# Patient Record
Sex: FEMALE | Race: WHITE | NOT HISPANIC OR LATINO | Employment: UNEMPLOYED | URBAN - METROPOLITAN AREA
[De-identification: names, ages, dates, MRNs, and addresses within clinical notes are randomized per-mention and may not be internally consistent; named-entity substitution may affect disease eponyms.]

---

## 2019-07-03 ENCOUNTER — HOSPITAL ENCOUNTER (EMERGENCY)
Facility: HOSPITAL | Age: 59
Discharge: HOME | End: 2019-07-03
Attending: EMERGENCY MEDICINE | Admitting: EMERGENCY MEDICINE
Payer: COMMERCIAL

## 2019-07-03 ENCOUNTER — APPOINTMENT (EMERGENCY)
Dept: RADIOLOGY | Facility: HOSPITAL | Age: 59
End: 2019-07-03
Payer: COMMERCIAL

## 2019-07-03 ENCOUNTER — OFFICE VISIT (OUTPATIENT)
Dept: URGENT CARE | Age: 59
End: 2019-07-03
Payer: COMMERCIAL

## 2019-07-03 VITALS
TEMPERATURE: 97.5 F | DIASTOLIC BLOOD PRESSURE: 94 MMHG | RESPIRATION RATE: 20 BRPM | HEART RATE: 76 BPM | OXYGEN SATURATION: 96 % | WEIGHT: 293 LBS | SYSTOLIC BLOOD PRESSURE: 148 MMHG | HEIGHT: 65 IN | BODY MASS INDEX: 48.82 KG/M2

## 2019-07-03 VITALS
WEIGHT: 293 LBS | TEMPERATURE: 98.4 F | SYSTOLIC BLOOD PRESSURE: 137 MMHG | BODY MASS INDEX: 48.82 KG/M2 | OXYGEN SATURATION: 93 % | HEART RATE: 72 BPM | DIASTOLIC BLOOD PRESSURE: 67 MMHG | HEIGHT: 65 IN | RESPIRATION RATE: 16 BRPM

## 2019-07-03 DIAGNOSIS — R63.5 ABNORMAL WEIGHT GAIN: Primary | ICD-10-CM

## 2019-07-03 DIAGNOSIS — R06.02 SHORTNESS OF BREATH: ICD-10-CM

## 2019-07-03 DIAGNOSIS — R06.02 SOB (SHORTNESS OF BREATH): Primary | ICD-10-CM

## 2019-07-03 LAB
ALBUMIN SERPL BCP-MCNC: 3.7 G/DL (ref 3.5–5)
ALP SERPL-CCNC: 83 U/L (ref 46–116)
ALT SERPL W P-5'-P-CCNC: 24 U/L (ref 12–78)
ANION GAP SERPL CALCULATED.3IONS-SCNC: 3 MMOL/L (ref 4–13)
AST SERPL W P-5'-P-CCNC: 12 U/L (ref 5–45)
BASOPHILS # BLD AUTO: 0.02 THOUSANDS/ΜL (ref 0–0.1)
BASOPHILS NFR BLD AUTO: 0 % (ref 0–1)
BILIRUB SERPL-MCNC: 0.4 MG/DL (ref 0.2–1)
BUN SERPL-MCNC: 16 MG/DL (ref 5–25)
CALCIUM SERPL-MCNC: 9.9 MG/DL (ref 8.3–10.1)
CHLORIDE SERPL-SCNC: 105 MMOL/L (ref 100–108)
CO2 SERPL-SCNC: 28 MMOL/L (ref 21–32)
CREAT SERPL-MCNC: 0.91 MG/DL (ref 0.6–1.3)
EOSINOPHIL # BLD AUTO: 0.15 THOUSAND/ΜL (ref 0–0.61)
EOSINOPHIL NFR BLD AUTO: 2 % (ref 0–6)
ERYTHROCYTE [DISTWIDTH] IN BLOOD BY AUTOMATED COUNT: 13.4 % (ref 11.6–15.1)
GFR SERPL CREATININE-BSD FRML MDRD: 70 ML/MIN/1.73SQ M
GLUCOSE SERPL-MCNC: 86 MG/DL (ref 65–140)
HCT VFR BLD AUTO: 41 % (ref 34.8–46.1)
HGB BLD-MCNC: 13.3 G/DL (ref 11.5–15.4)
IMM GRANULOCYTES # BLD AUTO: 0.02 THOUSAND/UL (ref 0–0.2)
IMM GRANULOCYTES NFR BLD AUTO: 0 % (ref 0–2)
LYMPHOCYTES # BLD AUTO: 1.63 THOUSANDS/ΜL (ref 0.6–4.47)
LYMPHOCYTES NFR BLD AUTO: 22 % (ref 14–44)
MCH RBC QN AUTO: 30.3 PG (ref 26.8–34.3)
MCHC RBC AUTO-ENTMCNC: 32.4 G/DL (ref 31.4–37.4)
MCV RBC AUTO: 93 FL (ref 82–98)
MONOCYTES # BLD AUTO: 0.88 THOUSAND/ΜL (ref 0.17–1.22)
MONOCYTES NFR BLD AUTO: 12 % (ref 4–12)
NEUTROPHILS # BLD AUTO: 4.66 THOUSANDS/ΜL (ref 1.85–7.62)
NEUTS SEG NFR BLD AUTO: 64 % (ref 43–75)
NRBC BLD AUTO-RTO: 0 /100 WBCS
NT-PROBNP SERPL-MCNC: 65 PG/ML
PLATELET # BLD AUTO: 196 THOUSANDS/UL (ref 149–390)
PMV BLD AUTO: 11.1 FL (ref 8.9–12.7)
POTASSIUM SERPL-SCNC: 3.9 MMOL/L (ref 3.5–5.3)
PROT SERPL-MCNC: 7.7 G/DL (ref 6.4–8.2)
RBC # BLD AUTO: 4.39 MILLION/UL (ref 3.81–5.12)
SODIUM SERPL-SCNC: 136 MMOL/L (ref 136–145)
TROPONIN I SERPL-MCNC: <0.02 NG/ML
WBC # BLD AUTO: 7.36 THOUSAND/UL (ref 4.31–10.16)

## 2019-07-03 PROCEDURE — 85025 COMPLETE CBC W/AUTO DIFF WBC: CPT | Performed by: EMERGENCY MEDICINE

## 2019-07-03 PROCEDURE — 93005 ELECTROCARDIOGRAM TRACING: CPT

## 2019-07-03 PROCEDURE — 99213 OFFICE O/P EST LOW 20 MIN: CPT | Performed by: NURSE PRACTITIONER

## 2019-07-03 PROCEDURE — 80053 COMPREHEN METABOLIC PANEL: CPT | Performed by: EMERGENCY MEDICINE

## 2019-07-03 PROCEDURE — 71046 X-RAY EXAM CHEST 2 VIEWS: CPT

## 2019-07-03 PROCEDURE — 84484 ASSAY OF TROPONIN QUANT: CPT | Performed by: EMERGENCY MEDICINE

## 2019-07-03 PROCEDURE — 73110 X-RAY EXAM OF WRIST: CPT

## 2019-07-03 PROCEDURE — 83880 ASSAY OF NATRIURETIC PEPTIDE: CPT | Performed by: EMERGENCY MEDICINE

## 2019-07-03 PROCEDURE — 99284 EMERGENCY DEPT VISIT MOD MDM: CPT | Performed by: EMERGENCY MEDICINE

## 2019-07-03 PROCEDURE — 36415 COLL VENOUS BLD VENIPUNCTURE: CPT

## 2019-07-03 PROCEDURE — 99285 EMERGENCY DEPT VISIT HI MDM: CPT

## 2019-07-03 RX ORDER — LISINOPRIL AND HYDROCHLOROTHIAZIDE 12.5; 1 MG/1; MG/1
1 TABLET ORAL DAILY
Refills: 1 | COMMUNITY
Start: 2019-05-08 | End: 2019-07-10

## 2019-07-03 RX ORDER — ACETAMINOPHEN 500 MG
500 TABLET ORAL EVERY 6 HOURS PRN
COMMUNITY

## 2019-07-03 NOTE — DISCHARGE INSTRUCTIONS
You were seen in the emergency department today for shortness of breath  Please proceed with your plan to establish with a new primary care provider as soon as possible to discuss outpatient management of your leg swelling and increasing your level of activity  Continue taking your current medications as prescribed in the meantime

## 2019-07-03 NOTE — ED PROVIDER NOTES
History  Chief Complaint   Patient presents with    Shortness of Breath     pt reports increased swelling in legs, SOB and weight gain for the past 6 weeks  (25 lbs in 6 weeks)      Ms Brody High presents with several months of increasing shortness of breath and leg swelling  She went to an urgent care earlier today due to non-traumatic wrist pain, but was told to go to the ED when she mentioned her shortness of breath and leg swelling  She reports that she has gained 25 pounds over the last 6 weeks  She smoked 1 ppd from age 17 until this past December but denies baseline shortness of breath  Her shortness of breath is worse on exertion but she denies any chest pain, pain in arm(s), indigestion, lightheadedness, syncope, or palpitations at rest or on exertion  She is unable to walk up a full flight of stairs due to SOB  She does not have a history of heart problems herself but her father  from heart problems in his late 52's, his brothers and sisters at about the same age, and her mother had two heart attacks before passing away from a AAA  She denies any back or abdominal pain, numbness or tingling, HA, vision changes, cough, wheezing, recent illnesses  She occassionally has lightheadedness when she stands up quickly from laying down  She has had L wrist pain for several weeks, not made worse by anything in particular but relieved by holding it in neutral position and wrapping it in an ACE bandage  She has mild numbness in her palm but not in her fingers         History provided by:  Patient   used: No    Shortness of Breath   Severity:  Moderate  Onset quality:  Gradual  Duration:  6 months  Progression:  Worsening  Chronicity:  Chronic  Context: activity    Relieved by:  Rest  Worsened by:  Exertion  Associated symptoms: no abdominal pain, no chest pain, no claudication, no cough, no diaphoresis, no fever, no headaches, no neck pain, no rash, no sore throat, no syncope and no vomiting    Risk factors: obesity and tobacco use    Risk factors: no hx of PE/DVT    Wrist Pain   Location:  Volar L wrist  Quality:  Dull  Severity:  Mild  Onset quality:  Gradual  Duration:  6 weeks  Chronicity:  New  Associated symptoms: shortness of breath    Associated symptoms: no abdominal pain, no chest pain, no cough, no diarrhea, no fatigue, no fever, no headaches, no myalgias, no nausea, no rash, no rhinorrhea, no sore throat and no vomiting        Prior to Admission Medications   Prescriptions Last Dose Informant Patient Reported? Taking?   acetaminophen (TYLENOL) 500 mg tablet 7/3/2019 at Unknown time Self Yes Yes   Sig: Take 500 mg by mouth every 6 (six) hours as needed for mild pain   lisinopril-hydrochlorothiazide (PRINZIDE,ZESTORETIC) 10-12 5 MG per tablet Past Week at Unknown time  Yes Yes   Sig: Take 1 tablet by mouth daily      Facility-Administered Medications: None       Past Medical History:   Diagnosis Date    COPD (chronic obstructive pulmonary disease) ([facility] [address] )     Hypertension        Past Surgical History:   Procedure Laterality Date    HYSTERECTOMY      REPLACEMENT TOTAL KNEE         Family History   Problem Relation Age of Onset    Other Mother     Heart failure Father     Diabetes Father      I have reviewed and agree with the history as documented  Social History     Tobacco Use    Smoking status: Former Smoker    Smokeless tobacco: Never Used   Substance Use Topics    Alcohol use: Never     Frequency: Never    Drug use: Never        Review of Systems   Constitutional: Positive for activity change (less)  Negative for chills, diaphoresis, fatigue and fever  HENT: Negative for rhinorrhea and sore throat  Eyes: Negative for visual disturbance  Respiratory: Positive for shortness of breath  Negative for cough  Cardiovascular: Positive for leg swelling  Negative for chest pain, palpitations, claudication and syncope     Gastrointestinal: Negative for abdominal distention, abdominal pain, blood in stool, diarrhea, nausea and vomiting  Genitourinary: Negative for decreased urine volume, difficulty urinating, flank pain, hematuria and pelvic pain  Musculoskeletal: Negative for arthralgias, back pain, myalgias and neck pain  Skin: Negative for rash and wound  Neurological: Negative for dizziness, syncope, weakness, light-headedness, numbness and headaches  Hematological: Does not bruise/bleed easily  Psychiatric/Behavioral: Negative for confusion  Physical Exam  ED Triage Vitals [07/03/19 1143]   Temperature Pulse Respirations Blood Pressure SpO2   98 4 °F (36 9 °C) 79 20 (!) 176/91 94 %      Temp Source Heart Rate Source Patient Position - Orthostatic VS BP Location FiO2 (%)   Tympanic Monitor Lying Right arm --      Pain Score       5             Orthostatic Vital Signs  Vitals:    07/03/19 1143 07/03/19 1204 07/03/19 1400 07/03/19 1415   BP: (!) 176/91 157/83 137/67 137/67   Pulse: 79 73 74 72   Patient Position - Orthostatic VS: Lying Lying Lying Lying       Physical Exam   Constitutional: She is oriented to person, place, and time  She appears well-developed and well-nourished  No distress  HENT:   Head: Normocephalic and atraumatic  Mouth/Throat: Oropharynx is clear and moist  No oropharyngeal exudate  Eyes: Conjunctivae are normal  No scleral icterus  Neck: Neck supple  No thyromegaly present  Cardiovascular: Normal rate and regular rhythm  1+ b/l posterior tibial pulses, 2+ bl radial pulses   Pulmonary/Chest: Effort normal and breath sounds normal  No stridor  No respiratory distress  Abdominal: Soft  She exhibits no distension and no mass  There is no tenderness  There is no guarding  Musculoskeletal: She exhibits no deformity  Right lower leg: She exhibits edema (3+)  Left lower leg: She exhibits edema (3+)  Lymphadenopathy:     She has no cervical adenopathy     Neurological: She is alert and oriented to person, place, and time  Skin: Skin is warm and dry  Capillary refill takes less than 2 seconds  No rash noted  She is not diaphoretic  Psychiatric: She has a normal mood and affect  Her behavior is normal    Vitals reviewed        ED Medications  Medications - No data to display    Diagnostic Studies  Results Reviewed     Procedure Component Value Units Date/Time    Troponin I [[id number]]  (Normal) Collected:  07/03/19 1211    Lab Status:  Final result Specimen:  Blood from Arm, Left Updated:  07/03/19 1246     Troponin I <0 02 ng/mL     Comprehensive metabolic panel [[id number]]  (Abnormal) Collected:  07/03/19 1211    Lab Status:  Final result Specimen:  Blood from Arm, Left Updated:  07/03/19 1242     Sodium 136 mmol/L      Potassium 3 9 mmol/L      Chloride 105 mmol/L      CO2 28 mmol/L      ANION GAP 3 mmol/L      BUN 16 mg/dL      Creatinine 0 91 mg/dL      Glucose 86 mg/dL      Calcium 9 9 mg/dL      AST 12 U/L      ALT 24 U/L      Alkaline Phosphatase 83 U/L      Total Protein 7 7 g/dL      Albumin 3 7 g/dL      Total Bilirubin 0 40 mg/dL      eGFR 70 ml/min/1 73sq m     Narrative:       Lucero guidelines for Chronic Kidney Disease (CKD):     Stage 1 with normal or high GFR (GFR > 90 mL/min/1 73 square meters)    Stage 2 Mild CKD (GFR = 60-89 mL/min/1 73 square meters)    Stage 3A Moderate CKD (GFR = 45-59 mL/min/1 73 square meters)    Stage 3B Moderate CKD (GFR = 30-44 mL/min/1 73 square meters)    Stage 4 Severe CKD (GFR = 15-29 mL/min/1 73 square meters)    Stage 5 End Stage CKD (GFR <15 mL/min/1 73 square meters)  Note: GFR calculation is accurate only with a steady state creatinine    NT-BNP PRO (BNP for [place], [place], [place], [place], [place], [place], [place], [place] campuses) [[id number]]  (Normal) Collected:  07/03/19 1211    Lab Status:  Final result Specimen:  Blood from Arm, Left Updated:  07/03/19 1242     NT-proBNP 65 pg/mL     CBC and differential [[id number]] Collected:  07/03/19 1211    Lab Status: Final result Specimen:  Blood from Arm, Left Updated:  07/03/19 1220     WBC 7 36 Thousand/uL      RBC 4 39 Million/uL      Hemoglobin 13 3 g/dL      Hematocrit 41 0 %      MCV 93 fL      MCH 30 3 pg      MCHC 32 4 g/dL      RDW 13 4 %      MPV 11 1 fL      Platelets 488 Thousands/uL      nRBC 0 /100 WBCs      Neutrophils Relative 64 %      Immat GRANS % 0 %      Lymphocytes Relative 22 %      Monocytes Relative 12 %      Eosinophils Relative 2 %      Basophils Relative 0 %      Neutrophils Absolute 4 66 Thousands/µL      Immature Grans Absolute 0 02 Thousand/uL      Lymphocytes Absolute 1 63 Thousands/µL      Monocytes Absolute 0 88 Thousand/µL      Eosinophils Absolute 0 15 Thousand/µL      Basophils Absolute 0 02 Thousands/µL                  XR wrist 3+ views LEFT   Final Result by Tarun Jimenez MD (07/03 2372)      No acute osseous abnormality  Workstation performed: [id number]         XR chest 2 views   Final Result by Aamir Woodward MD (07/03 0385)      No acute cardiopulmonary disease  Workstation performed: [id number]               Procedures  Procedures        ED Course                               MDM  Number of Diagnoses or Management Options  SOB (shortness of breath): new and requires workup  Diagnosis management comments: Patient does not have tachycardia, low O2 saturation, unilateral leg swelling or tenderness  Patient has a normal pulmonary, cardiac, and abdominal exam and she has bilaterally symmetric pulses in her extremities  She has a negative troponin and labs including pro-BNP within normal limits  She has an established plan to get a new PCP         Amount and/or Complexity of Data Reviewed  Clinical lab tests: ordered and reviewed  Tests in the radiology section of CPT®: ordered and reviewed  Tests in the medicine section of CPT®: ordered and reviewed  Discussion of test results with the performing providers: yes  Review and summarize past medical records: yes  Discuss the patient with other providers: yes  Independent visualization of images, tracings, or specimens: yes    Risk of Complications, Morbidity, and/or Mortality  Presenting problems: low  Diagnostic procedures: minimal  Management options: minimal    Patient Progress  Patient progress: stable      Disposition  Final diagnoses:   SOB (shortness of breath)     Time reflects when diagnosis was documented in both MDM as applicable and the Disposition within this note     Time User Action Codes Description Comment    7/3/2019  2:45 PM Codey Giron Add [R06 02] SOB (shortness of breath)       ED Disposition     ED Disposition Condition Date/Time Comment    Discharge Good Wed Jul 3, 2019  3:15 PM Shimon Nicolas discharge to home/self care  Her labs are reassuring and she reports feeling well enough to go home  She is planning to get a new PCP ASAP  Encouraged to return to ED if she experiences chest pain, worsening shortness of michael th  Follow-up Information    None         Discharge Medication List as of 7/3/2019  2:48 PM      CONTINUE these medications which have NOT CHANGED    Details   acetaminophen (TYLENOL) 500 mg tablet Take 500 mg by mouth every 6 (six) hours as needed for mild pain, Historical Med      lisinopril-hydrochlorothiazide (PRINZIDE,ZESTORETIC) 10-12 5 MG per tablet Take 1 tablet by mouth daily, Starting Wed 5/8/2019, Historical Med           No discharge procedures on file  ED Provider  Attending physically available and evaluated Shimon Nicolas  I managed the patient along with the ED Attending      Electronically Signed by         Chelsea Sharif, DO  07/03/19 1700 [facility], DO  07/04/19 1849

## 2019-07-03 NOTE — PATIENT INSTRUCTIONS
Shortness of Breath   WHAT YOU NEED TO KNOW:   Shortness of breath is a feeling that you cannot get enough air when you breathe in  You may have this feeling only during activity, or all the time  Your symptoms can range from mild to severe  Shortness of breath may be a sign of a serious health condition that needs immediate care  DISCHARGE INSTRUCTIONS:   Return to the emergency department if:   · Your signs and symptoms are the same or worse within 24 hours of treatment  · The skin over your ribs or on your neck sinks in when you breathe  · You feel confused or dizzy  Contact your healthcare provider if:   · You have new or worsening symptoms  · You have questions or concerns about your condition or care  Medicines:   · Medicines  may be used to treat the cause of your symptoms  You may need medicine to treat a bacterial infection or reduce anxiety  Other medicines may be used to open your airway, reduce swelling, or remove extra fluid  If you have a heart condition, you may need medicine to help your heart beat more strongly or regularly  · Take your medicine as directed  Contact your healthcare provider if you think your medicine is not helping or if you have side effects  Tell him or her if you are allergic to any medicine  Keep a list of the medicines, vitamins, and herbs you take  Include the amounts, and when and why you take them  Bring the list or the pill bottles to follow-up visits  Carry your medicine list with you in case of an emergency  Manage shortness of breath:   · Create an action plan  You and your healthcare provider can work together to create a plan for how to handle shortness of breath  The plan can include daily activities, treatment changes, and what to do if you have severe breathing problems  · Lean forward on your elbows when you sit  This helps your lungs expand and may make it easier to breathe  · Use pursed-lip breathing any time you feel short of breath  Breathe in through your nose and then slowly breathe out through your mouth with your lips slightly puckered  It should take you twice as long to breathe out as it did to breathe in  · Do not smoke  Nicotine and other chemicals in cigarettes and cigars can cause lung damage and make shortness of breath worse  Ask your healthcare provider for information if you currently smoke and need help to quit  E-cigarettes or smokeless tobacco still contain nicotine  Talk to your healthcare provider before you use these products  · Reach or maintain a healthy weight  Your healthcare provider can help you create a safe weight loss plan if you are overweight  · Exercise as directed  Exercise can help your lungs work more easily  Exercise can also help you lose weight if needed  Try to get at least 30 minutes of exercise most days of the week  Follow up with your healthcare provider or specialist as directed:  Write down your questions so you remember to ask them during your visits  © 2017 [employer] Information is for End User's use only and may not be sold, redistributed or otherwise used for commercial purposes  All illustrations and images included in CareNotes® are the copyrighted property of [employer]  or [employer]  The above information is an  only  It is not intended as medical advice for individual conditions or treatments  Talk to your doctor, nurse or pharmacist before following any medical regimen to see if it is safe and effective for you

## 2019-07-03 NOTE — PROGRESS NOTES
[facility]        NAME: Dianne Mehta is a 64 y o  female  : 1960    MRN: [id number]  DATE: July 3, 2019  TIME: 11:16 AM    Assessment and Plan   Abnormal weight gain [R63 5]  1  Abnormal weight gain  Transfer to other facility   2  Shortness of breath  Transfer to other facility         Patient Instructions     Transferred to the ED in [place]  R/o CHF  Follow up with PCP in 3-5 days  Proceed to  ER if symptoms worsen  Chief Complaint     Chief Complaint   Patient presents with    Hand Pain     Pt complaining of L hand pain x2 days  Reports inability to move hand, does not recall an injury  Arrived with an ace wrap in place   Foot Swelling     Pt complaining of B/L foot swelling since   She reports a weight gain of 25 pounds in 1 5 months and is having SOB with exertion  Denies history of CHF  History of Present Illness       HPI   Reports SOB with recent weight gain of about 25 Ibs, in the last 6 weeks or less  Has a strong family Hx of CHF  Has not been diagnosed with CHF but is severely obese  Review of Systems   Review of Systems   Constitutional: Positive for fatigue  Respiratory: Positive for shortness of breath (at rest and with activity)  Negative for cough, chest tightness and wheezing  Cardiovascular: Negative for chest pain  Neurological: Negative for dizziness and headaches           Current Medications       Current Outpatient Medications:     lisinopril-hydrochlorothiazide (PRINZIDE,ZESTORETIC) 10-12 5 MG per tablet, Take 1 tablet by mouth daily, Disp: , Rfl: 1    Current Allergies     Allergies as of 2019    (No Known Allergies)            The following portions of the patient's history were reviewed and updated as appropriate: allergies, current medications, past family history, past medical history, past social history, past surgical history and problem list      Past Medical History:   Diagnosis Date    COPD [facility]chronic obstructive pulmonary disease) ([facility] [address] )     Hypertension        Past Surgical History:   Procedure Laterality Date    HYSTERECTOMY      REPLACEMENT TOTAL KNEE         Family History   Problem Relation Age of Onset    Other Mother     Heart failure Father     Diabetes Father          Medications have been verified  Objective   /94 (BP Location: Right arm, Patient Position: Sitting)   Pulse 76   Temp 97 5 °F (36 4 °C) (Temporal)   Resp 20   Ht 5' 5" (1 651 m)   Wt (!) 156 kg (345 lb)   SpO2 96%   BMI 57 41 kg/m²        Physical Exam     Physical Exam   Cardiovascular: Normal rate and regular rhythm     Swelling of the feet   Pulmonary/Chest: Effort normal    Mild congestion in the right lower lung

## 2019-07-04 LAB
ATRIAL RATE: 170 BPM
QRS AXIS: 54 DEGREES
QRSD INTERVAL: 92 MS
QT INTERVAL: 380 MS
QTC INTERVAL: 430 MS
T WAVE AXIS: 69 DEGREES
VENTRICULAR RATE: 77 BPM

## 2019-07-04 PROCEDURE — 93010 ELECTROCARDIOGRAM REPORT: CPT | Performed by: INTERNAL MEDICINE

## 2019-07-04 NOTE — ED ATTENDING ATTESTATION
Joce Vazquez DO, saw and evaluated the patient  I have discussed the patient with the resident/non-physician practitioner and agree with the resident's/non-physician practitioner's findings, Plan of Care, and MDM as documented in the resident's/non-physician practitioner's note, except where noted  All available labs and Radiology studies were reviewed  I was present for key portions of any procedure(s) performed by the resident/non-physician practitioner and I was immediately available to provide assistance  At this point I agree with the current assessment done in the Emergency Department  I have conducted an independent evaluation of this patient a history and physical is as follows:    61-year-old female complain of increased shortness of breath and bilateral leg swelling  Patient also complains of atraumatic left wrist pain  Patient states he has had approximately 25 lb weight gain over last 6 weeks  She states her shortness of breath is limiting her to significant immobility has become symptomatic with shortness breath while walking up stairs  Denies chest pain, fevers, chills  No change in medications  Denies any trauma to the left wrist   CXR, labs reviewed and within normal range  Left wrist xray unremarkable  Recommend f/u PCP      Critical Care Time  Procedures

## 2019-07-09 PROBLEM — Z76.89 ENCOUNTER TO ESTABLISH CARE: Status: ACTIVE | Noted: 2019-07-09

## 2019-07-09 NOTE — PROGRESS NOTES
Assessment/Plan:      Hyperlipidemia  - last lipid panel done on May 1st, 2019 showed a total cholesterol of 228, triglyceride of 144, HDL of 48 and LDL of 151  -calculated 10 year ASCVD risk is 3 8% with the recommendation for diet and exercise  -patient has been counseled to diet and exercise    Elevated blood pressure  - patient declines a diagnosis of hypertension and states that she believes that her elevated blood pressures are likely secondary to white coat hypertension  -she has been counseled to check her blood pressures at home  - continue with lisinopril for now    Reactive depression  - will start patient on Zoloft 25 mg daily and she has been counseled that she may increase the dose to 50 mg daily in 1-2 weeks  - will check a TSH level  -follow-up in 2 weeks  - patient may benefit from psychotherapy    Paresthesia of left upper extremity  - will refer patient for an EMG    Mixed stress and urge incontinence  - status post a bladder sling operation  - will order urinalysis with microscopy and reflex to culture and sensitivity to rule out a UTI  -will refer patient to Urogynecology    Breast cancer screen  - will give patient a script for mammogram    Hepatitis-C screening  - patient was born in [facility] and has never had a hepatitis-C antibody checked  -will order hepatitis C antibody    Colon cancer screening  - patient has never had a colonoscopy done  -will refer patient to Gastroenterology for a colonoscopy    Cervical cancer screen  - patient had a total hysterectomy about 10 years ago and so does not qualify for further Pap smears    COPD  - patient is currently not using inhalers because she states that the albuterol inhaler causes her to have tachycardia and palpitations  - I counseled that she may benefit from Xopenex inhaler     Diagnoses and all orders for this visit:    Encounter to establish care    Screening for breast cancer  -     Mammo screening bilateral w cad;  Future    Screening for colon cancer  -     Ambulatory referral to Gastroenterology; Future    Cervical cancer screening    Need for hepatitis C screening test  -     Hepatitis C antibody    Elevated blood pressure, situational    Mixed stress and urge urinary incontinence  -     Ambulatory referral to Urogynecology; Future    Chronic obstructive pulmonary disease, unspecified COPD type ([facility] )    Paresthesia of left upper extremity  -     EMG 1 Limb; Future    Reactive depression  -     TSH, 3rd generation with Free T4 reflex; Future  -     sertraline (ZOLOFT) 25 mg tablet; Take 1 tablet (25 mg total) by mouth daily    Morbid obesity ([facility] )    Other orders  -     Cancel: Ambulatory referral to Obstetrics / Gynecology; Future  -     lisinopril (ZESTRIL) 5 mg tablet; Take 5 mg by mouth daily          Subjective:      Patient ID: Tucker Almanzar is a 64 y o  female  HPI  Pt presents to [facility] care  Her last primary care physician, whom she was with for a long time, does not accept her new insurance  She has a history of ? Hypertension  She states that her blood pressure tends to be elevated at her doctors office visits so she was started on lisinopril which she rarely takes  She has not taken it in almost a week  She states that she checks her blood pressures at home and they are always normal   She usually forgets to take her lisinopril and states that she only takes it about once a week  She has no family hx of HTN  She has a history of COPD  She states that she smoked a pack a day of cigarettes for about 40 years and quit about 6 months ago  She believes that she has a history of depression  Patient admits to feelings of depression, poor interest, fatigue, weight gain, excessive eating, and sleep disturbance but denies poor concentration, suicidal ideation or homicidal ideation  She also denies feelings of anxiety    She states that she has a severe history of urge and stress incontinence as well as nocturia which dates back to many years ago when she had a bladder sling surgery done  She states that she does not sleep well because she is up every hour to urinate and believes that this affects her mood  She also admits to urinary frequency during the day but denies hematuria, suprapubic pain, abdominal pain, nausea, vomiting, flank pain  Of note she denies fever, chills, night sweats, chest pain, shortness of breath, palpitations, nausea, vomiting, abdominal pain, diarrhea, constipation  Patient also complains of numbness of her 3rd and 4th left hand digits  She states that she woke up 1 morning, about 1 week ago with pain in her left hand which has since resolved but the numbness persists  Numbness usually only occurs every morning for about 1 hour after she wakes up from sleep and then resolves  She admits to a feeling of cold in left hand  She admits to a prior history of carpal tunnel syndrome but states that the symptoms she has now are completely different  She has a history of complete hysterectomy and her last Pap smear was about 10 years ago prior to the hysterectomy  Her last mammogram was 1 year ago  She has never had a colonoscopy done  She has also never been screened for hepatitis C infection  The following portions of the patient's history were reviewed and updated as appropriate:   She  has a past medical history of COPD (chronic obstructive pulmonary disease) ([address] ), Depression, High blood pressure, Hypertension, and Urinary incontinence    She   Patient Active Problem List    Diagnosis Date Noted    Elevated blood pressure, situational 07/10/2019    Mixed stress and urge urinary incontinence 07/10/2019    COPD (chronic obstructive pulmonary disease) ([address] ) 07/10/2019    Paresthesia of left upper extremity 07/10/2019    Mixed hyperlipidemia 07/10/2019    Morbid obesity ([address] ) 07/10/2019    Encounter to establish care 07/09/2019     She  has a past surgical history that includes Hysterectomy; Oophorectomy (Bilateral); Replacement total knee (Right, 2016); Total abdominal hysterectomy w/ bilateral salpingoophorectomy (2006); and Incontinence surgery  Her family history includes Aneurysm in her mother; Diabetes in her father; Heart failure in her father; Other in her mother  She  reports that she quit smoking about 6 months ago  Her smoking use included cigarettes  She has a 45 00 pack-year smoking history  She has never used smokeless tobacco  She reports that she has current or past drug history  Drug: Marijuana  She reports that she does not drink alcohol  Current Outpatient Medications   Medication Sig Dispense Refill    acetaminophen (TYLENOL) 500 mg tablet Take 500 mg by mouth every 6 (six) hours as needed for mild pain      lisinopril (ZESTRIL) 5 mg tablet Take 5 mg by mouth daily      sertraline (ZOLOFT) 25 mg tablet Take 1 tablet (25 mg total) by mouth daily 60 tablet 0     No current facility-administered medications for this visit  Current Outpatient Medications on File Prior to Visit   Medication Sig    acetaminophen (TYLENOL) 500 mg tablet Take 500 mg by mouth every 6 (six) hours as needed for mild pain    lisinopril (ZESTRIL) 5 mg tablet Take 5 mg by mouth daily    [DISCONTINUED] lisinopril-hydrochlorothiazide (PRINZIDE,ZESTORETIC) 10-12 5 MG per tablet Take 1 tablet by mouth daily     No current facility-administered medications on file prior to visit  She has No Known Allergies       Review of Systems   Constitutional: Positive for fatigue and unexpected weight change (gained 40 lbs in 6 months)  Negative for activity change, chills and fever  HENT: Negative for ear pain, postnasal drip, rhinorrhea, sinus pressure and sore throat  Eyes: Negative for pain  Respiratory: Positive for shortness of breath  Negative for cough, choking, chest tightness and wheezing  Cardiovascular: Negative for chest pain, palpitations and leg swelling     Gastrointestinal: Negative for abdominal pain, constipation, diarrhea, nausea and vomiting  Genitourinary: Positive for frequency and urgency  Negative for dysuria and hematuria  Incontinence   Musculoskeletal: Negative for arthralgias, back pain, gait problem, joint swelling, myalgias and neck stiffness  Skin: Negative for pallor and rash  Neurological: Negative for dizziness, tremors, seizures, syncope, light-headedness and headaches  Hematological: Negative for adenopathy  Psychiatric/Behavioral: Positive for dysphoric mood and sleep disturbance (secondary to nocturia)  Negative for behavioral problems and decreased concentration  The patient is not nervous/anxious           Poor ambition         Past Medical History:   Diagnosis Date    COPD [facility]chronic obstructive pulmonary disease) ([facility]     Depression     High blood pressure     Hypertension     Urinary incontinence          Current Outpatient Medications:     acetaminophen (TYLENOL) 500 mg tablet, Take 500 mg by mouth every 6 (six) hours as needed for mild pain, Disp: , Rfl:     lisinopril (ZESTRIL) 5 mg tablet, Take 5 mg by mouth daily, Disp: , Rfl:     sertraline (ZOLOFT) 25 mg tablet, Take 1 tablet (25 mg total) by mouth daily, Disp: 60 tablet, Rfl: 0    No Known Allergies    Social History   Past Surgical History:   Procedure Laterality Date    HYSTERECTOMY      INCONTINENCE SURGERY      OOPHORECTOMY Bilateral     REPLACEMENT TOTAL KNEE Right 2016    TOTAL ABDOMINAL HYSTERECTOMY W/ BILATERAL SALPINGOOPHORECTOMY  2006     Family History   Problem Relation Age of Onset    Other Mother     Aneurysm Mother         aortic    Heart failure Father     Diabetes Father        Objective:  /84 (BP Location: Left arm, Patient Position: Sitting, Cuff Size: Large)   Pulse 94   Temp 98 °F (36 7 °C) (Tympanic)   Ht 5' 5" (1 651 m)   Wt (!) 152 kg (335 lb 9 6 oz)   SpO2 95%   BMI 55 85 kg/m²        Physical Exam   Constitutional: She is oriented to person, place, and time  She appears well-developed and well-nourished  No distress  Morbidly obese   HENT:   Head: Normocephalic and atraumatic  Right Ear: External ear normal    Left Ear: External ear normal    Nose: Nose normal    Mouth/Throat: Oropharynx is clear and moist  No oropharyngeal exudate  Eyes: Pupils are equal, round, and reactive to light  Conjunctivae and EOM are normal  Right eye exhibits no discharge  Left eye exhibits no discharge  No scleral icterus  Neck: Normal range of motion  Neck supple  No JVD present  No tracheal deviation present  No thyromegaly present  Cardiovascular: Normal rate, regular rhythm, normal heart sounds and intact distal pulses  Exam reveals no gallop and no friction rub  No murmur heard  Pulmonary/Chest: Effort normal and breath sounds normal  No respiratory distress  She has no wheezes  She has no rales  She exhibits no tenderness  Abdominal: Soft  Bowel sounds are normal  She exhibits no distension and no mass  There is no tenderness  There is no rebound and no guarding  Obese abdomen   Musculoskeletal: Normal range of motion  She exhibits edema (Trace to 1+ pitting pedal edema in bilateral lower extremities)  She exhibits no tenderness or deformity  Lymphadenopathy:     She has no cervical adenopathy  Neurological: She is alert and oriented to person, place, and time  She has normal reflexes  No cranial nerve deficit  She exhibits normal muscle tone  Coordination normal    Skin: Skin is warm and dry  No rash noted  She is not diaphoretic  No erythema  No pallor  Psychiatric: Her behavior is normal    Patient's affect is depressed, with very easy tearing throughout the visit

## 2019-07-10 ENCOUNTER — OFFICE VISIT (OUTPATIENT)
Dept: INTERNAL MEDICINE CLINIC | Age: 59
End: 2019-07-10
Payer: COMMERCIAL

## 2019-07-10 VITALS
DIASTOLIC BLOOD PRESSURE: 84 MMHG | SYSTOLIC BLOOD PRESSURE: 122 MMHG | HEART RATE: 94 BPM | HEIGHT: 65 IN | OXYGEN SATURATION: 95 % | WEIGHT: 293 LBS | TEMPERATURE: 98 F | BODY MASS INDEX: 48.82 KG/M2

## 2019-07-10 DIAGNOSIS — E66.01 MORBID OBESITY (HCC): ICD-10-CM

## 2019-07-10 DIAGNOSIS — N39.46 MIXED STRESS AND URGE URINARY INCONTINENCE: ICD-10-CM

## 2019-07-10 DIAGNOSIS — Z12.11 SCREENING FOR COLON CANCER: ICD-10-CM

## 2019-07-10 DIAGNOSIS — R03.0 ELEVATED BLOOD PRESSURE, SITUATIONAL: ICD-10-CM

## 2019-07-10 DIAGNOSIS — F32.9 REACTIVE DEPRESSION: ICD-10-CM

## 2019-07-10 DIAGNOSIS — Z12.4 CERVICAL CANCER SCREENING: ICD-10-CM

## 2019-07-10 DIAGNOSIS — Z11.59 NEED FOR HEPATITIS C SCREENING TEST: ICD-10-CM

## 2019-07-10 DIAGNOSIS — Z12.39 SCREENING FOR BREAST CANCER: ICD-10-CM

## 2019-07-10 DIAGNOSIS — J44.9 CHRONIC OBSTRUCTIVE PULMONARY DISEASE, UNSPECIFIED COPD TYPE (HCC): ICD-10-CM

## 2019-07-10 DIAGNOSIS — R20.2 PARESTHESIA OF LEFT UPPER EXTREMITY: ICD-10-CM

## 2019-07-10 DIAGNOSIS — Z76.89 ENCOUNTER TO ESTABLISH CARE: Primary | ICD-10-CM

## 2019-07-10 PROBLEM — E78.2 MIXED HYPERLIPIDEMIA: Status: ACTIVE | Noted: 2019-07-10

## 2019-07-10 PROCEDURE — 1036F TOBACCO NON-USER: CPT | Performed by: INTERNAL MEDICINE

## 2019-07-10 PROCEDURE — 99204 OFFICE O/P NEW MOD 45 MIN: CPT | Performed by: INTERNAL MEDICINE

## 2019-07-10 PROCEDURE — 3008F BODY MASS INDEX DOCD: CPT | Performed by: INTERNAL MEDICINE

## 2019-07-10 RX ORDER — SERTRALINE HYDROCHLORIDE 25 MG/1
25 TABLET, FILM COATED ORAL DAILY
Qty: 60 TABLET | Refills: 0 | Status: SHIPPED | OUTPATIENT
Start: 2019-07-10 | End: 2020-03-06 | Stop reason: SDUPTHER

## 2019-07-10 RX ORDER — LISINOPRIL 5 MG/1
5 TABLET ORAL DAILY
COMMUNITY
End: 2020-03-06 | Stop reason: SDUPTHER

## 2019-07-10 NOTE — PATIENT INSTRUCTIONS
Basic Carbohydrate Counting   AMBULATORY CARE:   Carbohydrate counting  is a way to plan your meals by counting the amount of carbohydrate in foods  Carbohydrates are the sugars, starches, and fiber found in fruit, grains, vegetables, and milk products  Carbohydrates increase your blood sugar levels  Carbohydrate counting can help you eat the right amount of carbohydrate to keep your blood sugar levels under control  What you need to know about planning meals using carbohydrate counting:  · A dietitian or healthcare provider will help you develop a healthy meal plan that works best for you  You will be taught how much carbohydrate to eat or drink for each meal and snack  Your meal plan will be based on your age, weight, usual food intake, and physical activity level  If you have diabetes, it will also include your blood sugar levels and diabetes medicine  Once you know how much carbohydrate you should eat, you can decide what type of food you want to eat  · You will need to know what foods contain carbohydrate and how much they contain  Keep track of the amount of carbohydrate in meals and snacks in order to follow your meal plan  Do not avoid carbohydrates or skip meals  Your blood sugar may fall too low if you do not eat enough carbohydrate or you skip meals  Foods that contain carbohydrate:   · Breads:  Each serving of food listed below contains about 15 g of carbohydrate   ¨ 1 slice of bread (1 ounce) or 1 flour or corn tortilla (6 inch)    ¨ ½ of a hamburger bun or ¼ of a large bagel (about 1 ounce)    ¨ 1 pancake (about 4 inches across and ¼ inch thick)    · Cereals and grains:  Serving sizes of ready-to-eat cereals vary  Look at the serving size and the total carbohydrate amount listed on the food label  Each serving of food listed below contains about 15 g of carbohydrate       ¨ ¾ cup of dry, unsweetened, ready-to-eat cereal or ¼ cup of low-fat granola     ¨ ½ cup of oatmeal or other cooked cereal ¨ ? cup of cooked rice or pasta    · Starchy vegetables and beans:  Each serving of food listed below contains about 15 g of carbohydrate   ¨ ½ cup of corn, green peas, sweet potatoes, or mashed potatoes    ¨ ¼ of a large baked potato    ¨ ½ cup of beans, lentils, and peas (garbanzo, green, kidney, white, split, black-eyed)    · Crackers and snacks:  Each serving of food listed below contains about 15 g of carbohydrate   ¨ 3 noah cracker squares or 8 animal crackers     ¨ 6 saltine-type crackers    ¨ 3 cups of popcorn or ¾ ounce of pretzels, potato chips, or tortilla chips    · Fruit:  Each serving of food listed below contains about 15 g of carbohydrate   ¨ 1 small (4 ounce) piece of fresh fruit or ¾ to 1 cup of fresh fruit    ¨ ½ cup of canned or frozen fruit, packed in natural juice    ¨ ½ cup (4 ounces) of unsweetened fruit juice    ¨ 2 tablespoons of dried fruit    · Desserts or sugary foods:  Each serving of food listed below contains about 15 g of carbohydrate   ¨ 2-inch square unfrosted cake or brownie     ¨ 2 small cookies    ¨ ½ cup of ice cream, frozen yogurt, or nondairy frozen yogurt    ¨ ¼ cup of sherbet or sorbet    ¨ 1 tablespoon of regular syrup, jam, or jelly    ¨ 2 tablespoons of light syrup    · Milk and yogurt:  Foods from the milk group contain about 12 g of carbohydrate per serving  ¨ 1 cup of fat-free or low-fat milk    ¨ 1 cup of soy milk    ¨ ? cup of fat-free, yogurt sweetened with artificial sweetener    · Non-starchy vegetables:  Each serving contains about 5 g of carbohydrate   Three servings of non-starch vegetables count as 1 carbohydrate serving  ¨ ½ cup of cooked vegetables or 1 cup of raw vegetables  This includes beets, broccoli, cabbage, cauliflower, cucumber, mushrooms, tomatoes, and zucchini    ¨ ½ cup of vegetable juice  How to use carbohydrate counting to plan meals:   · Count carbohydrate amounts using serving sizes:      ¨ Pasta dinner example:   You plan to have pasta, tossed salad, and an 8-ounce glass of milk  Your healthcare provider tells you that you may have 4 carbohydrate servings for dinner  One carbohydrate serving of pasta is ? cup  One cup of pasta will equal 3 carbohydrate servings  An 8-ounce glass of milk will count as 1 carbohydrate serving  These amounts of food would equal 4 carbohydrate servings  One cup of tossed salad does not count toward your carbohydrate servings  · Count carbohydrate amounts using food labels:  Find the total amount of carbohydrate in a packaged food by reading the food label  Food labels tell you the serving size of the food and the total carbohydrate amount in each serving  Find the serving size on the food label and then decide how many servings you will eat  Multiply the number of servings you plan to eat by the carbohydrate amount per serving  ¨ Granola bar snack example: Your meal plan allows you to have 2 carbohydrate servings (30 grams) of carbohydrate for a snack  You plan to eat 1 package of granola bars, which contains 2 bars  According to the food label, the serving size of food in this package is 1 bar  Each serving (1 bar) contains 25 grams of carbohydrate  The total amount of carbohydrate in this package of granola bars would be 50 g  Based on your meal plan, you should eat only 1 bar  Follow up with your healthcare provider as directed:  Write down your questions so you remember to ask them during your visits  © 2017 [address] [employer] Information is for End User's use only and may not be sold, redistributed or otherwise used for commercial purposes  All illustrations and images included in CareNotes® are the copyrighted property of [employer]  or [employer]  The above information is an  only  It is not intended as medical advice for individual conditions or treatments   Talk to your doctor, nurse or pharmacist before following any medical regimen to see if it is safe and effective for you

## 2019-07-28 PROBLEM — F32.9 REACTIVE DEPRESSION: Status: ACTIVE | Noted: 2019-07-28

## 2019-11-04 ENCOUNTER — APPOINTMENT (OUTPATIENT)
Dept: LAB | Facility: CLINIC | Age: 59
End: 2019-11-04
Payer: COMMERCIAL

## 2019-11-04 ENCOUNTER — TRANSCRIBE ORDERS (OUTPATIENT)
Dept: LAB | Facility: CLINIC | Age: 59
End: 2019-11-04

## 2019-11-04 ENCOUNTER — HOSPITAL ENCOUNTER (OUTPATIENT)
Dept: RADIOLOGY | Facility: HOSPITAL | Age: 59
Discharge: HOME | End: 2019-11-04
Payer: COMMERCIAL

## 2019-11-04 ENCOUNTER — TELEPHONE (OUTPATIENT)
Dept: INTERNAL MEDICINE CLINIC | Facility: CLINIC | Age: 59
End: 2019-11-04

## 2019-11-04 ENCOUNTER — OFFICE VISIT (OUTPATIENT)
Dept: INTERNAL MEDICINE CLINIC | Age: 59
End: 2019-11-04
Payer: COMMERCIAL

## 2019-11-04 VITALS
BODY MASS INDEX: 48.82 KG/M2 | HEART RATE: 83 BPM | WEIGHT: 293 LBS | OXYGEN SATURATION: 95 % | TEMPERATURE: 98.9 F | SYSTOLIC BLOOD PRESSURE: 124 MMHG | HEIGHT: 65 IN | DIASTOLIC BLOOD PRESSURE: 66 MMHG

## 2019-11-04 DIAGNOSIS — R06.00 DYSPNEA ON EXERTION: ICD-10-CM

## 2019-11-04 DIAGNOSIS — I50.9 ACUTE CONGESTIVE HEART FAILURE, UNSPECIFIED HEART FAILURE TYPE (HCC): ICD-10-CM

## 2019-11-04 DIAGNOSIS — R03.0 ELEVATED BLOOD PRESSURE, SITUATIONAL: ICD-10-CM

## 2019-11-04 DIAGNOSIS — R60.0 PEDAL EDEMA: ICD-10-CM

## 2019-11-04 DIAGNOSIS — R06.02 SOB (SHORTNESS OF BREATH): ICD-10-CM

## 2019-11-04 DIAGNOSIS — J44.9 CHRONIC OBSTRUCTIVE PULMONARY DISEASE, UNSPECIFIED COPD TYPE (HCC): ICD-10-CM

## 2019-11-04 DIAGNOSIS — R06.02 SOB (SHORTNESS OF BREATH): Primary | ICD-10-CM

## 2019-11-04 PROBLEM — R06.09 DYSPNEA ON EXERTION: Status: ACTIVE | Noted: 2019-11-04

## 2019-11-04 LAB
ALBUMIN SERPL BCP-MCNC: 3.7 G/DL (ref 3.5–5)
ALP SERPL-CCNC: 94 U/L (ref 46–116)
ALT SERPL W P-5'-P-CCNC: 30 U/L (ref 12–78)
ANION GAP SERPL CALCULATED.3IONS-SCNC: 5 MMOL/L (ref 4–13)
AST SERPL W P-5'-P-CCNC: 17 U/L (ref 5–45)
BASOPHILS # BLD AUTO: 0.03 THOUSANDS/ΜL (ref 0–0.1)
BASOPHILS NFR BLD AUTO: 0 % (ref 0–1)
BILIRUB SERPL-MCNC: 0.3 MG/DL (ref 0.2–1)
BUN SERPL-MCNC: 15 MG/DL (ref 5–25)
CALCIUM SERPL-MCNC: 9.9 MG/DL (ref 8.3–10.1)
CHLORIDE SERPL-SCNC: 103 MMOL/L (ref 100–108)
CO2 SERPL-SCNC: 32 MMOL/L (ref 21–32)
CREAT SERPL-MCNC: 1.73 MG/DL (ref 0.6–1.3)
EOSINOPHIL # BLD AUTO: 0.16 THOUSAND/ΜL (ref 0–0.61)
EOSINOPHIL NFR BLD AUTO: 2 % (ref 0–6)
ERYTHROCYTE [DISTWIDTH] IN BLOOD BY AUTOMATED COUNT: 13.8 % (ref 11.6–15.1)
GFR SERPL CREATININE-BSD FRML MDRD: 32 ML/MIN/1.73SQ M
GLUCOSE SERPL-MCNC: 97 MG/DL (ref 65–140)
HCT VFR BLD AUTO: 43.9 % (ref 34.8–46.1)
HGB BLD-MCNC: 13.5 G/DL (ref 11.5–15.4)
IMM GRANULOCYTES # BLD AUTO: 0.02 THOUSAND/UL (ref 0–0.2)
IMM GRANULOCYTES NFR BLD AUTO: 0 % (ref 0–2)
LYMPHOCYTES # BLD AUTO: 1.85 THOUSANDS/ΜL (ref 0.6–4.47)
LYMPHOCYTES NFR BLD AUTO: 21 % (ref 14–44)
MCH RBC QN AUTO: 29.3 PG (ref 26.8–34.3)
MCHC RBC AUTO-ENTMCNC: 30.8 G/DL (ref 31.4–37.4)
MCV RBC AUTO: 95 FL (ref 82–98)
MONOCYTES # BLD AUTO: 0.69 THOUSAND/ΜL (ref 0.17–1.22)
MONOCYTES NFR BLD AUTO: 8 % (ref 4–12)
NEUTROPHILS # BLD AUTO: 6.01 THOUSANDS/ΜL (ref 1.85–7.62)
NEUTS SEG NFR BLD AUTO: 69 % (ref 43–75)
NRBC BLD AUTO-RTO: 0 /100 WBCS
NT-PROBNP SERPL-MCNC: 27 PG/ML
PLATELET # BLD AUTO: 214 THOUSANDS/UL (ref 149–390)
PMV BLD AUTO: 11.2 FL (ref 8.9–12.7)
POTASSIUM SERPL-SCNC: 3.9 MMOL/L (ref 3.5–5.3)
PROT SERPL-MCNC: 7.9 G/DL (ref 6.4–8.2)
RBC # BLD AUTO: 4.6 MILLION/UL (ref 3.81–5.12)
SODIUM SERPL-SCNC: 140 MMOL/L (ref 136–145)
WBC # BLD AUTO: 8.76 THOUSAND/UL (ref 4.31–10.16)

## 2019-11-04 PROCEDURE — 83880 ASSAY OF NATRIURETIC PEPTIDE: CPT

## 2019-11-04 PROCEDURE — 99214 OFFICE O/P EST MOD 30 MIN: CPT | Performed by: INTERNAL MEDICINE

## 2019-11-04 PROCEDURE — 3008F BODY MASS INDEX DOCD: CPT | Performed by: INTERNAL MEDICINE

## 2019-11-04 PROCEDURE — 85025 COMPLETE CBC W/AUTO DIFF WBC: CPT

## 2019-11-04 PROCEDURE — 80053 COMPREHEN METABOLIC PANEL: CPT

## 2019-11-04 PROCEDURE — 36415 COLL VENOUS BLD VENIPUNCTURE: CPT

## 2019-11-04 PROCEDURE — 71046 X-RAY EXAM CHEST 2 VIEWS: CPT

## 2019-11-04 PROCEDURE — 93000 ELECTROCARDIOGRAM COMPLETE: CPT | Performed by: INTERNAL MEDICINE

## 2019-11-04 NOTE — PROGRESS NOTES
Assessment/Plan:    Addendum at 2010 hours p m   - results of CBC, CMP, NT proBNP and stat chest x-ray reviewed  -patient does not have acute CHF exacerbation or any other acute cardiopulmonary process  - I called and spoke to patient's daughter and counseled her to tell her mother to get her echocardiogram done and to follow up at the office with us as soon as possible, within 1 week  Shortness of breath with weight gain and leg swelling  - patient's symptoms are concerning for possible CHF versus other cardio pulmonary process  - point of care EKG shows normal sinus rhythm, normal intervals and no acute ischemic changes  - will order a stat CBC, CMP, chest x-ray and NT proBNP  - will also order an echocardiogram      COPD  - patient is not currently wheezing and does not have rhonchi or rales on examination  -she states that she does not use her inhalers because she reacts adversely to them  - will switch presents to Xopenex inhaler if her insurance will cover    Elevated blood pressure  - blood pressure is controlled today at 124/66       Diagnoses and all orders for this visit:    SOB (shortness of breath)  -     POCT ECG  -     NT-BNP PRO; Future  -     XR chest pa & lateral; Future  -     CBC and differential; Future  -     Comprehensive metabolic panel; Future  -     Echo complete with contrast if indicated; Future    Elevated blood pressure, situational    Chronic obstructive pulmonary disease, unspecified COPD type ([facility])  -     XR chest pa & lateral; Future    Acute congestive heart failure, unspecified heart failure type ([address] )  -     NT-BNP PRO; Future  -     XR chest pa & lateral; Future  -     CBC and differential; Future  -     Comprehensive metabolic panel; Future  -     Echo complete with contrast if indicated; Future    Dyspnea on exertion  -     NT-BNP PRO; Future  -     XR chest pa & lateral; Future  -     CBC and differential; Future  -     Comprehensive metabolic panel;  Future  -     Echo complete with contrast if indicated; Future    Pedal edema  -     Comprehensive metabolic panel; Future  -     Echo complete with contrast if indicated; Future          Subjective:      Patient ID: Carl Acuna is a 64 y o  female  HPI  Patient presents with complaints of weight gain of about 32 lb in about 4 months, leg swelling, wheezing, shortness of breath both on exertion and at rest, fatigue and dizziness especially on standing up at night  She denies any change in her diet and states that the only thing that has changed is that she stopped working but also states that she has been taking care of her grandchildren  She states her shortness of breath such that she finds it difficult to climb the 3 steps front of her front door  She admits to occasional chest pain on the sides of her chest and palpitations when she is having shortness of breath but she denies fever, chills, night sweats, chest nausea, vomiting, abdominal pain, diarrhea, constipation but does admit to abdominal bloating  She denies hematuria or hematochezia  Of note, patient has a history of COPD and states that she does not use her inhalers because she reacts adversely to them  She states that both her Advair and her albuterol inhaler give her tachycardia and palpitations and so she does not use them  Of note, she quits smoking 1 year ago and smoked for about 40 years at a maximum of pack a day  The following portions of the patient's history were reviewed and updated as appropriate:   She  has a past medical history of COPD (chronic obstructive pulmonary disease) ([facility] [address] ), Depression, High blood pressure, Hypertension, and Urinary incontinence    She   Patient Active Problem List    Diagnosis Date Noted    Acute congestive heart failure ([facility] [address] ) 11/04/2019    SOB (shortness of breath) 11/04/2019    Pedal edema 11/04/2019    Reactive depression 07/28/2019    Elevated blood pressure, situational 07/10/2019    Mixed stress and urge urinary incontinence 07/10/2019    COPD (chronic obstructive pulmonary disease) ([facility] [address] ) 07/10/2019    Paresthesia of left upper extremity 07/10/2019    Mixed hyperlipidemia 07/10/2019    Morbid obesity ([facility] [address] ) 07/10/2019    Encounter to establish care 07/09/2019     She  has a past surgical history that includes Hysterectomy; Oophorectomy (Bilateral); Replacement total knee (Right, 2016); Total abdominal hysterectomy w/ bilateral salpingoophorectomy (2006); and Incontinence surgery  Her family history includes Aneurysm in her mother; Diabetes in her father; Heart failure in her father; Other in her mother  She  reports that she quit smoking about 10 months ago  Her smoking use included cigarettes  She has a 45 00 pack-year smoking history  She has never used smokeless tobacco  She reports that she has current or past drug history  Drug: Marijuana  She reports that she does not drink alcohol  Current Outpatient Medications   Medication Sig Dispense Refill    acetaminophen (TYLENOL) 500 mg tablet Take 500 mg by mouth every 6 (six) hours as needed for mild pain      sertraline (ZOLOFT) 25 mg tablet Take 1 tablet (25 mg total) by mouth daily 60 tablet 0    lisinopril (ZESTRIL) 5 mg tablet Take 5 mg by mouth daily       No current facility-administered medications for this visit  Current Outpatient Medications on File Prior to Visit   Medication Sig    acetaminophen (TYLENOL) 500 mg tablet Take 500 mg by mouth every 6 (six) hours as needed for mild pain    sertraline (ZOLOFT) 25 mg tablet Take 1 tablet (25 mg total) by mouth daily    lisinopril (ZESTRIL) 5 mg tablet Take 5 mg by mouth daily     No current facility-administered medications on file prior to visit  She has No Known Allergies       Review of Systems   Constitutional: Positive for fatigue and unexpected weight change (wt gain of about 32 lbs in 3-4 months)  Negative for activity change, chills and fever     HENT: Negative for ear pain, postnasal drip, rhinorrhea, sinus pressure and sore throat  Eyes: Negative for pain  Respiratory: Positive for shortness of breath (on exertion and also at rest  more with exhalation) and wheezing  Negative for cough, choking and chest tightness  Orthopnea and paroxysmal nocturnal dyspnea   Cardiovascular: Positive for chest pain (very rarely but usually on the sides of her chest, last time was 2 weeks ago), palpitations (sometimes hammad when she gets SOB) and leg swelling  Gastrointestinal: Negative for abdominal pain, constipation, diarrhea, nausea and vomiting  Genitourinary: Negative for dysuria and hematuria  Musculoskeletal: Positive for arthralgias (knee pain)  Negative for back pain, gait problem, joint swelling, myalgias and neck stiffness  Skin: Negative for pallor and rash  Neurological: Positive for dizziness (when she gets up too fast hammad at night)  Negative for tremors, seizures, syncope, light-headedness and headaches  Hematological: Negative for adenopathy  Psychiatric/Behavioral: Negative for behavioral problems  Objective:      /66 (BP Location: Left arm, Patient Position: Sitting)   Pulse 83   Temp 98 9 °F (37 2 °C) (Tympanic)   Ht 5' 4 57" (1 64 m) Comment: shoes off  Wt (!) 167 kg (367 lb 3 2 oz) Comment: shoes off  SpO2 95%   BMI 61 93 kg/m²          Physical Exam   Constitutional: She is oriented to person, place, and time  She appears well-developed and well-nourished  No distress  Morbidly obese   HENT:   Head: Normocephalic and atraumatic  Right Ear: External ear normal    Left Ear: External ear normal    Nose: Nose normal    Mouth/Throat: Oropharynx is clear and moist  No oropharyngeal exudate  Eyes: Pupils are equal, round, and reactive to light  Conjunctivae and EOM are normal  Right eye exhibits no discharge  Left eye exhibits no discharge  No scleral icterus  Neck: Normal range of motion  Neck supple  No JVD present   No tracheal deviation present  No thyromegaly present  Cardiovascular: Normal rate, regular rhythm and intact distal pulses  Exam reveals no gallop and no friction rub  Murmur (2/6 systolic murmur maximal in aortic valve region) heard  Pulmonary/Chest: Effort normal  No tachypnea (No tachypnea, patient's respiratory rate is 12 per minute)  No respiratory distress  She has decreased breath sounds in the right lower field and the left lower field  She has no wheezes  She has no rales  She exhibits no tenderness  Abdominal: Soft  Bowel sounds are normal  She exhibits no distension and no mass  There is no tenderness  There is no rebound and no guarding  Obese abdomen   Musculoskeletal: Normal range of motion  She exhibits edema ( 2+ pitting pedal edema in bilateral lower extremities)  She exhibits no tenderness or deformity  Lymphadenopathy:     She has no cervical adenopathy  Neurological: She is alert and oriented to person, place, and time  She has normal reflexes  No cranial nerve deficit  She exhibits normal muscle tone  Coordination normal    Skin: Skin is warm and dry  No rash noted  She is not diaphoretic  No erythema  No pallor  Psychiatric: She has a normal mood and affect   Her behavior is normal          Appointment on 11/04/2019   Component Date Value Ref Range Status    NT-proBNP 11/04/2019 27  <125 pg/mL Final    WBC 11/04/2019 8 76  4 31 - 10 16 Thousand/uL Final    RBC 11/04/2019 4 60  3 81 - 5 12 Million/uL Final    Hemoglobin 11/04/2019 13 5  11 5 - 15 4 g/dL Final    Hematocrit 11/04/2019 43 9  34 8 - 46 1 % Final    MCV 11/04/2019 95  82 - 98 fL Final    MCH 11/04/2019 29 3  26 8 - 34 3 pg Final    MCHC 11/04/2019 30 8* 31 4 - 37 4 g/dL Final    RDW 11/04/2019 13 8  11 6 - 15 1 % Final    MPV 11/04/2019 11 2  8 9 - 12 7 fL Final    Platelets 87/37/7587 214  149 - 390 Thousands/uL Final    nRBC 11/04/2019 0  /100 WBCs Final    Neutrophils Relative 11/04/2019 69  43 - 75 % Final    Immat GRANS % 11/04/2019 0  0 - 2 % Final    Lymphocytes Relative 11/04/2019 21  14 - 44 % Final    Monocytes Relative 11/04/2019 8  4 - 12 % Final    Eosinophils Relative 11/04/2019 2  0 - 6 % Final    Basophils Relative 11/04/2019 0  0 - 1 % Final    Neutrophils Absolute 11/04/2019 6 01  1 85 - 7 62 Thousands/µL Final    Immature Grans Absolute 11/04/2019 0 02  0 00 - 0 20 Thousand/uL Final    Lymphocytes Absolute 11/04/2019 1 85  0 60 - 4 47 Thousands/µL Final    Monocytes Absolute 11/04/2019 0 69  0 17 - 1 22 Thousand/µL Final    Eosinophils Absolute 11/04/2019 0 16  0 00 - 0 61 Thousand/µL Final    Basophils Absolute 11/04/2019 0 03  0 00 - 0 10 Thousands/µL Final    Sodium 11/04/2019 140  136 - 145 mmol/L Final    Potassium 11/04/2019 3 9  3 5 - 5 3 mmol/L Final    Chloride 11/04/2019 103  100 - 108 mmol/L Final    CO2 11/04/2019 32  21 - 32 mmol/L Final    ANION GAP 11/04/2019 5  4 - 13 mmol/L Final    BUN 11/04/2019 15  5 - 25 mg/dL Final    Creatinine 11/04/2019 1 73* 0 60 - 1 30 mg/dL Final    Standardized to IDMS reference method    Glucose 11/04/2019 97  65 - 140 mg/dL Final      If the patient is fasting, the ADA then defines impaired fasting glucose as > 100 mg/dL and diabetes as > or equal to 123 mg/dL  Specimen collection should occur prior to Sulfasalazine administration due to the potential for falsely depressed results  Specimen collection should occur prior to Sulfapyridine administration due to the potential for falsely elevated results   Calcium 11/04/2019 9 9  8 3 - 10 1 mg/dL Final    AST 11/04/2019 17  5 - 45 U/L Final      Specimen collection should occur prior to Sulfasalazine administration due to the potential for falsely depressed results   ALT 11/04/2019 30  12 - 78 U/L Final      Specimen collection should occur prior to Sulfasalazine administration due to the potential for falsely depressed results       Alkaline Phosphatase 11/04/2019 94  46 - 116 U/L Final    Total Protein 11/04/2019 7 9  6 4 - 8 2 g/dL Final    Albumin 11/04/2019 3 7  3 5 - 5 0 g/dL Final    Total Bilirubin 11/04/2019 0 30  0 20 - 1 00 mg/dL Final    eGFR 11/04/2019 32  ml/min/1 73sq m Final   Admission on 07/03/2019, Discharged on 07/03/2019   Component Date Value Ref Range Status    WBC 07/03/2019 7 36  4 31 - 10 16 Thousand/uL Final    RBC 07/03/2019 4 39  3 81 - 5 12 Million/uL Final    Hemoglobin 07/03/2019 13 3  11 5 - 15 4 g/dL Final    Hematocrit 07/03/2019 41 0  34 8 - 46 1 % Final    MCV 07/03/2019 93  82 - 98 fL Final    MCH 07/03/2019 30 3  26 8 - 34 3 pg Final    MCHC 07/03/2019 32 4  31 4 - 37 4 g/dL Final    RDW 07/03/2019 13 4  11 6 - 15 1 % Final    MPV 07/03/2019 11 1  8 9 - 12 7 fL Final    Platelets 86/20/5500 196  149 - 390 Thousands/uL Final    nRBC 07/03/2019 0  /100 WBCs Final    Neutrophils Relative 07/03/2019 64  43 - 75 % Final    Immat GRANS % 07/03/2019 0  0 - 2 % Final    Lymphocytes Relative 07/03/2019 22  14 - 44 % Final    Monocytes Relative 07/03/2019 12  4 - 12 % Final    Eosinophils Relative 07/03/2019 2  0 - 6 % Final    Basophils Relative 07/03/2019 0  0 - 1 % Final    Neutrophils Absolute 07/03/2019 4 66  1 85 - 7 62 Thousands/µL Final    Immature Grans Absolute 07/03/2019 0 02  0 00 - 0 20 Thousand/uL Final    Lymphocytes Absolute 07/03/2019 1 63  0 60 - 4 47 Thousands/µL Final    Monocytes Absolute 07/03/2019 0 88  0 17 - 1 22 Thousand/µL Final    Eosinophils Absolute 07/03/2019 0 15  0 00 - 0 61 Thousand/µL Final    Basophils Absolute 07/03/2019 0 02  0 00 - 0 10 Thousands/µL Final    Sodium 07/03/2019 136  136 - 145 mmol/L Final    Potassium 07/03/2019 3 9  3 5 - 5 3 mmol/L Final    Chloride 07/03/2019 105  100 - 108 mmol/L Final    CO2 07/03/2019 28  21 - 32 mmol/L Final    ANION GAP 07/03/2019 3* 4 - 13 mmol/L Final    BUN 07/03/2019 16  5 - 25 mg/dL Final    Creatinine 07/03/2019 0 91  0 60 - 1 30 mg/dL Final Standardized to IDMS reference method    Glucose 07/03/2019 86  65 - 140 mg/dL Final      If the patient is fasting, the ADA then defines impaired fasting glucose as > 100 mg/dL and diabetes as > or equal to 123 mg/dL  Specimen collection should occur prior to Sulfasalazine administration due to the potential for falsely depressed results  Specimen collection should occur prior to Sulfapyridine administration due to the potential for falsely elevated results   Calcium 07/03/2019 9 9  8 3 - 10 1 mg/dL Final    AST 07/03/2019 12  5 - 45 U/L Final      Specimen collection should occur prior to Sulfasalazine administration due to the potential for falsely depressed results   ALT 07/03/2019 24  12 - 78 U/L Final      Specimen collection should occur prior to Sulfasalazine and/or Sulfapyridine administration due to the potential for falsely depressed results   Alkaline Phosphatase 07/03/2019 83  46 - 116 U/L Final    Total Protein 07/03/2019 7 7  6 4 - 8 2 g/dL Final    Albumin 07/03/2019 3 7  3 5 - 5 0 g/dL Final    Total Bilirubin 07/03/2019 0 40  0 20 - 1 00 mg/dL Final    eGFR 07/03/2019 70  ml/min/1 73sq m Final    Troponin I 07/03/2019 <0 02  <=0 04 ng/mL Final      [employer] Chemistry analyzer 99% cutoff is > 0 04 ng/mL in network labs     o cTnI 99% cutoff is useful only when applied to patients in the clinical setting of myocardial ischemia   o cTnI 99% cutoff should be interpreted in the context of clinical history, ECG findings and possibly cardiac imaging to establish correct diagnosis  o cTnI 99% cutoff may be suggestive but clearly not indicative of a coronary event without the clinical setting of myocardial ischemia        NT-proBNP 07/03/2019 65  <125 pg/mL Final    Ventricular Rate 07/03/2019 77  BPM Final    Atrial Rate 07/03/2019 170  BPM Final    QRSD Interval 07/03/2019 92  ms Final    QT Interval 07/03/2019 380  ms Final    QTC Interval 07/03/2019 430  ms Final    QRS Axis 07/03/2019 54  degrees Final    T Wave Blossom 07/03/2019 69  degrees Final

## 2019-11-14 ENCOUNTER — TELEPHONE (OUTPATIENT)
Dept: INTERNAL MEDICINE CLINIC | Age: 59
End: 2019-11-14

## 2019-11-14 DIAGNOSIS — R79.89 ELEVATED SERUM CREATININE: ICD-10-CM

## 2019-11-14 DIAGNOSIS — E66.01 MORBID OBESITY (HCC): Primary | ICD-10-CM

## 2019-11-14 NOTE — TELEPHONE ENCOUNTER
I called and discussed patient's chest x-ray and lab results with her  Her creatinine is increased at 1 73, up from 0 91 on July 3rd, 2019  Her NT proBNP is not elevated at all  Her chest x-ray shows no acute cardiopulmonary process  Her echocardiogram is scheduled for November 19th, 2019  Will order a repeat BMP, and refer patient to nutrition and bariatric surgery  I will put in the referrals in the system and she will come in and pick them up

## 2020-02-19 ENCOUNTER — TELEPHONE (OUTPATIENT)
Dept: INTERNAL MEDICINE CLINIC | Age: 60
End: 2020-02-19

## 2020-02-19 DIAGNOSIS — Z12.11 ENCOUNTER FOR SCREENING FOR MALIGNANT NEOPLASM OF COLON: Primary | ICD-10-CM

## 2020-02-19 NOTE — TELEPHONE ENCOUNTER
Pt  Called stating she has been trying to lose weight and is interested in getting gastric bypass  She is not sure with what step to take to have this procedure done      Please advise    Thank you

## 2020-02-19 NOTE — PROGRESS NOTES
Spoke to pt  aggred to complete cologuard, will sign pt  Up for cologuard  Scheduled mammogram for pt  For 2/20/2020 at 6:10 PM pt  Preferred [facility]  Faxed oprder for mammogram to [facility]    Pt  Understands/

## 2020-02-20 NOTE — TELEPHONE ENCOUNTER
Pt can come and get a referral to bariatrics  During the visit we can screen her to see if she will likely be accepted

## 2020-03-05 PROBLEM — E66.01 MORBID OBESITY WITH BODY MASS INDEX (BMI) OF 60.0 TO 69.9 IN ADULT (HCC): Status: ACTIVE | Noted: 2020-03-05

## 2020-03-06 ENCOUNTER — OFFICE VISIT (OUTPATIENT)
Dept: INTERNAL MEDICINE CLINIC | Age: 60
End: 2020-03-06
Payer: COMMERCIAL

## 2020-03-06 VITALS
DIASTOLIC BLOOD PRESSURE: 86 MMHG | TEMPERATURE: 97.6 F | HEIGHT: 65 IN | SYSTOLIC BLOOD PRESSURE: 128 MMHG | OXYGEN SATURATION: 97 % | HEART RATE: 63 BPM | BODY MASS INDEX: 48.82 KG/M2 | WEIGHT: 293 LBS

## 2020-03-06 DIAGNOSIS — F32.9 REACTIVE DEPRESSION: ICD-10-CM

## 2020-03-06 DIAGNOSIS — R79.89 ELEVATED SERUM CREATININE: ICD-10-CM

## 2020-03-06 DIAGNOSIS — I10 ESSENTIAL HYPERTENSION: ICD-10-CM

## 2020-03-06 DIAGNOSIS — E66.01 MORBID OBESITY WITH BODY MASS INDEX (BMI) OF 60.0 TO 69.9 IN ADULT (HCC): Primary | ICD-10-CM

## 2020-03-06 DIAGNOSIS — F17.210 CIGARETTE NICOTINE DEPENDENCE WITHOUT COMPLICATION: ICD-10-CM

## 2020-03-06 PROCEDURE — 3008F BODY MASS INDEX DOCD: CPT | Performed by: INTERNAL MEDICINE

## 2020-03-06 PROCEDURE — 4004F PT TOBACCO SCREEN RCVD TLK: CPT | Performed by: INTERNAL MEDICINE

## 2020-03-06 PROCEDURE — 99215 OFFICE O/P EST HI 40 MIN: CPT | Performed by: INTERNAL MEDICINE

## 2020-03-06 PROCEDURE — 3079F DIAST BP 80-89 MM HG: CPT | Performed by: INTERNAL MEDICINE

## 2020-03-06 PROCEDURE — 3074F SYST BP LT 130 MM HG: CPT | Performed by: INTERNAL MEDICINE

## 2020-03-06 RX ORDER — SERTRALINE HYDROCHLORIDE 25 MG/1
25 TABLET, FILM COATED ORAL DAILY
Qty: 60 TABLET | Refills: 0 | Status: SHIPPED | OUTPATIENT
Start: 2020-03-06 | End: 2021-03-23 | Stop reason: SDUPTHER

## 2020-03-06 RX ORDER — LISINOPRIL 5 MG/1
10 TABLET ORAL DAILY
Qty: 60 TABLET | Refills: 1 | Status: SHIPPED | OUTPATIENT
Start: 2020-03-06 | End: 2020-07-31 | Stop reason: DRUGHIGH

## 2020-03-06 NOTE — PROGRESS NOTES
Assessment/Plan: Morbid obesity  - diet and exercise counseling given  -will refer patient to bariatric surgery    BMI Counseling: Body mass index is 62 56 kg/m²  The BMI is above normal  Nutrition recommendations include encouraging healthy choices of fruits and vegetables, consuming healthier snacks, limiting drinks that contain sugar and reducing intake of saturated and trans fat  Exercise recommendations include moderate physical activity 150 minutes/week  No pharmacotherapy was ordered  Patient referred to bariatric surgery and PCP due to patient being overweight  Tobacco Cessation Counseling: Tobacco cessation counseling was provided  The patient is sincerely urged to quit consumption of tobacco  She is ready to quit tobacco  Medication options and side effects of medication not discussed  Patient refused medication  Patient states that she barely smokes cigarettes  She states that she only smokes when she is with her brother-in-law(who smokes) on weekends and only smokes about 2-3 cigarettes a weekend and states that she will stop on her own  Essential hypertension  - will refill lisinopril and increase the dose from 5 mg to 10 mg daily  -patient has been counseled to buy a proper sphygmomanometer and to check her blood pressures twice a day and keep a blood pressure log and bring it into her next visit in 1 month   -patient was counseled to eat a low-salt diet and to cut down on caffeine and to stop smoking   -follow-up in 1 month    Reactive depression  - patient states that she has improved greatly  -will refill Zoloft  -I will not increase the dose of her Zoloft since patient has a habit of skipping doses  My goal is to avoid the adverse reactions of abrupt cessation of the SSRI  It is better for her to handle skipping doses if she is on small dose of Zoloft      Elevated serum creatinine  - her last CMP done on November 4th, 2019 showed an elevated creatinine of 1 74 with an EGFR of 32  -a repeat BMP was ordered and we will follow up with the results  - patient was counseled to hold off on taking the lisinopril till after having the BMP done  Nicotine dependence  - patient has been counseled to quit smoking  -I spent about 3 minutes on smoking cessation counseling     Diagnoses and all orders for this visit:    Morbid obesity with body mass index (BMI) of 60 0 to 69 9 in [facility])  -     Ambulatory referral to Weight Management; Future    Reactive depression  -     sertraline (ZOLOFT) 25 mg tablet; Take 1 tablet (25 mg total) by mouth daily    Essential hypertension  -     lisinopril (ZESTRIL) 5 mg tablet; Take 2 tablets (10 mg total) by mouth daily    Cigarette nicotine dependence without complication    Elevated serum creatinine          Subjective:      Patient ID: Jc Chan is a 64 y o  female  HPI  Pt presents for a follow up visit regarding her elevated blood pressure, reactive depression and obesity  She states that she checks her blood pressure at home with a wrist sphygmomanometer and gets blood pressures that are all over the place  She states that sometimes her blood pressures are up to 921 systolic and sometimes up to 566W diastolic  She has gained about 100 pounds In the past year  She states that she had a weird attachment to food and had also been very heavy and has a family hx of obesity and a family hx of early death  Sister  at age 54 and dad at age 61 years and uncle at age 61 years  She wants to have have wt loss surgery  She states that her sister had wt loss surgery and did well afterwards but gained some of the wt back before she   She  from complications of lung cancer  Pt states that she does not feel like she is still depressed  Of note , she is supposed to be on sertraline 25 mg daily   She does not take her medications all the time including the zoloft   Pt states that she quit smoking a year ago and had smoked a pack and a half to 2 packs a day for 45 years before quitting but since quitting she occasionally smokes 2-3 sticks a weekend when she is in the company of her brother in law who smokes  She has tried various wt loss programs like wt wat[employer] and [employer]ystem  She denies fever, chills, night sweats, chest pain, shortness breath or palpitations, nausea, vomiting abdominal pain, diarrhea, constipation  The following portions of the patient's history were reviewed and updated as appropriate:   She  has a past medical history of COPD (chronic obstructive pulmonary disease) ([facility] [place] [address] ), Depression, High blood pressure, Hypertension, and Urinary incontinence  She   Patient Active Problem List    Diagnosis Date Noted    Cigarette nicotine dependence without complication 16/04/0440    Morbid obesity with body mass index (BMI) of 60 0 to 69 9 in adult [facility]) 03/05/2020    Elevated serum creatinine 11/14/2019    Acute congestive heart failure ([facility] [place] [address] ) 11/04/2019    SOB (shortness of breath) 11/04/2019    Pedal edema 11/04/2019    Reactive depression 07/28/2019    Essential hypertension 07/10/2019    Mixed stress and urge urinary incontinence 07/10/2019    COPD (chronic obstructive pulmonary disease) ([facility] [place] [address] ) 07/10/2019    Paresthesia of left upper extremity 07/10/2019    Mixed hyperlipidemia 07/10/2019    [facility] 07/10/2019    Encounter to establish care 07/09/2019     She  has a past surgical history that includes Hysterectomy; Oophorectomy (Bilateral); Replacement total knee (Right, 2016); Total abdominal hysterectomy w/ bilateral salpingoophorectomy (2006); and Incontinence surgery  Her family history includes Aneurysm in her mother; Diabetes in her father; Heart failure in her father; Lung cancer in her sister; Other in her mother  She  reports that she has been smoking cigarettes  She has a 45 00 pack-year smoking history  She has never used smokeless tobacco  She reports that she has current or past drug history  Drug: Marijuana  She reports that she does not drink alcohol  Current Outpatient Medications   Medication Sig Dispense Refill    acetaminophen (TYLENOL) 500 mg tablet Take 500 mg by mouth every 6 (six) hours as needed for mild pain      lisinopril (ZESTRIL) 5 mg tablet Take 2 tablets (10 mg total) by mouth daily 60 tablet 1    sertraline (ZOLOFT) 25 mg tablet Take 1 tablet (25 mg total) by mouth daily 60 tablet 0     No current facility-administered medications for this visit  Current Outpatient Medications on File Prior to Visit   Medication Sig    acetaminophen (TYLENOL) 500 mg tablet Take 500 mg by mouth every 6 (six) hours as needed for mild pain    [DISCONTINUED] lisinopril (ZESTRIL) 5 mg tablet Take 5 mg by mouth daily    [DISCONTINUED] sertraline (ZOLOFT) 25 mg tablet Take 1 tablet (25 mg total) by mouth daily     No current facility-administered medications on file prior to visit  She has No Known Allergies       Review of Systems   Constitutional: Positive for appetite change (excessive eating) and fatigue  Negative for activity change, chills, fever and unexpected weight change  HENT: Negative for ear pain, postnasal drip, rhinorrhea, sinus pressure and sore throat  Eyes: Negative for pain  Respiratory: Positive for shortness of breath (both at rest and on exertion) and wheezing  Negative for cough, choking and chest tightness  Cardiovascular: Negative for chest pain, palpitations and leg swelling  Gastrointestinal: Negative for abdominal pain, constipation, diarrhea, nausea and vomiting  Genitourinary: Positive for frequency (with nocturia X 4 s/p bladder sling surgery for urinary  incontinence)  Negative for dysuria and hematuria  Musculoskeletal: Negative for arthralgias (knee pain), back pain, gait problem, joint swelling, myalgias and neck stiffness  Skin: Negative for pallor and rash     Neurological: Negative for dizziness, tremors, seizures, syncope, light-headedness and headaches  Hematological: Negative for adenopathy  Psychiatric/Behavioral: Positive for dysphoric mood (improved) and sleep disturbance  Negative for behavioral problems, decreased concentration and suicidal ideas  The patient is not nervous/anxious  Irritability           Objective:      /86 (BP Location: Left arm, Patient Position: Sitting, Cuff Size: Large)   Pulse 63   Temp 97 6 °F (36 4 °C) (Tympanic)   Ht 5' 5 04" (1 652 m)   Wt (!) 171 kg (376 lb 6 4 oz)   SpO2 97%   BMI 62 56 kg/m²          Physical Exam   Constitutional: She is oriented to person, place, and time  She appears well-developed and well-nourished  No distress  Morbidly obese   HENT:   Head: Normocephalic and atraumatic  Right Ear: External ear normal    Left Ear: External ear normal    Nose: Nose normal    Mouth/Throat: Oropharynx is clear and moist  Mucous membranes are dry (dry mucous membranes)  No oropharyngeal exudate  Eyes: Pupils are equal, round, and reactive to light  Conjunctivae and EOM are normal  Right eye exhibits no discharge  Left eye exhibits no discharge  No scleral icterus  Neck: Normal range of motion  Neck supple  No JVD present  No tracheal deviation present  No thyromegaly present  Cardiovascular: Normal rate, regular rhythm and intact distal pulses  Exam reveals no gallop and no friction rub  Murmur (1/6 systolic murmur maximal in aortic valve and radiating to the carotid) heard  Systolic murmur is present with a grade of 1/6  Pulmonary/Chest: Effort normal  No respiratory distress  She has decreased breath sounds  She has no wheezes  She has no rales  She exhibits no tenderness  Abdominal: Soft  Bowel sounds are normal  She exhibits distension ( obese abdomen)  She exhibits no mass  There is no tenderness  There is no rebound and no guarding  Musculoskeletal: Normal range of motion  She exhibits no tenderness or deformity  Right lower leg:  She exhibits edema  Left lower leg: She exhibits edema (Trace pitting pedal edema bilateral lower extremity With chronic venous stasis changes)  Lymphadenopathy:     She has no cervical adenopathy  Neurological: She is alert and oriented to person, place, and time  She has normal reflexes  No cranial nerve deficit  She exhibits normal muscle tone  Gait (Antalgic gait and patient walks with a walking cane) abnormal  Coordination normal    Skin: Skin is warm and dry  No rash noted  She is not diaphoretic  No erythema  No pallor  Psychiatric: She has a normal mood and affect   Her behavior is normal          Appointment on 11/04/2019   Component Date Value Ref Range Status    NT-proBNP 11/04/2019 27  <125 pg/mL Final    WBC 11/04/2019 8 76  4 31 - 10 16 Thousand/uL Final    RBC 11/04/2019 4 60  3 81 - 5 12 Million/uL Final    Hemoglobin 11/04/2019 13 5  11 5 - 15 4 g/dL Final    Hematocrit 11/04/2019 43 9  34 8 - 46 1 % Final    MCV 11/04/2019 95  82 - 98 fL Final    MCH 11/04/2019 29 3  26 8 - 34 3 pg Final    MCHC 11/04/2019 30 8* 31 4 - 37 4 g/dL Final    RDW 11/04/2019 13 8  11 6 - 15 1 % Final    MPV 11/04/2019 11 2  8 9 - 12 7 fL Final    Platelets 16/98/9465 214  149 - 390 Thousands/uL Final    nRBC 11/04/2019 0  /100 WBCs Final    Neutrophils Relative 11/04/2019 69  43 - 75 % Final    Immat GRANS % 11/04/2019 0  0 - 2 % Final    Lymphocytes Relative 11/04/2019 21  14 - 44 % Final    Monocytes Relative 11/04/2019 8  4 - 12 % Final    Eosinophils Relative 11/04/2019 2  0 - 6 % Final    Basophils Relative 11/04/2019 0  0 - 1 % Final    Neutrophils Absolute 11/04/2019 6 01  1 85 - 7 62 Thousands/µL Final    Immature Grans Absolute 11/04/2019 0 02  0 00 - 0 20 Thousand/uL Final    Lymphocytes Absolute 11/04/2019 1 85  0 60 - 4 47 Thousands/µL Final    Monocytes Absolute 11/04/2019 0 69  0 17 - 1 22 Thousand/µL Final    Eosinophils Absolute 11/04/2019 0 16  0 00 - 0 61 Thousand/µL Final  Basophils Absolute 11/04/2019 0 03  0 00 - 0 10 Thousands/µL Final    Sodium 11/04/2019 140  136 - 145 mmol/L Final    Potassium 11/04/2019 3 9  3 5 - 5 3 mmol/L Final    Chloride 11/04/2019 103  100 - 108 mmol/L Final    CO2 11/04/2019 32  21 - 32 mmol/L Final    ANION GAP 11/04/2019 5  4 - 13 mmol/L Final    BUN 11/04/2019 15  5 - 25 mg/dL Final    Creatinine 11/04/2019 1 73* 0 60 - 1 30 mg/dL Final    Standardized to IDMS reference method    Glucose 11/04/2019 97  65 - 140 mg/dL Final      If the patient is fasting, the ADA then defines impaired fasting glucose as > 100 mg/dL and diabetes as > or equal to 123 mg/dL  Specimen collection should occur prior to Sulfasalazine administration due to the potential for falsely depressed results  Specimen collection should occur prior to Sulfapyridine administration due to the potential for falsely elevated results   Calcium 11/04/2019 9 9  8 3 - 10 1 mg/dL Final    AST 11/04/2019 17  5 - 45 U/L Final      Specimen collection should occur prior to Sulfasalazine administration due to the potential for falsely depressed results   ALT 11/04/2019 30  12 - 78 U/L Final      Specimen collection should occur prior to Sulfasalazine administration due to the potential for falsely depressed results       Alkaline Phosphatase 11/04/2019 94  46 - 116 U/L Final    Total Protein 11/04/2019 7 9  6 4 - 8 2 g/dL Final    Albumin 11/04/2019 3 7  3 5 - 5 0 g/dL Final    Total Bilirubin 11/04/2019 0 30  0 20 - 1 00 mg/dL Final    eGFR 11/04/2019 32  ml/min/1 73sq m Final   Admission on 07/03/2019, Discharged on 07/03/2019   Component Date Value Ref Range Status    WBC 07/03/2019 7 36  4 31 - 10 16 Thousand/uL Final    RBC 07/03/2019 4 39  3 81 - 5 12 Million/uL Final    Hemoglobin 07/03/2019 13 3  11 5 - 15 4 g/dL Final    Hematocrit 07/03/2019 41 0  34 8 - 46 1 % Final    MCV 07/03/2019 93  82 - 98 fL Final    MCH 07/03/2019 30 3  26 8 - 34 3 pg Final    MCHC 07/03/2019 32 4  31 4 - 37 4 g/dL Final    RDW 07/03/2019 13 4  11 6 - 15 1 % Final    MPV 07/03/2019 11 1  8 9 - 12 7 fL Final    Platelets 07/79/3165 196  149 - 390 Thousands/uL Final    nRBC 07/03/2019 0  /100 WBCs Final    Neutrophils Relative 07/03/2019 64  43 - 75 % Final    Immat GRANS % 07/03/2019 0  0 - 2 % Final    Lymphocytes Relative 07/03/2019 22  14 - 44 % Final    Monocytes Relative 07/03/2019 12  4 - 12 % Final    Eosinophils Relative 07/03/2019 2  0 - 6 % Final    Basophils Relative 07/03/2019 0  0 - 1 % Final    Neutrophils Absolute 07/03/2019 4 66  1 85 - 7 62 Thousands/µL Final    Immature Grans Absolute 07/03/2019 0 02  0 00 - 0 20 Thousand/uL Final    Lymphocytes Absolute 07/03/2019 1 63  0 60 - 4 47 Thousands/µL Final    Monocytes Absolute 07/03/2019 0 88  0 17 - 1 22 Thousand/µL Final    Eosinophils Absolute 07/03/2019 0 15  0 00 - 0 61 Thousand/µL Final    Basophils Absolute 07/03/2019 0 02  0 00 - 0 10 Thousands/µL Final    Sodium 07/03/2019 136  136 - 145 mmol/L Final    Potassium 07/03/2019 3 9  3 5 - 5 3 mmol/L Final    Chloride 07/03/2019 105  100 - 108 mmol/L Final    CO2 07/03/2019 28  21 - 32 mmol/L Final    ANION GAP 07/03/2019 3* 4 - 13 mmol/L Final    BUN 07/03/2019 16  5 - 25 mg/dL Final    Creatinine 07/03/2019 0 91  0 60 - 1 30 mg/dL Final    Standardized to IDMS reference method    Glucose 07/03/2019 86  65 - 140 mg/dL Final      If the patient is fasting, the ADA then defines impaired fasting glucose as > 100 mg/dL and diabetes as > or equal to 123 mg/dL  Specimen collection should occur prior to Sulfasalazine administration due to the potential for falsely depressed results  Specimen collection should occur prior to Sulfapyridine administration due to the potential for falsely elevated results      Calcium 07/03/2019 9 9  8 3 - 10 1 mg/dL Final    AST 07/03/2019 12  5 - 45 U/L Final      Specimen collection should occur prior to Sulfasalazine administration due to the potential for falsely depressed results   ALT 07/03/2019 24  12 - 78 U/L Final      Specimen collection should occur prior to Sulfasalazine and/or Sulfapyridine administration due to the potential for falsely depressed results   Alkaline Phosphatase 07/03/2019 83  46 - 116 U/L Final    Total Protein 07/03/2019 7 7  6 4 - 8 2 g/dL Final    Albumin 07/03/2019 3 7  3 5 - 5 0 g/dL Final    Total Bilirubin 07/03/2019 0 40  0 20 - 1 00 mg/dL Final    eGFR 07/03/2019 70  ml/min/1 73sq m Final    Troponin I 07/03/2019 <0 02  <=0 04 ng/mL Final      [employer] Chemistry analyzer 99% cutoff is > 0 04 ng/mL in network labs     o cTnI 99% cutoff is useful only when applied to patients in the clinical setting of myocardial ischemia   o cTnI 99% cutoff should be interpreted in the context of clinical history, ECG findings and possibly cardiac imaging to establish correct diagnosis  o cTnI 99% cutoff may be suggestive but clearly not indicative of a coronary event without the clinical setting of myocardial ischemia        NT-proBNP 07/03/2019 65  <125 pg/mL Final    Ventricular Rate 07/03/2019 77  BPM Final    Atrial Rate 07/03/2019 170  BPM Final    QRSD Interval 07/03/2019 92  ms Final    QT Interval 07/03/2019 380  ms Final    QTC Interval 07/03/2019 430  ms Final    QRS Axis 07/03/2019 54  degrees Final    T Wave [place] 07/03/2019 69  degrees Final

## 2020-03-13 ENCOUNTER — TELEPHONE (OUTPATIENT)
Dept: INTERNAL MEDICINE CLINIC | Age: 60
End: 2020-03-13

## 2020-03-16 ENCOUNTER — TELEPHONE (OUTPATIENT)
Dept: INTERNAL MEDICINE CLINIC | Age: 60
End: 2020-03-16

## 2020-04-30 ENCOUNTER — TELEPHONE (OUTPATIENT)
Dept: INTERNAL MEDICINE CLINIC | Age: 60
End: 2020-04-30

## 2020-04-30 ENCOUNTER — TELEMEDICINE (OUTPATIENT)
Dept: INTERNAL MEDICINE CLINIC | Age: 60
End: 2020-04-30
Payer: COMMERCIAL

## 2020-04-30 DIAGNOSIS — L30.9 DERMATITIS: Primary | ICD-10-CM

## 2020-04-30 DIAGNOSIS — T78.40XA ALLERGIC REACTION, INITIAL ENCOUNTER: ICD-10-CM

## 2020-04-30 DIAGNOSIS — Z12.11 SCREENING FOR MALIGNANT NEOPLASM OF COLON: ICD-10-CM

## 2020-04-30 PROCEDURE — 99215 OFFICE O/P EST HI 40 MIN: CPT | Performed by: INTERNAL MEDICINE

## 2020-04-30 RX ORDER — CETIRIZINE HYDROCHLORIDE 10 MG/1
10 TABLET, CHEWABLE ORAL DAILY
Qty: 20 TABLET | Refills: 0 | Status: SHIPPED | OUTPATIENT
Start: 2020-04-30 | End: 2020-09-02 | Stop reason: ALTCHOICE

## 2020-04-30 RX ORDER — PREDNISONE 1 MG/1
TABLET ORAL
Qty: 40 TABLET | Refills: 0 | Status: SHIPPED | OUTPATIENT
Start: 2020-04-30 | End: 2020-05-01 | Stop reason: SDUPTHER

## 2020-05-01 ENCOUNTER — TELEPHONE (OUTPATIENT)
Dept: INTERNAL MEDICINE CLINIC | Age: 60
End: 2020-05-01

## 2020-05-01 DIAGNOSIS — T78.40XA ALLERGIC REACTION, INITIAL ENCOUNTER: ICD-10-CM

## 2020-05-01 DIAGNOSIS — L30.9 DERMATITIS: ICD-10-CM

## 2020-05-01 RX ORDER — PREDNISONE 1 MG/1
TABLET ORAL
Qty: 40 TABLET | Refills: 0 | Status: SHIPPED | OUTPATIENT
Start: 2020-05-01 | End: 2020-07-31 | Stop reason: ALTCHOICE

## 2020-07-31 ENCOUNTER — OFFICE VISIT (OUTPATIENT)
Dept: INTERNAL MEDICINE CLINIC | Age: 60
End: 2020-07-31
Payer: COMMERCIAL

## 2020-07-31 VITALS
SYSTOLIC BLOOD PRESSURE: 144 MMHG | DIASTOLIC BLOOD PRESSURE: 82 MMHG | TEMPERATURE: 98.7 F | BODY MASS INDEX: 48.82 KG/M2 | HEART RATE: 98 BPM | HEIGHT: 65 IN | OXYGEN SATURATION: 95 % | WEIGHT: 293 LBS

## 2020-07-31 DIAGNOSIS — R79.89 ELEVATED SERUM CREATININE: ICD-10-CM

## 2020-07-31 DIAGNOSIS — R60.0 PEDAL EDEMA: ICD-10-CM

## 2020-07-31 DIAGNOSIS — E66.01 MORBID OBESITY (HCC): ICD-10-CM

## 2020-07-31 DIAGNOSIS — M79.672 PAIN IN BOTH FEET: ICD-10-CM

## 2020-07-31 DIAGNOSIS — Z00.00 ANNUAL PHYSICAL EXAM: Primary | ICD-10-CM

## 2020-07-31 DIAGNOSIS — M79.671 PAIN IN BOTH FEET: ICD-10-CM

## 2020-07-31 DIAGNOSIS — Z11.59 NEED FOR HEPATITIS C SCREENING TEST: ICD-10-CM

## 2020-07-31 DIAGNOSIS — F17.210 CIGARETTE NICOTINE DEPENDENCE WITHOUT COMPLICATION: ICD-10-CM

## 2020-07-31 PROCEDURE — 3079F DIAST BP 80-89 MM HG: CPT | Performed by: INTERNAL MEDICINE

## 2020-07-31 PROCEDURE — 99406 BEHAV CHNG SMOKING 3-10 MIN: CPT | Performed by: INTERNAL MEDICINE

## 2020-07-31 PROCEDURE — 4004F PT TOBACCO SCREEN RCVD TLK: CPT | Performed by: INTERNAL MEDICINE

## 2020-07-31 PROCEDURE — 3077F SYST BP >= 140 MM HG: CPT | Performed by: INTERNAL MEDICINE

## 2020-07-31 PROCEDURE — 99396 PREV VISIT EST AGE 40-64: CPT | Performed by: INTERNAL MEDICINE

## 2020-07-31 PROCEDURE — 99215 OFFICE O/P EST HI 40 MIN: CPT | Performed by: INTERNAL MEDICINE

## 2020-07-31 PROCEDURE — 3008F BODY MASS INDEX DOCD: CPT | Performed by: INTERNAL MEDICINE

## 2020-07-31 RX ORDER — LISINOPRIL 10 MG/1
10 TABLET ORAL DAILY
COMMUNITY

## 2020-07-31 NOTE — PROGRESS NOTES
Assessment/Plan:    Annual physical exam  - History and physical examination done  - Pt was counseled to eat a heart healthy diet, to drink at least 2 L of water daily, to take a daily multivitamin and to exercise for at least 30 minutes of cardio exercise daily, for at least 5 days a week  - CBC, CMP, TSH and lipid panel have been ordered and we will follow up with the results  - patient is up-to-date with the shingles vaccine but needs the tetanus vaccine  She has been counseled to return to the office at a later date for the vaccine  - she is due for the mammogram and with will give her a script   -she was born in 1960 and has not been screened for hepatitis-C   Will order a hepatitis-C antibody test  - follow up in 1 month         Diagnoses and all orders for this visit:    Annual physical exam  -     CBC and differential; Future  -     Comprehensive metabolic panel; Future  -     TSH, 3rd generation with Free T4 reflex; Future  -     Lipid panel; Future    Need for hepatitis C screening test  -     Hepatitis C antibody; Future    Pedal edema  -     Ambulatory referral to Podiatry; Future    Pain in both feet  -     Ambulatory referral to Podiatry; Future    Morbid obesity (HCC)    Elevated serum creatinine    Other orders  -     lisinopril (ZESTRIL) 10 mg tablet; Take 10 mg by mouth daily            Subjective:      Patient ID: Shaun Cunningham is a 64 y o  female  HPI  Patient presents for an annual physical exam   She also presents for a follow-up visit regarding her depression, pedal edema and has some complaints as well    Last annual physical exam- over a year ago    Past medical history- COPD, nicotine dependence, Morbid obesity  Past surgical history- hysterectomy, knee replacement, right, bladder sling surgery at the same time as a hysterectomy    Medications- lisinopril and sertraline  Allergies-NKDA    Diet- a mixture of balanced and junk, drinks about 2 16 oz bottles of water daily  Exercise- none    Alcohol use- none, used to drink socially  Caffeine and soda use- two 12-16 oz  cups of coffee a day, also drinks green tea, occasionally soda  Nicotine use- smokes half a pack to one pack a day and has been smoking for about two months now , smoked for 42-44 years and quit for a year and half before restarting  Recreational drug use- Never, experimented as a young person    Work-  None, on disability  Sexual history, STD history and HIV testing- not sexually active, never had an std, hiv testing - over 30 years ago, negative    Gynecological history/Prostate health/testicular health history- hysterectomy 2005, last mammogram - 2019, due for mammogram    Colonoscopy- negative cologuard 3 months ago  Immunization history- cant remember last tetanus shot, had shingles vaccine     Dental visit- not very often  Vision- myopia    Family history- htn - mom, dm - dad and uncles and aunties, heart ds - dad's whole side of the family - CHF, lung cancer - sister    Today, patient complains of bilateral leg swelling which has been worsening  She states that the left lower extremity is usually more swollen than the right and that the swelling is usually worse at night with wrinkled skin and pain in her bilateral knees and feet  She admits that she gained about 100 lb of weight over the past year  She admits to a family history of lymphedema in her sister  She also reports that she started smoking again after having quit for about a year and half, because of the COVID pandemic  She states that she now smokes between half a pack and a pack of cigarettes a day  She admits to smoker's cough which is intermittently productive of clear colored phlegm as well as shortness of breath on exertion  She states that she is unable to do things for herself such as even bend and put on her shoes    She admits to night sweats but denies denies fever, chills,  chest pain, palpitations, nausea, vomiting abdominal pain, diarrhea, constipation, myalgias, dizziness or headache  She would like some help in weight loss  She states that her insurance will not cover for weight loss surgery  The following portions of the patient's history were reviewed and updated as appropriate:   She  has a past medical history of COPD (chronic obstructive pulmonary disease) ([facility] [address] ), Depression, High blood pressure, Hypertension, and Urinary incontinence  She   Patient Active Problem List    Diagnosis Date Noted    Annual physical exam 07/31/2020    Pain in both feet 07/31/2020    Dermatitis 04/30/2020    Allergic reaction 04/30/2020    Cigarette nicotine dependence without complication 18/96/4264    Morbid obesity with body mass index (BMI) of 60 0 to 69 9 in adult [facility]) 03/05/2020    Elevated serum creatinine 11/14/2019    Acute congestive heart failure (HCC) 11/04/2019    SOB (shortness of breath) 11/04/2019    Pedal edema 11/04/2019    Reactive depression 07/28/2019    Essential hypertension 07/10/2019    Mixed stress and urge urinary incontinence 07/10/2019    [facility] 07/10/2019    Paresthesia of left upper extremity 07/10/2019    Mixed hyperlipidemia 07/10/2019    Morbid obesity ([facility] ) 07/10/2019    Encounter to establish care 07/09/2019     She  has a past surgical history that includes Hysterectomy; Oophorectomy (Bilateral); Replacement total knee (Right, 2016); Total abdominal hysterectomy w/ bilateral salpingoophorectomy (2006); and Incontinence surgery  Her family history includes Aneurysm in her mother; Diabetes in her father; Heart failure in her father; Lung cancer in her sister; Other in her mother  She  reports that she has been smoking cigarettes  She has a 22 50 pack-year smoking history  She has never used smokeless tobacco  She reports that she has current or past drug history  Drug: Marijuana  She reports that she does not drink alcohol    Current Outpatient Medications   Medication Sig Dispense Refill    acetaminophen (TYLENOL) 500 mg tablet Take 500 mg by mouth every 6 (six) hours as needed for mild pain      lisinopril (ZESTRIL) 10 mg tablet Take 10 mg by mouth daily      sertraline (ZOLOFT) 25 mg tablet Take 1 tablet (25 mg total) by mouth daily 60 tablet 0    cetirizine (ZyrTEC) 10 MG chewable tablet Chew 1 tablet (10 mg total) daily (Patient not taking: Reported on 7/31/2020) 20 tablet 0     No current facility-administered medications for this visit  Current Outpatient Medications on File Prior to Visit   Medication Sig    acetaminophen (TYLENOL) 500 mg tablet Take 500 mg by mouth every 6 (six) hours as needed for mild pain    lisinopril (ZESTRIL) 10 mg tablet Take 10 mg by mouth daily    sertraline (ZOLOFT) 25 mg tablet Take 1 tablet (25 mg total) by mouth daily    cetirizine (ZyrTEC) 10 MG chewable tablet Chew 1 tablet (10 mg total) daily (Patient not taking: Reported on 7/31/2020)    [DISCONTINUED] lisinopril (ZESTRIL) 5 mg tablet Take 2 tablets (10 mg total) by mouth daily (Patient not taking: Reported on 7/31/2020)    [DISCONTINUED] predniSONE 5 mg tablet Take 4 tabs daily for 4 days, then 3 tabs daily for 4 days, then 2 tabs daily for 4 days and then 1 tab daily for 4 days and stop (Patient not taking: Reported on 7/31/2020)     No current facility-administered medications on file prior to visit  She has No Known Allergies       Review of Systems   Constitutional: Positive for diaphoresis (night sweats) and unexpected weight change (wt gain of over 100 lbs in one year)  Negative for activity change, chills, fatigue and fever  HENT: Negative for ear pain, postnasal drip, rhinorrhea, sinus pressure and sore throat  Eyes: Negative for pain  Respiratory: Positive for cough (smokers cough, occasionally productive of clear colored phlegm) and shortness of breath (on exertion)  Negative for choking, chest tightness and wheezing  Cardiovascular: Positive for leg swelling (swelling of the B/L legs, left worse than right, with a hx of lymphedema)  Negative for chest pain and palpitations  Gastrointestinal: Negative for abdominal pain, constipation, diarrhea, nausea and vomiting  Genitourinary: Negative for dysuria and hematuria  Musculoskeletal: Positive for arthralgias (knees, ankles)  Negative for back pain, gait problem, joint swelling, myalgias and neck stiffness  Skin: Negative for pallor and rash  Neurological: Negative for dizziness, tremors, seizures, syncope, light-headedness and headaches  Hematological: Negative for adenopathy  Psychiatric/Behavioral: Negative for behavioral problems  Objective:      /82 (BP Location: Left arm, Patient Position: Sitting, Cuff Size: Large)   Pulse 98   Temp 98 7 °F (37 1 °C) (Temporal)   Ht 5' 5 04" (1 652 m)   Wt (!) 173 kg (380 lb 6 4 oz)   SpO2 95%   BMI 63 22 kg/m²          Physical Exam   Constitutional: She is oriented to person, place, and time  She appears well-developed and well-nourished  No distress  Morbidly obese   HENT:   Head: Normocephalic and atraumatic  Right Ear: External ear normal    Left Ear: External ear normal    Nose: Nose normal    Mouth/Throat: Oropharynx is clear and moist  Mucous membranes are dry (Very dry mucous membranes)  No oropharyngeal exudate  Eyes: Pupils are equal, round, and reactive to light  Conjunctivae and EOM are normal  Right eye exhibits no discharge  Left eye exhibits no discharge  No scleral icterus  Neck: Normal range of motion  Neck supple  No JVD present  No tracheal deviation present  No thyromegaly present  Cardiovascular: Normal rate, regular rhythm, normal heart sounds and intact distal pulses  Exam reveals no gallop and no friction rub  No murmur heard  Pulmonary/Chest: Effort normal and breath sounds normal  No respiratory distress  She has no wheezes  She has no rales   She exhibits no tenderness  Abdominal: Soft  Bowel sounds are normal  She exhibits no distension and no mass  There is no tenderness  There is no rebound and no guarding  Musculoskeletal: Normal range of motion  She exhibits no tenderness or deformity  Right lower leg: She exhibits swelling and edema  Left lower leg: She exhibits swelling and edema (Nonpitting pedal edema in bilateral lower extremities with dryness of the skin and prominent veins  There is wrinkling of skin, concerning for lymphedema)  Lymphadenopathy:     She has no cervical adenopathy  Neurological: She is alert and oriented to person, place, and time  She has normal reflexes  No cranial nerve deficit  She exhibits normal muscle tone  Gait (Antalgic gait) abnormal  Coordination normal    Cranial nerves 2-12 are intact bilaterally  Muscle strength is 5/5 in all extremities  Sensation is intact in bilateral face and extremities  Rapid alternating movement and finger-to-nose pointing test are intact  Deep tendon reflexes are 2+ bilaterally  Gait is antalgic   Skin: Skin is warm and dry  No rash noted  She is not diaphoretic  No erythema  No pallor  Psychiatric: She has a normal mood and affect   Her behavior is normal          Appointment on 11/04/2019   Component Date Value Ref Range Status    NT-proBNP 11/04/2019 27  <125 pg/mL Final    WBC 11/04/2019 8 76  4 31 - 10 16 Thousand/uL Final    RBC 11/04/2019 4 60  3 81 - 5 12 Million/uL Final    Hemoglobin 11/04/2019 13 5  11 5 - 15 4 g/dL Final    Hematocrit 11/04/2019 43 9  34 8 - 46 1 % Final    MCV 11/04/2019 95  82 - 98 fL Final    MCH 11/04/2019 29 3  26 8 - 34 3 pg Final    MCHC 11/04/2019 30 8* 31 4 - 37 4 g/dL Final    RDW 11/04/2019 13 8  11 6 - 15 1 % Final    MPV 11/04/2019 11 2  8 9 - 12 7 fL Final    Platelets 14/42/7802 214  149 - 390 Thousands/uL Final    nRBC 11/04/2019 0  /100 WBCs Final    Neutrophils Relative 11/04/2019 69  43 - 75 % Final    Immat GRANS % 11/04/2019 0  0 - 2 % Final    Lymphocytes Relative 11/04/2019 21  14 - 44 % Final    Monocytes Relative 11/04/2019 8  4 - 12 % Final    Eosinophils Relative 11/04/2019 2  0 - 6 % Final    Basophils Relative 11/04/2019 0  0 - 1 % Final    Neutrophils Absolute 11/04/2019 6 01  1 85 - 7 62 Thousands/µL Final    Immature Grans Absolute 11/04/2019 0 02  0 00 - 0 20 Thousand/uL Final    Lymphocytes Absolute 11/04/2019 1 85  0 60 - 4 47 Thousands/µL Final    Monocytes Absolute 11/04/2019 0 69  0 17 - 1 22 Thousand/µL Final    Eosinophils Absolute 11/04/2019 0 16  0 00 - 0 61 Thousand/µL Final    Basophils Absolute 11/04/2019 0 03  0 00 - 0 10 Thousands/µL Final    Sodium 11/04/2019 140  136 - 145 mmol/L Final    Potassium 11/04/2019 3 9  3 5 - 5 3 mmol/L Final    Chloride 11/04/2019 103  100 - 108 mmol/L Final    CO2 11/04/2019 32  21 - 32 mmol/L Final    ANION GAP 11/04/2019 5  4 - 13 mmol/L Final    BUN 11/04/2019 15  5 - 25 mg/dL Final    Creatinine 11/04/2019 1 73* 0 60 - 1 30 mg/dL Final    Standardized to IDMS reference method    Glucose 11/04/2019 97  65 - 140 mg/dL Final      If the patient is fasting, the ADA then defines impaired fasting glucose as > 100 mg/dL and diabetes as > or equal to 123 mg/dL  Specimen collection should occur prior to Sulfasalazine administration due to the potential for falsely depressed results  Specimen collection should occur prior to Sulfapyridine administration due to the potential for falsely elevated results   Calcium 11/04/2019 9 9  8 3 - 10 1 mg/dL Final    AST 11/04/2019 17  5 - 45 U/L Final      Specimen collection should occur prior to Sulfasalazine administration due to the potential for falsely depressed results   ALT 11/04/2019 30  12 - 78 U/L Final      Specimen collection should occur prior to Sulfasalazine administration due to the potential for falsely depressed results       Alkaline Phosphatase 11/04/2019 94  46 - 116 U/L Final    Total Protein 11/04/2019 7 9  6 4 - 8 2 g/dL Final    Albumin 11/04/2019 3 7  3 5 - 5 0 g/dL Final    Total Bilirubin 11/04/2019 0 30  0 20 - 1 00 mg/dL Final    eGFR 11/04/2019 32  ml/min/1 73sq m Final

## 2020-07-31 NOTE — PROGRESS NOTES
Assessment/Plan:    Pedal edema with pain in both feet  -with likely lymphedema and chronic venous stasis  -will refer patient to Podiatry  -I will hold off giving patient any diuretic at this time because of her elevated creatinine at her last labs  -will order repeat CMP to check kidney function and may be give her a diuretic at the next visit when kidney function is back to normal    Morbid obesity  -patient states that her insurance will not cover for weight loss surgery  -she would like weight loss pills but we will recheck her kidney function prior to starting her on perhaps phentermine  Elevated serum creatinine  -last CMP done on November 14th, 2019 showed an elevated creatinine at 1 73  -will recheck a CMP    Depression  -well controlled on sertraline  -continue sertraline at current dose    Essential hypertension  -stable  -continue with lisinopril  -stick to a low-salt diet  -will recheck a CMP    Nicotine dependence  -patient has been counseled to quit smoking  -I spent about 3 minutes on smoking cessation counseling       Diagnoses and all orders for this visit:    Annual physical exam  -     CBC and differential; Future  -     Comprehensive metabolic panel; Future  -     TSH, 3rd generation with Free T4 reflex; Future  -     Lipid panel; Future    Need for hepatitis C screening test  -     Hepatitis C antibody; Future    Pedal edema  -     Ambulatory referral to Podiatry; Future    Pain in both feet  -     Ambulatory referral to Podiatry; Future    Morbid obesity (HCC)    Elevated serum creatinine    Other orders  -     lisinopril (ZESTRIL) 10 mg tablet; Take 10 mg by mouth daily          Subjective:      Patient ID: Gail Carrion is a 64 y o  female  HPI  Patient presents for an annual physical but also presents for follow-up visit regarding her essential hypertension, pedal edema, depression and morbid obesity    Today, patient complains of bilateral leg swelling which has been worsening in recent weeks  She states that the left lower extremity is usually more swollen than the right and that the swelling is usually worse at night with wrinkled skin and pain in her bilateral knees and feet  She admits that she gained about 100 lb of weight over the past year  She also admits that she has been eating a lot more especially with the COVID pandemic  She admits to a family history of lymphedema in her sister  She also reports that she started smoking again after having quit for about a year and half, because of the COVID pandemic  She states that she now smokes between half a pack and a pack of cigarettes a day  She admits to smoker's cough which is intermittently productive of clear colored phlegm as well as shortness of breath on exertion  She states that she is unable to do things for herself such as even bend and put on her shoes  She admits to night sweats but denies denies fever, chills,  chest pain, palpitations, nausea, vomiting abdominal pain, diarrhea, constipation, myalgias, dizziness or headache  She would like some help in weight loss  She states that her insurance will not cover for weight loss surgery  The following portions of the patient's history were reviewed and updated as appropriate:   She  has a past medical history of COPD (chronic obstructive pulmonary disease) ([address] ), Depression, High blood pressure, Hypertension, and Urinary incontinence    She   Patient Active Problem List    Diagnosis Date Noted    Annual physical exam 07/31/2020    Pain in both feet 07/31/2020    Dermatitis 04/30/2020    Allergic reaction 04/30/2020    Cigarette nicotine dependence without complication 78/67/1465    Morbid obesity with body mass index (BMI) of 60 0 to 69 9 in adult [facility]) 03/05/2020    Elevated serum creatinine 11/14/2019    Acute congestive heart failure (HCC) 11/04/2019    SOB (shortness of breath) 11/04/2019    Pedal edema 11/04/2019    Reactive depression 07/28/2019    Essential hypertension 07/10/2019    Mixed stress and urge urinary incontinence 07/10/2019    COPD (chronic obstructive pulmonary disease) ([facility] [address] ) 07/10/2019    Paresthesia of left upper extremity 07/10/2019    Mixed hyperlipidemia 07/10/2019    Morbid obesity ([facility] ) 07/10/2019    Encounter to establish care 07/09/2019     She  has a past surgical history that includes Hysterectomy; Oophorectomy (Bilateral); Replacement total knee (Right, 2016); Total abdominal hysterectomy w/ bilateral salpingoophorectomy (2006); and Incontinence surgery  Her family history includes Aneurysm in her mother; Diabetes in her father; Heart failure in her father; Lung cancer in her sister; Other in her mother  She  reports that she has been smoking cigarettes  She has a 22 50 pack-year smoking history  She has never used smokeless tobacco  She reports that she has current or past drug history  Drug: Marijuana  She reports that she does not drink alcohol  Current Outpatient Medications   Medication Sig Dispense Refill    acetaminophen (TYLENOL) 500 mg tablet Take 500 mg by mouth every 6 (six) hours as needed for mild pain      lisinopril (ZESTRIL) 10 mg tablet Take 10 mg by mouth daily      sertraline (ZOLOFT) 25 mg tablet Take 1 tablet (25 mg total) by mouth daily 60 tablet 0    cetirizine (ZyrTEC) 10 MG chewable tablet Chew 1 tablet (10 mg total) daily (Patient not taking: Reported on 7/31/2020) 20 tablet 0     No current facility-administered medications for this visit        Current Outpatient Medications on File Prior to Visit   Medication Sig    acetaminophen (TYLENOL) 500 mg tablet Take 500 mg by mouth every 6 (six) hours as needed for mild pain    lisinopril (ZESTRIL) 10 mg tablet Take 10 mg by mouth daily    sertraline (ZOLOFT) 25 mg tablet Take 1 tablet (25 mg total) by mouth daily    cetirizine (ZyrTEC) 10 MG chewable tablet Chew 1 tablet (10 mg total) daily (Patient not taking: Reported on 7/31/2020)    [DISCONTINUED] lisinopril (ZESTRIL) 5 mg tablet Take 2 tablets (10 mg total) by mouth daily (Patient not taking: Reported on 7/31/2020)    [DISCONTINUED] predniSONE 5 mg tablet Take 4 tabs daily for 4 days, then 3 tabs daily for 4 days, then 2 tabs daily for 4 days and then 1 tab daily for 4 days and stop (Patient not taking: Reported on 7/31/2020)     No current facility-administered medications on file prior to visit  She has No Known Allergies       Review of Systems   Constitutional: Positive for unexpected weight change (Weight gain of about 100 lb in 1 year)  Negative for activity change, chills, fatigue and fever  HENT: Negative for ear pain, postnasal drip, rhinorrhea, sinus pressure and sore throat  Eyes: Negative for pain  Respiratory: Positive for cough and shortness of breath  Negative for choking, chest tightness and wheezing  Cardiovascular: Positive for leg swelling  Negative for chest pain and palpitations  Gastrointestinal: Negative for abdominal pain, constipation, diarrhea, nausea and vomiting  Genitourinary: Negative for dysuria and hematuria  Musculoskeletal: Positive for arthralgias  Negative for back pain, gait problem, joint swelling, myalgias and neck stiffness  Skin: Negative for pallor and rash  Neurological: Negative for dizziness, tremors, seizures, syncope, light-headedness and headaches  Hematological: Negative for adenopathy  Psychiatric/Behavioral: Negative for behavioral problems and dysphoric mood ( well controlled on sertraline)  Objective:      /82 (BP Location: Left arm, Patient Position: Sitting, Cuff Size: Large)   Pulse 98   Temp 98 7 °F (37 1 °C) (Temporal)   Ht 5' 5 04" (1 652 m)   Wt (!) 173 kg (380 lb 6 4 oz)   SpO2 95%   BMI 63 22 kg/m²          Physical Exam   Constitutional: She is oriented to person, place, and time  She appears well-developed and well-nourished  No distress     Morbidly obese   HENT: Head: Normocephalic and atraumatic  Right Ear: External ear normal  No decreased hearing ( cerumen in bilateral external auditory canal) is noted  Left Ear: External ear normal  No decreased hearing ( cerumen in bilateral external auditory canal without impaction) is noted  Nose: Mucosal edema and septal deviation present  Mouth/Throat: Oropharynx is clear and moist  Mucous membranes are dry (Dry mucous membranes)  No oropharyngeal exudate  Eyes: Pupils are equal, round, and reactive to light  Conjunctivae and EOM are normal  Right eye exhibits no discharge  Left eye exhibits no discharge  No scleral icterus  Neck: Normal range of motion  Neck supple  No JVD present  No tracheal deviation present  No thyromegaly present  Cardiovascular: Normal rate, regular rhythm, normal heart sounds and intact distal pulses  Exam reveals no gallop and no friction rub  No murmur heard  Pulmonary/Chest: Effort normal and breath sounds normal  No respiratory distress  She has no wheezes  She has no rales  She exhibits no tenderness  Abdominal: Soft  Bowel sounds are normal  She exhibits no distension and no mass  There is no tenderness  There is no rebound and no guarding  Musculoskeletal: Normal range of motion  Right lower leg: She exhibits tenderness (Tender bilateral lower extremity near the anterior aspect of the shins), swelling, edema and deformity (Deformity of bilateral ankles, concerning for lymphedema)  She exhibits no laceration  Left lower leg: She exhibits edema (Nonpitting pedal edema bilaterally extending to above the knees with changes concerning for lymphedema and prominent vein)  Lymphadenopathy:     She has no cervical adenopathy  Neurological: She is alert and oriented to person, place, and time  She has normal reflexes  No cranial nerve deficit  She exhibits normal muscle tone  Coordination normal    Skin: Skin is warm and dry  No rash noted  She is not diaphoretic   No erythema  No pallor  Psychiatric: She has a normal mood and affect   Her behavior is normal          Appointment on 11/04/2019   Component Date Value Ref Range Status    NT-proBNP 11/04/2019 27  <125 pg/mL Final    WBC 11/04/2019 8 76  4 31 - 10 16 Thousand/uL Final    RBC 11/04/2019 4 60  3 81 - 5 12 Million/uL Final    Hemoglobin 11/04/2019 13 5  11 5 - 15 4 g/dL Final    Hematocrit 11/04/2019 43 9  34 8 - 46 1 % Final    MCV 11/04/2019 95  82 - 98 fL Final    MCH 11/04/2019 29 3  26 8 - 34 3 pg Final    MCHC 11/04/2019 30 8* 31 4 - 37 4 g/dL Final    RDW 11/04/2019 13 8  11 6 - 15 1 % Final    MPV 11/04/2019 11 2  8 9 - 12 7 fL Final    Platelets 08/55/5865 214  149 - 390 Thousands/uL Final    nRBC 11/04/2019 0  /100 WBCs Final    Neutrophils Relative 11/04/2019 69  43 - 75 % Final    Immat GRANS % 11/04/2019 0  0 - 2 % Final    Lymphocytes Relative 11/04/2019 21  14 - 44 % Final    Monocytes Relative 11/04/2019 8  4 - 12 % Final    Eosinophils Relative 11/04/2019 2  0 - 6 % Final    Basophils Relative 11/04/2019 0  0 - 1 % Final    Neutrophils Absolute 11/04/2019 6 01  1 85 - 7 62 Thousands/µL Final    Immature Grans Absolute 11/04/2019 0 02  0 00 - 0 20 Thousand/uL Final    Lymphocytes Absolute 11/04/2019 1 85  0 60 - 4 47 Thousands/µL Final    Monocytes Absolute 11/04/2019 0 69  0 17 - 1 22 Thousand/µL Final    Eosinophils Absolute 11/04/2019 0 16  0 00 - 0 61 Thousand/µL Final    Basophils Absolute 11/04/2019 0 03  0 00 - 0 10 Thousands/µL Final    Sodium 11/04/2019 140  136 - 145 mmol/L Final    Potassium 11/04/2019 3 9  3 5 - 5 3 mmol/L Final    Chloride 11/04/2019 103  100 - 108 mmol/L Final    CO2 11/04/2019 32  21 - 32 mmol/L Final    ANION GAP 11/04/2019 5  4 - 13 mmol/L Final    BUN 11/04/2019 15  5 - 25 mg/dL Final    Creatinine 11/04/2019 1 73* 0 60 - 1 30 mg/dL Final    Standardized to IDMS reference method    Glucose 11/04/2019 97  65 - 140 mg/dL Final      If the patient is fasting, the ADA then defines impaired fasting glucose as > 100 mg/dL and diabetes as > or equal to 123 mg/dL  Specimen collection should occur prior to Sulfasalazine administration due to the potential for falsely depressed results  Specimen collection should occur prior to Sulfapyridine administration due to the potential for falsely elevated results   Calcium 11/04/2019 9 9  8 3 - 10 1 mg/dL Final    AST 11/04/2019 17  5 - 45 U/L Final      Specimen collection should occur prior to Sulfasalazine administration due to the potential for falsely depressed results   ALT 11/04/2019 30  12 - 78 U/L Final      Specimen collection should occur prior to Sulfasalazine administration due to the potential for falsely depressed results       Alkaline Phosphatase 11/04/2019 94  46 - 116 U/L Final    Total Protein 11/04/2019 7 9  6 4 - 8 2 g/dL Final    Albumin 11/04/2019 3 7  3 5 - 5 0 g/dL Final    Total Bilirubin 11/04/2019 0 30  0 20 - 1 00 mg/dL Final    eGFR 11/04/2019 32  ml/min/1 73sq m Final

## 2020-08-19 LAB
ALBUMIN SERPL-MCNC: 4.1 G/DL (ref 3.6–5.1)
ALBUMIN/GLOB SERPL: 1.3 (CALC) (ref 1–2.5)
ALP SERPL-CCNC: 77 U/L (ref 37–153)
ALT SERPL-CCNC: 16 U/L (ref 6–29)
AST SERPL-CCNC: 12 U/L (ref 10–35)
BASOPHILS # BLD AUTO: 28 CELLS/UL (ref 0–200)
BASOPHILS NFR BLD AUTO: 0.4 %
BILIRUB SERPL-MCNC: 0.4 MG/DL (ref 0.2–1.2)
BUN SERPL-MCNC: 13 MG/DL (ref 7–25)
BUN/CREAT SERPL: ABNORMAL (CALC) (ref 6–22)
CALCIUM SERPL-MCNC: 9.7 MG/DL (ref 8.6–10.4)
CHLORIDE SERPL-SCNC: 104 MMOL/L (ref 98–110)
CHOLEST SERPL-MCNC: 195 MG/DL
CHOLEST/HDLC SERPL: 4.6 (CALC)
CO2 SERPL-SCNC: 27 MMOL/L (ref 20–32)
CREAT SERPL-MCNC: 0.7 MG/DL (ref 0.5–1.05)
EOSINOPHIL # BLD AUTO: 131 CELLS/UL (ref 15–500)
EOSINOPHIL NFR BLD AUTO: 1.9 %
ERYTHROCYTE [DISTWIDTH] IN BLOOD BY AUTOMATED COUNT: 13.9 % (ref 11–15)
GLOBULIN SER CALC-MCNC: 3.1 G/DL (CALC) (ref 1.9–3.7)
GLUCOSE SERPL-MCNC: 107 MG/DL (ref 65–99)
HCT VFR BLD AUTO: 43.5 % (ref 35–45)
HCV AB S/CO SERPL IA: 0.04
HCV AB SERPL QL IA: NORMAL
HDLC SERPL-MCNC: 42 MG/DL
HGB BLD-MCNC: 14 G/DL (ref 11.7–15.5)
LDLC SERPL CALC-MCNC: 131 MG/DL (CALC)
LYMPHOCYTES # BLD AUTO: 1428 CELLS/UL (ref 850–3900)
LYMPHOCYTES NFR BLD AUTO: 20.7 %
MCH RBC QN AUTO: 29.7 PG (ref 27–33)
MCHC RBC AUTO-ENTMCNC: 32.2 G/DL (ref 32–36)
MCV RBC AUTO: 92.2 FL (ref 80–100)
MONOCYTES # BLD AUTO: 559 CELLS/UL (ref 200–950)
MONOCYTES NFR BLD AUTO: 8.1 %
NEUTROPHILS # BLD AUTO: 4754 CELLS/UL (ref 1500–7800)
NEUTROPHILS NFR BLD AUTO: 68.9 %
NONHDLC SERPL-MCNC: 153 MG/DL (CALC)
PLATELET # BLD AUTO: 204 THOUSAND/UL (ref 140–400)
PMV BLD REES-ECKER: 11.5 FL (ref 7.5–12.5)
POTASSIUM SERPL-SCNC: 4.8 MMOL/L (ref 3.5–5.3)
PROT SERPL-MCNC: 7.2 G/DL (ref 6.1–8.1)
RBC # BLD AUTO: 4.72 MILLION/UL (ref 3.8–5.1)
SL AMB EGFR AFRICAN AMERICAN: 111 ML/MIN/1.73M2
SL AMB EGFR NON AFRICAN AMERICAN: 96 ML/MIN/1.73M2
SODIUM SERPL-SCNC: 141 MMOL/L (ref 135–146)
TRIGL SERPL-MCNC: 108 MG/DL
TSH SERPL-ACNC: 1.25 MIU/L (ref 0.4–4.5)
WBC # BLD AUTO: 6.9 THOUSAND/UL (ref 3.8–10.8)

## 2020-08-28 PROBLEM — L30.9 DERMATITIS: Status: RESOLVED | Noted: 2020-04-30 | Resolved: 2020-08-28

## 2020-09-02 ENCOUNTER — OFFICE VISIT (OUTPATIENT)
Dept: INTERNAL MEDICINE CLINIC | Age: 60
End: 2020-09-02
Payer: COMMERCIAL

## 2020-09-02 VITALS
HEIGHT: 65 IN | SYSTOLIC BLOOD PRESSURE: 110 MMHG | WEIGHT: 293 LBS | HEART RATE: 78 BPM | BODY MASS INDEX: 48.82 KG/M2 | TEMPERATURE: 98 F | DIASTOLIC BLOOD PRESSURE: 76 MMHG | OXYGEN SATURATION: 98 %

## 2020-09-02 DIAGNOSIS — R06.02 SHORT OF BREATH ON EXERTION: ICD-10-CM

## 2020-09-02 DIAGNOSIS — E78.2 MIXED HYPERLIPIDEMIA: ICD-10-CM

## 2020-09-02 DIAGNOSIS — F32.9 REACTIVE DEPRESSION: ICD-10-CM

## 2020-09-02 DIAGNOSIS — F17.210 CIGARETTE NICOTINE DEPENDENCE WITHOUT COMPLICATION: ICD-10-CM

## 2020-09-02 DIAGNOSIS — E66.01 MORBID OBESITY (HCC): Primary | ICD-10-CM

## 2020-09-02 DIAGNOSIS — R79.89 ELEVATED SERUM CREATININE: ICD-10-CM

## 2020-09-02 DIAGNOSIS — E66.01 MORBID OBESITY WITH BODY MASS INDEX (BMI) OF 60.0 TO 69.9 IN ADULT (HCC): ICD-10-CM

## 2020-09-02 DIAGNOSIS — R60.0 PEDAL EDEMA: ICD-10-CM

## 2020-09-02 DIAGNOSIS — R35.1 NOCTURIA MORE THAN TWICE PER NIGHT: ICD-10-CM

## 2020-09-02 DIAGNOSIS — G47.33 OBSTRUCTIVE SLEEP APNEA SYNDROME: ICD-10-CM

## 2020-09-02 PROCEDURE — 99406 BEHAV CHNG SMOKING 3-10 MIN: CPT | Performed by: INTERNAL MEDICINE

## 2020-09-02 PROCEDURE — 3078F DIAST BP <80 MM HG: CPT | Performed by: INTERNAL MEDICINE

## 2020-09-02 PROCEDURE — 99215 OFFICE O/P EST HI 40 MIN: CPT | Performed by: INTERNAL MEDICINE

## 2020-09-02 RX ORDER — PHENTERMINE HYDROCHLORIDE 15 MG/1
15 CAPSULE ORAL EVERY MORNING
Qty: 60 CAPSULE | Refills: 0 | Status: SHIPPED | OUTPATIENT
Start: 2020-09-02 | End: 2020-09-02 | Stop reason: CLARIF

## 2020-09-03 ENCOUNTER — TELEPHONE (OUTPATIENT)
Dept: SLEEP CENTER | Facility: CLINIC | Age: 60
End: 2020-09-03

## 2020-09-03 DIAGNOSIS — G47.33 OBSTRUCTIVE SLEEP APNEA SYNDROME: Primary | ICD-10-CM

## 2020-09-03 NOTE — PROGRESS NOTES
Polysomnography or lab sleep study ordered as requested since patient's insurance does not cover home sleep study

## 2020-09-09 ENCOUNTER — TELEPHONE (OUTPATIENT)
Dept: SLEEP CENTER | Facility: CLINIC | Age: 60
End: 2020-09-09

## 2020-09-09 NOTE — TELEPHONE ENCOUNTER
----- Message from Claudia Paulino MD sent at 9/7/2020  2:14 PM EDT -----  approved  ----- Message -----  From: Elba Wallace  Sent: 9/4/2020   8:55 AM EDT  To: Claudia Paulino MD    This sleep study needs approval      If approved please sign and return to clerical pool  If denied please include reasons why  Also provide alternative testing if warranted  Please sign and return to clerical pool

## 2020-09-10 ENCOUNTER — TRANSCRIBE ORDERS (OUTPATIENT)
Dept: SLEEP CENTER | Facility: CLINIC | Age: 60
End: 2020-09-10

## 2020-09-23 DIAGNOSIS — Z12.11 SCREENING FOR MALIGNANT NEOPLASM OF COLON: ICD-10-CM

## 2020-10-08 ENCOUNTER — TELEMEDICINE (OUTPATIENT)
Dept: INTERNAL MEDICINE CLINIC | Age: 60
End: 2020-10-08
Payer: COMMERCIAL

## 2020-10-08 VITALS — WEIGHT: 293 LBS | BODY MASS INDEX: 48.82 KG/M2 | HEIGHT: 65 IN

## 2020-10-08 DIAGNOSIS — R06.2 WHEEZING: ICD-10-CM

## 2020-10-08 DIAGNOSIS — J44.0 CHRONIC OBSTRUCTIVE PULMONARY DISEASE WITH ACUTE LOWER RESPIRATORY INFECTION (HCC): ICD-10-CM

## 2020-10-08 DIAGNOSIS — J06.9 URTI (ACUTE UPPER RESPIRATORY INFECTION): ICD-10-CM

## 2020-10-08 DIAGNOSIS — G47.01 INSOMNIA DUE TO MEDICAL CONDITION: ICD-10-CM

## 2020-10-08 DIAGNOSIS — J20.8 ACUTE BRONCHITIS DUE TO OTHER SPECIFIED ORGANISMS: Primary | ICD-10-CM

## 2020-10-08 DIAGNOSIS — F17.210 CIGARETTE NICOTINE DEPENDENCE WITHOUT COMPLICATION: ICD-10-CM

## 2020-10-08 PROCEDURE — 4004F PT TOBACCO SCREEN RCVD TLK: CPT | Performed by: INTERNAL MEDICINE

## 2020-10-08 PROCEDURE — 99214 OFFICE O/P EST MOD 30 MIN: CPT | Performed by: INTERNAL MEDICINE

## 2020-10-08 PROCEDURE — 99406 BEHAV CHNG SMOKING 3-10 MIN: CPT | Performed by: INTERNAL MEDICINE

## 2020-10-08 RX ORDER — PREDNISONE 20 MG/1
20 TABLET ORAL DAILY
Qty: 5 TABLET | Refills: 0 | Status: SHIPPED | OUTPATIENT
Start: 2020-10-08 | End: 2020-10-08

## 2020-10-08 RX ORDER — AZITHROMYCIN 250 MG/1
TABLET, FILM COATED ORAL
Qty: 6 TABLET | Refills: 0 | Status: SHIPPED | OUTPATIENT
Start: 2020-10-08 | End: 2020-10-12

## 2020-10-08 RX ORDER — PREDNISONE 20 MG/1
40 TABLET ORAL DAILY
Qty: 10 TABLET | Refills: 0 | Status: SHIPPED | OUTPATIENT
Start: 2020-10-08 | End: 2020-11-23 | Stop reason: ALTCHOICE

## 2020-10-08 RX ORDER — GUAIFENESIN 600 MG
600 TABLET, EXTENDED RELEASE 12 HR ORAL EVERY 12 HOURS SCHEDULED
Qty: 20 TABLET | Refills: 0 | Status: SHIPPED | OUTPATIENT
Start: 2020-10-08 | End: 2020-11-23 | Stop reason: ALTCHOICE

## 2020-10-08 RX ORDER — PROMETHAZINE HYDROCHLORIDE AND CODEINE PHOSPHATE 6.25; 1 MG/5ML; MG/5ML
5 SYRUP ORAL
Qty: 120 ML | Refills: 0 | Status: SHIPPED | OUTPATIENT
Start: 2020-10-08 | End: 2020-11-23 | Stop reason: ALTCHOICE

## 2020-10-08 RX ORDER — ALBUTEROL SULFATE 90 UG/1
2 AEROSOL, METERED RESPIRATORY (INHALATION) EVERY 6 HOURS PRN
Qty: 1 INHALER | Refills: 1 | Status: SHIPPED | OUTPATIENT
Start: 2020-10-08 | End: 2021-11-03 | Stop reason: SDUPTHER

## 2020-11-23 ENCOUNTER — TELEPHONE (OUTPATIENT)
Dept: INTERNAL MEDICINE CLINIC | Facility: CLINIC | Age: 60
End: 2020-11-23

## 2020-11-23 ENCOUNTER — TELEMEDICINE (OUTPATIENT)
Dept: INTERNAL MEDICINE CLINIC | Age: 60
End: 2020-11-23
Payer: COMMERCIAL

## 2020-11-23 VITALS — WEIGHT: 293 LBS | BODY MASS INDEX: 64.32 KG/M2

## 2020-11-23 DIAGNOSIS — E66.01 MORBID OBESITY WITH BODY MASS INDEX (BMI) OF 60.0 TO 69.9 IN ADULT (HCC): ICD-10-CM

## 2020-11-23 DIAGNOSIS — I89.0 LYMPHEDEMA OF BOTH LOWER EXTREMITIES: ICD-10-CM

## 2020-11-23 DIAGNOSIS — F17.210 CIGARETTE NICOTINE DEPENDENCE WITHOUT COMPLICATION: ICD-10-CM

## 2020-11-23 DIAGNOSIS — R60.0 PEDAL EDEMA: Primary | ICD-10-CM

## 2020-11-23 DIAGNOSIS — J44.9 CHRONIC OBSTRUCTIVE PULMONARY DISEASE, UNSPECIFIED COPD TYPE (HCC): ICD-10-CM

## 2020-11-23 PROCEDURE — 99214 OFFICE O/P EST MOD 30 MIN: CPT | Performed by: INTERNAL MEDICINE

## 2020-11-23 PROCEDURE — 4004F PT TOBACCO SCREEN RCVD TLK: CPT | Performed by: INTERNAL MEDICINE

## 2020-11-23 PROCEDURE — 99406 BEHAV CHNG SMOKING 3-10 MIN: CPT | Performed by: INTERNAL MEDICINE

## 2020-11-23 PROCEDURE — 3725F SCREEN DEPRESSION PERFORMED: CPT | Performed by: INTERNAL MEDICINE

## 2021-02-10 ENCOUNTER — HOSPITAL ENCOUNTER (OUTPATIENT)
Dept: NON INVASIVE DIAGNOSTICS | Facility: CLINIC | Age: 61
Discharge: HOME | End: 2021-02-10
Payer: COMMERCIAL

## 2021-02-10 DIAGNOSIS — R60.0 PEDAL EDEMA: ICD-10-CM

## 2021-02-10 DIAGNOSIS — R06.02 SHORT OF BREATH ON EXERTION: ICD-10-CM

## 2021-02-10 PROCEDURE — 93306 TTE W/DOPPLER COMPLETE: CPT | Performed by: INTERNAL MEDICINE

## 2021-02-10 PROCEDURE — 93306 TTE W/DOPPLER COMPLETE: CPT

## 2021-02-12 ENCOUNTER — TELEMEDICINE (OUTPATIENT)
Dept: INTERNAL MEDICINE CLINIC | Age: 61
End: 2021-02-12
Payer: COMMERCIAL

## 2021-02-12 ENCOUNTER — TELEPHONE (OUTPATIENT)
Dept: INTERNAL MEDICINE CLINIC | Age: 61
End: 2021-02-12

## 2021-02-12 VITALS — HEIGHT: 65 IN | BODY MASS INDEX: 48.82 KG/M2 | WEIGHT: 293 LBS

## 2021-02-12 DIAGNOSIS — J44.9 CHRONIC OBSTRUCTIVE PULMONARY DISEASE, UNSPECIFIED COPD TYPE (HCC): ICD-10-CM

## 2021-02-12 DIAGNOSIS — E66.01 MORBID OBESITY (HCC): ICD-10-CM

## 2021-02-12 DIAGNOSIS — I50.32 CHRONIC DIASTOLIC CONGESTIVE HEART FAILURE (HCC): Primary | ICD-10-CM

## 2021-02-12 DIAGNOSIS — R60.0 PEDAL EDEMA: ICD-10-CM

## 2021-02-12 DIAGNOSIS — F17.210 CIGARETTE NICOTINE DEPENDENCE WITHOUT COMPLICATION: ICD-10-CM

## 2021-02-12 PROCEDURE — 99215 OFFICE O/P EST HI 40 MIN: CPT | Performed by: INTERNAL MEDICINE

## 2021-02-12 PROCEDURE — 99406 BEHAV CHNG SMOKING 3-10 MIN: CPT | Performed by: INTERNAL MEDICINE

## 2021-02-12 RX ORDER — FUROSEMIDE 20 MG/1
20 TABLET ORAL DAILY
Qty: 30 TABLET | Refills: 0 | Status: SHIPPED | OUTPATIENT
Start: 2021-02-12 | End: 2021-03-23 | Stop reason: SDUPTHER

## 2021-02-12 NOTE — PROGRESS NOTES
Virtual Regular Visit      Assessment/Plan:    Diastolic CHF   - echocardiography done on February 10th, 2021 showed normal systolic function with grade 1 diastolic dysfunction  - will refer patient to cardiology   -will start her on Lasix at 20 mg daily   - will check a BMP in 1 week to rule out acute kidney injury and electrolyte abnormalities  Essential hypertension  -patient was counseled to check her blood pressure occasionally and keep a blood pressure log   - I explained to her that since I am starting her on Lasix, I need her to monitor her blood pressure and to call if it falls below her normal values, especially below 105 systolic or if she develops dizziness , lightheadedness or other cardiac symptoms  - continue with lisinopril  -continue with a low-salt diet     COPD   - stable without exacerbation   -continue with p r n  albuterol inhaler  -patient was counseled to quit smoking    Nicotine dependence   - patient was counseled to quit smoking   - I spent about 3 minutes on smoking cessation counseling    Morbid obesity   -BMI is 62 40   - patient wants to try a medication to help her lose weight   -because of her cardiovascular disease, I will hold off on giving her phentermine but will try Orlistat  - follow-up in 1 month    Pedal edema  - patient was referred to ambulatory physical therapy like she requested but her insurance did not cover  That    - she has been counseled to elevate her legs and to wear compression stockings but states that these 2 things are difficult for her to do   - she insists that her left lower extremity is about 3 in larger than the right  -will order a venous duplex study of the lower extremities  -will start patient on low-dose Lasix especially with her history of diastolic CHF  - will order BMP to be done in 1 week        Problem List Items Addressed This Visit        Respiratory    COPD (chronic obstructive pulmonary disease) ([facility] [address] )       Cardiovascular and Mediastinum    Chronic diastolic congestive heart failure (HCC) - Primary    Relevant Medications    furosemide (LASIX) 20 mg tablet    Other Relevant Orders    Ambulatory referral to Cardiology    Basic metabolic panel       Other    Morbid obesity ([address] )    Relevant Medications    orlistat (JORGE) 60 MG capsule    Pedal edema    Relevant Orders    VAS lower limb venous duplex study, complete bilateral    Cigarette nicotine dependence without complication          BMI Counseling: Body mass index is 62 4 kg/m²  The BMI is above normal  Nutrition recommendations include limiting drinks that contain sugar  Exercise recommendations include exercising 3-5 times per week  Pharmacotherapy was ordered to help aid in weight loss  Patient referred to PCP due to patient being overweight  Reason for visit is   Chief Complaint   Patient presents with    Virtual Regular Visit     review Echo results    Headache     x2 weeks, thinks was from eating poppy seed cake     Virtual Regular Visit        Encounter provider Myke Garza DO    Provider located at [address] [facility] [id number]      Recent Visits  No visits were found meeting these conditions  Showing recent visits within past 7 days and meeting all other requirements     Today's Visits  Date Type Provider Dept   02/12/21 Telephone Myke Garza DO [facility]   02/12/21 Telemedicine Myke Garza DO [facility]   Showing today's visits and meeting all other requirements     Future Appointments  No visits were found meeting these conditions  Showing future appointments within next 150 days and meeting all other requirements        The patient was identified by name and date of birth   Les Bess was informed that this is a telemedicine visit and that the visit is being conducted through [address] [place] [address] and patient was informed that this is not a secure, HIPAA-compliant platform  She agrees to proceed     My office door was closed  No one else was in the room  She acknowledged consent and understanding of privacy and security of the video platform  The patient has agreed to participate and understands they can discontinue the visit at any time  Patient is aware this is a billable service  Subjective  Huong Knott is a 63 y o  female    HPI    Patient presents for a follow-up visit regarding her morbid obesity, her essential hypertension, her leg swelling and wants to review the results of her echocardiography done recently  She admits to persistent leg swelling but states that she mostly sits around her trailer and does not do much exercise but also states that she recently bought a puppy which will likely help her move around a little bit more  Of note, she also admits to shortness of breath on exertion which she believes is also secondary to the fact that she sits around a lot as well as her weight  Of note, she states that over the holidays her weight kim up to above 400 but now she is back down to 375 lb  She states that she has lost some weight from reduced appetite  She does not exercise  She states that she does not elevate her legs much and does not wear compression stockings  She believes that she has lymphedema because her sister had similar symptoms and turned out to have lymphedema  She states that her sister was seen by Physical therapy and they "wrapped her legs" and she lost 35 lb  She had requested for referral to physical therapy but had wanted home PT but her insurance did not approve home PT  They want her to go out and see the physical therapist by herself  Of note, she states that she does not drive and there is no one to drive her to physical therapy  She also admits that she snores and feels sleepy during the day    She denies fever, chills, night sweats, chest pain, palpitations, nausea, vomiting abdominal pain, diarrhea, constipation, myalgias, arthralgias  Of note, she still smokes  Half a pack of cigarettes day  Past Medical History:   Diagnosis Date    COPD (chronic obstructive pulmonary disease) ([facility] [address] )     Depression     High blood pressure     Hypertension     Urinary incontinence        Past Surgical History:   Procedure Laterality Date    HYSTERECTOMY      INCONTINENCE SURGERY      OOPHORECTOMY Bilateral     REPLACEMENT TOTAL KNEE Right 2016    TOTAL ABDOMINAL HYSTERECTOMY W/ BILATERAL SALPINGOOPHORECTOMY  2006       Current Outpatient Medications   Medication Sig Dispense Refill    acetaminophen (TYLENOL) 500 mg tablet Take 500 mg by mouth every 6 (six) hours as needed for mild pain      albuterol (PROVENTIL HFA,VENTOLIN HFA) 90 mcg/act inhaler Inhale 2 puffs every 6 (six) hours as needed for wheezing or shortness of breath 1 Inhaler 1    lisinopril (ZESTRIL) 10 mg tablet Take 10 mg by mouth daily      sertraline (ZOLOFT) 25 mg tablet Take 1 tablet (25 mg total) by mouth daily 60 tablet 0    DIETARY MANAGEMENT PRODUCT PO Take 1 tablet by mouth daily      furosemide (LASIX) 20 mg tablet Take 1 tablet (20 mg total) by mouth daily 30 tablet 0    orlistat (JORGE) 60 MG capsule Take 1 capsule (60 mg total) by mouth 3 (three) times a day with meals 90 capsule 0     No current facility-administered medications for this visit  No Known Allergies    Review of Systems   Constitutional: Positive for unexpected weight change (gained 10 lbs over the holiday and has been losing wt)  Negative for activity change, chills, fatigue and fever  HENT: Negative for ear pain, postnasal drip, rhinorrhea, sinus pressure and sore throat  Eyes: Negative for pain  Respiratory: Positive for shortness of breath (on exertion, very sendentary)  Negative for cough, choking, chest tightness and wheezing (and no orthopnea)  Cardiovascular: Positive for leg swelling  Negative for chest pain and palpitations  Gastrointestinal: Negative for abdominal pain, constipation, diarrhea, nausea and vomiting  Genitourinary: Negative for dysuria and hematuria  Musculoskeletal: Negative for arthralgias, back pain, gait problem, joint swelling, myalgias and neck stiffness  Skin: Negative for pallor and rash  Neurological: Negative for dizziness, tremors, seizures, syncope, light-headedness and headaches  Hematological: Negative for adenopathy  Psychiatric/Behavioral: Positive for sleep disturbance (snores at night and feels sleepy during the day)  Negative for behavioral problems  Video Exam    Vitals:    02/12/21 1020   Weight: (!) 170 kg (375 lb)   Height: 5' 5" (1 651 m)       Physical Exam  Constitutional:       General: She is not in acute distress  Appearance: Normal appearance  She is obese  She is not ill-appearing or toxic-appearing  Comments:   BMI is 62 40   HENT:      Head: Normocephalic and atraumatic  Mouth/Throat:      Mouth: Mucous membranes are dry  Comments:  Dry mucous membranes  Eyes:      General: No scleral icterus  Right eye: No discharge  Left eye: No discharge  Neck:      Musculoskeletal: Normal range of motion  Pulmonary:      Effort: Pulmonary effort is normal  No respiratory distress ( no conversational dyspnea)  Abdominal:      Tenderness: There is no abdominal tenderness  Musculoskeletal:      Right lower leg: Edema present  Left lower leg: Edema (left  leg swelling is worse, for months, no pain) present  Skin:     Coloration: Skin is not jaundiced  Neurological:      Mental Status: She is alert and oriented to person, place, and time  Psychiatric:         Behavior: Behavior normal           I spent 24 minutes directly with the patient during this visit      VIRTUAL VISIT [address] [place] [address] acknowledges that she has consented to an online visit or consultation   She understands that the online visit is based solely on information provided by her, and that, in the absence of a face-to-face physical evaluation by the physician, the diagnosis she receives is both limited and provisional in terms of accuracy and completeness  This is not intended to replace a full medical face-to-face evaluation by the physician  Emil Osman understands and accepts these terms

## 2021-02-12 NOTE — PROGRESS NOTES
Assessment/Plan:     Diagnoses and all orders for this visit:    Chronic diastolic congestive heart failure ([facility] )    Chronic obstructive pulmonary disease, unspecified COPD type ([facility] )    Cigarette nicotine dependence without complication    Pedal edema          Subjective:      Patient ID: Mandie Amanda is a 63 y o  female  HPI    The following portions of the patient's history were reviewed and updated as appropriate:   She  has a past medical history of COPD (chronic obstructive pulmonary disease) ([facility] ), Depression, High blood pressure, Hypertension, and Urinary incontinence  She   Patient Active Problem List    Diagnosis Date Noted    Chronic diastolic congestive heart failure ([facility] ) 02/12/2021    Acute bronchitis due to other specified organisms 10/08/2020    URTI (acute upper respiratory infection) 10/08/2020    Insomnia due to medical condition 10/08/2020    Nocturia more than twice per night 09/02/2020    Obstructive sleep apnea syndrome 09/02/2020    Annual physical exam 07/31/2020    Pain in both feet 07/31/2020    Allergic reaction 04/30/2020    Cigarette nicotine dependence without complication 01/84/2953    Morbid obesity with body mass index (BMI) of 60 0 to 69 9 in adult [facility]) 03/05/2020    Elevated serum creatinine 11/14/2019    SOB (shortness of breath) 11/04/2019    Pedal edema 11/04/2019    Reactive depression 07/28/2019    Essential hypertension 07/10/2019    Mixed stress and urge urinary incontinence 07/10/2019    COPD [facility]chronic obstructive [facility] disease) ([facility] ) 07/10/2019    Paresthesia of left upper extremity 07/10/2019    Mixed hyperlipidemia 07/10/2019    Morbid obesity ([facility] ) 07/10/2019    Encounter to establish care 07/09/2019     She  has a past surgical history that includes Hysterectomy; Oophorectomy (Bilateral); Replacement total knee (Right, 2016); Total abdominal hysterectomy w/ bilateral salpingoophorectomy (2006); and Incontinence surgery    Her family history includes Aneurysm in her mother; Diabetes in her father; Heart failure in her father; Lung cancer in her sister; Other in her mother  She  reports that she has been smoking cigarettes  She has a 22 50 pack-year smoking history  She has never used smokeless tobacco  She reports previous drug use  Drug: Marijuana  She reports that she does not drink alcohol  Current Outpatient Medications   Medication Sig Dispense Refill    acetaminophen (TYLENOL) 500 mg tablet Take 500 mg by mouth every 6 (six) hours as needed for mild pain      albuterol (PROVENTIL HFA,VENTOLIN HFA) 90 mcg/act inhaler Inhale 2 puffs every 6 (six) hours as needed for wheezing or shortness of breath 1 Inhaler 1    DIETARY MANAGEMENT PRODUCT PO Take 1 tablet by mouth daily      lisinopril (ZESTRIL) 10 mg tablet Take 10 mg by mouth daily      sertraline (ZOLOFT) 25 mg tablet Take 1 tablet (25 mg total) by mouth daily 60 tablet 0     No current facility-administered medications for this visit  Current Outpatient Medications on File Prior to Visit   Medication Sig    acetaminophen (TYLENOL) 500 mg tablet Take 500 mg by mouth every 6 (six) hours as needed for mild pain    albuterol (PROVENTIL HFA,VENTOLIN HFA) 90 mcg/act inhaler Inhale 2 puffs every 6 (six) hours as needed for wheezing or shortness of breath    DIETARY MANAGEMENT PRODUCT PO Take 1 tablet by mouth daily    lisinopril (ZESTRIL) 10 mg tablet Take 10 mg by mouth daily    sertraline (ZOLOFT) 25 mg tablet Take 1 tablet (25 mg total) by mouth daily     No current facility-administered medications on file prior to visit  She has No Known Allergies       Review of Systems   Constitutional: Negative for activity change, chills, fatigue, fever and unexpected weight change  HENT: Negative for ear pain, postnasal drip, rhinorrhea, sinus pressure and sore throat  Eyes: Negative for pain     Respiratory: Negative for cough, choking, chest tightness, shortness of breath and wheezing  Cardiovascular: Negative for chest pain, palpitations and leg swelling  Gastrointestinal: Negative for abdominal pain, constipation, diarrhea, nausea and vomiting  Genitourinary: Negative for dysuria and hematuria  Musculoskeletal: Negative for arthralgias, back pain, gait problem, joint swelling, myalgias and neck stiffness  Skin: Negative for pallor and rash  Neurological: Negative for dizziness, tremors, seizures, syncope, light-headedness and headaches  Hematological: Negative for adenopathy  Psychiatric/Behavioral: Negative for behavioral problems  Objective: There were no vitals taken for this visit  Physical Exam  Constitutional:       General: She is not in acute distress  Appearance: She is well-developed  She is not diaphoretic  HENT:      Head: Normocephalic and atraumatic  Right Ear: External ear normal       Left Ear: External ear normal       Nose: Nose normal       Mouth/Throat:      Pharynx: No oropharyngeal exudate  Eyes:      General: No scleral icterus  Right eye: No discharge  Left eye: No discharge  Conjunctiva/sclera: Conjunctivae normal       Pupils: Pupils are equal, round, and reactive to light  Neck:      Musculoskeletal: Normal range of motion and neck supple  Thyroid: No thyromegaly  Vascular: No JVD  Trachea: No tracheal deviation  Cardiovascular:      Rate and Rhythm: Normal rate and regular rhythm  Heart sounds: Normal heart sounds  No murmur  No friction rub  No gallop  Pulmonary:      Effort: Pulmonary effort is normal  No respiratory distress  Breath sounds: Normal breath sounds  No wheezing or rales  Chest:      Chest wall: No tenderness  Abdominal:      General: Bowel sounds are normal  There is no distension  Palpations: Abdomen is soft  There is no mass  Tenderness: There is no abdominal tenderness  There is no guarding or rebound  Musculoskeletal: Normal range of motion  General: No tenderness or deformity  Lymphadenopathy:      Cervical: No cervical adenopathy  Skin:     General: Skin is warm and dry  Coloration: Skin is not pale  Findings: No erythema or rash  Neurological:      Mental Status: She is alert and oriented to person, place, and time  Cranial Nerves: No cranial nerve deficit  Motor: No abnormal muscle tone  Coordination: Coordination normal       Deep Tendon Reflexes: Reflexes are normal and symmetric  Psychiatric:         Behavior: Behavior normal            Orders Only on 08/18/2020   Component Date Value Ref Range Status    Total Cholesterol 08/18/2020 195  <200 mg/dL Final    HDL 08/18/2020 42* > OR = 50 mg/dL Final    Triglycerides 08/18/2020 108  <150 mg/dL Final    LDL Calculated 08/18/2020 131* mg/dL (calc) Final    Comment: Reference range: <100     Desirable range <100 mg/dL for primary prevention;    <70 mg/dL for patients with CHD or diabetic patients   with > or = 2 CHD risk factors  LDL-C is now calculated using the Skip-Diaz   calculation, which is a validated novel method providing   better accuracy than the Friedewald equation in the   estimation of LDL-C  Brandy Smith  [facility]  [id number];033(54): [id number]   ([email or web address]/[email or web address])      Chol HDLC Ratio 08/18/2020 4 6  <5 0 (calc) Final    Non-HDL Cholesterol 08/18/2020 153* <130 mg/dL (calc) Final    Comment: For patients with diabetes plus 1 major ASCVD risk   factor, treating to a non-HDL-C goal of <100 mg/dL   (LDL-C of <70 mg/dL) is considered a therapeutic   option        Glucose, Random 08/18/2020 107* 65 - 99 mg/dL Final    Comment:               Fasting reference interval     For someone without known diabetes, a glucose value  between 100 and 125 mg/dL is consistent with  prediabetes and should be confirmed with a  follow-up test          BUN 08/18/2020 13  7 - 25 mg/dL Final    Creatinine 08/18/2020 0 70  0 50 - 1 05 mg/dL Final    Comment: For patients >52years of age, the reference limit  for Creatinine is approximately 13% higher for people  identified as -American           eGFR Non  08/18/2020 96  > OR = 60 mL/min/1 73m2 Final    eGFR  08/18/2020 111  > OR = 60 mL/min/1 73m2 Final    SL AMB BUN/CREATININE RATIO 71/32/2417 NOT APPLICABLE  6 - 22 (calc) Final    Sodium 08/18/2020 141  135 - 146 mmol/L Final    Potassium 08/18/2020 4 8  3 5 - 5 3 mmol/L Final    Chloride 08/18/2020 104  98 - 110 mmol/L Final    CO2 08/18/2020 27  20 - 32 mmol/L Final    Calcium 08/18/2020 9 7  8 6 - 10 4 mg/dL Final    Protein, Total 08/18/2020 7 2  6 1 - 8 1 g/dL Final    Albumin 08/18/2020 4 1  3 6 - 5 1 g/dL Final    Globulin 08/18/2020 3 1  1 9 - 3 7 g/dL (calc) Final    Albumin/Globulin Ratio 08/18/2020 1 3  1 0 - 2 5 (calc) Final    TOTAL BILIRUBIN 08/18/2020 0 4  0 2 - 1 2 mg/dL Final    Alkaline Phosphatase 08/18/2020 77  37 - 153 U/L Final    AST 08/18/2020 12  10 - 35 U/L Final    ALT 08/18/2020 16  6 - 29 U/L Final    White Blood Cell Count 08/18/2020 6 9  3 8 - 10 8 Thousand/uL Final    Red Blood Cell Count 08/18/2020 4 72  3 80 - 5 10 Million/uL Final    Hemoglobin 08/18/2020 14 0  11 7 - 15 5 g/dL Final    HCT 08/18/2020 43 5  35 0 - 45 0 % Final    MCV 08/18/2020 92 2  80 0 - 100 0 fL Final    MCH 08/18/2020 29 7  27 0 - 33 0 pg Final    MCHC 08/18/2020 32 2  32 0 - 36 0 g/dL Final    RDW 08/18/2020 13 9  11 0 - 15 0 % Final    Platelet Count 54/81/1818 204  140 - 400 Thousand/uL Final    SL AMB MPV 08/18/2020 11 5  7 5 - 12 5 fL Final    Neutrophils (Absolute) 08/18/2020 4,754  1,500 - 7,800 cells/uL Final    Lymphocytes (Absolute) 08/18/2020 1,428  850 - 3,900 cells/uL Final    Monocytes (Absolute) 08/18/2020 559  200 - 950 cells/uL Final    Eosinophils (Absolute) 08/18/2020 131  15 - 500 cells/uL Final    Basophils ABS 08/18/2020 28  0 - 200 cells/uL Final    Neutrophils 08/18/2020 68 9  % Final    Lymphocytes 08/18/2020 20 7  % Final    Monocytes 08/18/2020 8 1  % Final    Eosinophils 08/18/2020 1 9  % Final    Basophils PCT 08/18/2020 0 4  % Final    HEP C AB 08/18/2020 NON-REACTIVE  NON-REACTIVE Final    Signal to Cut-Off 08/18/2020 0 04  <1 00 Final    Comment:    HCV antibody was non-reactive  There is no laboratory   evidence of HCV infection  In most cases, no further action is required  However,  if recent HCV exposure is suspected, a test for HCV RNA  (test code 72436) is suggested  For additional information please refer to  http://[employer]/faq/CTR07t7  (This link is being provided for informational/  educational purposes only )         TSH W/RFX TO FREE T4 08/18/2020 1 25  0 40 - 4 50 mIU/L Final

## 2021-02-12 NOTE — TELEPHONE ENCOUNTER
Patient called the let us know that her Piero was not filled by the pharmacy because it was not covered by her insurance  I called the pharmacy to find out how much it would cost and the pharmacy stated $50 51  The patient stated that this is to much for her to pay out of pocket  Patient is asking for a similar medication to be sent over to the pharmacy

## 2021-02-16 NOTE — TELEPHONE ENCOUNTER
I called and encouraged pt to call her health insurance and find out what weight loss medications they have on their formulary, that they will pay for, so that we do not keep doing  trial and error prescription  She will call them and find out and then give us a name and we will write the prescription for her

## 2021-03-08 DIAGNOSIS — I50.32 CHRONIC DIASTOLIC CONGESTIVE HEART FAILURE (HCC): ICD-10-CM

## 2021-03-08 RX ORDER — FUROSEMIDE 20 MG/1
TABLET ORAL
Qty: 30 TABLET | Refills: 0 | OUTPATIENT
Start: 2021-03-08

## 2021-03-16 ENCOUNTER — OFFICE VISIT (OUTPATIENT)
Dept: INTERNAL MEDICINE CLINIC | Age: 61
End: 2021-03-16
Payer: COMMERCIAL

## 2021-03-16 VITALS
HEART RATE: 86 BPM | SYSTOLIC BLOOD PRESSURE: 124 MMHG | HEIGHT: 64 IN | BODY MASS INDEX: 50.02 KG/M2 | DIASTOLIC BLOOD PRESSURE: 74 MMHG | OXYGEN SATURATION: 97 % | WEIGHT: 293 LBS | TEMPERATURE: 98.7 F

## 2021-03-16 DIAGNOSIS — I50.32 CHRONIC DIASTOLIC CONGESTIVE HEART FAILURE (HCC): ICD-10-CM

## 2021-03-16 DIAGNOSIS — I89.0 LYMPHEDEMA OF BOTH LOWER EXTREMITIES: ICD-10-CM

## 2021-03-16 DIAGNOSIS — M79.671 PAIN IN BOTH FEET: ICD-10-CM

## 2021-03-16 DIAGNOSIS — M79.672 PAIN IN BOTH FEET: ICD-10-CM

## 2021-03-16 DIAGNOSIS — E66.01 MORBID OBESITY (HCC): ICD-10-CM

## 2021-03-16 DIAGNOSIS — J44.9 CHRONIC OBSTRUCTIVE PULMONARY DISEASE, UNSPECIFIED COPD TYPE (HCC): ICD-10-CM

## 2021-03-16 DIAGNOSIS — F17.210 CIGARETTE NICOTINE DEPENDENCE WITHOUT COMPLICATION: ICD-10-CM

## 2021-03-16 DIAGNOSIS — E66.01 MORBID OBESITY WITH BODY MASS INDEX (BMI) OF 60.0 TO 69.9 IN ADULT (HCC): ICD-10-CM

## 2021-03-16 DIAGNOSIS — R60.0 PEDAL EDEMA: Primary | ICD-10-CM

## 2021-03-16 PROCEDURE — 4004F PT TOBACCO SCREEN RCVD TLK: CPT | Performed by: INTERNAL MEDICINE

## 2021-03-16 PROCEDURE — 99214 OFFICE O/P EST MOD 30 MIN: CPT | Performed by: INTERNAL MEDICINE

## 2021-03-16 PROCEDURE — 3008F BODY MASS INDEX DOCD: CPT | Performed by: INTERNAL MEDICINE

## 2021-03-16 NOTE — PROGRESS NOTES
Assessment/Plan:    Pedal edema concerning for lymphedema  -will refer patient to vascular surgery  -she was counseled to elevate her legs as much as possible  -patient she should stick to a low-salt diet  -will check a CMP and a CBC  -follow-up in 3 months    Morbid obesity  -BMI is 63 54  -diet and exercise counseling given  -will refer her to weight management    Chronic diastolic CHF  -without acute exacerbation  -continue with Lasix  -will check a CMP    Nicotine dependence  -patient was counseled to quit smoking  -I spent about 3 minutes on smoking cessation counseling    COPD  -without exacerbation  -continue with albuterol inhaler as needed  -patient was counseled to quit smoking     Diagnoses and all orders for this visit:    Pedal edema  -     Ambulatory referral to Vascular Surgery; Future  -     Comprehensive metabolic panel; Future  -     CBC and differential; Future    Morbid obesity with body mass index (BMI) of 60 0 to 69 9 in [facility])  -     Ambulatory referral to Weight Management; Future  -     CBC and differential; Future    Lymphedema of both lower extremities  -     Ambulatory referral to Vascular Surgery; Future  -     Comprehensive metabolic panel; Future  -     CBC and differential; Future    Cigarette nicotine dependence without complication    Pain in both feet  -     Comprehensive metabolic panel; Future    Chronic obstructive pulmonary disease, unspecified COPD type ([facility] [address] )  -     CBC and differential; Future    Morbid obesity (HCC)    Chronic diastolic congestive heart failure (HCC)  -     Comprehensive metabolic panel; Future  -     CBC and differential; Future             Subjective:      Patient ID: Cinthya Decker is a 63 y o  female  HPI    Patient presents for a follow-up visit regarding her pedal edema and morbid obesity    She had been seen a month ago and wanted weight loss medications but her insurance would not cover the orlistat prescribed and because of her CHF, the phentermine was not recommended  She was counseled to find out what other medications her insurance will pay for  Today she presents and states that her insurance did not give her the names of any alternative medications for weight  She states that she has been working to reduce her portion size and has started losing weight  She has considered bariatric surgery but states that she is afraid of it because of her other comorbidities including her COPD  She also states that her lymphedema has worsened especially on her left lower extremity and would like to be referred to vascular surgery  She states that her sister had the same ailment and had her" lower extremity wrapped" and lost a lot of swelling and weight  She admits to persistent cough she called her smoker's cough and states that she still smokes about a pack of cigarettes a day, shortness of breath on exertion, occasional diarrhea but denies fever, chills, night sweats, chest pain, palpitations, wheezing, nausea, vomiting, constipation, dizziness  The following portions of the patient's history were reviewed and updated as appropriate:   She  has a past medical history of COPD (chronic obstructive pulmonary disease) ([facility] [address] ), Depression, High blood pressure, Hypertension, and Urinary incontinence    She   Patient Active Problem List    Diagnosis Date Noted    Lymphedema of both lower extremities 03/16/2021    Chronic diastolic congestive heart failure ([address] ) 02/12/2021    Acute bronchitis due to other specified organisms 10/08/2020    URTI (acute upper respiratory infection) 10/08/2020    Insomnia due to medical condition 10/08/2020    Nocturia more than twice per night 09/02/2020    Obstructive sleep apnea syndrome 09/02/2020    Annual physical exam 07/31/2020    Pain in both feet 07/31/2020    Allergic reaction 04/30/2020    Cigarette nicotine dependence without complication 82/94/4309    Morbid obesity with body mass index (BMI) of 60 0 to 69 9 in adult [facility]) 03/05/2020    Elevated serum creatinine 11/14/2019    SOB (shortness of breath) 11/04/2019    Pedal edema 11/04/2019    Reactive depression 07/28/2019    Essential hypertension 07/10/2019    Mixed stress and urge urinary incontinence 07/10/2019    COPD (chronic obstructive pulmonary disease) ([facility] [address] ) 07/10/2019    Paresthesia of left upper extremity 07/10/2019    Mixed hyperlipidemia 07/10/2019    Mor[facility] obesity [facility] ) 07/10/2019    Encounter to establish care 07/09/2019     She  has a past surgical history that includes Hysterectomy; Oophorectomy (Bilateral); Replacement total knee (Right, 2016); Total abdominal hysterectomy w/ bilateral salpingoophorectomy (2006); and Incontinence surgery  Her family history includes Aneurysm in her mother; Diabetes in her father; Heart failure in her father; Lung cancer in her sister; Other in her mother  She  reports that she has been smoking cigarettes  She has a 45 00 pack-year smoking history  She has never used smokeless tobacco  She reports previous drug use  Drug: Marijuana  She reports that she does not drink alcohol  Current Outpatient Medications   Medication Sig Dispense Refill    acetaminophen (TYLENOL) 500 mg tablet Take 500 mg by mouth every 6 (six) hours as needed for mild pain      albuterol (PROVENTIL HFA,VENTOLIN HFA) 90 mcg/act inhaler Inhale 2 puffs every 6 (six) hours as needed for wheezing or shortness of breath 1 Inhaler 1    furosemide (LASIX) 20 mg tablet Take 1 tablet (20 mg total) by mouth daily 30 tablet 0    lisinopril (ZESTRIL) 10 mg tablet Take 10 mg by mouth daily      sertraline (ZOLOFT) 25 mg tablet Take 1 tablet (25 mg total) by mouth daily 60 tablet 0    DIETARY MANAGEMENT PRODUCT PO Take 1 tablet by mouth daily       No current facility-administered medications for this visit        Current Outpatient Medications on File Prior to Visit   Medication Sig    acetaminophen (TYLENOL) 500 mg tablet Take 500 mg by mouth every 6 (six) hours as needed for mild pain    albuterol (PROVENTIL HFA,VENTOLIN HFA) 90 mcg/act inhaler Inhale 2 puffs every 6 (six) hours as needed for wheezing or shortness of breath    furosemide (LASIX) 20 mg tablet Take 1 tablet (20 mg total) by mouth daily    lisinopril (ZESTRIL) 10 mg tablet Take 10 mg by mouth daily    sertraline (ZOLOFT) 25 mg tablet Take 1 tablet (25 mg total) by mouth daily    DIETARY MANAGEMENT PRODUCT PO Take 1 tablet by mouth daily    [DISCONTINUED] orlistat (JORGE) 60 MG capsule Take 1 capsule (60 mg total) by mouth 3 (three) times a day with meals (Patient not taking: Reported on 3/16/2021)     No current facility-administered medications on file prior to visit  She has No Known Allergies       Review of Systems   Constitutional: Negative for activity change, chills, fatigue, fever and unexpected weight change  HENT: Negative for ear pain, postnasal drip, rhinorrhea, sinus pressure and sore throat  Eyes: Negative for pain  Respiratory: Positive for cough (smokes cough) and shortness of breath (on exertion )  Negative for choking, chest tightness and wheezing  No orthopnea   Cardiovascular: Positive for leg swelling  Negative for chest pain and palpitations  Gastrointestinal: Positive for diarrhea (occasionally, someimes up to - times a day)  Negative for abdominal pain, constipation, nausea and vomiting  Genitourinary: Negative for dysuria and hematuria  Musculoskeletal: Negative for arthralgias, back pain, gait problem, joint swelling, myalgias and neck stiffness  Skin: Negative for pallor and rash  Neurological: Negative for dizziness, tremors, seizures, syncope, light-headedness and headaches  Hematological: Negative for adenopathy  Psychiatric/Behavioral: Negative for behavioral problems           Objective:      /74 (BP Location: Left arm, Patient Position: Sitting, Cuff Size: Large)   Pulse 86   Temp 98 7 °F (37 1 °C) (Temporal)   Ht 5' 4" (1 626 m)   Wt (!) 168 kg (370 lb 3 2 oz)   SpO2 97%   BMI 63 54 kg/m²          Physical Exam  Constitutional:       General: She is not in acute distress  Appearance: She is well-developed  She is obese  She is not diaphoretic  HENT:      Head: Normocephalic and atraumatic  Right Ear: External ear normal       Left Ear: External ear normal       Nose: Nose normal       Mouth/Throat:      Pharynx: No oropharyngeal exudate  Eyes:      General: No scleral icterus  Right eye: No discharge  Left eye: No discharge  Conjunctiva/sclera: Conjunctivae normal       Pupils: Pupils are equal, round, and reactive to light  Neck:      Musculoskeletal: Normal range of motion and neck supple  Thyroid: No thyromegaly  Vascular: No JVD  Trachea: No tracheal deviation  Cardiovascular:      Rate and Rhythm: Normal rate and regular rhythm  Heart sounds: Murmur (1/6 systolic murmur maximal in aortic valve region and radiating to the carotids) present  Systolic murmur present with a grade of 1/6  No friction rub  No gallop  Pulmonary:      Effort: Pulmonary effort is normal  No respiratory distress  Breath sounds: Normal breath sounds  No wheezing or rales  Chest:      Chest wall: No tenderness  Abdominal:      General: Bowel sounds are normal  There is no distension  Palpations: Abdomen is soft  There is no mass  Tenderness: There is no abdominal tenderness  There is no guarding or rebound  Musculoskeletal: Normal range of motion  General: No tenderness or deformity  Right lower leg: Edema (edema L>R ) present  Left lower leg: She exhibits swelling (pedal edema with chronic venous stasis changes, thickened skin concernng for lymphedema)  Edema present  Lymphadenopathy:      Cervical: No cervical adenopathy  Skin:     General: Skin is warm and dry  Coloration: Skin is not pale        Findings: No erythema or rash  Neurological:      Mental Status: She is alert and oriented to person, place, and time  Cranial Nerves: No cranial nerve deficit  Motor: No abnormal muscle tone  Coordination: Coordination normal       Gait: Gait abnormal ( patient walks with a cane)  Deep Tendon Reflexes: Reflexes are normal and symmetric  Psychiatric:         Behavior: Behavior normal            Orders Only on 08/18/2020   Component Date Value Ref Range Status    Total Cholesterol 08/18/2020 195  <200 mg/dL Final    HDL 08/18/2020 42* > OR = 50 mg/dL Final    Triglycerides 08/18/2020 108  <150 mg/dL Final    LDL Calculated 08/18/2020 131* mg/dL (calc) Final    Comment: Reference range: <100     Desirable range <100 mg/dL for primary prevention;    <70 mg/dL for patients with CHD or diabetic patients   with > or = 2 CHD risk factors  LDL-C is now calculated using the Skip-Diaz   calculation, which is a validated novel method providing   better accuracy than the Friedewald equation in the   estimation of LDL-C  Aly Galvan  [facility]  7083;701(36): [id number]   (http://Cono-C/faq/QEE525)      Chol HDLC Ratio 08/18/2020 4 6  <5 0 (calc) Final    Non-HDL Cholesterol 08/18/2020 153* <130 mg/dL (calc) Final    Comment: For patients with diabetes plus 1 major ASCVD risk   factor, treating to a non-HDL-C goal of <100 mg/dL   (LDL-C of <70 mg/dL) is considered a therapeutic   option        Glucose, Random 08/18/2020 107* 65 - 99 mg/dL Final    Comment:               Fasting reference interval     For someone without known diabetes, a glucose value  between 100 and 125 mg/dL is consistent with  prediabetes and should be confirmed with a  follow-up test          BUN 08/18/2020 13  7 - 25 mg/dL Final    Creatinine 08/18/2020 0 70  0 50 - 1 05 mg/dL Final    Comment: For patients >52years of age, the reference limit  for Creatinine is approximately 13% higher for people  identified as -American           eGFR Non  08/18/2020 96  > OR = 60 mL/min/1 73m2 Final    eGFR  08/18/2020 111  > OR = 60 mL/min/1 73m2 Final    SL AMB BUN/CREATININE RATIO 09/50/6696 NOT APPLICABLE  6 - 22 (calc) Final    Sodium 08/18/2020 141  135 - 146 mmol/L Final    Potassium 08/18/2020 4 8  3 5 - 5 3 mmol/L Final    Chloride 08/18/2020 104  98 - 110 mmol/L Final    CO2 08/18/2020 27  20 - 32 mmol/L Final    Calcium 08/18/2020 9 7  8 6 - 10 4 mg/dL Final    Protein, Total 08/18/2020 7 2  6 1 - 8 1 g/dL Final    Albumin 08/18/2020 4 1  3 6 - 5 1 g/dL Final    Globulin 08/18/2020 3 1  1 9 - 3 7 g/dL (calc) Final    Albumin/Globulin Ratio 08/18/2020 1 3  1 0 - 2 5 (calc) Final    TOTAL BILIRUBIN 08/18/2020 0 4  0 2 - 1 2 mg/dL Final    Alkaline Phosphatase 08/18/2020 77  37 - 153 U/L Final    AST 08/18/2020 12  10 - 35 U/L Final    ALT 08/18/2020 16  6 - 29 U/L Final    White Blood Cell Count 08/18/2020 6 9  3 8 - 10 8 Thousand/uL Final    Red Blood Cell Count 08/18/2020 4 72  3 80 - 5 10 Million/uL Final    Hemoglobin 08/18/2020 14 0  11 7 - 15 5 g/dL Final    HCT 08/18/2020 43 5  35 0 - 45 0 % Final    MCV 08/18/2020 92 2  80 0 - 100 0 fL Final    MCH 08/18/2020 29 7  27 0 - 33 0 pg Final    MCHC 08/18/2020 32 2  32 0 - 36 0 g/dL Final    RDW 08/18/2020 13 9  11 0 - 15 0 % Final    Platelet Count 96/61/1574 204  140 - 400 Thousand/uL Final    SL AMB MPV 08/18/2020 11 5  7 5 - 12 5 fL Final    Neutrophils (Absolute) 08/18/2020 4,754  1,500 - 7,800 cells/uL Final    Lymphocytes (Absolute) 08/18/2020 1,428  850 - 3,900 cells/uL Final    Monocytes (Absolute) 08/18/2020 559  200 - 950 cells/uL Final    Eosinophils (Absolute) 08/18/2020 131  15 - 500 cells/uL Final    Basophils ABS 08/18/2020 28  0 - 200 cells/uL Final    Neutrophils 08/18/2020 68 9  % Final    Lymphocytes 08/18/2020 20 7  % Final    Monocytes 08/18/2020 8 1  % Final    Eosinophils 08/18/2020 1 9  % Final    Basophils PCT 08/18/2020 0 4  % Final    HEP C AB 08/18/2020 NON-REACTIVE  NON-REACTIVE Final    Signal to Cut-Off 08/18/2020 0 04  <1 00 Final    Comment:    HCV antibody was non-reactive  There is no laboratory   evidence of HCV infection  In most cases, no further action is required  However,  if recent HCV exposure is suspected, a test for HCV RNA  (test code 69361) is suggested  For additional information please refer to  http://Aidin/faq/DJA80z6  (This link is being provided for informational/  educational purposes only )         TSH W/RFX TO FREE T4 08/18/2020 1 25  0 40 - 4 50 mIU/L Final

## 2021-03-23 DIAGNOSIS — I50.32 CHRONIC DIASTOLIC CONGESTIVE HEART FAILURE (HCC): ICD-10-CM

## 2021-03-23 DIAGNOSIS — F32.9 REACTIVE DEPRESSION: ICD-10-CM

## 2021-03-23 RX ORDER — SERTRALINE HYDROCHLORIDE 25 MG/1
25 TABLET, FILM COATED ORAL DAILY
Qty: 90 TABLET | Refills: 1 | Status: SHIPPED | OUTPATIENT
Start: 2021-03-23

## 2021-03-23 RX ORDER — FUROSEMIDE 20 MG/1
20 TABLET ORAL DAILY
Qty: 90 TABLET | Refills: 1 | Status: SHIPPED | OUTPATIENT
Start: 2021-03-23

## 2021-04-02 ENCOUNTER — HOSPITAL ENCOUNTER (OUTPATIENT)
Dept: NON INVASIVE DIAGNOSTICS | Facility: CLINIC | Age: 61
Discharge: HOME | End: 2021-04-02
Payer: COMMERCIAL

## 2021-04-02 DIAGNOSIS — R60.0 PEDAL EDEMA: ICD-10-CM

## 2021-04-02 PROCEDURE — 93970 EXTREMITY STUDY: CPT | Performed by: SURGERY

## 2021-04-02 PROCEDURE — 93970 EXTREMITY STUDY: CPT

## 2021-04-08 ENCOUNTER — TELEPHONE (OUTPATIENT)
Dept: VASCULAR SURGERY | Facility: CLINIC | Age: 61
End: 2021-04-08

## 2021-04-08 ENCOUNTER — CONSULT (OUTPATIENT)
Dept: VASCULAR SURGERY | Facility: CLINIC | Age: 61
End: 2021-04-08
Payer: COMMERCIAL

## 2021-04-08 VITALS
BODY MASS INDEX: 50.02 KG/M2 | HEART RATE: 79 BPM | SYSTOLIC BLOOD PRESSURE: 128 MMHG | WEIGHT: 293 LBS | TEMPERATURE: 98.8 F | HEIGHT: 64 IN | DIASTOLIC BLOOD PRESSURE: 78 MMHG

## 2021-04-08 DIAGNOSIS — I89.0 LYMPHEDEMA OF BOTH LOWER EXTREMITIES: Primary | ICD-10-CM

## 2021-04-08 DIAGNOSIS — R60.0 PEDAL EDEMA: ICD-10-CM

## 2021-04-08 PROCEDURE — 99243 OFF/OP CNSLTJ NEW/EST LOW 30: CPT | Performed by: NURSE PRACTITIONER

## 2021-04-08 PROCEDURE — 4004F PT TOBACCO SCREEN RCVD TLK: CPT | Performed by: NURSE PRACTITIONER

## 2021-04-08 PROCEDURE — 3008F BODY MASS INDEX DOCD: CPT | Performed by: NURSE PRACTITIONER

## 2021-04-08 NOTE — PROGRESS NOTES
Assessment/Plan:    Lymphedema of both lower extremities   68-year-old female with morbid obesity, tobacco abuse, hypertension, hyperlipidemia, COPD, obstructive sleep apnea, chronic diastolic heart failure with EF 65% referred for vascular evaluation bilateral lower extremity lymphedema, L>R  -Stage II/III bilateral lower extremity lymphedema, left greater than right with lipodermatosclerosis, skin fibrosis and stasis changes on the left  -Referred to physical therapy for lymphedema massage and wrapping   -Pneumatic compression pumps to be used twice per day b i d   -Compress flex compression   -Start Tubigrip compression  Leg size to large and mishaped for traditional compression  -Exercise and weight loss recommended  She is attempting weight loss   -Moisturizers daily to maintain skin integrity   -Increase exercise  Recommended 30 minutes of cardiovascular exercise  Start 10 minute walks 3 times per day  -Follow-up in the office in 1 month       Diagnoses and all orders for this visit:    Lymphedema of both lower extremities  -     Ambulatory referral to Vascular Surgery  -     Ambulatory referral to PT/OT lymphedema therapy; Future  -     Pneumatic compression pumps  -     CompreFlex    Pedal edema  -     Ambulatory referral to Vascular Surgery          Subjective:      Patient ID: Lea Staples is a 63 y o  female  Patient is new and referred by PCP  Patient is here to rev LEV done on 4/02/21  Patient has B/l leg discomfort and swelling Lt> Rt  Patient c/o occasionally itching  Patient has trouble wearing compression  Patient denies wounds or ulcers  Patient currently smokes about a ppd  HPI    68-year-old female with morbid obesity, tobacco abuse, hypertension, hyperlipidemia, COPD, obstructive sleep apnea, chronic diastolic heart failure with EF 65% referred for vascular evaluation bilateral lower extremity lymphedema, L>R   she is morbidly obese  She is attempting weight loss    She has bilateral lower extremity swelling for many years and worsened left leg swelling after hysterectomy in 2015  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/  and has gained approximately 80 lb since then  She has increased bilateral, left greater than right lower extremity swelling on a daily basis  She has previously had weeping from left shin  No issues with cellulitis  She has tried compression socks in the past though unable to [facility] compression due to difficulty with bending left knee secondary to osteoarthritis and mid shape of leg  She denies any history of DVT  Mom and sister both had lymphedema  She had a venous ultrasound 4/2 which was negative for acute or chronic DVT  Showed arterial  waveforms triphasic  She reports recent diagnosis heart failure  She was started on furosemide  She notes no difference in her lower extremity swelling  She did have echo in February EF 65% with grade 1 diastolic dysfunction  The following portions of the patient's history were reviewed and updated as appropriate: allergies, current medications, past family history, past medical history, past social history, past surgical history and problem list   ROS reviewed     Review of Systems   Constitutional: Positive for fatigue  HENT: Negative  Eyes: Negative  Respiratory: Positive for apnea, cough and shortness of breath  Cardiovascular: Positive for leg swelling (B/l leg swelling)  Gastrointestinal: Negative  Endocrine: Negative  Genitourinary: Positive for frequency  Musculoskeletal: Positive for arthralgias and gait problem  Skin: Negative  Allergic/Immunologic: Negative  Hematological: Negative  Psychiatric/Behavioral: Negative  Objective:    I have reviewed and made appropriate changes to the review of systems input by the medical assistant      Vitals:    04/08/21 0957   BP: 128/78   BP Location: Left arm   Patient Position: Sitting   Cuff Size: Standard Pulse: 79   Temp: 98 8 °F (37 1 °C)   TempSrc: Tympanic   Weight: (!) 172 kg (379 lb)   Height: 5' 4" (1 626 m)       Patient Active Problem List   Diagnosis    Encounter to establish care    Essential hypertension    Mixed stress and urge urinary incontinence    [facility]    Paresthesia of left upper extremity    Mixed hyperlipidemia    Morbid obesity ([facility])    Reactive depression    SOB (shortness of breath)    Pedal edema    Elevated serum creatinine    Morbid obesity with body mass index (BMI) of 60 0 to 69 9 in adult ([facility])    Cigarette nicotine dependence without complication    Allergic reaction    Annual physical exam    Pain in both feet    Nocturia more than twice per night    Obstructive sleep apnea syndrome    Acute bronchitis due to other specified organisms    URTI (acute upper respiratory infection)    Insomnia due to medical condition    Chronic diastolic congestive heart failure ([facility])    Lymphedema of both lower extremities       Past Surgical History:   Procedure Laterality Date    HYSTERECTOMY      INCONTINENCE SURGERY      OOPHORECTOMY Bilateral     REPLACEMENT TOTAL KNEE Right 2016    TOTAL ABDOMINAL HYSTERECTOMY W/ BILATERAL SALPINGOOPHORECTOMY  2006       Family History   Problem Relation Age of Onset    Other Mother     Aneurysm Mother         aortic    Heart failure Father     Diabetes Father     Lung cancer Sister        Social History     Socioeconomic History    Marital status: /Civil Union     Spouse name: Not on file    Number of children: Not on file    Years of education: Not on file    Highest education level: Not on file   Occupational History    Not on file   Social Needs    Financial resource strain: Not on file    Food insecurity     Worry: Not on file     Inability: Not on file    Transportation needs     Medical: Not on file     Non-medical: Not on file   Tobacco Use    Smoking status: Current Every Day Smoker     Packs/day: 1 00     Years: 45 00     Pack years: 45 00     Types: Cigarettes     Last attempt to quit: 2018     Years since quittin 2    Smokeless tobacco: Never Used   Substance and Sexual Activity    Alcohol use: Never     Frequency: Never    Drug use: Not Currently     Types: Marijuana     Comment: former used rarely about once every 6 months     Sexual activity: Not on file   Lifestyle    Physical activity     Days per week: Not on file     Minutes per session: Not on file    Stress: Not on file   Relationships    Social connections     Talks on phone: Not on file     Gets together: Not on file     Attends [employer]ism service: Not on file     Active member of club or organization: Not on file     Attends meetings of clubs or organizations: Not on file     Relationship status: Not on file    Intimate partner violence     Fear of current or ex partner: Not on file     Emotionally abused: Not on file     Physically abused: Not on file     Forced sexual activity: Not on file   Other Topics Concern    Not on file   Social History Narrative    Not on file       No Known Allergies      Current Outpatient Medications:     albuterol (PROVENTIL HFA,VENTOLIN HFA) 90 mcg/act inhaler, Inhale 2 puffs every 6 (six) hours as needed for wheezing or shortness of breath, Disp: 1 Inhaler, Rfl: 1    DIETARY MANAGEMENT PRODUCT PO, Take 1 tablet by mouth daily, Disp: , Rfl:     furosemide (LASIX) 20 mg tablet, Take 1 tablet (20 mg total) by mouth daily, Disp: 90 tablet, Rfl: 1    lisinopril (ZESTRIL) 10 mg tablet, Take 10 mg by mouth daily, Disp: , Rfl:     sertraline (ZOLOFT) 25 mg tablet, Take 1 tablet (25 mg total) by mouth daily, Disp: 90 tablet, Rfl: 1    acetaminophen (TYLENOL) 500 mg tablet, Take 500 mg by mouth every 6 (six) hours as needed for mild pain, Disp: , Rfl:     /78 (BP Location: Left arm, Patient Position: Sitting, Cuff Size: Standard)   Pulse 79   Temp 98 8 °F (37 1 °C) (Tympanic)   Ht 5' 4" (1 626 m)   Wt (!) 172 kg (379 lb)   BMI 65 06 kg/m²          Physical Exam  Vitals signs and nursing note reviewed  Constitutional:       Appearance: She is obese  HENT:      Head: Normocephalic and atraumatic  Eyes:      Extraocular Movements: Extraocular movements intact  Cardiovascular:      Pulses:           Dorsalis pedis pulses are 2+ on the right side and 2+ on the left side  Heart sounds: Normal heart sounds  Pulmonary:      Effort: Pulmonary effort is normal       Breath sounds: Normal breath sounds  Abdominal:      General: Bowel sounds are normal       Palpations: Abdomen is soft  Comments: obese   Musculoskeletal:         General: Swelling present  Comments: Bilateral lower extremity 2+ edema, left greater than right with lipodermatosclerosis and stasis changes left distal anterior lower leg  Ambulates with a cane   Skin:     General: Skin is warm and dry  Neurological:      General: No focal deficit present  Mental Status: She is oriented to person, place, and time  Psychiatric:         Behavior: Behavior normal          Thought Content:  Thought content normal

## 2021-04-08 NOTE — PATIENT INSTRUCTIONS
Lymphedema   AMBULATORY CARE:   The lymphatic system  contains fluid, vessels, tissue, and organs  This system removes and carries fluid throughout the body  It also helps the body fight infection  Lymphedema is the buildup of lymph fluid in fat tissue under your skin  The buildup causes the area to swell  Lymphedema can happen any time that lymphatic vessels are blocked or damaged  Damage to lymphatic vessels may be caused by surgery, infection, injury, cancer, radiation, or scar tissue     Common signs and symptoms include the following:  Signs and symptoms may happen where lymph nodes were removed, or in the arm, leg, chest, or underarm  · Swelling or itching    · Pain, burning, or aching    · Tight, hard, or red skin    · Hair loss    · Heaviness or fullness    · Numbness or tingling    · Stiffness    Contact your healthcare provider or lymphedema specialist if:   · You have a fever or chills  · You have an open area of skin that looks red or swollen, or drains pus  · Your symptoms, such as swelling or pain, get worse  · Your arms or legs feel heavy, or you cannot move them  · Your skin becomes hard, thick, or rough  · You have a skin wound that will not heal      · Your shoes, clothes, or jewelry feel tighter  · You have questions or concerns about your condition or care  Treatment for lymphedema  can relieve symptoms and prevent lymphedema from getting worse  You may need therapeutic massage, physical therapy, or compression devices to help decrease swelling and pain  Surgery may be needed if other treatments do not work  Self-care:   · Elevate  your arm or leg above the level of your heart as often as you can  This will help decrease swelling and pain  Prop your arm or leg on pillows or blankets to keep it elevated comfortably  · Wear compression socks, sleeves, or bandages  as directed  Compression devices must be fitted by a healthcare provider   Compression devices may need to be replaced every 3 to 6 months  · Exercise  can help you maintain or regain function of your arm or leg  Ask your healthcare provider what type of exercise to do and how often to do it  Start slow, take breaks, and gradually do more each day  Do not do vigorous, repeated exercises  Watch for changes in your arm or leg during exercise  Stop and rest if you have swelling, increased pain, or heaviness  Elevate your arm or leg above the level of your heart  · Change your position often  to help move lymphatic fluid through your body  Do not sit or  one position for more than 30 minutes  Do not cross your legs when you sit  These actions can cause lymphatic fluid to buildup  · Maintain a healthy weight  Ask your healthcare provider what you should weigh  Weight loss may improve your symptoms  If you need to lose weight, your healthcare provider can help you create a weight loss program     Prevent infection with proper skin care:  A skin infection can make lymphedema worse  Do the following to decrease your risk for a skin infection in your arm or leg with lymphedema:  · Wash your skin gently and dry it well  Use a mild soap to wash your skin  Gently pat your skin dry after you bathe  Apply a mild cream or lotion to moisturize your skin and prevent dryness or cracking  Keep your feet clean and dry  · Protect your skin from injury  Wear gloves when you garden and wash dishes  Cut your nails straight across to prevent injury to your fingers and toes  Use sunscreen and insect repellant to avoid burns and punctures  Wear slippers in the house  Wear shoes when you go outside  · Check your skin every day for signs of infection  Signs of infection include redness, swelling, increased heat, or pus  You may also have a fever or chills  · Care for cuts, scratches or burns  Apply antibiotic ointment to cuts and other small breaks in the skin   Apply a cold pack or cold water to a burn for 15 minutes  Then wash it with soap and water  Cover scratches, cuts, or burns with a clean, dry gauze or bandage as directed  Keep the area clean and dry  · Tell healthcare providers that you have lymphedema  Tell them not to do, IVs, blood draws, and blood pressure readings in the arm or leg with lymphedema  If there is lymphedema in both arms, ask them to take your blood pressure on your leg  Do not allow flu shots or vaccinations in your arm with lymphedema  Follow up with your healthcare provider or lymphedema specialist as directed: You will need regular visits so healthcare providers can examine the affected areas  You may also be referred to a clinic that specializes in lymphedema treatment  Write down your questions so you remember to ask them during your visits  © Copyright [address] Information is for End User's use only and may not be sold, redistributed or otherwise used for commercial purposes  All illustrations and images included in CareNotes® are the copyrighted property of A D A M , Inc  or [facility] Joanne Benedict   The above information is an  only  It is not intended as medical advice for individual conditions or treatments  Talk to your doctor, nurse or pharmacist before following any medical regimen to see if it is safe and effective for you

## 2021-04-08 NOTE — TELEPHONE ENCOUNTER
RONALD Artis (NPI: [id number]) ordered lymphedema pumps for patient  I have faxed the lymphedema pump order, patient's demographics, current medication list, office note, and recent imaging studies to Gracie Hutchinson - Phone: [phone] / Fax: [phone] / E-mail: [email or web address]  Patient's four (4) week follow-up appointment is scheduled for 5/13/2021 at our [facility] with Nina Albert (NPI: [id number])

## 2021-04-08 NOTE — ASSESSMENT & PLAN NOTE
51-year-old female with morbid obesity, tobacco abuse, hypertension, hyperlipidemia, COPD, obstructive sleep apnea, chronic diastolic heart failure with EF 65% referred for vascular evaluation bilateral lower extremity lymphedema, L>R  -Stage II/III bilateral lower extremity lymphedema, left greater than right with lipodermatosclerosis, skin fibrosis and stasis changes on the left  -Referred to physical therapy for lymphedema massage and wrapping   -Pneumatic compression pumps to be used twice per day b i d   -Compress flex compression   -Start Tubigrip compression  Leg size to large and mishaped for traditional compression  -Exercise and weight loss recommended  She is attempting weight loss   -Moisturizers daily to maintain skin integrity   -Increase exercise  Recommended 30 minutes of cardiovascular exercise  Start 10 minute walks 3 times per day    -Follow-up in the office in 1 month

## 2021-04-20 ENCOUNTER — EVALUATION (OUTPATIENT)
Dept: PHYSICAL THERAPY | Age: 61
End: 2021-04-20
Payer: COMMERCIAL

## 2021-04-20 DIAGNOSIS — I89.0 LYMPHEDEMA OF BOTH LOWER EXTREMITIES: Primary | ICD-10-CM

## 2021-04-20 PROCEDURE — 97163 PT EVAL HIGH COMPLEX 45 MIN: CPT

## 2021-04-20 NOTE — PROGRESS NOTES
PT Evaluation     Today's date: 2021  Patient name: Dori Aragon  : 1960  MRN: [id number]  Referring provider: RONALD Mackenzie  Dx:   Encounter Diagnosis     ICD-10-CM    1  Lymphedema of both lower extremities  I89 0 Ambulatory referral to PT/OT lymphedema therapy     +3 pitting lymphedema L>R   21 Left le Right le reev left  Right     mtp 26 25      Lat malleolus 35 5 32 5      4cm prox to lat mal 40 5 34 5      8 43 5 39      12 49 5 44      16 55 5 47      20 59 53      24 66 58      28 67 60      32 67 61      36 70 60      40 76 63 5      44 76 65      48 77 69      52 82 76      56 83cm 78cm                                   Assessment  Assessment details: The pt is a 63year old female  referred to outpt Physical therapy with diagnoses chronic le bilateral lymphedema present since late childhood ( primary) and now exacerbated  symptoms since undergoing s/p total hysterectomy surgery in  and approx 100 lb wt gain in + 1year reported this past year  Dori Aragon presents with decreased bilateral knee  range of motion,increased pain, increased girth both le's  , + tissue fibrosis and decreased tolerance to functional activity with significant shortness of breath    PT is warranted to address these deficits in efforts to reduce girth, maximize function, and return to prior level of activity  Treatment shall include complex decongestive physical therapy, manual lymphatic drainage massage and soft tissue mobilization, there exer to increase lymphatic circulation, home exer programming, lymphedema education and skin care management, compression wrapping prn if unable to wrap trial tubigrip stockineete , rom exer, trial home compression pump usage with bilateral  le elevation 40 MMHG, fitting for compression garments knee high versus thigh high (  4 refills ) and feet consider circiade farrow wraps, bilateral below the knee and feet  Or any compression wrap garment  Pending insurance clearance  Trial aquatic exer pending tolerance to activity in h20     Impairments: abnormal gait, abnormal or restricted ROM, activity intolerance, impaired physical strength, lacks appropriate home exercise program, pain with function, weight-bearing intolerance, poor posture  and poor body mechanics  Other impairment: +3 pitting  lymphedema bilateral  le's ,    Functional limitations: shortness of breath with activity, decreased tolerance to bending, walking, standing , donning and doffing compression garments and dressing c/o leg heaviness and decraeased mobility  Symptom irritability: moderateUnderstanding of Dx/Px/POC: good   Prognosis: fair    Goals  STG 1  Independent in there exer program in two weeks           2  Reduction of girth by 2 cm in two weeks      LTG 1 Reduction of girth by 4 cm in 4 weeks  2 The pt shall be independent in donning and doffing compression garments  ( compression wraps in 4 weeks )    Plan  Patient would benefit from: PT eval, skilled physical therapy and lymphedema eval  Referral necessary: Yes  Planned therapy interventions: aquatic therapy, manual therapy, massage, patient education, neuromuscular re-education, strengthening, stretching, therapeutic activities, therapeutic exercise, home exercise program and compression  Other planned therapy interventions: fit for compression wraps below the knee and both feet pending insurance coverage  Frequency: 2x week  Duration in weeks: 8  Plan of Care beginning date: 4/20/2021  Plan of Care expiration date: 6/21/2021  Treatment plan discussed with: patient        Subjective Evaluation    History of Present Illness  Onset date: primary lymphedema since childhood now secondary s/p hysterectomy surgery in 2006    Mechanism of injury: Mabel Orellana is a 63year old female referred to outpt PT with a diagnosis of bilateral le lymphedema initially present since later childhood with morbid obesity noted throughout her life and exacerbated s/p total hysterectomy surgery with the disruption of the lymphatic system in   The pt report two years ago weighting 290lbs and recently weighing 379lb with also a hundred lb weight gain in the past 1 year   She has never formally been treated for le lymphedema and was unable to tolerate over the counter compression garment with difficultly donning and cutting at the calf per her report  She has completed on line forms for possible bariatric surgery but reports transportation can limit her attendance to possible weight loss management programming  She reports significant shortness of breath with activity and hx of COPD and will be seeing a cardiologist in the very near future to address possible early CHF symptoms              Recurrent probem    Quality of life: fair    Pain  Current pain ratin  At best pain ratin  At worst pain ratin  Location: left knee pain   Quality: sharp, tight, pulling and dull ache  Relieving factors: change in position, heat and rest  Aggravating factors: stair climbing, walking, standing and lifting  Progression: worsening    Social Support  Steps to enter house: yes (2 steps bilateral railings)  Stairs in house: no   Lives in: [place]  Lives with: significant other (brother in law and cousin)    Employment status: not working  Exercise history: sedentary in nature, pt has new puppy does walk dog very short distances    Treatments  Current treatment: physical therapy  Patient Goals  Patient goals for therapy: decreased edema, decreased pain, increased motion, increased strength, independence with ADLs/IADLs and return to sport/leisure activities  Patient goal: reduction in le girth by 4 cm in 4 weeks         Objective     Active Range of Motion   Left Hip   Flexion: WFL  Extension: 10 degrees WFL  Abduction: WFL  Adduction: WFL    Right Hip   Flexion: WFL  Extension: 10 degrees WFL  Abduction: WFL  Adduction: WFL  Left Knee Flexion: WFL  Extension: WFL    Right Knee   Extension: WFL  Left Ankle/Foot   Dorsiflexion (ke): 10 degrees     Right Ankle/Foot   Dorsiflexion (ke): 10 degrees     Additional Active Range of Motion Details  Knee flexion limited due to adipose tissue and s/p right tkr to 90 degrees achieved  Goal 20 dorsiflexion    Strength/Myotome Testing     Left Hip   Planes of Motion   Flexion: 3-  Extension: 2+  Abduction: 2+  Adduction: 2+    Right Hip   Planes of Motion   Flexion: 3-  Extension: 2+  Abduction: 2+    Left Knee   Flexion: 4-  Extension: 5    Right Knee   Flexion: 3+  Extension: 5    Left Ankle/Foot   Dorsiflexion: 4+    Right Ankle/Foot   Dorsiflexion: 4+    Ambulation     Ambulation: Level Surfaces   Ambulation without assistive device: independent    Additional Level Surfaces Ambulation Details  30 feet  Independent then shortness of breath noted ,  Can walk 75 ft but requires rest afterwards               Precautions:SHORTNESS OF BREATH WiTH EXERTION no known allergies, PMH:COPD, right tkr 2016, HTN, arthritis, obstructive sleep apnea and decreased sleep due to nocturia greater than 2 x a night, mixed stress and urge incontinence , s/p sling suspension surgery , s/p total hysterectomy 2006 then significant increase in le lymphedema noted,   morbid obesity ( 379lbs - pt has gained approx 1 hundred lbs in the past 1 year  , pt had larger legs and increased weight as a child ( primary lymphedema now  Secondary lymphedema s/p hysterectomy surgery) pt to see cardiologist for beginnings of CHF per pt report ,  Pt has completed on line bariatric screening info,  Reactive depression, SOB , cigarette nicotine dependence    Manuals 4/20/21             I eval            Mld massage and soft tissue mobilization ble's                                        Neuro Re-Ed/ there exer  Pursed lip breathing reviewed today            Le lymphedema exer packet  10 reps each begun see handout            Nu step to tolerance only watch sob                                                                               Ther Ex/ aquatic exer trial pending SOB              Water walking 5 laps fwd,/bwd             Sitting ankle pumps and ankle circles              Sitting laq, hip flexion, hip abd/add, hip int and ext rotation             Standing heelraises, hip abd, flex/ext, knee flexion             Marching in place, 1/2 jj             Step ups             hamstring stretch, heelcord stretch with strap                           Ther Activity                                       Gait Training                                       Modalities             Vascular doctors have ordered home compression pump for the pt per her report, will trial lymphedema pump in future 40 mmHg with legs elevated

## 2021-05-03 ENCOUNTER — OFFICE VISIT (OUTPATIENT)
Dept: PHYSICAL THERAPY | Age: 61
End: 2021-05-03
Payer: COMMERCIAL

## 2021-05-03 DIAGNOSIS — I89.0 LYMPHEDEMA OF BOTH LOWER EXTREMITIES: Primary | ICD-10-CM

## 2021-05-03 PROCEDURE — 97110 THERAPEUTIC EXERCISES: CPT

## 2021-05-03 PROCEDURE — 97140 MANUAL THERAPY 1/> REGIONS: CPT

## 2021-05-03 NOTE — PROGRESS NOTES
Daily Note     Today's date: 5/3/2021  Patient name: Lea Staples  : 1960  MRN: [id number]  Referring provider: RONALD Dumont  Dx:   Encounter Diagnosis     ICD-10-CM    1  Lymphedema of both lower extremities  I89 0                   Subjective: Without significant changes  Objective: See treatment diary below      Assessment: Tolerated treatment well  Patient would benefit from continued PT      Plan: Continue per plan of care        Precautions:SHORTNESS OF BREATH WiTH EXERTION no known allergies, PMH:COPD, right tkr , HTN, arthritis, obstructive sleep apnea and decreased sleep due to nocturia greater than 2 x a night, mixed stress and urge incontinence , s/p sling suspension surgery , s/p total hysterectomy  then significant increase in le lymphedema noted,   morbid obesity ( 379lbs - pt has gained approx 1 hundred lbs in the past 1 year  , pt had larger legs and increased weight as a child ( primary lymphedema now  Secondary lymphedema s/p hysterectomy surgery) pt to see cardiologist for beginnings of CHF per pt report ,  Pt has completed on line bariatric screening info,  Reactive depression, SOB , cigarette nicotine dependence    Manuals 4/20/21 5/3            I eval            Mld massage and soft tissue mobilization ble's                                        Neuro Re-Ed/ there exer  Pursed lip breathing reviewed today            Le lymphedema exer packet  10 reps each begun see handout            Nu step to tolerance only watch sob                                                                               Ther Ex/ aquatic exer trial pending SOB              Water walking 5 laps fwd,/bwd             Sitting ankle pumps and ankle circles              Sitting laq, hip flexion, hip abd/add, hip int and ext rotation             Standing heelraises, hip abd, flex/ext, knee flexion             Marching in place, 1/2 jj             Step ups             hamstring stretch, heelcord stretch with strap                           Ther Activity                                       Gait Training                                       Modalities             Vascular doctors have ordered home compression pump for the pt per her report, will trial lymphedema pump in future 40 mmHg with legs elevated

## 2021-05-06 ENCOUNTER — APPOINTMENT (OUTPATIENT)
Dept: PHYSICAL THERAPY | Age: 61
End: 2021-05-06
Payer: COMMERCIAL

## 2021-05-10 ENCOUNTER — OFFICE VISIT (OUTPATIENT)
Dept: PHYSICAL THERAPY | Age: 61
End: 2021-05-10
Payer: COMMERCIAL

## 2021-05-10 DIAGNOSIS — I89.0 LYMPHEDEMA OF BOTH LOWER EXTREMITIES: Primary | ICD-10-CM

## 2021-05-10 PROCEDURE — 97140 MANUAL THERAPY 1/> REGIONS: CPT

## 2021-05-10 PROCEDURE — 97110 THERAPEUTIC EXERCISES: CPT

## 2021-05-10 NOTE — PROGRESS NOTES
Daily Note     Today's date: 5/10/2021  Patient name: Diana Pichardo  : 1960  MRN: [id number]  Referring provider: RONALD Quiroz  Dx:   Encounter Diagnosis     ICD-10-CM    1  Lymphedema of both lower extremities  I89 0                   Subjective: Will U PT supervising    "I have some soreness in the back of my left thigh  "      Objective: See treatment diary below      Assessment: Tolerated treatment well  Patient would benefit from continued PT  Pt to utliize dog leash at home to stretch hamstrings and calves static stretch 1 min each       Plan: Continue per plan of care        Precautions:SHORTNESS OF BREATH WiTH EXERTION no known allergies, PMH:COPD, right tkr , HTN, arthritis, obstructive sleep apnea and decreased sleep due to nocturia greater than 2 x a night, mixed stress and urge incontinence , s/p sling suspension surgery , s/p total hysterectomy  then significant increase in le lymphedema noted,   morbid obesity ( 379lbs - pt has gained approx 1 hundred lbs in the past 1 year  , pt had larger legs and increased weight as a child ( primary lymphedema now  Secondary lymphedema s/p hysterectomy surgery) pt to see cardiologist for beginnings of CHF per pt report ,  Pt has completed on line bariatric screening info,  Reactive depression, SOB , cigarette nicotine dependence    Manuals 4/20/21 5/3 5/10           I eval            Mld massage and soft tissue mobilization ble's    30 min   man                                    Neuro Re-Ed/ there exer  Pursed lip breathing reviewed today            Le lymphedema exer packet  10 reps each begun see handout            Nu step to tolerance only watch sob     15 min r 2          heelcord stretch 1min x 1 ble's, hamstring slr with strap stretch   1 min x 1 each           Squats to hi low mat   3 x 10                                                 Ther Ex/ aquatic exer trial pending SOB              Water walking 5 laps fwd,/bwd Sitting ankle pumps and ankle circles              Sitting laq, hip flexion, hip abd/add, hip int and ext rotation             Standing heelraises, hip abd, flex/ext, knee flexion             Marching in place, 1/2 jj             Step ups             hamstring stretch, heelcord stretch with strap                           Ther Activity                                       Gait Training                                       Modalities             Vascular doctors have ordered home compression pump for the pt per her report, will trial lymphedema pump in future 40 mmHg with legs elevated

## 2021-05-12 ENCOUNTER — OFFICE VISIT (OUTPATIENT)
Dept: PHYSICAL THERAPY | Age: 61
End: 2021-05-12
Payer: COMMERCIAL

## 2021-05-12 DIAGNOSIS — I89.0 LYMPHEDEMA OF BOTH LOWER EXTREMITIES: Primary | ICD-10-CM

## 2021-05-12 PROCEDURE — 97016 VASOPNEUMATIC DEVICE THERAPY: CPT

## 2021-05-12 NOTE — PROGRESS NOTES
Daily Note     Today's date: 2021  Patient name: Kathrine Brenner  : 1960  MRN: [id number]  Referring provider: RONALD Muller  Dx:   Encounter Diagnosis     ICD-10-CM    1  Lymphedema of both lower extremities  I89 0                   Subjective: no new c/o's       Objective: See treatment diary below      Assessment: Tolerated treatment well  Patient would benefit from continued PT      Plan: Continue per plan of care        Precautions:SHORTNESS OF BREATH WiTH EXERTION no known allergies, PMH:COPD, right tkr , HTN, arthritis, obstructive sleep apnea and decreased sleep due to nocturia greater than 2 x a night, mixed stress and urge incontinence , s/p sling suspension surgery , s/p total hysterectomy  then significant increase in le lymphedema noted,   morbid obesity ( 379lbs - pt has gained approx 1 hundred lbs in the past 1 year  , pt had larger legs and increased weight as a child ( primary lymphedema now  Secondary lymphedema s/p hysterectomy surgery) pt to see cardiologist for beginnings of CHF per pt report ,  Pt has completed on line bariatric screening info,  Reactive depression, SOB , cigarette nicotine dependence    Manuals 21/3 5/10 5/12          I eval            Mld massage and soft tissue mobilization ble's    30 min   man                                    Neuro Re-Ed/ there exer  Pursed lip breathing reviewed today            Le lymphedema exer packet  10 reps each begun see handout            Nu step to tolerance only watch sob     15 min r 2 15 min r 2         heelcord stretch 1min x 1 ble's, hamstring slr with strap stretch   1 min x 1 each   1min x 1         Squats to hi low mat   3 x 10 3 x 10         Deep breathing exer      5min                                   Ther Ex/ aquatic exer trial pending SOB              Water walking 5 laps fwd,/bwd             Sitting ankle pumps and ankle circles              Sitting laq, hip flexion, hip abd/add, hip int and ext rotation             Standing heelraises, hip abd, flex/ext, knee flexion             Marching in place, 1/2 jj             Step ups             hamstring stretch, heelcord stretch with strap                           Ther Activity                                       Gait Training                                       Modalities             Vascular doctors have ordered home compression pump for the pt per her report, will trial lymphedema pump in future 40 mmHg with legs elevated   30 min

## 2021-05-17 ENCOUNTER — OFFICE VISIT (OUTPATIENT)
Dept: PHYSICAL THERAPY | Age: 61
End: 2021-05-17
Payer: COMMERCIAL

## 2021-05-17 DIAGNOSIS — I89.0 LYMPHEDEMA OF BOTH LOWER EXTREMITIES: Primary | ICD-10-CM

## 2021-05-17 PROCEDURE — 97140 MANUAL THERAPY 1/> REGIONS: CPT

## 2021-05-17 PROCEDURE — 97110 THERAPEUTIC EXERCISES: CPT

## 2021-05-17 NOTE — PROGRESS NOTES
Daily Note     Today's date: 2021  Patient name: Kenia Tenorio  : 1960  MRN: [id number]  Referring provider: RONALD Campbell  Dx:   Encounter Diagnosis     ICD-10-CM    1  Lymphedema of both lower extremities  I89 0                   Subjective: Without changes  Objective: See treatment diary below      Assessment: Tolerated treatment well  Patient would benefit from continued PT      Plan: Continue per plan of care        Precautions:SHORTNESS OF BREATH WiTH EXERTION no known allergies, PMH:COPD, right tkr , HTN, arthritis, obstructive sleep apnea and decreased sleep due to nocturia greater than 2 x a night, mixed stress and urge incontinence , s/p sling suspension surgery , s/p total hysterectomy  then significant increase in le lymphedema noted,   morbid obesity ( 379lbs - pt has gained approx 1 hundred lbs in the past 1 year  , pt had larger legs and increased weight as a child ( primary lymphedema now  Secondary lymphedema s/p hysterectomy surgery) pt to see cardiologist for beginnings of CHF per pt report ,  Pt has completed on line bariatric screening info,  Reactive depression, SOB , cigarette nicotine dependence    Manuals 4/20/21 5/3 5/10 5/12 5/17         I eval            Mld massage and soft tissue mobilization ble's    30 min   man  45 min                                  Neuro Re-Ed/ there exer  Pursed lip breathing reviewed today            Le lymphedema exer packet  10 reps each begun see handout            Nu step to tolerance only watch sob     15 min r 2 15 min r 2             15 min        heelcord stretch 1min x 1 ble's, hamstring slr with strap stretch   1 min x 1 each   1min x 1         Squats to hi low mat   3 x 10 3 x 10         Deep breathing exer      5min                                   Ther Ex/ aquatic exer trial pending SOB              Water walking 5 laps fwd,/bwd             Sitting ankle pumps and ankle circles              Sitting laq, hip flexion, hip abd/add, hip int and ext rotation             Standing heelraises, hip abd, flex/ext, knee flexion             Marching in place, 1/2 jj             Step ups             hamstring stretch, heelcord stretch with strap                           Ther Activity                                       Gait Training                                       Modalities             Vascular doctors have ordered home compression pump for the pt per her report, will trial lymphedema pump in future 40 mmHg with legs elevated   30 min

## 2021-05-20 ENCOUNTER — OFFICE VISIT (OUTPATIENT)
Dept: PHYSICAL THERAPY | Age: 61
End: 2021-05-20
Payer: COMMERCIAL

## 2021-05-20 DIAGNOSIS — I89.0 LYMPHEDEMA OF BOTH LOWER EXTREMITIES: Primary | ICD-10-CM

## 2021-05-20 PROCEDURE — 97110 THERAPEUTIC EXERCISES: CPT

## 2021-05-20 PROCEDURE — 97016 VASOPNEUMATIC DEVICE THERAPY: CPT

## 2021-05-20 PROCEDURE — 97140 MANUAL THERAPY 1/> REGIONS: CPT

## 2021-05-20 NOTE — PROGRESS NOTES
Daily Note     Today's date: 2021  Patient name: Orestes Saavedra  : 1960  MRN: [id number]  Referring provider: RONALD Cadet  Dx:   Encounter Diagnosis     ICD-10-CM    1  Lymphedema of both lower extremities  I89 0                   Subjective: "I am exercising and walking more "      Objective: See treatment diary below      Assessment: Tolerated treatment well  Patient would benefit from continued PT      Plan: Continue per plan of care        Precautions:SHORTNESS OF BREATH WiTH EXERTION no known allergies, PMH:COPD, right tkr , HTN, arthritis, obstructive sleep apnea and decreased sleep due to nocturia greater than 2 x a night, mixed stress and urge incontinence , s/p sling suspension surgery , s/p total hysterectomy  then significant increase in le lymphedema noted,   morbid obesity ( 379lbs - pt has gained approx 1 hundred lbs in the past 1 year  , pt had larger legs and increased weight as a child ( primary lymphedema now  Secondary lymphedema s/p hysterectomy surgery) pt to see cardiologist for beginnings of CHF per pt report ,  Pt has completed on line bariatric screening info,  Reactive depression, SOB , cigarette nicotine dependence    Manuals 21 5/3 5/10 5/12 5/17 5/20        I eval            Mld massage and soft tissue mobilization ble's    30 min   man  45 min 15 min man       Girth measurments pre and post compression pump       man                    Neuro Re-Ed/ there exer  Pursed lip breathing reviewed today            Le lymphedema exer packet  10 reps each begun see handout            Nu step to tolerance only watch sob     15 min r 2 15 min r 2             15 min 15 min        heelcord stretch 1min x 1 ble's, hamstring slr with strap stretch   1 min x 1 each   1min x 1         Squats to hi low mat   3 x 10 3 x 10  3 x 10       Deep breathing exer      5min                                   Ther Ex/ aquatic exer trial pending SOB              Water walking 5 laps fwd,/bwd             Sitting ankle pumps and ankle circles              Sitting laq, hip flexion, hip abd/add, hip int and ext rotation             Standing heelraises, hip abd, flex/ext, knee flexion             Marching in place, 1/2 jj             Step ups             hamstring stretch, heelcord stretch with strap                           Ther Activity                                       Gait Training                                       Modalities             Vascular doctors have ordered home compression pump for the pt per her report, will trial lymphedema pump in future 40 mmHg with legs elevated   30 min   30 min

## 2021-05-24 ENCOUNTER — APPOINTMENT (OUTPATIENT)
Dept: PHYSICAL THERAPY | Age: 61
End: 2021-05-24
Payer: COMMERCIAL

## 2021-05-27 ENCOUNTER — APPOINTMENT (OUTPATIENT)
Dept: PHYSICAL THERAPY | Age: 61
End: 2021-05-27
Payer: COMMERCIAL

## 2021-06-24 ENCOUNTER — OFFICE VISIT (OUTPATIENT)
Dept: VASCULAR SURGERY | Facility: CLINIC | Age: 61
End: 2021-06-24
Payer: COMMERCIAL

## 2021-06-24 VITALS
WEIGHT: 293 LBS | HEART RATE: 81 BPM | BODY MASS INDEX: 50.02 KG/M2 | DIASTOLIC BLOOD PRESSURE: 84 MMHG | RESPIRATION RATE: 22 BRPM | SYSTOLIC BLOOD PRESSURE: 120 MMHG | HEIGHT: 64 IN

## 2021-06-24 DIAGNOSIS — I89.0 LYMPHEDEMA OF BOTH LOWER EXTREMITIES: Primary | ICD-10-CM

## 2021-06-24 PROCEDURE — 3008F BODY MASS INDEX DOCD: CPT | Performed by: NURSE PRACTITIONER

## 2021-06-24 PROCEDURE — 99213 OFFICE O/P EST LOW 20 MIN: CPT | Performed by: NURSE PRACTITIONER

## 2021-06-24 NOTE — PATIENT INSTRUCTIONS
Utilize pumps twice per day for 1 hour each  Continue with exercise and massaging  Await compression stockings  Follow-up in the office in 1 year    Call with any issues

## 2021-06-24 NOTE — PROGRESS NOTES
Assessment/Plan:    Lymphedema of both lower extremities  65-year-old female with morbid obesity, tobacco abuse, hypertension, hyperlipidemia, COPD, obstructive sleep apnea, chronic diastolic heart failure with EF 65% bilateral lower extremity lymphedema, L>R who returns to the office for follow-up for bilateral lower extremity lymphedema   -Utilizing lymphedema pumps on a daily basis with good response to lower extremity lymphedema and stasis changes   -Continue lymph massaging   -Awaiting  Comp reflects compression  -Moisturize daily to maintain skin integrity   -Regular exercise   -Weight loss would be beneficial  -Follow up in the office in 1 year       Diagnoses and all orders for this visit:    Lymphedema of both lower extremities          Subjective:      Patient ID: Thom Rosado is a 63 y o  female  Patient states that her legs are doing better  Pt uses her compression pumps once a day for an hour  Pt is still waiting for compression wraps  Pt was in physical therapy and was released about 2-3 weeks ago  Pt uses a stationary bike at home 2 days a week  Pt does elevate her legs when possible  Pt uses an e-cigarette daily  Vendor Information:  Shelley Hutchinson - Phone: [phone] / Fax: [phone] / E-mail: [email or web address]    Ordering Provider:  Lashell Verdin (NPI: [id number])  [facility]  [address] [place] [address] [facility], [address] [place] [address]  Phone: [phone]  Fax: [phone]      Patient Information  Thom Rosado  1960  [address] [place] [address] [phone] (home)    Patient Height and Weight 5' 4" (1 626 m)    Wt Readings from Last 1 Encounters:   06/24/21 (!) 169 kg (372 lb)         Post 4-week Measurements      Body Part Right Left   Ankle / Forearm 34 cm 35 cm   Calf / Elbow  47 cm 50 cm   Knee / Bicep  64 cm 72 cm   Mid-Thigh / Axilla  75 cm 78 cm     Patient outcome after 4-weeks of conservative therapy:   [] Patient's condition has NOT improved          HPI  Patient returns to the office for follow-up on lymphedema  Bilateral lower extremity lymphedema, left greater than right  She is utilizing compression pumps with good response  She also did physical therapy with exercise and massaging  She is massaging on her own with good response  Skin remains dry  No open ulcerations or weeping  No lymphangitis or cellulitis  The following portions of the patient's history were reviewed and updated as appropriate: allergies, current medications, past family history, past medical history, past social history, past surgical history and problem list    review of systems reviewed    Review of Systems   Constitutional: Negative  HENT: Negative  Eyes: Negative  Respiratory: Positive for shortness of breath  Cardiovascular: Positive for leg swelling  Gastrointestinal: Negative  Endocrine: Negative  Genitourinary: Negative  Musculoskeletal: Positive for arthralgias and gait problem  Skin: Negative  Allergic/Immunologic: Negative  Neurological: Negative for dizziness, weakness and numbness  Hematological: Negative  Psychiatric/Behavioral: Negative  Objective:    I have reviewed and made appropriate changes to the review of systems input by the medical assistant      Vitals:    06/24/21 1331   BP: 120/84   BP Location: Left arm   Patient Position: Sitting   Pulse: 81   Resp: 22   Weight: (!) 169 kg (372 lb)   Height: 5' 4" (1 626 m)       Patient Active Problem List   Diagnosis    Encounter to establish care    Essential hypertension    Mixed stress and urge urinary incontinence    COPD (chronic obstructive pulmonary disease) (HCC)    Paresthesia of left upper extremity    Mixed hyperlipidemia    Morbid obesity (HCC)    Reactive depression    SOB (shortness of breath)    Pedal edema    Elevated serum creatinine    Morbid obesity with body mass index (BMI) of 60 0 to 69 9 in adult [facility])    Cigarette nicotine dependence without complication    Allergic reaction    Annual physical exam    Pain in both feet    Nocturia more than twice per night    Obstructive sleep apnea syndrome    Acute bronchitis due to other specified organisms    URTI (acute upper respiratory infection)    Insomnia due to medical condition    Chronic diastolic congestive heart failure (HCC)    Lymphedema of both lower extremities       Past Surgical History:   Procedure Laterality Date    HYSTERECTOMY      INCONTINENCE SURGERY      OOPHORECTOMY Bilateral     REPLACEMENT TOTAL KNEE Right 2016    TOTAL ABDOMINAL HYSTERECTOMY W/ BILATERAL SALPINGOOPHORECTOMY  2006       Family History   Problem Relation Age of Onset    Other Mother     Aneurysm Mother         aortic    Heart failure Father     Diabetes Father     Lung cancer Sister        Social History     Socioeconomic History    Marital status: /Civil Union     Spouse name: Not on file    Number of children: Not on file    Years of education: Not on file    Highest education level: Not on file   Occupational History    Not on file   Tobacco Use    Smoking status: Current Every Day Smoker     Packs/day: 1 00     Years: 45 00     Pack years: 45 00     Types: Cigarettes     Last attempt to quit: 2018     Years since quittin 4    Smokeless tobacco: Never Used   Vaping Use    Vaping Use: Former    Start date: 2018   Carrie Causey Quit date: 2019   Substance and Sexual Activity    Alcohol use: Never    Drug use: Not Currently     Types: Marijuana     Comment: former used rarely about once every 6 months     Sexual activity: Not on file   Other Topics Concern    Not on file   Social History Narrative    Not on file     Social Determinants of Health     Financial Resource Strain:     Difficulty of Paying Living Expenses:    Food Insecurity:     Worried About Running Out of Food in the Last Year:     Ran Out of Food in the Last Year:    Transportation Needs:     Lack of Transportation (Medical):  Lack of Transportation (Non-Medical):    Physical Activity:     Days of Exercise per Week:     Minutes of Exercise per Session:    Stress:     Feeling of Stress :    Social Connections:     Frequency of Communication with Friends and Family:     Frequency of Social Gatherings with Friends and Family:     Attends Church Services:     Active Member of Clubs or Organizations:     Attends Club or Organization Meetings:     Marital Status:    Intimate Partner Violence:     Fear of Current or Ex-Partner:     Emotionally Abused:     Physically Abused:     Sexually Abused:        No Known Allergies      Current Outpatient Medications:     acetaminophen (TYLENOL) 500 mg tablet, Take 500 mg by mouth every 6 (six) hours as needed for mild pain, Disp: , Rfl:     albuterol (PROVENTIL HFA,VENTOLIN HFA) 90 mcg/act inhaler, Inhale 2 puffs every 6 (six) hours as needed for wheezing or shortness of breath, Disp: 1 Inhaler, Rfl: 1    DIETARY MANAGEMENT PRODUCT PO, Take 1 tablet by mouth daily, Disp: , Rfl:     furosemide (LASIX) 20 mg tablet, Take 1 tablet (20 mg total) by mouth daily, Disp: 90 tablet, Rfl: 1    lisinopril (ZESTRIL) 10 mg tablet, Take 10 mg by mouth daily, Disp: , Rfl:     sertraline (ZOLOFT) 25 mg tablet, Take 1 tablet (25 mg total) by mouth daily, Disp: 90 tablet, Rfl: 1    /84 (BP Location: Left arm, Patient Position: Sitting)   Pulse 81   Resp 22   Ht 5' 4" (1 626 m)   Wt (!) 169 kg (372 lb)   BMI 63 85 kg/m²          Physical Exam  Vitals and nursing note reviewed  Constitutional:       Appearance: Normal appearance  She is obese  HENT:      Head: Normocephalic and atraumatic  Eyes:      Extraocular Movements: Extraocular movements intact  Cardiovascular:      Pulses: Normal pulses  Heart sounds: Normal heart sounds     Pulmonary:      Effort: Pulmonary effort is normal       Breath sounds: Normal breath sounds  Abdominal:      Palpations: Abdomen is soft  Musculoskeletal:         General: Swelling present  Normal range of motion  Comments: Stage II lymphedema, left greater than right, of varicosities left shin  Mild chronic erythema left shin  No open ulcerations or weeping   Skin:     General: Skin is warm and dry  Neurological:      General: No focal deficit present  Mental Status: She is alert and oriented to person, place, and time     Psychiatric:         Mood and Affect: Mood normal          Behavior: Behavior normal

## 2021-06-24 NOTE — ASSESSMENT & PLAN NOTE
65-year-old female with morbid obesity, tobacco abuse, hypertension, hyperlipidemia, COPD, obstructive sleep apnea, chronic diastolic heart failure with EF 65% bilateral lower extremity lymphedema, L>R who returns to the office for follow-up for bilateral lower extremity lymphedema   -Utilizing lymphedema pumps on a daily basis with good response to lower extremity lymphedema and stasis changes   -Continue lymph massaging   -Awaiting  Comp reflects compression  -Moisturize daily to maintain skin integrity   -Regular exercise   -Weight loss would be beneficial  -Follow up in the office in 1 year

## 2021-07-19 ENCOUNTER — OFFICE VISIT (OUTPATIENT)
Dept: INTERNAL MEDICINE CLINIC | Age: 61
End: 2021-07-19
Payer: COMMERCIAL

## 2021-07-19 VITALS
HEART RATE: 83 BPM | TEMPERATURE: 98.4 F | WEIGHT: 293 LBS | DIASTOLIC BLOOD PRESSURE: 70 MMHG | BODY MASS INDEX: 48.82 KG/M2 | HEIGHT: 65 IN | SYSTOLIC BLOOD PRESSURE: 104 MMHG | OXYGEN SATURATION: 95 %

## 2021-07-19 DIAGNOSIS — I10 ESSENTIAL HYPERTENSION: ICD-10-CM

## 2021-07-19 DIAGNOSIS — Z12.2 ENCOUNTER FOR SCREENING FOR LUNG CANCER: ICD-10-CM

## 2021-07-19 DIAGNOSIS — F32.9 REACTIVE DEPRESSION: ICD-10-CM

## 2021-07-19 DIAGNOSIS — J44.9 CHRONIC OBSTRUCTIVE PULMONARY DISEASE, UNSPECIFIED COPD TYPE (HCC): Primary | ICD-10-CM

## 2021-07-19 DIAGNOSIS — E66.01 MORBID OBESITY WITH BODY MASS INDEX (BMI) OF 60.0 TO 69.9 IN ADULT (HCC): ICD-10-CM

## 2021-07-19 DIAGNOSIS — Z00.00 ANNUAL PHYSICAL EXAM: ICD-10-CM

## 2021-07-19 DIAGNOSIS — E78.2 MIXED HYPERLIPIDEMIA: ICD-10-CM

## 2021-07-19 DIAGNOSIS — F17.290 OTHER TOBACCO PRODUCT NICOTINE DEPENDENCE, UNCOMPLICATED: ICD-10-CM

## 2021-07-19 DIAGNOSIS — R60.0 PEDAL EDEMA: ICD-10-CM

## 2021-07-19 DIAGNOSIS — I50.32 CHRONIC DIASTOLIC CONGESTIVE HEART FAILURE (HCC): ICD-10-CM

## 2021-07-19 DIAGNOSIS — I89.0 LYMPHEDEMA OF BOTH LOWER EXTREMITIES: ICD-10-CM

## 2021-07-19 PROBLEM — Z76.89 ENCOUNTER TO ESTABLISH CARE: Status: RESOLVED | Noted: 2019-07-09 | Resolved: 2021-07-19

## 2021-07-19 PROBLEM — J06.9 URTI (ACUTE UPPER RESPIRATORY INFECTION): Status: RESOLVED | Noted: 2020-10-08 | Resolved: 2021-07-19

## 2021-07-19 PROBLEM — J20.8 ACUTE BRONCHITIS DUE TO OTHER SPECIFIED ORGANISMS: Status: RESOLVED | Noted: 2020-10-08 | Resolved: 2021-07-19

## 2021-07-19 PROCEDURE — 3008F BODY MASS INDEX DOCD: CPT | Performed by: INTERNAL MEDICINE

## 2021-07-19 PROCEDURE — 3725F SCREEN DEPRESSION PERFORMED: CPT | Performed by: INTERNAL MEDICINE

## 2021-07-19 PROCEDURE — 1036F TOBACCO NON-USER: CPT | Performed by: INTERNAL MEDICINE

## 2021-07-19 PROCEDURE — 99214 OFFICE O/P EST MOD 30 MIN: CPT | Performed by: INTERNAL MEDICINE

## 2021-07-19 NOTE — PROGRESS NOTES
Assessment/Plan:    Chronic obstructive pulmonary disease   - without acute exacerbation   - patient was counseled to quit smoking  -continue with p r n  albuterol inhaler    Essential hypertension   - very well controlled  -patient was counseled to continue with lisinopril and Lasix and with a low-salt diet   - she was counseled to monitor her blood pressure and to call the office if it is consistently lower than 597 systolic and will likely discontinue her lisinopril especially since she is on a diuretic  Sherre Soulier Chronic diastolic CHF   -without acute exacerbation   -continue with Lasix  -will check a CMP prior to next visit    Reactive depression   -well controlled  -continue with sertraline at current dose    Lymphedema of lower extremities   -improving  -continue with lymphedema pump and also with exercises as taught by physical therapy  -continue to follow with vascular surgery    Hyperlipidemia  - stable   -continue with heart healthy diet   - will check a lipid panel prior to next visit    Nicotine dependence  -patient was counseled to quit vaping  -I spent about 3 minutes on smoking cessation counseling   - will order a CT lung screening program to screen patient for lung cancer    Need for annual physical exam   - will order CBC, CMP, TSH, lipid panel  -follow-up for an annual physical examination in 3 months     Diagnoses and all orders for this visit:    Chronic obstructive pulmonary disease, unspecified COPD type ([facility] )    Encounter for screening for lung cancer  -     CT lung screening program; Future    Essential hypertension  -     TSH, 3rd generation with Free T4 reflex; Future  -     Comprehensive metabolic panel; Future    Chronic diastolic congestive heart failure (HCC)  -     Lipid panel; Future    Reactive depression  -     TSH, 3rd generation with Free T4 reflex; Future    Pedal edema  -     CBC and differential; Future  -     TSH, 3rd generation with Free T4 reflex;  Future  -     Lipid panel; Future    Lymphedema of both lower extremities    Other tobacco product nicotine dependence, uncomplicated    Morbid obesity with body mass index (BMI) of 60 0 to 69 9 in adult (HCC)  -     CBC and differential; Future  -     TSH, 3rd generation with Free T4 reflex; Future  -     Comprehensive metabolic panel; Future  -     Lipid panel; Future    Annual physical exam  -     CBC and differential; Future  -     TSH, 3rd generation with Free T4 reflex; Future  -     Comprehensive metabolic panel; Future  -     Lipid panel; Future    Mixed hyperlipidemia             Subjective:      Patient ID: Daria Reid is a 63 y o  female  HPI  Patient presents for a follow-up visit regarding her COPD, obstructive sleep apnea, essential hypertension, pedal edema, lymphedema, hyperlipidemia, morbid obesity, reactive depression  She states that she went for physical therapy for her lower extremity lymphedema and it has improved  She has finished physical therapy but states that she was taught some exercises which she is doing by herself at home  She is also using the lymphedema  pump which is helping  She saw vascular surgery as well  She states that she has never had a CT scan of the chest and smoked for over 40 years and only quit 2 months ago but states that she still vapes  She admits to occasional wheezing and shortness of breath but denies cough  She states that she added some weight that she has been eating ice cream because of the hot weather  She also states that she does not drink enough water and occasionally has shortness of breath when she gets up    She states that her depression is improving and also admits to pain in her bilateral knees and ankle as well as leg swelling but denies fever, chills, night sweats, chest pain, palpitations, nausea, vomiting abdominal pain, diarrhea, constipation      The following portions of the patient's history were reviewed and updated as appropriate:   She  has a past medical history of COPD (chronic obstructive pulmonary disease) ([facility] ), Depression, High blood pressure, Hypertension, and Urinary incontinence  She   Patient Active Problem List    Diagnosis Date Noted    Lymphedema of both lower extremities 03/16/2021    Chronic diastolic congestive heart failure ([facility] ) 02/12/2021    Insomnia due to medical condition 10/08/2020    Nocturia more than twice per night 09/02/2020    Obstructive sleep apnea syndrome 09/02/2020    Annual physical exam 07/31/2020    Pain in both feet 07/31/2020    Allergic reaction 04/30/2020    Cigarette nicotine dependence without complication 97/17/2913    Morbid obesity with body mass index (BMI) of 60 0 to 69 9 in adult [facility]) 03/05/2020    Elevated serum creatinine 11/14/2019    SOB (shortness of breath) 11/04/2019    Pedal edema 11/04/2019    Reactive depression 07/28/2019    Essential hypertension 07/10/2019    Mixed stress and urge urinary incontinence 07/10/2019    COPD (chronic obstructive pulmonary disease) ([facility] ) 07/10/2019    Paresthesia of left upper extremity 07/10/2019    Mixed hyperlipidemia 07/10/2019    Morbid obesity ([facility] ) 07/10/2019     She  has a past surgical history that includes Hysterectomy; Oophorectomy (Bilateral); Replacement total knee (Right, 2016); Total abdominal hysterectomy w/ bilateral salpingoophorectomy (2006); and Incontinence surgery  Her family history includes Aneurysm in her mother; Diabetes in her father; Heart failure in her father; Lung cancer in her sister; Other in her mother  She  reports that she quit smoking about 2 months ago  Her smoking use included cigarettes  She has a 45 00 pack-year smoking history  She has never used smokeless tobacco  She reports current alcohol use of about 1 0 standard drinks of alcohol per week  She reports previous drug use  Drug: Marijuana    Current Outpatient Medications   Medication Sig Dispense Refill    acetaminophen (TYLENOL) 500 mg tablet Take 500 mg by mouth every 6 (six) hours as needed for mild pain      albuterol (PROVENTIL HFA,VENTOLIN HFA) 90 mcg/act inhaler Inhale 2 puffs every 6 (six) hours as needed for wheezing or shortness of breath 1 Inhaler 1    furosemide (LASIX) 20 mg tablet Take 1 tablet (20 mg total) by mouth daily 90 tablet 1    lisinopril (ZESTRIL) 10 mg tablet Take 10 mg by mouth daily      sertraline (ZOLOFT) 25 mg tablet Take 1 tablet (25 mg total) by mouth daily 90 tablet 1    DIETARY MANAGEMENT PRODUCT PO Take 1 tablet by mouth daily (Patient not taking: Reported on 7/19/2021)       No current facility-administered medications for this visit  Current Outpatient Medications on File Prior to Visit   Medication Sig    acetaminophen (TYLENOL) 500 mg tablet Take 500 mg by mouth every 6 (six) hours as needed for mild pain    albuterol (PROVENTIL HFA,VENTOLIN HFA) 90 mcg/act inhaler Inhale 2 puffs every 6 (six) hours as needed for wheezing or shortness of breath    furosemide (LASIX) 20 mg tablet Take 1 tablet (20 mg total) by mouth daily    lisinopril (ZESTRIL) 10 mg tablet Take 10 mg by mouth daily    sertraline (ZOLOFT) 25 mg tablet Take 1 tablet (25 mg total) by mouth daily    DIETARY MANAGEMENT PRODUCT PO Take 1 tablet by mouth daily (Patient not taking: Reported on 7/19/2021)     No current facility-administered medications on file prior to visit  She has No Known Allergies       Review of Systems   Constitutional: Positive for unexpected weight change (has been eating ice cream with the heat)  Negative for activity change, chills, fatigue and fever  HENT: Negative for ear pain, postnasal drip, rhinorrhea, sinus pressure and sore throat  Eyes: Negative for pain  Respiratory: Positive for shortness of breath and wheezing (once in a while)  Negative for cough, choking and chest tightness           Quit smoking but is still vaping, switched from cigs about two months ago  Started smoking at age 17 and quit for a year and a half and then restarted a year and a half ago and quit 2 months ago and still vaping, max cig use - a bout a pack and a half a day   Cardiovascular: Positive for leg swelling  Negative for chest pain and palpitations  Gastrointestinal: Negative for abdominal pain, constipation, diarrhea, nausea and vomiting  Genitourinary: Negative for dysuria and hematuria  Musculoskeletal: Positive for arthralgias (knee pain and ankle pain in the past few days - has been working on losing wt)  Negative for back pain, gait problem, joint swelling, myalgias and neck stiffness  Skin: Negative for pallor and rash  Neurological: Positive for dizziness (occasionally dizzy when she wakes up in the middle of the night but does not dirnk enough water and dinks about 2 cups or more of coffee)  Negative for tremors, seizures, syncope, light-headedness and headaches  Hematological: Negative for adenopathy  Psychiatric/Behavioral: Positive for dysphoric mood (improving) and sleep disturbance (wakes up to urinate every hour)  Negative for behavioral problems  Objective:      /70 (BP Location: Left arm, Patient Position: Sitting, Cuff Size: Thigh)   Pulse 83   Temp 98 4 °F (36 9 °C) (Temporal)   Ht 5' 5 25" (1 657 m)   Wt (!) 171 kg (375 lb 14 4 oz)   SpO2 95% Comment: Room Air  BMI 62 07 kg/m²          Physical Exam  Constitutional:       General: She is not in acute distress  Appearance: She is well-developed  She is not diaphoretic  HENT:      Head: Normocephalic and atraumatic  Right Ear: External ear normal       Left Ear: External ear normal       Nose: Nose normal       Mouth/Throat:      Mouth: Mucous membranes are dry  Pharynx: No oropharyngeal exudate  Comments: Dry mucous membranes  Eyes:      General: No scleral icterus  Right eye: No discharge  Left eye: No discharge        Conjunctiva/sclera: Conjunctivae normal       Pupils: Pupils are equal, round, and reactive to light  Neck:      Thyroid: No thyromegaly  Vascular: No JVD  Trachea: No tracheal deviation  Cardiovascular:      Rate and Rhythm: Normal rate and regular rhythm  Heart sounds: Murmur (1/6 systolic murmur maximal in aortic valve regiion) heard  Systolic murmur is present with a grade of 1/6  No friction rub  No gallop  Pulmonary:      Effort: Pulmonary effort is normal  No respiratory distress  Breath sounds: Normal breath sounds  No wheezing or rales  Chest:      Chest wall: No tenderness  Abdominal:      General: Bowel sounds are normal  There is no distension  Palpations: Abdomen is soft  There is no mass  Tenderness: There is no abdominal tenderness  There is no guarding or rebound  Musculoskeletal:         General: No tenderness or deformity  Normal range of motion  Cervical back: Normal range of motion and neck supple  Right lower leg: Swelling present  Edema present  Left lower leg: Swelling present  Edema (L>R with varicose veins) present  Lymphadenopathy:      Cervical: No cervical adenopathy  Skin:     General: Skin is warm and dry  Coloration: Skin is not pale  Findings: No erythema or rash  Neurological:      Mental Status: She is alert and oriented to person, place, and time  Cranial Nerves: No cranial nerve deficit  Motor: No abnormal muscle tone  Coordination: Coordination normal       Deep Tendon Reflexes: Reflexes are normal and symmetric     Psychiatric:         Behavior: Behavior normal            Orders Only on 08/18/2020   Component Date Value Ref Range Status    Total Cholesterol 08/18/2020 195  <200 mg/dL Final    HDL 08/18/2020 42* > OR = 50 mg/dL Final    Triglycerides 08/18/2020 108  <150 mg/dL Final    LDL Calculated 08/18/2020 131* mg/dL (calc) Final    Comment: Reference range: <100     Desirable range <100 mg/dL for primary prevention;    <70 mg/dL for patients with CHD or diabetic patients   with > or = 2 CHD risk factors  LDL-C is now calculated using the Skip-Diaz   calculation, which is a validated novel method providing   better accuracy than the Friedewald equation in the   estimation of LDL-C  Ronny Davalos  [place] [id number];444(83): 1214-3407   (http://Bobex.com/faq/JEX152)      Chol HDLC Ratio 08/18/2020 4 6  <5 0 (calc) Final    Non-HDL Cholesterol 08/18/2020 153* <130 mg/dL (calc) Final    Comment: For patients with diabetes plus 1 major ASCVD risk   factor, treating to a non-HDL-C goal of <100 mg/dL   (LDL-C of <70 mg/dL) is considered a therapeutic   option   Glucose, Random 08/18/2020 107* 65 - 99 mg/dL Final    Comment:               Fasting reference interval     For someone without known diabetes, a glucose value  between 100 and 125 mg/dL is consistent with  prediabetes and should be confirmed with a  follow-up test          BUN 08/18/2020 13  7 - 25 mg/dL Final    Creatinine 08/18/2020 0 70  0 50 - 1 05 mg/dL Final    Comment: For patients >52years of age, the reference limit  for Creatinine is approximately 13% higher for people  identified as -American           eGFR Non  08/18/2020 96  > OR = 60 mL/min/1 73m2 Final    eGFR  08/18/2020 111  > OR = 60 mL/min/1 73m2 Final    SL AMB BUN/CREATININE RATIO 52/97/4257 NOT APPLICABLE  6 - 22 (calc) Final    Sodium 08/18/2020 141  135 - 146 mmol/L Final    Potassium 08/18/2020 4 8  3 5 - 5 3 mmol/L Final    Chloride 08/18/2020 104  98 - 110 mmol/L Final    CO2 08/18/2020 27  20 - 32 mmol/L Final    Calcium 08/18/2020 9 7  8 6 - 10 4 mg/dL Final    Protein, Total 08/18/2020 7 2  6 1 - 8 1 g/dL Final    Albumin 08/18/2020 4 1  3 6 - 5 1 g/dL Final    Globulin 08/18/2020 3 1  1 9 - 3 7 g/dL (calc) Final    Albumin/Globulin Ratio 08/18/2020 1 3  1 0 - 2 5 (calc) Final    TOTAL BILIRUBIN 08/18/2020 0 4  0 2 - 1 2 mg/dL Final    Alkaline Phosphatase 08/18/2020 77  37 - 153 U/L Final    AST 08/18/2020 12  10 - 35 U/L Final    ALT 08/18/2020 16  6 - 29 U/L Final    White Blood Cell Count 08/18/2020 6 9  3 8 - 10 8 Thousand/uL Final    Red Blood Cell Count 08/18/2020 4 72  3 80 - 5 10 Million/uL Final    Hemoglobin 08/18/2020 14 0  11 7 - 15 5 g/dL Final    HCT 08/18/2020 43 5  35 0 - 45 0 % Final    MCV 08/18/2020 92 2  80 0 - 100 0 fL Final    MCH 08/18/2020 29 7  27 0 - 33 0 pg Final    MCHC 08/18/2020 32 2  32 0 - 36 0 g/dL Final    RDW 08/18/2020 13 9  11 0 - 15 0 % Final    Platelet Count 16/89/3668 204  140 - 400 Thousand/uL Final    SL AMB MPV 08/18/2020 11 5  7 5 - 12 5 fL Final    Neutrophils (Absolute) 08/18/2020 4,754  1,500 - 7,800 cells/uL Final    Lymphocytes (Absolute) 08/18/2020 1,428  850 - 3,900 cells/uL Final    Monocytes (Absolute) 08/18/2020 559  200 - 950 cells/uL Final    Eosinophils (Absolute) 08/18/2020 131  15 - 500 cells/uL Final    Basophils ABS 08/18/2020 28  0 - 200 cells/uL Final    Neutrophils 08/18/2020 68 9  % Final    Lymphocytes 08/18/2020 20 7  % Final    Monocytes 08/18/2020 8 1  % Final    Eosinophils 08/18/2020 1 9  % Final    Basophils PCT 08/18/2020 0 4  % Final    HEP C AB 08/18/2020 NON-REACTIVE  NON-REACTIVE Final    Signal to Cut-Off 08/18/2020 0 04  <1 00 Final    Comment:    HCV antibody was non-reactive  There is no laboratory   evidence of HCV infection  In most cases, no further action is required  However,  if recent HCV exposure is suspected, a test for HCV RNA  (test code 18776) is suggested  For additional information please refer to  http://Kincast/faq/APP89c8  (This link is being provided for informational/  educational purposes only )         TSH W/RFX TO FREE T4 08/18/2020 1 25  0 40 - 4 50 mIU/L Final

## 2021-11-03 DIAGNOSIS — J20.8 ACUTE BRONCHITIS DUE TO OTHER SPECIFIED ORGANISMS: ICD-10-CM

## 2021-11-03 DIAGNOSIS — J44.0 CHRONIC OBSTRUCTIVE PULMONARY DISEASE WITH ACUTE LOWER RESPIRATORY INFECTION (HCC): ICD-10-CM

## 2021-11-03 DIAGNOSIS — R06.2 WHEEZING: ICD-10-CM

## 2021-11-03 RX ORDER — ALBUTEROL SULFATE 90 UG/1
2 AEROSOL, METERED RESPIRATORY (INHALATION) EVERY 6 HOURS PRN
Qty: 18 G | Refills: 1 | Status: SHIPPED | OUTPATIENT
Start: 2021-11-03

## 2021-12-02 ENCOUNTER — OFFICE VISIT (OUTPATIENT)
Dept: INTERNAL MEDICINE CLINIC | Age: 61
End: 2021-12-02
Payer: MEDICARE

## 2021-12-02 VITALS
SYSTOLIC BLOOD PRESSURE: 118 MMHG | WEIGHT: 293 LBS | HEIGHT: 65 IN | TEMPERATURE: 98.6 F | DIASTOLIC BLOOD PRESSURE: 78 MMHG | OXYGEN SATURATION: 98 % | HEART RATE: 83 BPM | BODY MASS INDEX: 48.82 KG/M2

## 2021-12-02 DIAGNOSIS — J44.9 CHRONIC OBSTRUCTIVE PULMONARY DISEASE, UNSPECIFIED COPD TYPE (HCC): ICD-10-CM

## 2021-12-02 DIAGNOSIS — Z12.12 SCREENING FOR COLORECTAL CANCER: ICD-10-CM

## 2021-12-02 DIAGNOSIS — I89.0 LYMPHEDEMA OF BOTH LOWER EXTREMITIES: ICD-10-CM

## 2021-12-02 DIAGNOSIS — F33.9 DEPRESSION, RECURRENT (HCC): ICD-10-CM

## 2021-12-02 DIAGNOSIS — Z00.00 WELCOME TO MEDICARE PREVENTIVE VISIT: Primary | ICD-10-CM

## 2021-12-02 DIAGNOSIS — I10 ESSENTIAL HYPERTENSION: ICD-10-CM

## 2021-12-02 DIAGNOSIS — Z23 NEEDS FLU SHOT: ICD-10-CM

## 2021-12-02 DIAGNOSIS — Z12.2 ENCOUNTER FOR SCREENING FOR LUNG CANCER: ICD-10-CM

## 2021-12-02 DIAGNOSIS — Z23 ENCOUNTER FOR IMMUNIZATION: ICD-10-CM

## 2021-12-02 DIAGNOSIS — Z12.31 ENCOUNTER FOR SCREENING MAMMOGRAM FOR MALIGNANT NEOPLASM OF BREAST: ICD-10-CM

## 2021-12-02 DIAGNOSIS — Z82.49 FAMILY HISTORY OF ABDOMINAL AORTIC ANEURYSM (AAA): ICD-10-CM

## 2021-12-02 DIAGNOSIS — Z12.4 SCREENING FOR CERVICAL CANCER: ICD-10-CM

## 2021-12-02 DIAGNOSIS — Z12.11 SCREENING FOR COLORECTAL CANCER: ICD-10-CM

## 2021-12-02 DIAGNOSIS — Z12.31 ENCOUNTER FOR SCREENING MAMMOGRAM FOR BREAST CANCER: ICD-10-CM

## 2021-12-02 DIAGNOSIS — F17.210 CIGARETTE NICOTINE DEPENDENCE WITHOUT COMPLICATION: ICD-10-CM

## 2021-12-02 DIAGNOSIS — Z13.6 SCREENING FOR AAA (ABDOMINAL AORTIC ANEURYSM): ICD-10-CM

## 2021-12-02 PROCEDURE — 90682 RIV4 VACC RECOMBINANT DNA IM: CPT

## 2021-12-02 PROCEDURE — G0402 INITIAL PREVENTIVE EXAM: HCPCS | Performed by: INTERNAL MEDICINE

## 2021-12-02 PROCEDURE — G0403 EKG FOR INITIAL PREVENT EXAM: HCPCS | Performed by: INTERNAL MEDICINE

## 2021-12-02 PROCEDURE — G0008 ADMIN INFLUENZA VIRUS VAC: HCPCS

## 2021-12-02 PROCEDURE — 99214 OFFICE O/P EST MOD 30 MIN: CPT | Performed by: INTERNAL MEDICINE

## 2021-12-02 RX ORDER — VARENICLINE TARTRATE 1 MG/1
TABLET, FILM COATED ORAL
Qty: 158 TABLET | Refills: 0 | Status: SHIPPED | OUTPATIENT
Start: 2021-12-02

## 2022-03-07 ENCOUNTER — TELEMEDICINE (OUTPATIENT)
Dept: INTERNAL MEDICINE CLINIC | Age: 62
End: 2022-03-07
Payer: MEDICARE

## 2022-03-07 DIAGNOSIS — J30.89 SEASONAL ALLERGIC RHINITIS DUE TO OTHER ALLERGIC TRIGGER: ICD-10-CM

## 2022-03-07 DIAGNOSIS — E66.01 MORBID OBESITY WITH BODY MASS INDEX (BMI) OF 60.0 TO 69.9 IN ADULT (HCC): ICD-10-CM

## 2022-03-07 DIAGNOSIS — H10.13 ALLERGIC CONJUNCTIVITIS OF BOTH EYES: Primary | ICD-10-CM

## 2022-03-07 PROBLEM — J30.2 SEASONAL ALLERGIC RHINITIS: Status: ACTIVE | Noted: 2022-03-07

## 2022-03-07 PROCEDURE — 99213 OFFICE O/P EST LOW 20 MIN: CPT | Performed by: INTERNAL MEDICINE

## 2022-03-07 RX ORDER — CETIRIZINE HYDROCHLORIDE 10 MG/1
10 TABLET ORAL DAILY
Qty: 30 TABLET | Refills: 1 | Status: SHIPPED | OUTPATIENT
Start: 2022-03-07

## 2022-03-07 RX ORDER — KETOTIFEN FUMARATE 0.35 MG/ML
1 SOLUTION/ DROPS OPHTHALMIC 2 TIMES DAILY
Qty: 5 ML | Refills: 0 | Status: SHIPPED | OUTPATIENT
Start: 2022-03-07

## 2022-03-07 NOTE — PROGRESS NOTES
Virtual Regular Visit    Verification of patient location:    Patient is located in the following state in which I hold an active license [place]      Assessment/Plan:    Allergic conjunctivitis  -will start patient on ketotifen eyedrops  -will also start her on cetirizine  -patient was counseled to go to the emergency department if her symptoms worsen or if she develops any change in vision or photophobia or eye pain  I explained to patient that since this is a telemedicine visit, I am not able to properly assess her eyes and she should go to the emergency department if her symptoms worsen  Seasonal allergic rhinitis  -will start patient on cetirizine  -she may also use with over-the-counter Flonase    Problem List Items Addressed This Visit        Respiratory    Seasonal allergic rhinitis    Relevant Medications    ketotifen (ZADITOR) 0 025 % ophthalmic solution    cetirizine (ZyrTEC) 10 mg tablet       Other    Morbid obesity with body mass index (BMI) of 60 0 to 69 9 in [facility])    Allergic conjunctivitis of both eyes - Primary    Relevant Medications    ketotifen (ZADITOR) 0 025 % ophthalmic solution    cetirizine (ZyrTEC) 10 mg tablet          BMI Counseling: There is no height or weight on file to calculate BMI  The BMI is above normal  Nutrition recommendations include limiting drinks that contain sugar, reducing intake of saturated and trans fat and reducing intake of cholesterol  Exercise recommendations include moderate physical activity 150 minutes/week  No pharmacotherapy was ordered  Patient referred to PCP  Rationale for BMI follow-up plan is due to patient being overweight or obese  Reason for visit is   Chief Complaint   Patient presents with    Virtual Regular Visit     -left eye swollen  and swelling started in right eye   started this AM   pt  states eyes itches       Virtual Regular Visit        Encounter provider Denver Deer, DO    Provider located at [address] [facility] [place]  [address] [facility]  [address]  [address] [address]      Recent Visits  No visits were found meeting these conditions  Showing recent visits within past 7 days and meeting all other requirements  Today's Visits  Date Type Provider Dept   03/07/22 Telemedicine [facility], DO [facility]   Showing today's visits and meeting all other requirements  Future Appointments  No visits were found meeting these conditions  Showing future appointments within next 150 days and meeting all other requirements       The patient was identified by name and date of birth  Valerie Beckman was informed that this is a telemedicine visit and that the visit is being conducted through [facility] and patient was informed this is a secure, HIPAA-complaint platform  She agrees to proceed     My office door was closed  No one else was in the room  She acknowledged consent and understanding of privacy and security of the video platform  The patient has agreed to participate and understands they can discontinue the visit at any time  Patient is aware this is a billable service  Subjective  Valerie Beckman is a 63 y o  female    HPI   Pt presents via telemedicine visit with with c/o of left eye swelling that started this am with associated itching and is now involving the right eye with associated redness but no discharge  She denies any change in vision or photophobia  She admits to associated nasal congestion, sinus pain and pressure, chronic cough and chronic shortness of breath  She also admits to mild headache  She denies any history of contact, fever, chills, night sweats, dizziness, sore throat, chest pain, excessive sneezing, nausea, vomiting, abdominal pain, diarrhea, constipation  She denies any history of seasonal allergies      Past Medical History:   Diagnosis Date    [facility]chronic obstructive pulmonary disease) [facility] )     Depression     High blood pressure     Hypertension     Urinary incontinence        Past Surgical History:   Procedure Laterality Date    HYSTERECTOMY      INCONTINENCE SURGERY      OOPHORECTOMY Bilateral     REPLACEMENT TOTAL KNEE Right 2016    TOTAL ABDOMINAL HYSTERECTOMY W/ BILATERAL SALPINGOOPHORECTOMY  2006       Current Outpatient Medications   Medication Sig Dispense Refill    acetaminophen (TYLENOL) 500 mg tablet Take 500 mg by mouth every 6 (six) hours as needed for mild pain      albuterol (PROVENTIL HFA,VENTOLIN HFA) 90 mcg/act inhaler Inhale 2 puffs every 6 (six) hours as needed for wheezing or shortness of breath 18 g 1    furosemide (LASIX) 20 mg tablet Take 1 tablet (20 mg total) by mouth daily 90 tablet 1    lisinopril (ZESTRIL) 10 mg tablet Take 10 mg by mouth daily      sertraline (ZOLOFT) 25 mg tablet Take 1 tablet (25 mg total) by mouth daily 90 tablet 1    cetirizine (ZyrTEC) 10 mg tablet Take 1 tablet (10 mg total) by mouth daily 30 tablet 1    DIETARY MANAGEMENT PRODUCT PO Take 1 tablet by mouth daily (Patient not taking: Reported on 7/19/2021)      ketotifen (ZADITOR) 0 025 % ophthalmic solution Administer 1 drop to both eyes 2 (two) times a day 5 mL 0    varenicline (CHANTIX) 1 mg tablet Take 1/2 tab daily for 3 days, then 1/2 tab twice daily for 4 days, then 1 tab twice daily for 11 weeks  Quit by week 5 (Patient not taking: Reported on 3/7/2022 ) 158 tablet 0     No current facility-administered medications for this visit  No Known Allergies    Review of Systems   Constitutional: Negative for activity change, chills, fatigue, fever and unexpected weight change  HENT: Positive for congestion, sinus pressure and sinus pain  Negative for ear pain, postnasal drip, rhinorrhea and sore throat  Eyes: Positive for redness (with swelling of the eyelids) and itching  Negative for pain and discharge     Respiratory: Positive for cough (chronic cough, unchanged) and shortness of breath (chronic and unchanged)  Negative for choking, chest tightness and wheezing  Cardiovascular: Negative for chest pain, palpitations and leg swelling  Gastrointestinal: Negative for abdominal pain, constipation, diarrhea, nausea and vomiting  Genitourinary: Negative for dysuria and hematuria  Musculoskeletal: Negative for arthralgias, back pain, gait problem, joint swelling, myalgias and neck stiffness  Skin: Negative for pallor and rash  Neurological: Positive for headaches  Negative for dizziness, tremors, seizures, syncope and light-headedness  Hematological: Negative for adenopathy  Psychiatric/Behavioral: Negative for behavioral problems  Video Exam    There were no vitals filed for this visit  Physical Exam  Constitutional:       General: She is not in acute distress  Appearance: Normal appearance  She is not ill-appearing or toxic-appearing  HENT:      Head: Normocephalic and atraumatic  Mouth/Throat:      Mouth: Mucous membranes are moist    Eyes:      General: No scleral icterus  Right eye: No discharge  Left eye: No discharge  Conjunctiva/sclera:      Right eye: Right conjunctiva is injected  Left eye: Left conjunctiva is injected (Injection of the bilateral conjunctiva with swelling of the eyelids, worse on the right compared to the left)  Pulmonary:      Effort: Pulmonary effort is normal  No respiratory distress  Abdominal:      Tenderness: There is no abdominal tenderness  Musculoskeletal:      Cervical back: Normal range of motion  Skin:     Coloration: Skin is not jaundiced  Neurological:      Mental Status: She is alert and oriented to person, place, and time  Psychiatric:         Behavior: Behavior normal           I spent 15 minutes directly with the patient during this visit    VIRTUAL VISIT [facility] verbally agrees to participate in [employer]   Pt is aware that Virtual Care Services could be limited without vital signs or the ability to perform a full hands-on physical exam  Valerie Burris understands she or the provider may request at any time to terminate the video visit and request the patient to seek care or treatment in person

## 2022-07-25 ENCOUNTER — TELEPHONE (OUTPATIENT)
Dept: VASCULAR SURGERY | Facility: CLINIC | Age: 62
End: 2022-07-25

## 2022-07-25 NOTE — TELEPHONE ENCOUNTER
Attempted to contact patient to schedule appointment(s) listed below  Requested patient call [phone] option 3 to schedule appointment(s)  Patient's appointment(s) are due now  Pt is due for 1 yr OV for RFM    Dopplers  [] Abdominal Aorta Iliac (AOIL)  [] Carotid (CV)   [] Celiac and/or Mesenteric  [] Endovascular Aortic Repair (EVAR)   [] Hemodialysis Access (HD)   [] Lower Limb Arterial (JAUN)  [] Lower Limb Venous Duplex with Reflux (LEVDR)  [] Renal Artery  [] Upper Limb Arterial (UEA)    [] Upper Limb Venous (UEV)    Advanced Imaging   [] CTA abdomen    [] CTA abdomen & pelvis    [] CT abdomen with/ without contrast  [] CT abdomen with contrast  [] CT abdomen without contrast    [] CT abdomen & pelvis with/ without contrast  [] CT abdomen & pelvis with contrast  [] CT abdomen & pelvis without contrast    Office Visit   [] New patient, patient last seen over 3 years ago  [x] Risk factor modification (RFM)    [] Follow up due now

## 2022-08-24 ENCOUNTER — RA CDI HCC (OUTPATIENT)
Dept: OTHER | Facility: HOSPITAL | Age: 62
End: 2022-08-24

## 2022-08-24 NOTE — PROGRESS NOTES
[facility]  coding opportunities        I11 0  Chart Reviewed number of suggestions sent to Provider: 1     Patients Insurance     Medicare Insurance: [employer]

## 2022-08-30 ENCOUNTER — OFFICE VISIT (OUTPATIENT)
Dept: INTERNAL MEDICINE CLINIC | Age: 62
End: 2022-08-30
Payer: MEDICARE

## 2022-08-30 VITALS
SYSTOLIC BLOOD PRESSURE: 138 MMHG | TEMPERATURE: 97.9 F | HEART RATE: 87 BPM | OXYGEN SATURATION: 92 % | DIASTOLIC BLOOD PRESSURE: 82 MMHG | WEIGHT: 293 LBS | HEIGHT: 65 IN | BODY MASS INDEX: 48.82 KG/M2

## 2022-08-30 DIAGNOSIS — R79.89 ABNORMAL TSH: ICD-10-CM

## 2022-08-30 DIAGNOSIS — E55.9 VITAMIN D DEFICIENCY: ICD-10-CM

## 2022-08-30 DIAGNOSIS — N39.46 MIXED STRESS AND URGE URINARY INCONTINENCE: ICD-10-CM

## 2022-08-30 DIAGNOSIS — E66.01 MORBID OBESITY WITH BODY MASS INDEX (BMI) OF 60.0 TO 69.9 IN ADULT (HCC): ICD-10-CM

## 2022-08-30 DIAGNOSIS — E78.2 MIXED HYPERLIPIDEMIA: ICD-10-CM

## 2022-08-30 DIAGNOSIS — G47.33 OBSTRUCTIVE SLEEP APNEA SYNDROME: ICD-10-CM

## 2022-08-30 DIAGNOSIS — J44.9 CHRONIC OBSTRUCTIVE PULMONARY DISEASE, UNSPECIFIED COPD TYPE (HCC): ICD-10-CM

## 2022-08-30 DIAGNOSIS — F33.9 DEPRESSION, RECURRENT (HCC): ICD-10-CM

## 2022-08-30 DIAGNOSIS — Z72.0 TOBACCO ABUSE: ICD-10-CM

## 2022-08-30 DIAGNOSIS — I50.32 CHRONIC DIASTOLIC CONGESTIVE HEART FAILURE (HCC): ICD-10-CM

## 2022-08-30 DIAGNOSIS — R35.1 NOCTURIA MORE THAN TWICE PER NIGHT: ICD-10-CM

## 2022-08-30 DIAGNOSIS — I50.30 DIASTOLIC CONGESTIVE HEART FAILURE, UNSPECIFIED HF CHRONICITY (HCC): ICD-10-CM

## 2022-08-30 DIAGNOSIS — F17.210 CIGARETTE NICOTINE DEPENDENCE WITHOUT COMPLICATION: ICD-10-CM

## 2022-08-30 DIAGNOSIS — I89.0 LYMPHEDEMA: Primary | ICD-10-CM

## 2022-08-30 DIAGNOSIS — I10 ESSENTIAL HYPERTENSION: ICD-10-CM

## 2022-08-30 DIAGNOSIS — R73.03 PRE-DIABETES: ICD-10-CM

## 2022-08-30 DIAGNOSIS — E66.01 MORBID OBESITY (HCC): ICD-10-CM

## 2022-08-30 DIAGNOSIS — I89.0 LYMPHEDEMA OF BOTH LOWER EXTREMITIES: ICD-10-CM

## 2022-08-30 PROBLEM — Z23 NEEDS FLU SHOT: Status: RESOLVED | Noted: 2021-12-02 | Resolved: 2022-08-30

## 2022-08-30 PROBLEM — H10.13 ALLERGIC CONJUNCTIVITIS OF BOTH EYES: Status: RESOLVED | Noted: 2022-03-07 | Resolved: 2022-08-30

## 2022-08-30 PROBLEM — R20.2 PARESTHESIA OF LEFT UPPER EXTREMITY: Status: RESOLVED | Noted: 2019-07-10 | Resolved: 2022-08-30

## 2022-08-30 PROBLEM — M79.671 PAIN IN BOTH FEET: Status: RESOLVED | Noted: 2020-07-31 | Resolved: 2022-08-30

## 2022-08-30 PROBLEM — M79.672 PAIN IN BOTH FEET: Status: RESOLVED | Noted: 2020-07-31 | Resolved: 2022-08-30

## 2022-08-30 PROBLEM — R60.0 PEDAL EDEMA: Status: RESOLVED | Noted: 2019-11-04 | Resolved: 2022-08-30

## 2022-08-30 PROBLEM — R06.02 SOB (SHORTNESS OF BREATH): Status: RESOLVED | Noted: 2019-11-04 | Resolved: 2022-08-30

## 2022-08-30 PROBLEM — T78.40XA ALLERGIC REACTION: Status: RESOLVED | Noted: 2020-04-30 | Resolved: 2022-08-30

## 2022-08-30 PROBLEM — J30.2 SEASONAL ALLERGIC RHINITIS: Status: RESOLVED | Noted: 2022-03-07 | Resolved: 2022-08-30

## 2022-08-30 PROBLEM — F32.9 REACTIVE DEPRESSION: Status: RESOLVED | Noted: 2019-07-28 | Resolved: 2022-08-30

## 2022-08-30 PROBLEM — Z12.31 ENCOUNTER FOR SCREENING MAMMOGRAM FOR MALIGNANT NEOPLASM OF BREAST: Status: RESOLVED | Noted: 2021-12-02 | Resolved: 2022-08-30

## 2022-08-30 PROBLEM — Z00.00 WELCOME TO MEDICARE PREVENTIVE VISIT: Status: RESOLVED | Noted: 2020-07-31 | Resolved: 2022-08-30

## 2022-08-30 LAB
SL AMB  POCT GLUCOSE, UA: NORMAL
SL AMB LEUKOCYTE ESTERASE,UA: NORMAL
SL AMB POCT BILIRUBIN,UA: NORMAL
SL AMB POCT BLOOD,UA: NORMAL
SL AMB POCT CLARITY,UA: CLEAR
SL AMB POCT COLOR,UA: NORMAL
SL AMB POCT KETONES,UA: NORMAL
SL AMB POCT NITRITE,UA: NORMAL
SL AMB POCT PH,UA: 6
SL AMB POCT SPECIFIC GRAVITY,UA: 1.02
SL AMB POCT URINE PROTEIN: NORMAL
SL AMB POCT UROBILINOGEN: 1

## 2022-08-30 PROCEDURE — 81003 URINALYSIS AUTO W/O SCOPE: CPT | Performed by: NURSE PRACTITIONER

## 2022-08-30 PROCEDURE — 99215 OFFICE O/P EST HI 40 MIN: CPT | Performed by: NURSE PRACTITIONER

## 2022-08-30 RX ORDER — FUROSEMIDE 20 MG/1
20 TABLET ORAL 2 TIMES DAILY
Qty: 90 TABLET | Refills: 0 | Status: SHIPPED | OUTPATIENT
Start: 2022-08-30

## 2022-08-30 RX ORDER — LISINOPRIL 10 MG/1
10 TABLET ORAL DAILY
Qty: 90 TABLET | Refills: 0 | Status: SHIPPED | OUTPATIENT
Start: 2022-08-30

## 2022-08-30 NOTE — ASSESSMENT & PLAN NOTE
Not on statin  Need updated lipid panel  Recommend healthy lifestyle choices for your cholesterol  Low fat/low cholesterol diet  Limit/avoid red meat  Eat more lean meat - chicken breast, ground turkey, fish  Exercise 30 mins at least 5 times a week as tolerated

## 2022-08-30 NOTE — ASSESSMENT & PLAN NOTE
Restart lisinopril  Continue current regimen -   Continue to monitor blood pressure at home  Goal BP is < 130/80  Contact our office for consistent elevations  Recommend low sodium diet  Exercise 30 minutes 5 times a week as tolerated    Recommend yearly eye exam

## 2022-08-30 NOTE — PROGRESS NOTES
Assessment/Plan:    COPD (chronic obstructive pulmonary disease) ([facility] )  Will get PFT, continue with albuterol as needed  Obstructive sleep apnea syndrome  No previous Sleep study test      Essential hypertension  Restart lisinopril  Continue current regimen -   Continue to monitor blood pressure at home  Goal BP is < 130/80  Contact our office for consistent elevations  Recommend low sodium diet  Exercise 30 minutes 5 times a week as tolerated  Recommend yearly eye exam       Chronic diastolic congestive heart failure ([facility] )  Wt Readings from Last 3 Encounters:   08/30/22 (!) 178 kg (393 lb)   12/02/21 (!) 175 kg (386 lb 12 8 oz)   07/19/21 (!) 171 kg (375 lb 14 4 oz)     Will get BNP  Restart lasix  Mixed stress and urge urinary incontinence  Chronic and has worsened  UA negative  Referral to urogyn  High suspicion for DM  Mixed hyperlipidemia  Not on statin  Need updated lipid panel  Recommend healthy lifestyle choices for your cholesterol  Low fat/low cholesterol diet  Limit/avoid red meat  Eat more lean meat - chicken breast, ground turkey, fish  Exercise 30 mins at least 5 times a week as tolerated  Morbid obesity [facility])  Patient has appointment with weight management  Cigarette nicotine dependence without complication  Encouraged smoking cessation  Lymphedema of both lower extremities  Referral to PT OT    Pre-diabetes  Patient is to continue to work on diet and exercise  Limit sugars and carbohydrate intake  Avoid soda, juice, sweets, cookies, desserts, pasta, bread    Eat more whole grains, exercised 30 min of cardio at least 3 times a week  Also recommended daily foot exams to check for sores, and recommended yearly eye exams  BMI Counseling: Body mass index is 65 4 kg/m²   The BMI is above normal  Nutrition recommendations include decreasing portion sizes, encouraging healthy choices of fruits and vegetables, decreasing fast food intake, consuming healthier snacks, limiting drinks that contain sugar, moderation in carbohydrate intake, increasing intake of lean protein, reducing intake of saturated and trans fat and reducing intake of cholesterol  Exercise recommendations include exercising 3-5 times per week  Rationale for BMI follow-up plan is due to patient being overweight or obese  Diagnoses and all orders for this visit:    Lymphedema  -     Ambulatory Referral to PT/OT Lymphedema Therapy; Future    Chronic diastolic congestive heart failure (HCC)    Mixed hyperlipidemia  -     Lipid panel    Essential hypertension  -     lisinopril (ZESTRIL) 10 mg tablet; Take 1 tablet (10 mg total) by mouth daily  -     CBC and differential  -     Comprehensive metabolic panel; Future    Nocturia more than twice per night  -     POCT urine dip  -     TSH, 3rd generation with Free T4 reflex  -     Ambulatory Referral to Urogynecology; Future    Pre-diabetes  -     Hemoglobin A1C    BMI 60 0-69 9, adult (HCC)  -     TSH, 3rd generation with Free T4 reflex    Vitamin D deficiency  -     Vitamin D 25 hydroxy    Diastolic congestive heart failure, unspecified HF chronicity (HCC)  -     furosemide (LASIX) 20 mg tablet; Take 1 tablet (20 mg total) by mouth 2 (two) times a day  -     NT-BNP PRO; Future    Tobacco abuse  -     Complete PFT with post bronchodilator; Future    Chronic obstructive pulmonary disease, unspecified COPD type ([facility] )    Depression, recurrent ([facility])    Abnormal TSH  -     TSH, 3rd generation with Free T4 reflex    Obstructive sleep apnea syndrome    Mixed stress and urge urinary incontinence    Morbid obesity ([facility] )    Morbid obesity with body mass index (BMI) of 60 0 to 69 9 in adult [facility])    Cigarette nicotine dependence without complication    Lymphedema of both lower extremities          Subjective:      Patient ID: Pete Atwood is a 63 y o  female  Patient presents today to re establish care    She is currently living with her daughter  COPD- never had PFT, uses albuterol as needed      Essential hypertension- was on lisinopril without side effects, has not been taking    Chronic diastolic congestive heart failure ([facility] )-  Wt Readings from Last 3 Encounters:  08/30/22 (!) 178 kg (393 lb)  12/02/21 (!) 175 kg (386 lb 12 8 oz)  07/19/21 (!) 171 kg (375 lb 14 4 oz)  Last echo showed diastolic dysfunction stage one, has not been taking lasix      Mixed stress and urge urinary incontinence- Chronic and has worsened  Incontinet most of the time, effecting her quality of life  UA negative  Is up using the bathroom every hour at night  Mixed hyperlipidemia-Not on statin  Need updated lipid panel  Morbid obesity (HCC)-Patient has appointment with weight management  Cigarette nicotine dependence without complication-she has cut back on smoking, but continues to vape    Lymphedema of both lower extremities- has seen PT OT in the past            The following portions of the patient's history were reviewed and updated as appropriate: allergies, current medications, past family history, past medical history, past social history, past surgical history and problem list     Review of Systems   Constitutional: Negative for activity change, appetite change, chills, diaphoresis and fever  HENT: Negative for congestion, ear discharge, ear pain, postnasal drip, rhinorrhea, sinus pressure, sinus pain and sore throat  Eyes: Negative for pain, discharge, itching and visual disturbance  Respiratory: Negative for cough, chest tightness, shortness of breath and wheezing  Cardiovascular: Positive for leg swelling  Negative for chest pain and palpitations  Gastrointestinal: Negative for abdominal pain, constipation, diarrhea, nausea and vomiting  Endocrine: Negative for polydipsia, polyphagia and polyuria  Genitourinary: Positive for dysuria  Negative for difficulty urinating and urgency     Musculoskeletal: Negative for arthralgias, back pain and neck pain  Skin: Negative for rash and wound  Neurological: Negative for dizziness, weakness, numbness and headaches  Psychiatric/Behavioral: Positive for sleep disturbance           Past Medical History:   Diagnosis Date    COPD (chronic obstructive pulmonary disease) ([facility] )     Depression     High blood pressure     Hypertension     Urinary incontinence          Current Outpatient Medications:     acetaminophen (TYLENOL) 500 mg tablet, Take 500 mg by mouth every 6 (six) hours as needed for mild pain, Disp: , Rfl:     albuterol (PROVENTIL HFA,VENTOLIN HFA) 90 mcg/act inhaler, Inhale 2 puffs every 6 (six) hours as needed for wheezing or shortness of breath, Disp: 18 g, Rfl: 1    furosemide (LASIX) 20 mg tablet, Take 1 tablet (20 mg total) by mouth 2 (two) times a day, Disp: 90 tablet, Rfl: 0    lisinopril (ZESTRIL) 10 mg tablet, Take 1 tablet (10 mg total) by mouth daily, Disp: 90 tablet, Rfl: 0    No Known Allergies    Social History   Past Surgical History:   Procedure Laterality Date    HYSTERECTOMY      INCONTINENCE SURGERY      OOPHORECTOMY Bilateral     REPLACEMENT TOTAL KNEE Right 2016    TOTAL ABDOMINAL HYSTERECTOMY W/ BILATERAL SALPINGOOPHORECTOMY  2006     Family History   Problem Relation Age of Onset    Other Mother     Aneurysm Mother         aortic    Heart failure Father     Diabetes Father     Lung cancer Sister        Objective:  /82 (BP Location: Left arm, Patient Position: Sitting, Cuff Size: Large)   Pulse 87   Temp 97 9 °F (36 6 °C) (Temporal)   Ht 5' 5" (1 651 m)   Wt (!) 178 kg (393 lb)   SpO2 92% Comment: room air  BMI 65 40 kg/m²     Recent Results (from the past 1344 hour(s))   POCT urine dip    Collection Time: 08/30/22  3:44 PM   Result Value Ref Range    LEUKOCYTE ESTERASE,UA NEG     NITRITE,UA NEG     SL AMB POCT UROBILINOGEN 1 0     POCT URINE PROTEIN NEG      PH,UA 6 0     BLOOD,UA NEG     SPECIFIC GRAVITY,UA 1 025     KETONES,UA NEG BILIRUBIN,UA small     GLUCOSE, UA NEG      COLOR,UA Dark Yellow     CLARITY,UA clear             Physical Exam  Constitutional:       General: She is not in acute distress  Appearance: She is well-developed  She is obese  She is not diaphoretic  HENT:      Head: Normocephalic and atraumatic  Right Ear: External ear normal       Left Ear: External ear normal       Nose: Nose normal       Mouth/Throat:      Pharynx: No oropharyngeal exudate  Eyes:      General:         Right eye: No discharge  Left eye: No discharge  Conjunctiva/sclera: Conjunctivae normal       Pupils: Pupils are equal, round, and reactive to light  Neck:      Thyroid: No thyromegaly  Cardiovascular:      Rate and Rhythm: Normal rate and regular rhythm  Heart sounds: Normal heart sounds  No murmur heard  No friction rub  No gallop  Pulmonary:      Effort: Pulmonary effort is normal  No respiratory distress  Breath sounds: Normal breath sounds  No stridor  No wheezing or rales  Abdominal:      General: Bowel sounds are normal  There is no distension  Palpations: Abdomen is soft  Tenderness: There is no abdominal tenderness  Musculoskeletal:      Cervical back: Normal range of motion and neck supple  Right lower leg: Edema present  Left lower leg: Edema present  Lymphadenopathy:      Cervical: No cervical adenopathy  Skin:     General: Skin is warm and dry  Findings: No erythema or rash  Neurological:      Mental Status: She is alert and oriented to person, place, and time  Psychiatric:         Behavior: Behavior normal          Thought Content:  Thought content normal          Judgment: Judgment normal

## 2022-08-31 ENCOUNTER — APPOINTMENT (OUTPATIENT)
Dept: LAB | Age: 62
End: 2022-08-31
Payer: MEDICARE

## 2022-08-31 DIAGNOSIS — I10 ESSENTIAL HYPERTENSION: ICD-10-CM

## 2022-08-31 DIAGNOSIS — I50.30 DIASTOLIC CONGESTIVE HEART FAILURE, UNSPECIFIED HF CHRONICITY (HCC): ICD-10-CM

## 2022-08-31 LAB
25(OH)D3 SERPL-MCNC: 9.4 NG/ML (ref 30–100)
ALBUMIN SERPL BCP-MCNC: 3.3 G/DL (ref 3.5–5)
ALP SERPL-CCNC: 88 U/L (ref 46–116)
ALT SERPL W P-5'-P-CCNC: 21 U/L (ref 12–78)
ANION GAP SERPL CALCULATED.3IONS-SCNC: 3 MMOL/L (ref 4–13)
AST SERPL W P-5'-P-CCNC: 9 U/L (ref 5–45)
BASOPHILS # BLD AUTO: 0.02 THOUSANDS/ΜL (ref 0–0.1)
BASOPHILS NFR BLD AUTO: 0 % (ref 0–1)
BILIRUB SERPL-MCNC: 0.53 MG/DL (ref 0.2–1)
BUN SERPL-MCNC: 13 MG/DL (ref 5–25)
CALCIUM ALBUM COR SERPL-MCNC: 10.4 MG/DL (ref 8.3–10.1)
CALCIUM SERPL-MCNC: 9.8 MG/DL (ref 8.3–10.1)
CHLORIDE SERPL-SCNC: 107 MMOL/L (ref 96–108)
CHOLEST SERPL-MCNC: 187 MG/DL
CO2 SERPL-SCNC: 28 MMOL/L (ref 21–32)
CREAT SERPL-MCNC: 0.77 MG/DL (ref 0.6–1.3)
EOSINOPHIL # BLD AUTO: 0.14 THOUSAND/ΜL (ref 0–0.61)
EOSINOPHIL NFR BLD AUTO: 3 % (ref 0–6)
ERYTHROCYTE [DISTWIDTH] IN BLOOD BY AUTOMATED COUNT: 14.5 % (ref 11.6–15.1)
EST. AVERAGE GLUCOSE BLD GHB EST-MCNC: 128 MG/DL
GFR SERPL CREATININE-BSD FRML MDRD: 84 ML/MIN/1.73SQ M
GLUCOSE P FAST SERPL-MCNC: 113 MG/DL (ref 65–99)
HBA1C MFR BLD: 6.1 %
HCT VFR BLD AUTO: 44 % (ref 34.8–46.1)
HDLC SERPL-MCNC: 41 MG/DL
HGB BLD-MCNC: 13.4 G/DL (ref 11.5–15.4)
IMM GRANULOCYTES # BLD AUTO: 0.01 THOUSAND/UL (ref 0–0.2)
IMM GRANULOCYTES NFR BLD AUTO: 0 % (ref 0–2)
LDLC SERPL CALC-MCNC: 123 MG/DL (ref 0–100)
LYMPHOCYTES # BLD AUTO: 1.28 THOUSANDS/ΜL (ref 0.6–4.47)
LYMPHOCYTES NFR BLD AUTO: 23 % (ref 14–44)
MCH RBC QN AUTO: 28.8 PG (ref 26.8–34.3)
MCHC RBC AUTO-ENTMCNC: 30.5 G/DL (ref 31.4–37.4)
MCV RBC AUTO: 95 FL (ref 82–98)
MONOCYTES # BLD AUTO: 0.51 THOUSAND/ΜL (ref 0.17–1.22)
MONOCYTES NFR BLD AUTO: 9 % (ref 4–12)
NEUTROPHILS # BLD AUTO: 3.72 THOUSANDS/ΜL (ref 1.85–7.62)
NEUTS SEG NFR BLD AUTO: 65 % (ref 43–75)
NONHDLC SERPL-MCNC: 146 MG/DL
NRBC BLD AUTO-RTO: 0 /100 WBCS
NT-PROBNP SERPL-MCNC: 23 PG/ML
PLATELET # BLD AUTO: 203 THOUSANDS/UL (ref 149–390)
PMV BLD AUTO: 11.4 FL (ref 8.9–12.7)
POTASSIUM SERPL-SCNC: 4.3 MMOL/L (ref 3.5–5.3)
PROT SERPL-MCNC: 7.8 G/DL (ref 6.4–8.4)
RBC # BLD AUTO: 4.65 MILLION/UL (ref 3.81–5.12)
SODIUM SERPL-SCNC: 138 MMOL/L (ref 135–147)
TRIGL SERPL-MCNC: 115 MG/DL
TSH SERPL DL<=0.05 MIU/L-ACNC: 1.45 UIU/ML (ref 0.45–4.5)
WBC # BLD AUTO: 5.68 THOUSAND/UL (ref 4.31–10.16)

## 2022-08-31 PROCEDURE — 80053 COMPREHEN METABOLIC PANEL: CPT

## 2022-08-31 PROCEDURE — 83880 ASSAY OF NATRIURETIC PEPTIDE: CPT

## 2022-08-31 PROCEDURE — 80061 LIPID PANEL: CPT | Performed by: NURSE PRACTITIONER

## 2022-08-31 PROCEDURE — 85025 COMPLETE CBC W/AUTO DIFF WBC: CPT | Performed by: NURSE PRACTITIONER

## 2022-08-31 PROCEDURE — 84443 ASSAY THYROID STIM HORMONE: CPT | Performed by: NURSE PRACTITIONER

## 2022-08-31 PROCEDURE — 82306 VITAMIN D 25 HYDROXY: CPT | Performed by: NURSE PRACTITIONER

## 2022-08-31 PROCEDURE — 36415 COLL VENOUS BLD VENIPUNCTURE: CPT | Performed by: NURSE PRACTITIONER

## 2022-08-31 PROCEDURE — 83036 HEMOGLOBIN GLYCOSYLATED A1C: CPT | Performed by: NURSE PRACTITIONER

## 2022-09-07 ENCOUNTER — TELEPHONE (OUTPATIENT)
Dept: INTERNAL MEDICINE CLINIC | Age: 62
End: 2022-09-07

## 2022-09-07 NOTE — TELEPHONE ENCOUNTER
Pt rescheduled her 1 week f/u from today until next Wednesday because she did not start taking the medication until yesterday  She also states she is on furosemide and has been having to go the bathroom every 1/2 hour overnight  She is asking what time is the latest that she can take the pill that she would not be waking up in the middle of the night to go to the bathroom so much  Please advise
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
## 2022-10-03 ENCOUNTER — OFFICE VISIT (OUTPATIENT)
Dept: BARIATRICS | Facility: CLINIC | Age: 62
End: 2022-10-03

## 2022-10-03 VITALS
SYSTOLIC BLOOD PRESSURE: 130 MMHG | WEIGHT: 293 LBS | DIASTOLIC BLOOD PRESSURE: 84 MMHG | OXYGEN SATURATION: 91 % | BODY MASS INDEX: 50.02 KG/M2 | HEART RATE: 73 BPM | HEIGHT: 64 IN | TEMPERATURE: 96.4 F

## 2022-10-03 DIAGNOSIS — F17.299 OTHER TOBACCO PRODUCT NICOTINE DEPENDENCE WITH NICOTINE-INDUCED DISORDER: ICD-10-CM

## 2022-10-03 DIAGNOSIS — G47.33 OBSTRUCTIVE SLEEP APNEA SYNDROME: ICD-10-CM

## 2022-10-03 DIAGNOSIS — J44.9 CHRONIC OBSTRUCTIVE PULMONARY DISEASE, UNSPECIFIED COPD TYPE (HCC): Primary | ICD-10-CM

## 2022-10-03 DIAGNOSIS — F55.8 ABUSE OF OTHER NON-PSYCHOACTIVE SUBSTANCES: ICD-10-CM

## 2022-10-03 DIAGNOSIS — Z01.812 BLOOD TESTS PRIOR TO TREATMENT OR PROCEDURE: ICD-10-CM

## 2022-10-03 DIAGNOSIS — E66.01 MORBID OBESITY (HCC): Primary | ICD-10-CM

## 2022-10-03 DIAGNOSIS — Z01.818 PREOPERATIVE CLEARANCE: ICD-10-CM

## 2022-10-03 DIAGNOSIS — E66.01 MORBID OBESITY (HCC): ICD-10-CM

## 2022-10-03 DIAGNOSIS — E78.2 MIXED HYPERLIPIDEMIA: ICD-10-CM

## 2022-10-03 DIAGNOSIS — F17.210 CIGARETTE NICOTINE DEPENDENCE WITHOUT COMPLICATION: ICD-10-CM

## 2022-10-03 PROCEDURE — RECHECK

## 2022-10-03 NOTE — PROGRESS NOTES
Bariatric Nutrition Assessment Note    Type of surgery    Preop no monthly requirement  Surgery Date: Tentative March-April 2023  Deadline July 2023  Surgeon: Dr Carlos Quintana  63 y o   female     Wt with BMI of 25: 146 4lbs  Pre-Op Excess Wt: 243 1lbs  /84 (BP Location: Left arm, Patient Position: Sitting, Cuff Size: Large)   Pulse 73   Temp (!) 96 4 °F (35 8 °C) (Tympanic)   Ht 5' 4" (1 626 m)   Wt (!) 177 kg (389 lb 8 oz)   SpO2 91%   BMI 66 86 kg/m²     [facility] Equation:     Weight maintenance= 2794 kcal/day  Estimated calories for weight loss 3824-7836 kcal/day ( 1-2# per wk wt loss - sedentary )  Estimated protein needs 66 5-80 g/day (1 0-1 2 gms/kg IBW )   Estimated fluid needs 0173-1820 ml/day (30-35 ml/kg IBW )      Weight History   Onset of Obesity: Childhood  Family history of obesity: Yes    Sister had WLS  Wt Loss Attempts: Commercial Programs ([employer] [place], [employer], etc )  Counseling with  MD AGUIRRE Diets (Cabbage soup, Grapefruit, Cleanse, etc )  Meal Replacements (Medifast, Slim Fast, etc )  Nutrition Counseling with RD  OTC meds/supplements  Patient has tried the above for 6 months or more with insufficient weight loss or weight regain, which is why patient has requested to be evaluated for weight loss surgery today  Maximum Wt Lost: [employer] 10-15 years ago lost 50lbs    Review of History and Medications   Past Medical History:   Diagnosis Date    Arthritis     [facility]     COPD [facility]chronic obstructive pulmonary disease) [facility] [facility]     Depression     High blood pressure     Hypertension     Urinary incontinence      Past Surgical History:   Procedure Laterality Date    HYSTERECTOMY      INCONTINENCE SURGERY      OOPHORECTOMY Bilateral     REPLACEMENT TOTAL KNEE Right 2016    TOTAL ABDOMINAL HYSTERECTOMY W/ BILATERAL SALPINGOOPHORECTOMY  2006     Social History     Socioeconomic History    Marital status: /Civil Union     Spouse name: None    Number of children: None    Years of education: None    Highest education level: None   Occupational History    None   Tobacco Use    Smoking status: Former Smoker     Packs/day: 0 00     Years: 35 00     Pack years: 0 00     Types: Cigarettes     Start date: 1976     Quit date: 5/15/2021     Years since quittin 3    Smokeless tobacco: Never Used   Vaping Use    Vaping Use: Every day    Start date: 2018    Substances: Nicotine   Substance and Sexual Activity    Alcohol use: Not Currently    Drug use: Not Currently     Types: Marijuana     Comment: monthly at most    Sexual activity: Not Currently   Other Topics Concern    None   Social History Narrative    None     Social Determinants of Health     Financial Resource Strain: Not on file   Food Insecurity: Not on file   Transportation Needs: Not on file   Physical Activity: Not on file   Stress: Not on file   Social Connections: Not on file   Intimate Partner Violence: Not on file   Housing Stability: Not on file       Current Outpatient Medications:     acetaminophen (TYLENOL) 500 mg tablet, Take 500 mg by mouth every 6 (six) hours as needed for mild pain, Disp: , Rfl:     albuterol (PROVENTIL HFA,VENTOLIN HFA) 90 mcg/act inhaler, Inhale 2 puffs every 6 (six) hours as needed for wheezing or shortness of breath, Disp: 18 g, Rfl: 1    furosemide (LASIX) 20 mg tablet, Take 1 tablet (20 mg total) by mouth 2 (two) times a day (Patient taking differently: Take 20 mg by mouth 2 (two) times a day Pt only takes med when not leaving house; reports urinary incontinence in general (LASIX makes it worse)), Disp: 90 tablet, Rfl: 0    lisinopril (ZESTRIL) 10 mg tablet, Take 1 tablet (10 mg total) by mouth daily, Disp: 90 tablet, Rfl: 0  Food Intake and Lifestyle Assessment   Food Intake Assessment completed via usual diet recall  Breakfast: coffee with sweet n low and creamer, bagel with cream cheese  Snack: none   Lunch: noon: sandwich: ham n cheese or dinner leftovers  Snack: none  Dinner: pt cooks: meat: hamburger or chicken; and potatoes:  Fried or mashed or rice or pasta  Snack: bowl of cereal or cupcake and milk or couple cookies  Beverage intake: coffee/tea and diet green tea  Protein supplement: none  Estimated protein intake per day: 60-90g  Estimated fluid intake per day: 32 oz diet green tea, 1-2 cup coffee  Meals eaten away from home: 3 per week  Typical meal pattern: 3 meals per day and 0-1 snacks per day  Eating Behaviors: Consumption of high calorie/ high fat foods, Large portion sizes and Mindless eating  Food allergies or intolerances: No Known Allergies NKFA  Cultural or Anglican considerations: [place]    Physical Assessment  Physical Activity  Types of exercise: None  Current physical limitations: COPD, lymphedema,     Psychosocial Assessment   Support systems:   Lives with her daughter and three grandchildren:  9yo twin girls and 23yo boy  Socioeconomic factors: none noted    Nutrition Diagnosis  Diagnosis: Overweight / Obesity (NC-3 3) and Excessive energy intake (NI-1 5)  Related to: Physical inactivity and Excessive energy intake  As Evidenced by: BMI >25, Excessive energy intake and Unintentional weight gain     Nutrition Prescription: Recommend the following diet  Low fat, Low sugar, High protein, Regular and low sodium    Interventions and Teaching   Discussed pre-op and post-op nutrition guidelines  Patient educated and handouts provided    Surgical changes to stomach / GI  Capacity of post-surgery stomach  Diet progression  Adequate hydration  Sugar and fat restriction to decrease "dumping syndrome"  Fat restriction to decrease steatorrhea  Expected weight loss  Weight loss plateaus/ possibility of weight regain  Exercise  Suggestions for pre-op diet  Nutrition considerations after surgery  Protein supplements  Meal planning and preparation  Appropriate carbohydrate, protein, and fat intake, and food/fluid choices to maximize safe weight loss, nutrient intake, and tolerance   Dietary and lifestyle changes  Possible problems with poor eating habits  Techniques for self monitoring and keeping daily food journal  Potential for food intolerance after surgery, and ways to deal with them including: lactose intolerance, nausea, reflux, vomiting, diarrhea, food intolerance, appetite changes, gas  Vitamin / Mineral supplementation of Multivitamin with minerals and Vitamin D   Pt's last Vit D3 level on 8/31/22=9 4  Rec'd pt contact PCP to discuss Rx Vit D repletion  Patient is not currently pregnant and doesn't desire to become pregnant a minimum of one year post-op    Education provided to: patient    Barriers to learning: No barriers identified    Readiness to change: preparation    Prior research on procedure: pre-op class and friends or family    Comprehension: needs reinforcement and verbalizes understanding     Expected Compliance: good    Recommendations  Pt is an appropriate candidate for surgery  Yes  5% wt loss=19 475#=Goal of 370 025# TO SCHEDULE SURGERY  10% wt loss=38 95#=Goal of 350 55# ON DAY OF SURGERY  Bloodwork done 8/31/2022- will need updated CBC, CMP, HgbA1c after 2/28/2023  Nicotine blood test ordered today      Evaluation / Monitoring  Dietitian to Monitor: Eating pattern as discussed Tolerance of nutrition prescription Body weight Lab values Physical activity Bowel pattern    Goals  Food journal, Exercise 30 minutes 5 times per week, Complete lession plans 1-6, Eat 3 meals per day and Eliminate mindless snacking    Time Spent:   1 Hour

## 2022-11-15 ENCOUNTER — TELEPHONE (OUTPATIENT)
Dept: BARIATRICS | Facility: CLINIC | Age: 62
End: 2022-11-15

## 2022-11-15 NOTE — TELEPHONE ENCOUNTER
Called pt, phone went straight to voicemail  Pt cancelled 11/3/22 RD visit and has not yet rescheduled  Left voicemail message inquiring if pt is still interested in continuing with our surgery program at this time  Contact # and office hours provided  Instructed pt to call back within the next week to reschedule November office appointment  If we do not hear from her in the next week we will assume no longer interested and case will be halted

## 2022-12-02 ENCOUNTER — APPOINTMENT (EMERGENCY)
Dept: RADIOLOGY | Facility: HOSPITAL | Age: 62
End: 2022-12-02

## 2022-12-02 ENCOUNTER — HOSPITAL ENCOUNTER (INPATIENT)
Facility: HOSPITAL | Age: 62
LOS: 7 days | Discharge: HOME HEALTH | End: 2022-12-09
Attending: EMERGENCY MEDICINE | Admitting: INTERNAL MEDICINE

## 2022-12-02 ENCOUNTER — OFFICE VISIT (OUTPATIENT)
Dept: INTERNAL MEDICINE CLINIC | Age: 62
End: 2022-12-02

## 2022-12-02 VITALS
HEIGHT: 65 IN | WEIGHT: 293 LBS | DIASTOLIC BLOOD PRESSURE: 88 MMHG | TEMPERATURE: 99.7 F | SYSTOLIC BLOOD PRESSURE: 156 MMHG | HEART RATE: 88 BPM | BODY MASS INDEX: 48.82 KG/M2

## 2022-12-02 DIAGNOSIS — R09.02 HYPOXIA: Primary | ICD-10-CM

## 2022-12-02 DIAGNOSIS — J44.9 CHRONIC OBSTRUCTIVE PULMONARY DISEASE, UNSPECIFIED COPD TYPE (HCC): ICD-10-CM

## 2022-12-02 DIAGNOSIS — R32 URINARY INCONTINENCE: ICD-10-CM

## 2022-12-02 DIAGNOSIS — I50.9 CHF EXACERBATION (HCC): Primary | ICD-10-CM

## 2022-12-02 DIAGNOSIS — I50.32 CHRONIC DIASTOLIC CONGESTIVE HEART FAILURE (HCC): ICD-10-CM

## 2022-12-02 DIAGNOSIS — I50.9 ACUTE ON CHRONIC CONGESTIVE HEART FAILURE, UNSPECIFIED HEART FAILURE TYPE (HCC): ICD-10-CM

## 2022-12-02 DIAGNOSIS — J44.1 CHRONIC OBSTRUCTIVE PULMONARY DISEASE WITH ACUTE EXACERBATION (HCC): ICD-10-CM

## 2022-12-02 DIAGNOSIS — R06.02 SOB (SHORTNESS OF BREATH): ICD-10-CM

## 2022-12-02 PROBLEM — J96.91 RESPIRATORY FAILURE WITH HYPOXIA (HCC): Status: ACTIVE | Noted: 2022-12-02

## 2022-12-02 LAB
2HR DELTA HS TROPONIN: 0 NG/L
4HR DELTA HS TROPONIN: 0 NG/L
ALBUMIN SERPL BCP-MCNC: 3.8 G/DL (ref 3.5–5)
ALP SERPL-CCNC: 73 U/L (ref 34–104)
ALT SERPL W P-5'-P-CCNC: 20 U/L (ref 7–52)
ANION GAP SERPL CALCULATED.3IONS-SCNC: 4 MMOL/L (ref 4–13)
AST SERPL W P-5'-P-CCNC: 26 U/L (ref 13–39)
ATRIAL RATE: 84 BPM
BASE EX.OXY STD BLDV CALC-SCNC: 82.7 % (ref 60–80)
BASE EXCESS BLDV CALC-SCNC: 1.2 MMOL/L
BASOPHILS # BLD AUTO: 0.03 THOUSANDS/ÂΜL (ref 0–0.1)
BASOPHILS NFR BLD AUTO: 1 % (ref 0–1)
BILIRUB SERPL-MCNC: 0.59 MG/DL (ref 0.2–1)
BILIRUB UR QL STRIP: NEGATIVE
BNP SERPL-MCNC: 49 PG/ML (ref 0–100)
BUN SERPL-MCNC: 11 MG/DL (ref 5–25)
CALCIUM SERPL-MCNC: 9.5 MG/DL (ref 8.4–10.2)
CARDIAC TROPONIN I PNL SERPL HS: 10 NG/L
CHLORIDE SERPL-SCNC: 105 MMOL/L (ref 96–108)
CLARITY UR: CLEAR
CO2 SERPL-SCNC: 31 MMOL/L (ref 21–32)
COLOR UR: COLORLESS
CREAT SERPL-MCNC: 0.59 MG/DL (ref 0.6–1.3)
EOSINOPHIL # BLD AUTO: 0.02 THOUSAND/ÂΜL (ref 0–0.61)
EOSINOPHIL NFR BLD AUTO: 0 % (ref 0–6)
ERYTHROCYTE [DISTWIDTH] IN BLOOD BY AUTOMATED COUNT: 15.4 % (ref 11.6–15.1)
FLUAV RNA RESP QL NAA+PROBE: NEGATIVE
FLUBV RNA RESP QL NAA+PROBE: NEGATIVE
GFR SERPL CREATININE-BSD FRML MDRD: 99 ML/MIN/1.73SQ M
GLUCOSE SERPL-MCNC: 90 MG/DL (ref 65–140)
GLUCOSE UR STRIP-MCNC: NEGATIVE MG/DL
HCO3 BLDV-SCNC: 26.6 MMOL/L (ref 24–30)
HCT VFR BLD AUTO: 40.1 % (ref 34.8–46.1)
HGB BLD-MCNC: 12.2 G/DL (ref 11.5–15.4)
HGB UR QL STRIP.AUTO: NEGATIVE
IMM GRANULOCYTES # BLD AUTO: 0.05 THOUSAND/UL (ref 0–0.2)
IMM GRANULOCYTES NFR BLD AUTO: 1 % (ref 0–2)
KETONES UR STRIP-MCNC: NEGATIVE MG/DL
LEUKOCYTE ESTERASE UR QL STRIP: NEGATIVE
LYMPHOCYTES # BLD AUTO: 0.88 THOUSANDS/ÂΜL (ref 0.6–4.47)
LYMPHOCYTES NFR BLD AUTO: 16 % (ref 14–44)
MCH RBC QN AUTO: 28.7 PG (ref 26.8–34.3)
MCHC RBC AUTO-ENTMCNC: 30.4 G/DL (ref 31.4–37.4)
MCV RBC AUTO: 94 FL (ref 82–98)
MONOCYTES # BLD AUTO: 0.69 THOUSAND/ÂΜL (ref 0.17–1.22)
MONOCYTES NFR BLD AUTO: 13 % (ref 4–12)
NEUTROPHILS # BLD AUTO: 3.77 THOUSANDS/ÂΜL (ref 1.85–7.62)
NEUTS SEG NFR BLD AUTO: 69 % (ref 43–75)
NITRITE UR QL STRIP: NEGATIVE
NRBC BLD AUTO-RTO: 0 /100 WBCS
O2 CT BLDV-SCNC: 15.9 ML/DL
P AXIS: 51 DEGREES
PCO2 BLDV: 45.2 MM HG (ref 42–50)
PH BLDV: 7.39 [PH] (ref 7.3–7.4)
PH UR STRIP.AUTO: 7 [PH]
PLATELET # BLD AUTO: 163 THOUSANDS/UL (ref 149–390)
PLATELET # BLD AUTO: 164 THOUSANDS/UL (ref 149–390)
PMV BLD AUTO: 10.8 FL (ref 8.9–12.7)
PMV BLD AUTO: 10.9 FL (ref 8.9–12.7)
PO2 BLDV: 46.4 MM HG (ref 35–45)
POTASSIUM SERPL-SCNC: 4.9 MMOL/L (ref 3.5–5.3)
PR INTERVAL: 140 MS
PROCALCITONIN SERPL-MCNC: 0.08 NG/ML
PROT SERPL-MCNC: 7.3 G/DL (ref 6.4–8.4)
PROT UR STRIP-MCNC: NEGATIVE MG/DL
QRS AXIS: 42 DEGREES
QRSD INTERVAL: 98 MS
QT INTERVAL: 384 MS
QTC INTERVAL: 453 MS
RBC # BLD AUTO: 4.25 MILLION/UL (ref 3.81–5.12)
RSV RNA RESP QL NAA+PROBE: NEGATIVE
SARS-COV-2 AG UPPER RESP QL IA: NEGATIVE
SARS-COV-2 RNA RESP QL NAA+PROBE: NEGATIVE
SODIUM SERPL-SCNC: 140 MMOL/L (ref 135–147)
SP GR UR STRIP.AUTO: 1.01 (ref 1–1.03)
T WAVE AXIS: 66 DEGREES
UROBILINOGEN UR STRIP-ACNC: <2 MG/DL
VALID CONTROL: NORMAL
VENTRICULAR RATE: 84 BPM
WBC # BLD AUTO: 5.44 THOUSAND/UL (ref 4.31–10.16)

## 2022-12-02 RX ORDER — LISINOPRIL 10 MG/1
10 TABLET ORAL DAILY
Status: DISCONTINUED | OUTPATIENT
Start: 2022-12-03 | End: 2022-12-09 | Stop reason: HOSPADM

## 2022-12-02 RX ORDER — ALBUTEROL SULFATE 90 UG/1
2 AEROSOL, METERED RESPIRATORY (INHALATION) EVERY 6 HOURS PRN
Status: DISCONTINUED | OUTPATIENT
Start: 2022-12-02 | End: 2022-12-09 | Stop reason: HOSPADM

## 2022-12-02 RX ORDER — HEPARIN SODIUM 5000 [USP'U]/ML
5000 INJECTION, SOLUTION INTRAVENOUS; SUBCUTANEOUS EVERY 8 HOURS SCHEDULED
Status: DISCONTINUED | OUTPATIENT
Start: 2022-12-02 | End: 2022-12-02

## 2022-12-02 RX ORDER — ALBUTEROL SULFATE 2.5 MG/3ML
2.5 SOLUTION RESPIRATORY (INHALATION) EVERY 6 HOURS PRN
Status: DISCONTINUED | OUTPATIENT
Start: 2022-12-02 | End: 2022-12-05

## 2022-12-02 RX ORDER — FUROSEMIDE 10 MG/ML
40 INJECTION INTRAMUSCULAR; INTRAVENOUS ONCE
Status: COMPLETED | OUTPATIENT
Start: 2022-12-02 | End: 2022-12-02

## 2022-12-02 RX ORDER — NITROGLYCERIN 0.4 MG/1
0.4 TABLET SUBLINGUAL ONCE
Status: DISCONTINUED | OUTPATIENT
Start: 2022-12-02 | End: 2022-12-02

## 2022-12-02 RX ORDER — ACETAMINOPHEN 325 MG/1
650 TABLET ORAL EVERY 6 HOURS PRN
Status: DISCONTINUED | OUTPATIENT
Start: 2022-12-02 | End: 2022-12-09 | Stop reason: HOSPADM

## 2022-12-02 RX ORDER — FUROSEMIDE 10 MG/ML
40 INJECTION INTRAMUSCULAR; INTRAVENOUS
Status: DISCONTINUED | OUTPATIENT
Start: 2022-12-03 | End: 2022-12-03

## 2022-12-02 RX ORDER — ENOXAPARIN SODIUM 100 MG/ML
40 INJECTION SUBCUTANEOUS DAILY
Status: DISCONTINUED | OUTPATIENT
Start: 2022-12-03 | End: 2022-12-02

## 2022-12-02 RX ORDER — ENOXAPARIN SODIUM 100 MG/ML
60 INJECTION SUBCUTANEOUS EVERY 12 HOURS SCHEDULED
Status: DISCONTINUED | OUTPATIENT
Start: 2022-12-02 | End: 2022-12-09 | Stop reason: HOSPADM

## 2022-12-02 RX ADMIN — FUROSEMIDE 40 MG: 10 INJECTION, SOLUTION INTRAMUSCULAR; INTRAVENOUS at 13:44

## 2022-12-02 RX ADMIN — ENOXAPARIN SODIUM 60 MG: 60 INJECTION SUBCUTANEOUS at 21:58

## 2022-12-02 RX ADMIN — ACETAMINOPHEN 650 MG: 325 TABLET, FILM COATED ORAL at 17:39

## 2022-12-02 NOTE — ED PROVIDER NOTES
History  Chief Complaint   Patient presents with   • Shortness of Breath     Shortness of breath x 2 weeks, worsening in the last 3 days  History of COPD, early stage CHF per patient  Does not wear O2 at home  65yo F with h/o COPD (not on home O2) and CHF presnting for SOB  Over the past 2 weeks, Pt has been experiencing worsening SOB, wheezing, cough  Associated with b/l LE edema, orthopnea, HA  States she has not been compliant with her home lasix  Denies F/C, recent sick contacts, CP, abd pain, N/V, PE risk factors, back pain, nubmness, changes in voiding/BMs  History provided by:  Patient      Prior to Admission Medications   Prescriptions Last Dose Informant Patient Reported?  Taking?   acetaminophen (TYLENOL) 500 mg tablet   Yes No   Sig: Take 500 mg by mouth every 6 (six) hours as needed for mild pain   albuterol (PROVENTIL HFA,VENTOLIN HFA) 90 mcg/act inhaler   No No   Sig: Inhale 2 puffs every 6 (six) hours as needed for wheezing or shortness of breath   furosemide (LASIX) 20 mg tablet   No No   Sig: Take 1 tablet (20 mg total) by mouth 2 (two) times a day   Patient taking differently: Take 20 mg by mouth 2 (two) times a day Pt only takes med when not leaving house; reports urinary incontinence in general (LASIX makes it worse)   lisinopril (ZESTRIL) 10 mg tablet   No No   Sig: Take 1 tablet (10 mg total) by mouth daily      Facility-Administered Medications: None       Past Medical History:   Diagnosis Date   • Arthritis    • [facility] ([facility]    • COPD (chronic obstructive pulmonary disease) ([facility] [address] )    • Depression    • High blood pressure    • Hypertension    • Urinary incontinence        Past Surgical History:   Procedure Laterality Date   • HYSTERECTOMY     • INCONTINENCE SURGERY     • OOPHORECTOMY Bilateral    • REPLACEMENT TOTAL KNEE Right 2016   • TOTAL ABDOMINAL HYSTERECTOMY W/ BILATERAL SALPINGOOPHORECTOMY  2006       Family History   Problem Relation Age of Onset   • Other Mother    • Aneurysm Mother         aortic   • Dementia Mother    • Heart failure Father    • Diabetes Father    • Lung cancer Sister    • Cancer Sister    • Dementia Maternal Grandmother    • Dementia Maternal Aunt      I have reviewed and agree with the history as documented  E-Cigarette/Vaping   • E-Cigarette Use Former User    • Start Date 20      E-Cigarette/Vaping Substances   • Nicotine Yes    • THC No    • CBD No    • Flavoring No    • Other No    • Unknown No      Social History     Tobacco Use   • Smoking status: Former     Packs/day: 0 00     Years: 35 00     Pack years: 0 00     Types: Cigarettes     Start date: 1976     Quit date: 5/15/2021     Years since quittin 5   • Smokeless tobacco: Never   Vaping Use   • Vaping Use: Former   • Start date: 2018   • Substances: Nicotine   Substance Use Topics   • Alcohol use: Not Currently   • Drug use: Not Currently     Types: Marijuana     Comment: monthly at most        Review of Systems   Constitutional: Negative  HENT: Negative  Eyes: Negative  Respiratory: Positive for cough, shortness of breath and wheezing  Cardiovascular: Positive for leg swelling  Negative for chest pain  Gastrointestinal: Negative  Endocrine: Negative  Genitourinary: Negative  Musculoskeletal: Negative  Skin: Negative  Neurological: Positive for headaches  Negative for weakness, light-headedness and numbness  Psychiatric/Behavioral: Negative          Physical Exam  ED Triage Vitals   Temperature Pulse Respirations Blood Pressure SpO2   22 1227 22 1227 22 1227 22 1227 22 1227   98 5 °F (36 9 °C) 89 16 (!) 172/83 (!) 85 %      Temp Source Heart Rate Source Patient Position - Orthostatic VS BP Location FiO2 (%)   22 1227 22 1227 22 1227 22 1227 --   Oral Monitor Sitting Left arm       Pain Score       22 1414       2             Orthostatic Vital Signs  Vitals:    22 1316 12/02/22 1330 12/02/22 1414 12/02/22 1500   BP: 150/73 (!) 174/96 (!) 189/81 135/63   Pulse:  92 80 84   Patient Position - Orthostatic VS:  Lying Lying Lying       Physical Exam  Constitutional:       General: She is not in acute distress  Appearance: Normal appearance  HENT:      Head: Normocephalic and atraumatic  Right Ear: External ear normal       Left Ear: External ear normal       Nose: Nose normal  No rhinorrhea  Mouth/Throat:      Mouth: Mucous membranes are moist       Pharynx: Oropharynx is clear  Eyes:      Extraocular Movements: Extraocular movements intact  Conjunctiva/sclera: Conjunctivae normal    Cardiovascular:      Rate and Rhythm: Normal rate and regular rhythm  Pulses: Normal pulses  Heart sounds: Normal heart sounds  Pulmonary:      Effort: Pulmonary effort is normal       Comments: B/l basilar crackles  Abdominal:      General: Abdomen is flat  Palpations: Abdomen is soft  Musculoskeletal:         General: No tenderness  Right lower leg: Edema present  Left lower leg: Edema present  Skin:     General: Skin is warm and dry  Capillary Refill: Capillary refill takes less than 2 seconds  Neurological:      General: No focal deficit present  Mental Status: She is alert and oriented to person, place, and time  Psychiatric:         Mood and Affect: Mood normal          Behavior: Behavior normal          ED Medications  Medications   furosemide (LASIX) injection 40 mg (40 mg Intravenous Given 12/2/22 1344)       Diagnostic Studies  Results Reviewed     Procedure Component Value Units Date/Time    UA w Reflex to Microscopic w Reflex to Culture [[id number]] Collected: 12/02/22 1503    Lab Status:  In process Specimen: Urine, Clean Catch Updated: 12/02/22 1510    HS Troponin I 4hr [[id number]]     Lab Status: No result Specimen: Blood     Blood gas, venous [[id number]]  (Abnormal) Collected: 12/02/22 1408    Lab Status: Final result Specimen: Blood from Arm, Right Updated: 12/02/22 1415     pH, [place] 7 388     pCO2, [place] 45 2 mm Hg      pO2, [place] 46 4 mm Hg      HCO3, [place] 26 6 mmol/L      Base Excess, [place] 1 2 mmol/L      O2 Content, [place] 15 9 ml/dL      O2 HGB, VENOUS 82 7 %     Comprehensive metabolic panel [[id number]]  (Abnormal) Collected: 12/02/22 1304    Lab Status: Final result Specimen: Blood from Arm, Right Updated: 12/02/22 1412     Sodium 140 mmol/L      Potassium 4 9 mmol/L      Chloride 105 mmol/L      CO2 31 mmol/L      ANION GAP 4 mmol/L      BUN 11 mg/dL      Creatinine 0 59 mg/dL      Glucose 90 mg/dL      Calcium 9 5 mg/dL      AST 26 U/L      ALT 20 U/L      Alkaline Phosphatase 73 U/L      Total Protein 7 3 g/dL      Albumin 3 8 g/dL      Total Bilirubin 0 59 mg/dL      eGFR 99 ml/min/1 73sq m     Narrative:      [facility] guidelines for Chronic Kidney Disease (CKD):   •  Stage 1 with normal or high GFR (GFR > 90 mL/min/1 73 square meters)  •  Stage 2 Mild CKD (GFR = 60-89 mL/min/1 73 square meters)  •  Stage 3A Moderate CKD (GFR = 45-59 mL/min/1 73 square meters)  •  Stage 3B Moderate CKD (GFR = 30-44 mL/min/1 73 square meters)  •  Stage 4 Severe CKD (GFR = 15-29 mL/min/1 73 square meters)  •  Stage 5 End Stage CKD (GFR <15 mL/min/1 73 square meters)  Note: GFR calculation is accurate only with a steady state creatinine    FLU/RSV/COVID - if FLU/RSV clinically relevant [[id number]]  (Normal) Collected: 12/02/22 1317    Lab Status: Final result Specimen: Nares from Nose Updated: 12/02/22 1407     SARS-CoV-2 Negative     INFLUENZA A PCR Negative     INFLUENZA B PCR Negative     RSV PCR Negative    Narrative:      FOR PEDIATRIC PATIENTS - copy/paste COVID Guidelines URL to browser: [email or web address]    SARS-CoV-2 assay is a Nucleic Acid Amplification assay intended for the  qualitative detection of nucleic acid from SARS-CoV-2 in nasopharyngeal  swabs  Results are for the presumptive identification of SARS-CoV-2 RNA  Positive results are indicative of infection with SARS-CoV-2, the virus  causing COVID-19, but do not rule out bacterial infection or co-infection  with other viruses  Laboratories within the [place] and its  territories are required to report all positive results to the appropriate  public health authorities  Negative results do not preclude SARS-CoV-2  infection and should not be used as the sole basis for treatment or other  patient management decisions  Negative results must be combined with  clinical observations, patient history, and epidemiological information  This test has not been FDA cleared or approved  This test has been authorized by FDA under an Emergency Use Authorization  (EUA)  This test is only authorized for the duration of time the  declaration that circumstances exist justifying the authorization of the  emergency use of an in vitro diagnostic tests for detection of SARS-CoV-2  virus and/or diagnosis of COVID-19 infection under section 564(b)(1) of  the Act, 21 U  S C  968PAV-5(P)(1), unless the authorization is terminated  or revoked sooner  The test has been validated but independent review by FDA  and CLIA is pending  Test performed using [employer] GeneXpert: This RT-PCR assay targets N2,  a region unique to SARS-CoV-2  A conserved region in the E-gene was chosen  for pan-Sarbecovirus detection which includes SARS-CoV-2  According to CMS-2020-01-R, this platform meets the definition of high-throughput technology      B-Type Natriuretic Peptide(BNP) AN, CA, EA Campuses Only [[id number]]  (Normal) Collected: 12/02/22 1304    Lab Status: Final result Specimen: Blood from Arm, Right Updated: 12/02/22 1359     BNP 49 pg/mL     HS Troponin I 2hr [[id number]]     Lab Status: No result Specimen: Blood     HS Troponin 0hr (reflex protocol) [[id number]]  (Normal) Collected: 12/02/22 1304    Lab Status: Final result Specimen: Blood from Arm, Right Updated: 12/02/22 1344     hs TnI 0hr 10 ng/L     CBC and differential [[id number]]  (Abnormal) Collected: 12/02/22 1304    Lab Status: Final result Specimen: Blood from Arm, Right Updated: 12/02/22 1318     WBC 5 44 Thousand/uL      RBC 4 25 Million/uL      Hemoglobin 12 2 g/dL      Hematocrit 40 1 %      MCV 94 fL      MCH 28 7 pg      MCHC 30 4 g/dL      RDW 15 4 %      MPV 10 9 fL      Platelets 181 Thousands/uL      nRBC 0 /100 WBCs      Neutrophils Relative 69 %      Immat GRANS % 1 %      Lymphocytes Relative 16 %      Monocytes Relative 13 %      Eosinophils Relative 0 %      Basophils Relative 1 %      Neutrophils Absolute 3 77 Thousands/µL      Immature Grans Absolute 0 05 Thousand/uL      Lymphocytes Absolute 0 88 Thousands/µL      Monocytes Absolute 0 69 Thousand/µL      Eosinophils Absolute 0 02 Thousand/µL      Basophils Absolute 0 03 Thousands/µL                  XR chest 1 view portable   ED Interpretation by Libby Caal MD (12/02 8091)   Findings suggestive of pulmonary edema  Final Result by Shweta Oconnor MD (12/02 1352)      Prominence of the pulmonary vasculature suggests mild to moderate pulmonary vascular congestion  Findings concur with the preliminary ER interpretation                 Workstation performed: [id number]               Procedures  ECG 12 Lead Documentation Only    Date/Time: 12/2/2022 1:33 PM  Performed by: Libby Caal MD  Authorized by: Libby Caal MD     ECG reviewed by me, the ED Provider: yes    Patient location:  ED  Interpretation:     Interpretation: normal    Rate:     ECG rate:  84    ECG rate assessment: normal    Rhythm:     Rhythm: sinus rhythm    Ectopy:     Ectopy: none    QRS:     QRS axis:  Normal    QRS intervals:  Normal  Conduction:     Conduction: normal    ST segments:     ST segments:  Normal  T waves:     T waves: normal            ED Course                                       MDM  Number of Diagnoses or Management Options  CHF exacerbation ([facility] )  Diagnosis management comments: Ddx includes, but not limited to, COPD exacerbation, CHF exacerbation, ACS, PNA  History, physical, and imaging c/w CHF exacerbation, for which lasix was given  Pt stated that her breathing has been improving  Pt will require admission for continued management of her CHF exacerbation  Disposition  Final diagnoses:   CHF exacerbation ([facility] )     Time reflects when diagnosis was documented in both MDM as applicable and the Disposition within this note     Time User Action Codes Description Comment    12/2/2022  2:56 PM Emilia Chris [I50 [id number]] CHF exacerbation [facility])       ED Disposition     ED Disposition   Admit    Condition   Stable    Date/Time   Fri Dec 2, 2022  2:56 PM    Comment   Case was discussed with Dr Opal Plascencia and the patient's admission status was agreed to be Admission Status: inpatient status to the service of Dr Opal Plascencia   Follow-up Information    None         Patient's Medications   Discharge Prescriptions    No medications on file     No discharge procedures on file  PDMP Review       Value Time User    PDMP Reviewed  Yes 10/8/2020  2:56 PM Miquel Soares DO           ED Provider  Attending physically available and evaluated Thom Rosado  MAX managed the patient along with the ED Attending      Electronically Signed by         Armen Brewer MD  12/02/22 0896

## 2022-12-02 NOTE — ASSESSMENT & PLAN NOTE
Wt Readings from Last 3 Encounters:   12/02/22 (!) 182 kg (400 lb 11 2 oz)   10/03/22 (!) 177 kg (389 lb 8 oz)   08/30/22 (!) 178 kg (393 lb)       - 11 lb weight gain since last visit 2 months ago  - not using any diuretics   - send to ED for further eval

## 2022-12-02 NOTE — ED NOTES
Patient taken back to room, placed on 2L O2, O2 up to 93%  Receiving RN made aware        Yolanda Nunez RN  12/02/22 7199

## 2022-12-02 NOTE — PROGRESS NOTES
Assessment/Plan:    Problem List Items Addressed This Visit        Respiratory    COPD (chronic obstructive pulmonary disease) ([address] )     - hypoxic with ambulation  - no daily inhalers, has been using her albuterol frequently  - concern for PNA vs COPD exacerbation  - covid negative          Relevant Orders    Transfer to other facility (Completed)    Hypoxia - Primary     - O2 sat 90% at rest, drops to 80% with short walk  - dyspneic with conversation  - sent to ED for further evaluation  Taken to car in wheelchair  Daughter will drive her to [facility] ED  - concern for flu vs copd exacerbation vs PNA vs CHF exacerbation  Likely multifactorial         Relevant Orders    Transfer to other facility (Completed)       Cardiovascular and Mediastinum    Chronic diastolic congestive heart failure (HCC)     Wt Readings from Last 3 Encounters:   12/02/22 (!) 182 kg (400 lb 11 2 oz)   10/03/22 (!) 177 kg (389 lb 8 oz)   08/30/22 (!) 178 kg (393 lb)       - 11 lb weight gain since last visit 2 months ago  - not using any diuretics   - send to ED for further eval            Relevant Orders    Transfer to other facility (Completed)   Other Visit Diagnoses     SOB (shortness of breath)        Relevant Orders    POCT Rapid Covid Ag (Completed)    Transfer to other facility (Completed)             M*Modal software was used to dictate this note  It may contain errors with dictating incorrect words or incorrect spelling  Please contact the provider directly with any questions  Subjective:      Patient ID: Rene Anglin is a 63 y o  female  HPI    Patient presents today with concern for significant shortness of breath and cold symptoms starting 2 nights ago  Home covid test yesterday was negative, today her covid test is also negative  Yesterday morning she woke up with body aches, fatigue, dry cough, SOB, and mild rhinorrhea  She has pain in her ribs from coughing  Cough is worse with laying down       She has lymphedema and her dog jumped on her 4 days ago and cut open one    She is not taking her lasix at all due to bladder incontinence  The following portions of the patient's history were reviewed and updated as appropriate: allergies, current medications, past family history, past medical history, past social history, past surgical history and problem list     Review of Systems   Constitutional: Negative for chills and fever  HENT: Positive for rhinorrhea  Negative for congestion, ear pain, sinus pressure, sinus pain and sore throat  Respiratory: Positive for cough and shortness of breath  Negative for chest tightness  Cardiovascular: Positive for leg swelling  Negative for chest pain  Gastrointestinal: Negative for diarrhea, nausea and vomiting           Past Medical History:   Diagnosis Date   • Arthritis    • CHF (congestive heart failure) ([facility])    • COPD (chronic obstructive pulmonary disease) ([facility])    • Depression    • High blood pressure    • Hypertension    • Urinary incontinence          Current Outpatient Medications:   •  acetaminophen (TYLENOL) 500 mg tablet, Take 500 mg by mouth every 6 (six) hours as needed for mild pain, Disp: , Rfl:   •  albuterol (PROVENTIL HFA,VENTOLIN HFA) 90 mcg/act inhaler, Inhale 2 puffs every 6 (six) hours as needed for wheezing or shortness of breath, Disp: 18 g, Rfl: 1  •  furosemide (LASIX) 20 mg tablet, Take 1 tablet (20 mg total) by mouth 2 (two) times a day (Patient taking differently: Take 20 mg by mouth 2 (two) times a day Pt only takes med when not leaving house; reports urinary incontinence in general (LASIX makes it worse)), Disp: 90 tablet, Rfl: 0  •  lisinopril (ZESTRIL) 10 mg tablet, Take 1 tablet (10 mg total) by mouth daily, Disp: 90 tablet, Rfl: 0    No Known Allergies    Social History   Past Surgical History:   Procedure Laterality Date   • HYSTERECTOMY     • INCONTINENCE SURGERY     • OOPHORECTOMY Bilateral    • REPLACEMENT TOTAL KNEE Right 2016 • TOTAL ABDOMINAL HYSTERECTOMY W/ BILATERAL SALPINGOOPHORECTOMY  2006     Family History   Problem Relation Age of Onset   • Other Mother    • Aneurysm Mother         aortic   • Dementia Mother    • Heart failure Father    • Diabetes Father    • Lung cancer Sister    • Cancer Sister    • Dementia Maternal Grandmother    • Dementia Maternal Aunt        Objective:  /88 (BP Location: Left arm, Patient Position: Sitting, Cuff Size: Large)   Pulse 88   Temp 99 7 °F (37 6 °C) (Temporal)   Ht 5' 5" (1 651 m)   Wt (!) 182 kg (400 lb 11 2 oz)   BMI 66 68 kg/m²      Physical Exam  Vitals reviewed  Constitutional:       Appearance: She is obese  She is not diaphoretic  HENT:      Head: Normocephalic and atraumatic  Cardiovascular:      Rate and Rhythm: Normal rate and regular rhythm  Heart sounds: Normal heart sounds  Pulmonary:      Effort: Tachypnea present  Breath sounds: Decreased air movement present  Wheezing and rhonchi present  Comments: -O2 90% at rest, drops to 80% with short walk  -dyspnea with conversation  Musculoskeletal:      Right lower leg: Edema present  Left lower leg: Edema present  Neurological:      Mental Status: She is alert  Mental status is at baseline     Psychiatric:         Mood and Affect: Mood normal          Behavior: Behavior normal

## 2022-12-02 NOTE — ASSESSMENT & PLAN NOTE
- hypoxic with ambulation  - no daily inhalers, has been using her albuterol frequently  - concern for PNA vs COPD exacerbation  - covid negative

## 2022-12-02 NOTE — ASSESSMENT & PLAN NOTE
Patient presents with SOB for 2 weeks, and worsening symptoms for 3 days  Patient also has a dry cough  Had low grade fever with chills yesterday, also noted 1 episode of elevated temperature at PCP office today  Also, she was using her rescue inhaler more frequently during past 2 weeks  She could walk only 10 ft without getting out of breath  She noticed worsening of lower limb swelling, 10 lb weight gain over the past month  Today at office visit her PCP noticed, O2 saturastion 90% at rest, droped to 80% with short walk  She was sent to ED by her PCP for further evaluation  Patient is currently on 4L oxygen via nasal cannula  She is not on oxygen at baseline  CXR:Prominence of the pulmonary vasculature suggests mild to moderate pulmonary vascular congestion  BNP: normal  Respiratory panel: negative  White count normal   Received Lasix 40 IV in the ED  Plan  Continue Lasix 40 b i d   Monitor I/O  Daily weight  Monitor BMP  Follow-up procalcitonin  Albuterol nebulizer p r n   For wheezing  Respiratory protocol

## 2022-12-02 NOTE — ASSESSMENT & PLAN NOTE
- O2 sat 90% at rest, drops to 80% with short walk  - dyspneic with conversation  - sent to ED for further evaluation  Taken to car in wheelchair  Daughter will drive her to [facility] ED  - concern for flu vs copd exacerbation vs PNA vs CHF exacerbation   Likely multifactorial

## 2022-12-02 NOTE — ED ATTENDING ATTESTATION
12/2/2022  Arie SANTANA MD, saw and evaluated the patient  I have discussed the patient with the resident/non-physician practitioner and agree with the resident's/non-physician practitioner's findings, Plan of Care, and MDM as documented in the resident's/non-physician practitioner's note, except where noted  All available labs and Radiology studies were reviewed  I was present for key portions of any procedure(s) performed by the resident/non-physician practitioner and I was immediately available to provide assistance  At this point I agree with the current assessment done in the Emergency Department  I have conducted an independent evaluation of this patient a history and physical is as follows:  Shortness of breath, orthopnea, lower extremity edema  Chest x-ray with evidence of volume overload  Patient with a history of CHF, states she does not take her Lasix because it makes her urinate too often  Review of Systems - Negative except shortness of breath, lower extremity swelling  Physical Exam  Vitals and nursing note reviewed  Constitutional:       General: She is not in acute distress  Appearance: She is well-developed and well-nourished  Comments: Conversationally dyspneic   HENT:      Head: Normocephalic and atraumatic  Eyes:      Extraocular Movements: EOM normal       Pupils: Pupils are equal, round, and reactive to light  Cardiovascular:      Rate and Rhythm: Normal rate and regular rhythm  Heart sounds: Normal heart sounds  No murmur heard  Pulmonary:      Effort: Pulmonary effort is normal  No respiratory distress  Breath sounds: Normal breath sounds  No wheezing or rales  Abdominal:      General: Bowel sounds are normal  There is no distension  Palpations: Abdomen is soft  Tenderness: There is no abdominal tenderness  There is no guarding or rebound  Musculoskeletal:         General: No deformity  Normal range of motion        Cervical back: Normal range of motion and neck supple  Right lower leg: Edema present  Left lower leg: Edema present  Lymphadenopathy:      Cervical: No cervical adenopathy  Skin:     Capillary Refill: Capillary refill takes less than 2 seconds  Findings: No erythema or rash  Neurological:      Mental Status: She is alert and oriented to person, place, and time  Cranial Nerves: No cranial nerve deficit  Motor: No abnormal muscle tone  Coordination: Coordination normal    Psychiatric:         Mood and Affect: Mood and affect normal          Behavior: Behavior normal        Results Reviewed     Procedure Component Value Units Date/Time    HS Troponin I 2hr [[id number]] Collected: 12/02/22 1539    Lab Status:  In process Specimen: Blood from Arm, Right Updated: 12/02/22 1541    UA w Reflex to Microscopic w Reflex to Culture [[id number]] Collected: 12/02/22 1503    Lab Status: Final result Specimen: Urine, Clean Catch Updated: 12/02/22 1524     Color, UA Colorless     Clarity, UA Clear     Specific Gravity, UA 1 007     pH, UA 7 0     Leukocytes, UA Negative     Nitrite, UA Negative     Protein, UA Negative mg/dl      Glucose, UA Negative mg/dl      Ketones, UA Negative mg/dl      Urobilinogen, UA <2 0 mg/dl      Bilirubin, UA Negative     Occult Blood, UA Negative    HS Troponin I 4hr [[id number]]     Lab Status: No result Specimen: Blood     Blood gas, venous [[id number]]  (Abnormal) Collected: 12/02/22 1408    Lab Status: Final result Specimen: Blood from Arm, Right Updated: 12/02/22 1415     pH, [place] 7 388     pCO2, [place] 45 2 mm Hg      pO2, [place] 46 4 mm Hg      HCO3, [place] 26 6 mmol/L      Base Excess, [place] 1 2 mmol/L      O2 Content, [place] 15 9 ml/dL      O2 HGB, VENOUS 82 7 %     Comprehensive metabolic panel [[id number]]  (Abnormal) Collected: 12/02/22 1304    Lab Status: Final result Specimen: Blood from Arm, Right Updated: 12/02/22 1412     Sodium 140 mmol/L      Potassium 4 9 mmol/L      Chloride 105 mmol/L      CO2 31 mmol/L      ANION GAP 4 mmol/L      BUN 11 mg/dL      Creatinine 0 59 mg/dL      Glucose 90 mg/dL      Calcium 9 5 mg/dL      AST 26 U/L      ALT 20 U/L      Alkaline Phosphatase 73 U/L      Total Protein 7 3 g/dL      Albumin 3 8 g/dL      Total Bilirubin 0 59 mg/dL      eGFR 99 ml/min/1 73sq m     Narrative:      [place] guidelines for Chronic Kidney Disease (CKD):   •  Stage 1 with normal or high GFR (GFR > 90 mL/min/1 73 square meters)  •  Stage 2 Mild CKD (GFR = 60-89 mL/min/1 73 square meters)  •  Stage 3A Moderate CKD (GFR = 45-59 mL/min/1 73 square meters)  •  Stage 3B Moderate CKD (GFR = 30-44 mL/min/1 73 square meters)  •  Stage 4 Severe CKD (GFR = 15-29 mL/min/1 73 square meters)  •  Stage 5 End Stage CKD (GFR <15 mL/min/1 73 square meters)  Note: GFR calculation is accurate only with a steady state creatinine    FLU/RSV/COVID - if FLU/RSV clinically relevant [[id number]]  (Normal) Collected: 12/02/22 1317    Lab Status: Final result Specimen: Nares from Nose Updated: 12/02/22 1407     SARS-CoV-2 Negative     INFLUENZA A PCR Negative     INFLUENZA B PCR Negative     RSV PCR Negative    Narrative:      FOR PEDIATRIC PATIENTS - copy/paste COVID Guidelines URL to browser: [email or web address]    SARS-CoV-2 assay is a Nucleic Acid Amplification assay intended for the  qualitative detection of nucleic acid from SARS-CoV-2 in nasopharyngeal  swabs  Results are for the presumptive identification of SARS-CoV-2 RNA  Positive results are indicative of infection with SARS-CoV-2, the virus  causing COVID-19, but do not rule out bacterial infection or co-infection  with other viruses  Laboratories within the [place] and its  territories are required to report all positive results to the appropriate  public health authorities   Negative results do not preclude SARS-CoV-2  infection and should not be used as the sole basis for treatment or other  patient management decisions  Negative results must be combined with  clinical observations, patient history, and epidemiological information  This test has not been FDA cleared or approved  This test has been authorized by FDA under an Emergency Use Authorization  (EUA)  This test is only authorized for the duration of time the  declaration that circumstances exist justifying the authorization of the  emergency use of an in vitro diagnostic tests for detection of SARS-CoV-2  virus and/or diagnosis of COVID-19 infection under section 564(b)(1) of  the Act, 21 U  S C  899GSG-1(D)(2), unless the authorization is terminated  or revoked sooner  The test has been validated but independent review by FDA  and CLIA is pending  Test performed using [employer] [place] Gene[employer]pert: This RT-PCR assay targets N2,  a region unique to SARS-CoV-2  A conserved region in the E-gene was chosen  for pan-Sarbecovirus detection which includes SARS-CoV-2  According to CMS-2020-01-R, this platform meets the definition of high-throughput technology      B-Type Natriuretic Peptide(BNP) , [place], [facility] Only [[id number]]  (Normal) Collected: 12/02/22 1304    Lab Status: Final result Specimen: Blood from Arm, Right Updated: 12/02/22 1359     BNP 49 pg/mL     HS Troponin 0hr (reflex protocol) [[id number]]  (Normal) Collected: 12/02/22 1304    Lab Status: Final result Specimen: Blood from Arm, Right Updated: 12/02/22 1344     hs TnI 0hr 10 ng/L     CBC and differential [[id number]]  (Abnormal) Collected: 12/02/22 1304    Lab Status: Final result Specimen: Blood from Arm, Right Updated: 12/02/22 1318     WBC 5 44 Thousand/uL      RBC 4 25 Million/uL      Hemoglobin 12 2 g/dL      Hematocrit 40 1 %      MCV 94 fL      MCH 28 7 pg      MCHC 30 4 g/dL      RDW 15 4 %      MPV 10 9 fL      Platelets 404 Thousands/uL      nRBC 0 /100 WBCs      Neutrophils Relative 69 %      Immat GRANS % 1 %      Lymphocytes Relative 16 %      Monocytes Relative 13 %      Eosinophils Relative 0 %      Basophils Relative 1 %      Neutrophils Absolute 3 77 Thousands/µL      Immature Grans Absolute 0 05 Thousand/uL      Lymphocytes Absolute 0 88 Thousands/µL      Monocytes Absolute 0 69 Thousand/µL      Eosinophils Absolute 0 02 Thousand/µL      Basophils Absolute 0 03 Thousands/µL         XR chest 1 view portable   ED Interpretation by Christine Lara MD (12/02 6271)   Findings suggestive of pulmonary edema  Final Result by Pb Heredia MD (12/02 1312)      Prominence of the pulmonary vasculature suggests mild to moderate pulmonary vascular congestion  Findings concur with the preliminary ER interpretation  Workstation performed: [id number]           IV Lasix given, admit for CHF exacerbation          ED Course         Critical Care Time  Procedures

## 2022-12-02 NOTE — H&P
[facility]  H&P- Orestes Saavedra 1960, 63 y o  female MRN: [id number]  Unit/Bed#: W -01 Encounter: [id number]  Primary Care Provider: Laura Xie DO   Date and time admitted to hospital: 12/2/2022 12:28 PM    * Respiratory failure with hypoxia [facility])  Assessment & Plan  Patient presents with SOB for 2 weeks, and worsening symptoms for 3 days  Patient also has a dry cough  Had low grade fever with chills yesterday, also noted 1 episode of elevated temperature at PCP office today  Also, she was using her rescue inhaler more frequently during past 2 weeks  She could walk only 10 ft without getting out of breath  She noticed worsening of lower limb swelling, 10 lb weight gain over the past month  Today at office visit her PCP noticed, O2 saturastion 90% at rest, droped to 80% with short walk  She was sent to ED by her PCP for further evaluation  Patient is currently on 4L oxygen via nasal cannula  She is not on oxygen at baseline  CXR:Prominence of the pulmonary vasculature suggests mild to moderate pulmonary vascular congestion  BNP: normal  Respiratory panel: negative  White count normal   Received Lasix 40 IV in the ED  Plan  Continue Lasix 40 b i d   Monitor I/O  Daily weight  Monitor BMP  Consult cardiology  Follow-up procalcitonin  Albuterol nebulizer p r n  For wheezing  Respiratory protocol    Acute exacerbation of CHF (congestive heart failure) ([facility])  Assessment & Plan  Wt Readings from Last 3 Encounters:   12/02/22 (!) 179 kg (395 lb)   12/02/22 (!) 182 kg (400 lb 11 2 oz)   10/03/22 (!) 177 kg (389 lb 8 oz)     Patient was on furosemide 20 mg at home  Patient was noncompliant with furosemide  She gained 10 lb over past month  Noticed worsening lower limb swelling    Plan   Consult cardiology  Start Lasix 40 IV b i d   Monitor I/O  Daily weight  Monitor BMP      COPD (chronic obstructive pulmonary disease) [facility])  Assessment & Plan  Patient was not on any daily inhalers  Was using albuterol rescue inhaler  Plan  Albuterol nebulizer p r n  Respiratory protocol  Follow-up pro calcitonin  Maintain saturation above 88%    Morbid obesity (HCC)  Assessment & Plan  BMI: 66 68  Plan  PT/OT  Lifestyle modifications  Essential hypertension  Assessment & Plan  Plan  Continue Lisinopril 10 mg  Monitor BP    VTE Pharmacologic Prophylaxis: VTE Score: 5 High Risk (Score >/= 5) - Pharmacological DVT Prophylaxis Ordered: enoxaparin (Lovenox)  Sequential Compression Devices Ordered  Code Status: Level 1 - Full Code   Discussion with family: Patient declined call to   Anticipated Length of Stay: Patient will be admitted on an inpatient basis with an anticipated length of stay of greater than 2 midnights secondary to acute hypoxic respi failure  Chief Complaint: worsening SOB  History of Present Illness:  Orestes Saavedra is a 63 y o  female with a PMH of  CHF, COPD, HT who presents with worsening SOB  Patient had shortness of breath on exertion for 2 weeks  Symptoms worsened during past 3 days  She could only walk 10 ft without getting out of breath  She has COPD and she was not on daily inhalers  But she was using her rescue inhaler more frequently during past 2 weeks  She also notices worsening of lower limb extremities  She gained 10 lb over past month  She also has dry cough  She had low-grade fever with chills yesterday, also noted to have episode of temperature at PCP office today  No chest pain, sputum production  She was seen by her PCP today and noticed that she get hypoxic with ambulation  Saturation dropped to 80s with short walk  Also she was dyspneic with conversation  Patient was sent to the ED by her PCP for further evaluation  Review of Systems:  Review of Systems   Constitutional: Positive for fatigue and unexpected weight change  Negative for fever     HENT: Negative for ear pain, postnasal drip, rhinorrhea, sinus pain and sore throat  Respiratory: Positive for cough and shortness of breath  Cardiovascular: Positive for leg swelling  Negative for chest pain and palpitations  Gastrointestinal: Negative for abdominal pain, diarrhea, nausea and vomiting  Genitourinary: Negative for dysuria, flank pain, frequency and hematuria  Musculoskeletal: Negative for back pain  Skin: Negative for color change and pallor  Neurological: Negative for dizziness, seizures, syncope, light-headedness and headaches  Psychiatric/Behavioral: Negative for agitation, behavioral problems and confusion  Past Medical and Surgical History:   Past Medical History:   Diagnosis Date   • Arthritis    • CHF (congestive heart failure) ([facility] )    • COPD (chronic obstructive pulmonary disease) ([facility] )    • Depression    • High blood pressure    • Hypertension    • Urinary incontinence        Past Surgical History:   Procedure Laterality Date   • HYSTERECTOMY     • INCONTINENCE SURGERY     • OOPHORECTOMY Bilateral    • REPLACEMENT TOTAL KNEE Right 2016   • TOTAL ABDOMINAL HYSTERECTOMY W/ BILATERAL SALPINGOOPHORECTOMY  2006       Meds/Allergies:  Prior to Admission medications    Medication Sig Start Date End Date Taking?  Authorizing Provider   acetaminophen (TYLENOL) 500 mg tablet Take 500 mg by mouth every 6 (six) hours as needed for mild pain    Historical Provider, MD   albuterol (PROVENTIL HFA,VENTOLIN HFA) 90 mcg/act inhaler Inhale 2 puffs every 6 (six) hours as needed for wheezing or shortness of breath 11/3/21   Laurel Lyons DO   furosemide (LASIX) 20 mg tablet Take 1 tablet (20 mg total) by mouth 2 (two) times a day  Patient taking differently: Take 20 mg by mouth 2 (two) times a day Pt only takes med when not leaving house; reports urinary incontinence in general (LASIX makes it worse) 8/30/22   RONALD Gonzalez   lisinopril (ZESTRIL) 10 mg tablet Take 1 tablet (10 mg total) by mouth daily 8/30/22 RONALD Ashley     I have reviewed home medications with patient personally  Allergies: No Known Allergies    Social History:  Marital Status: /Civil Union   Occupation:   Patient Pre-hospital Living Situation: Home  Patient Pre-hospital Level of Mobility: walks  Patient Pre-hospital Diet Restrictions:   Substance Use History:   Social History     Substance and Sexual Activity   Alcohol Use Not Currently     Social History     Tobacco Use   Smoking Status Former   • Packs/day: 0 00   • Years: 35 00   • Pack years: 0 00   • Types: Cigarettes   • Start date: 1976   • Quit date: 5/15/2021   • Years since quittin 5   Smokeless Tobacco Never     Social History     Substance and Sexual Activity   Drug Use Not Currently   • Types: Marijuana    Comment: monthly at most       Family History:  Family History   Problem Relation Age of Onset   • Other Mother    • Aneurysm Mother         aortic   • Dementia Mother    • Heart failure Father    • Diabetes Father    • Lung cancer Sister    • Cancer Sister    • Dementia Maternal Grandmother    • Dementia Maternal Aunt        Physical Exam:     Vitals:   Blood Pressure: 135/77 (22 1803)  Pulse: 88 (22 183)  Temperature: 98 6 °F (37 °C) (22 1803)  Temp Source: Oral (22 1227)  Respirations: 20 (22 1803)  Height: 5' 5" (165 1 cm) (22)  Weight - Scale: (!) 179 kg (395 lb) (22)  SpO2: 90 % (22 1835)    Physical Exam  Constitutional:       General: She is not in acute distress  Appearance: Normal appearance  She is obese  HENT:      Head: Normocephalic  Nose: Nose normal       Mouth/Throat:      Mouth: Mucous membranes are moist       Pharynx: Oropharynx is clear  Eyes:      Conjunctiva/sclera: Conjunctivae normal    Cardiovascular:      Rate and Rhythm: Normal rate and regular rhythm  Pulses: Normal pulses  Heart sounds: Normal heart sounds     Pulmonary:      Effort: Pulmonary effort is normal       Breath sounds: No rhonchi  Comments: Reduced bilateral breath sounds  Abdominal:      General: Bowel sounds are normal       Palpations: Abdomen is soft  Tenderness: There is no abdominal tenderness  Musculoskeletal:      Right lower leg: Edema present  Left lower leg: Edema present  Skin:     General: Skin is warm and dry  Capillary Refill: Capillary refill takes less than 2 seconds  Coloration: Skin is not jaundiced or pale  Neurological:      General: No focal deficit present  Mental Status: She is alert and oriented to person, place, and time  Psychiatric:         Mood and Affect: Mood normal          Behavior: Behavior normal         Additional Data:     Lab Results:  Results from last 7 days   Lab Units 12/02/22  1852 12/02/22  1304   WBC Thousand/uL  --  5 44   HEMOGLOBIN g/dL  --  12 2   HEMATOCRIT %  --  40 1   PLATELETS Thousands/uL 164 163   NEUTROS PCT %  --  69   LYMPHS PCT %  --  16   MONOS PCT %  --  13*   EOS PCT %  --  0     Results from last 7 days   Lab Units 12/02/22  1304   SODIUM mmol/L 140   POTASSIUM mmol/L 4 9   CHLORIDE mmol/L 105   CO2 mmol/L 31   BUN mg/dL 11   CREATININE mg/dL 0 59*   ANION GAP mmol/L 4   CALCIUM mg/dL 9 5   ALBUMIN g/dL 3 8   TOTAL BILIRUBIN mg/dL 0 59   ALK PHOS U/L 73   ALT U/L 20   AST U/L 26   GLUCOSE RANDOM mg/dL 90                       Lines/Drains:  Invasive Devices     Peripheral Intravenous Line  Duration           Peripheral IV 12/02/22 Right;Ventral (anterior) Forearm <1 day                    Imaging: Reviewed radiology reports from this admission including: chest xray  XR chest 1 view portable   ED Interpretation by Mercedes Jones MD (12/02 9264)   Findings suggestive of pulmonary edema  Final Result by Marry Munguia MD (12/02 6565)      Prominence of the pulmonary vasculature suggests mild to moderate pulmonary vascular congestion      Findings concur with the preliminary ER interpretation  Workstation performed: [id number]             EKG and Other Studies Reviewed on Admission:   · EKG: NSR  HR 84     ** Please Note: This note has been constructed using a voice recognition system   **

## 2022-12-02 NOTE — PLAN OF CARE
Problem: PAIN - ADULT  Goal: Verbalizes/displays adequate comfort level or baseline comfort level  Description: Interventions:  - Encourage patient to monitor pain and request assistance  - Assess pain using appropriate pain scale  - Administer analgesics based on type and severity of pain and evaluate response  - Implement non-pharmacological measures as appropriate and evaluate response  - Consider cultural and social influences on pain and pain management  - Notify physician/advanced practitioner if interventions unsuccessful or patient reports new pain  Outcome: Progressing     Problem: INFECTION - ADULT  Goal: Absence or prevention of progression during hospitalization  Description: INTERVENTIONS:  - Assess and monitor for signs and symptoms of infection  - Monitor lab/diagnostic results  - Monitor all insertion sites, i e  indwelling lines, tubes, and drains  - Monitor endotracheal if appropriate and nasal secretions for changes in amount and color  - Winter appropriate cooling/warming therapies per order  - Administer medications as ordered  - Instruct and encourage patient and family to use good hand hygiene technique  - Identify and instruct in appropriate isolation precautions for identified infection/condition  Outcome: Progressing  Goal: Absence of fever/infection during neutropenic period  Description: INTERVENTIONS:  - Monitor WBC    Outcome: Progressing     Problem: SAFETY ADULT  Goal: Patient will remain free of falls  Description: INTERVENTIONS:  - Educate patient/family on patient safety including physical limitations  - Instruct patient to call for assistance with activity   - Consult OT/PT to assist with strengthening/mobility   - Keep Call bell within reach  - Keep bed low and locked with side rails adjusted as appropriate  - Keep care items and personal belongings within reach  - Initiate and maintain comfort rounds  - Make Fall Risk Sign visible to staff  - Offer Toileting   - Obtain necessary fall risk management equipment  - Apply yellow socks and bracelet for high fall risk patients  - Consider moving patient to room near nurses station  Outcome: Progressing  Goal: Maintain or return to baseline ADL function  Description: INTERVENTIONS:  -  Assess patient's ability to carry out ADLs; assess patient's baseline for ADL function and identify physical deficits which impact ability to perform ADLs (bathing, care of mouth/teeth, toileting, grooming, dressing, etc )  - Assess/evaluate cause of self-care deficits   - Assess range of motion  - Assess patient's mobility; develop plan if impaired  - Assess patient's need for assistive devices and provide as appropriate  - Encourage maximum independence but intervene and supervise when necessary  - Involve family in performance of ADLs  - Assess for home care needs following discharge   - Consider OT consult to assist with ADL evaluation and planning for discharge  - Provide patient education as appropriate  Outcome: Progressing  Goal: Maintains/Returns to pre admission functional level  Description: INTERVENTIONS:  - Perform BMAT or MOVE assessment daily    - Set and communicate daily mobility goal to care team and patient/family/caregiver     - Collaborate with rehabilitation services on mobility goals if consulted  - Perform Range of Motion   - Reposition patient   - Dangle patient   - Stand patient   - Ambulate patient   - Out of bed to chair   - Out of bed for toileting  - Record patient progress and toleration of activity level   Outcome: Progressing     Problem: DISCHARGE PLANNING  Goal: Discharge to home or other facility with appropriate resources  Description: INTERVENTIONS:  - Identify barriers to discharge w/patient and caregiver  - Arrange for needed discharge resources and transportation as appropriate  - Identify discharge learning needs (meds, wound care, etc )  - Arrange for interpretive services to assist at discharge as needed  - Refer to Case Management Department for coordinating discharge planning if the patient needs post-hospital services based on physician/advanced practitioner order or complex needs related to functional status, cognitive ability, or social support system  Outcome: Progressing     Problem: Knowledge Deficit  Goal: Patient/family/caregiver demonstrates understanding of disease process, treatment plan, medications, and discharge instructions  Description: Complete learning assessment and assess knowledge base    Interventions:  - Provide teaching at level of understanding  - Provide teaching via preferred learning methods  Outcome: Progressing

## 2022-12-02 NOTE — PLAN OF CARE
Problem: PAIN - ADULT  Goal: Verbalizes/displays adequate comfort level or baseline comfort level  Description: Interventions:  - Encourage patient to monitor pain and request assistance  - Assess pain using appropriate pain scale  - Administer analgesics based on type and severity of pain and evaluate response  - Implement non-pharmacological measures as appropriate and evaluate response  - Consider cultural and social influences on pain and pain management  - Notify physician/advanced practitioner if interventions unsuccessful or patient reports new pain  Outcome: Progressing     Problem: INFECTION - ADULT  Goal: Absence or prevention of progression during hospitalization  Description: INTERVENTIONS:  - Assess and monitor for signs and symptoms of infection  - Monitor lab/diagnostic results  - Monitor all insertion sites, i e  indwelling lines, tubes, and drains  - Monitor endotracheal if appropriate and nasal secretions for changes in amount and color  - [place] appropriate cooling/warming therapies per order  - Administer medications as ordered  - Instruct and encourage patient and family to use good hand hygiene technique  - Identify and instruct in appropriate isolation precautions for identified infection/condition  Outcome: Progressing  Goal: Absence of fever/infection during neutropenic period  Description: INTERVENTIONS:  - Monitor WBC    Outcome: Progressing     Problem: SAFETY ADULT  Goal: Patient will remain free of falls  Description: INTERVENTIONS:  - Educate patient/family on patient safety including physical limitations  - Instruct patient to call for assistance with activity   - Consult OT/PT to assist with strengthening/mobility   - Keep Call bell within reach  - Keep bed low and locked with side rails adjusted as appropriate  - Keep care items and personal belongings within reach  - Initiate and maintain comfort rounds  - Make Fall Risk Sign visible to staff  - Apply yellow socks and bracelet for high fall risk patients  - Consider moving patient to room near nurses station  Outcome: Progressing  Goal: Maintain or return to baseline ADL function  Description: INTERVENTIONS:  -  Assess patient's ability to carry out ADLs; assess patient's baseline for ADL function and identify physical deficits which impact ability to perform ADLs (bathing, care of mouth/teeth, toileting, grooming, dressing, etc )  - Assess/evaluate cause of self-care deficits   - Assess range of motion  - Assess patient's mobility; develop plan if impaired  - Assess patient's need for assistive devices and provide as appropriate  - Encourage maximum independence but intervene and supervise when necessary  - Involve family in performance of ADLs  - Assess for home care needs following discharge   - Consider OT consult to assist with ADL evaluation and planning for discharge  - Provide patient education as appropriate  Outcome: Progressing  Goal: Maintains/Returns to pre admission functional level  Description: INTERVENTIONS:  - Perform BMAT or MOVE assessment daily    - Set and communicate daily mobility goal to care team and patient/family/caregiver     - Collaborate with rehabilitation services on mobility goals if consulted  - Record patient progress and toleration of activity level   Outcome: Progressing     Problem: DISCHARGE PLANNING  Goal: Discharge to home or other facility with appropriate resources  Description: INTERVENTIONS:  - Identify barriers to discharge w/patient and caregiver  - Arrange for needed discharge resources and transportation as appropriate  - Identify discharge learning needs (meds, wound care, etc )  - Arrange for interpretive services to assist at discharge as needed  - Refer to Case Management Department for coordinating discharge planning if the patient needs post-hospital services based on physician/advanced practitioner order or complex needs related to functional status, cognitive ability, or social support system  Outcome: Progressing     Problem: Knowledge Deficit  Goal: Patient/family/caregiver demonstrates understanding of disease process, treatment plan, medications, and discharge instructions  Description: Complete learning assessment and assess knowledge base  Interventions:  - Provide teaching at level of understanding  - Provide teaching via preferred learning methods  Outcome: Progressing     Problem: MOBILITY - ADULT  Goal: Maintain or return to baseline ADL function  Description: INTERVENTIONS:  -  Assess patient's ability to carry out ADLs; assess patient's baseline for ADL function and identify physical deficits which impact ability to perform ADLs (bathing, care of mouth/teeth, toileting, grooming, dressing, etc )  - Assess/evaluate cause of self-care deficits   - Assess range of motion  - Assess patient's mobility; develop plan if impaired  - Assess patient's need for assistive devices and provide as appropriate  - Encourage maximum independence but intervene and supervise when necessary  - Involve family in performance of ADLs  - Assess for home care needs following discharge   - Consider OT consult to assist with ADL evaluation and planning for discharge  - Provide patient education as appropriate  Outcome: Progressing  Goal: Maintains/Returns to pre admission functional level  Description: INTERVENTIONS:  - Perform BMAT or MOVE assessment daily    - Set and communicate daily mobility goal to care team and patient/family/caregiver     - Collaborate with rehabilitation services on mobility goals if consult    - Out of bed for toileting  - Record patient progress and toleration of activity level   Outcome: Progressing     Problem: Potential for Falls  Goal: Patient will remain free of falls  Description: INTERVENTIONS:  - Educate patient/family on patient safety including physical limitations  - Instruct patient to call for assistance with activity   - Consult OT/PT to assist with strengthening/mobility   - Keep Call bell within reach  - Keep bed low and locked with side rails adjusted as appropriate  - Keep care items and personal belongings within reach  - Initiate and maintain comfort rounds  - Make Fall Risk Sign visible to staff  - Apply yellow socks and bracelet for high fall risk patients  - Consider moving patient to room near nurses station  Outcome: Progressing

## 2022-12-02 NOTE — ASSESSMENT & PLAN NOTE
Patient was not on any daily inhalers  Was using albuterol rescue inhaler  Plan  Albuterol nebulizer p r n    Respiratory protocol  Follow-up pro calcitonin  Maintain saturation above 88%

## 2022-12-03 ENCOUNTER — APPOINTMENT (INPATIENT)
Dept: NON INVASIVE DIAGNOSTICS | Facility: HOSPITAL | Age: 62
End: 2022-12-03

## 2022-12-03 LAB
ANION GAP SERPL CALCULATED.3IONS-SCNC: 3 MMOL/L (ref 4–13)
BUN SERPL-MCNC: 14 MG/DL (ref 5–25)
CALCIUM SERPL-MCNC: 9.1 MG/DL (ref 8.4–10.2)
CHLORIDE SERPL-SCNC: 104 MMOL/L (ref 96–108)
CO2 SERPL-SCNC: 33 MMOL/L (ref 21–32)
CREAT SERPL-MCNC: 0.56 MG/DL (ref 0.6–1.3)
ERYTHROCYTE [DISTWIDTH] IN BLOOD BY AUTOMATED COUNT: 15.6 % (ref 11.6–15.1)
GFR SERPL CREATININE-BSD FRML MDRD: 101 ML/MIN/1.73SQ M
GLUCOSE SERPL-MCNC: 125 MG/DL (ref 65–140)
HCT VFR BLD AUTO: 39.2 % (ref 34.8–46.1)
HGB BLD-MCNC: 11.8 G/DL (ref 11.5–15.4)
MAGNESIUM SERPL-MCNC: 2.2 MG/DL (ref 1.9–2.7)
MCH RBC QN AUTO: 29.5 PG (ref 26.8–34.3)
MCHC RBC AUTO-ENTMCNC: 30.1 G/DL (ref 31.4–37.4)
MCV RBC AUTO: 98 FL (ref 82–98)
PLATELET # BLD AUTO: 139 THOUSANDS/UL (ref 149–390)
PMV BLD AUTO: 10.7 FL (ref 8.9–12.7)
POTASSIUM SERPL-SCNC: 4.1 MMOL/L (ref 3.5–5.3)
PROCALCITONIN SERPL-MCNC: 0.08 NG/ML
RBC # BLD AUTO: 4 MILLION/UL (ref 3.81–5.12)
SODIUM SERPL-SCNC: 140 MMOL/L (ref 135–147)
WBC # BLD AUTO: 5.44 THOUSAND/UL (ref 4.31–10.16)

## 2022-12-03 RX ORDER — FUROSEMIDE 10 MG/ML
80 INJECTION INTRAMUSCULAR; INTRAVENOUS
Status: DISCONTINUED | OUTPATIENT
Start: 2022-12-03 | End: 2022-12-09

## 2022-12-03 RX ADMIN — ENOXAPARIN SODIUM 60 MG: 60 INJECTION SUBCUTANEOUS at 10:11

## 2022-12-03 RX ADMIN — FUROSEMIDE 80 MG: 10 INJECTION, SOLUTION INTRAMUSCULAR; INTRAVENOUS at 16:36

## 2022-12-03 RX ADMIN — ENOXAPARIN SODIUM 60 MG: 60 INJECTION SUBCUTANEOUS at 21:17

## 2022-12-03 RX ADMIN — FUROSEMIDE 80 MG: 10 INJECTION, SOLUTION INTRAMUSCULAR; INTRAVENOUS at 10:48

## 2022-12-03 RX ADMIN — LISINOPRIL 10 MG: 10 TABLET ORAL at 10:11

## 2022-12-03 NOTE — ASSESSMENT & PLAN NOTE
Wt Readings from Last 3 Encounters:   12/02/22 (!) 179 kg (395 lb)   12/02/22 (!) 182 kg (400 lb 11 2 oz)   10/03/22 (!) 177 kg (389 lb 8 oz)     Patient was on furosemide 20 mg at home  Patient was noncompliant with furosemide  She gained 10 lb over past month  Noticed worsening lower limb swelling    Plan   Start Lasix 40 IV b i d   Monitor I/O  Daily weight  Monitor BMP

## 2022-12-03 NOTE — ASSESSMENT & PLAN NOTE
· POA, likely multifactorial in the setting of morbid obesity with likely underlying OHS, sleep apnea and acute on chronic CHF  O2 sats 90% at rest which dropped to 80% with minimal exertion  · Not on home oxygen at baseline    Currently requiring up to 4 5 L supplemental nasal cannula O2 with sats 89-90% at rest   · CXR revealed " Prominence of the pulmonary vasculature suggests mild to moderate pulmonary vascular congestion"  · Patient started on IV diuretics as above  · Continue to wean nasal cannula oxygen for sats > 88%  · Continue respiratory protocol

## 2022-12-03 NOTE — ASSESSMENT & PLAN NOTE
· No acute exacerbation at this time  · Continue respiratory protocol, nebs p r n    · Supplemental nasal cannula O2 to maintain sats > 88%

## 2022-12-03 NOTE — ASSESSMENT & PLAN NOTE
Wt Readings from Last 3 Encounters:   12/02/22 (!) 179 kg (395 lb)   12/02/22 (!) 182 kg (400 lb 11 2 oz)   10/03/22 (!) 177 kg (389 lb 8 oz)     · Presented with 2 weeks history progressively worsening shortness of breath, orthopnea and lower extremity edema with weight gain secondary to noncompliance with diuretics  · Most recent echocardiogram in February of 2010 showed EF 65%, no wall motion abnormalities, grade 1 diastolic dysfunction, no hemodynamically significant valvular disease, normal RV function  · Chest x-ray showed findings to suggest mild to moderate pulmonary vascular congestion  · Volume status difficult to assess due to body habitus with morbid obesity  · She was started on Lasix 40 mg b i d  With minimal volume output  Lasix dose increased today 80 mg b i d   Per Cardiology recommendations  · Continue to monitor with daily weights, I's and O's, low-salt diet  · Patient transferred to monitored bed in view of high-dose Lasix

## 2022-12-03 NOTE — PLAN OF CARE
Problem: PHYSICAL THERAPY ADULT  Goal: Performs mobility at highest level of function for planned discharge setting  See evaluation for individualized goals  Description: Treatment/Interventions: LE strengthening/ROM, Elevations, Therapeutic exercise, Functional transfer training, Endurance training, Patient/family training, Equipment eval/education, Bed mobility, Gait training, Spoke to nursing          See flowsheet documentation for full assessment, interventions and recommendation  Alex Liner PT, DPT   Note: Prognosis: Good  Problem List: Decreased endurance, Decreased mobility, Obesity, Pain        Barriers to Discharge Comments: [facility] home though pt demonstrates appropriate strength and LE advancement in order to perform stairs Also FF stairs to bed and bath  PT Discharge Recommendation: Home with home health rehabilitation    See flowsheet documentation for full assessment

## 2022-12-03 NOTE — CONSULTS
Consultation - Cardiology Team One  Mayra Montes 63 y o  female MRN: [id number]  Unit/Bed#: W -43 Encounter: [id number]    Inpatient consult to Cardiology  Consult performed by: Irineo Ochoa PA-C  Consult ordered by: Tripp Montalvo MD          Physician Requesting Consult: Leisa Jain MD  Reason for Consult / Principal Problem:  CHF    Assessment:    1  Acute on chronic HFpEF:  Presented with 2 weeks of progressive shortness of breath, orthopnea, lower extremity edema in the setting of self discontinuing diuretic in October  Patient only taking on days she is not leaving the house  · Echocardiogram 02/10/2021:  EF 65% with no WMA, G1DD, normal RV function, no significant valvular abnormality  · Home diuretic regimen:  Lasix 40 mg daily  · Dry weight:  Unclear, but was 389 lb and October  · Weight on admission:  395 lb per standing scale  · I&Os:  Not documented  · Difficult to access volume status due to morbid obesity, lymphedema, bilateral rales and wheezing noted on exam   · Report not much response to initial lasix dose in ED  2  Acute hypoxic respiratory failure: Multifactorial in the setting of COPD and CHF exacerbation  Currently requiring 4 5 L NC  3  Essential hypertension:  Average /80  Last /80 on lisinopril 10 mg daily and IV Lasix  4  COPD:  Does not require home oxygen  Wheezing on exam   5  Morbid obesity:  BMI 65  Interested in gastric bypass surgery  6  Noncompliance: Patient has moved in with her daughter and motivated to take better care of her health        Plan/Recommendations:  · Continue IV Lasix but at increased dose of 80 mg BID with goal output of 2-3 L in 24 hours  · Monitor I&Os, renal function, electrolytes and standing weight  · Maintain potassium >4 and magnesium >2  · Continue low-sodium (< 2 g) and will place on fluid restriction (< 1500 mL)  · Will check echocardiogram to evaluate heart function and valvular status  · Continue lisinopril  · Patient will need telemetry monitoring with diuresis  __________________________________________________________    CC:  Shortness of breath      History of Present Illness   HPI: Shaun Cunningham is a 63y o  year old female who has chronic HFpEF, COPD, essential hypertension, depression, lymphedema, morbid obesity presented to the emergency room at [facility] on 12/2/2022 with complaints of shortness of breath that has been progressive over the last 2 weeks  She reported associated wheezing and cough with lower extremity edema and orthopnea  Patient stopped taking lasix in October  She does not follow with a cardiologist       On arrival to the ED patient's BP was 172/83 with oxygen saturation 85% on room air therefore 3 L NC was applied  EKG revealed sinus rhythm with sinus arrhythmia  Chest x-ray revealed mild-to-moderate pulmonary vascular congestion  Labs revealed stable CMP, stable CBC, HS troponin negative x3, BNP 49  Respiratory panel was negative for influenza/RSV/COVID  Patient received 40 mg IV Lasix in the ED and subsequently was started on 40 mg IV BID  Cardiology has been consulted for further evaluation management of CHF  Home medication regimen includes Lasix 40 mg daily and lisinopril 10 mg daily  Patient resting in bed during consultation and states she is a "bad patient" and never takes medication like she is supposed to  She stopped lasix in October as it was causing urinary incontinence  She is getting evaluated for gastric bypass surgery and has a sleep study scheduled for January  He breathing is slighting improved since admission  She denies any cp, palpitations, lightheadedness, dizziness, syncope or near syncope  Patient has moved in with her daughter and motivated to take better care of her health      Echocardiogram 02/10/2021:  EF 65% with no WMA, G1DD, normal RV function, no significant valvular abnormality  EKG reviewed personally: 2022-sinus rhythm at a rate of 84 beats per minute with sinus arrhythmia  No significant change compared to the EKG from 2019    Telemetry reviewed personally:  Not on telemetry  Review of Systems   Constitutional: Negative  Negative for chills  Cardiovascular: Positive for leg swelling and orthopnea  Negative for chest pain, dyspnea on exertion, near-syncope, palpitations, paroxysmal nocturnal dyspnea and syncope  Respiratory: Positive for shortness of breath and wheezing  Negative for cough  Endocrine: Negative  Hematologic/Lymphatic: Negative  Skin: Negative  Musculoskeletal: Negative  Gastrointestinal: Negative  Negative for diarrhea, nausea and vomiting  Neurological: Negative for dizziness, light-headedness and weakness  Psychiatric/Behavioral: Negative  Negative for altered mental status  All other systems reviewed and are negative      Historical Information   Past Medical History:   Diagnosis Date   • Arthritis    • CHF (congestive heart failure) ([facility])    • COPD (chronic obstructive pulmonary disease) ([facility])    • Depression    • High blood pressure    • Hypertension    • Urinary incontinence      Past Surgical History:   Procedure Laterality Date   • HYSTERECTOMY     • INCONTINENCE SURGERY     • OOPHORECTOMY Bilateral    • REPLACEMENT TOTAL KNEE Right    • TOTAL ABDOMINAL HYSTERECTOMY W/ BILATERAL SALPINGOOPHORECTOMY       Social History     Substance and Sexual Activity   Alcohol Use Not Currently     Social History     Substance and Sexual Activity   Drug Use Not Currently   • Types: Marijuana    Comment: monthly at most     Social History     Tobacco Use   Smoking Status Former   • Packs/day: 0 00   • Years: 35 00   • Pack years: 0 00   • Types: Cigarettes   • Start date: 1976   • Quit date: 5/15/2021   • Years since quittin 5   Smokeless Tobacco Never     Family History:   Family History   Problem Relation Age of Onset   • Other Mother    • Aneurysm Mother         aortic   • Dementia Mother    • Heart failure Father    • Diabetes Father    • Lung cancer Sister    • Cancer Sister    • Dementia Maternal Grandmother    • Dementia Maternal Aunt        Meds/Allergies   all current active meds have been reviewed, current meds:   Current Facility-Administered Medications   Medication Dose Route Frequency   • acetaminophen (TYLENOL) tablet 650 mg  650 mg Oral Q6H PRN   • albuterol (PROVENTIL HFA,VENTOLIN HFA) inhaler 2 puff  2 puff Inhalation Q6H PRN   • albuterol inhalation solution 2 5 mg  2 5 mg Nebulization Q6H PRN   • enoxaparin (LOVENOX) subcutaneous injection 60 mg  60 mg Subcutaneous Q12H SYED   • furosemide (LASIX) injection 40 mg  40 mg Intravenous BID (diuretic)   • lisinopril (ZESTRIL) tablet 10 mg  10 mg Oral Daily    and PTA meds:   Prior to Admission Medications   Prescriptions Last Dose Informant Patient Reported? Taking?   acetaminophen (TYLENOL) 500 mg tablet   Yes No   Sig: Take 500 mg by mouth every 6 (six) hours as needed for mild pain   albuterol (PROVENTIL HFA,VENTOLIN HFA) 90 mcg/act inhaler   No No   Sig: Inhale 2 puffs every 6 (six) hours as needed for wheezing or shortness of breath   furosemide (LASIX) 20 mg tablet   No No   Sig: Take 1 tablet (20 mg total) by mouth 2 (two) times a day   Patient taking differently: Take 20 mg by mouth 2 (two) times a day Pt only takes med when not leaving house; reports urinary incontinence in general (LASIX makes it worse)   lisinopril (ZESTRIL) 10 mg tablet   No No   Sig: Take 1 tablet (10 mg total) by mouth daily      Facility-Administered Medications: None          No Known Allergies    Objective   Vitals: Blood pressure 136/80, pulse 91, temperature 98 °F (36 7 °C), resp  rate 18, height 5' 5" (1 651 m), weight (!) 179 kg (395 lb), SpO2 90 %  ,     Body mass index is 65 73 kg/m²  ,     Systolic (89BKN), WVN:556 , Min:129 , HNX:443     Diastolic (57GOX), BUJ:74, Min:63, Max:96    Wt Readings from Last 3 Encounters:   12/02/22 (!) 179 kg (395 lb)   12/02/22 (!) 182 kg (400 lb 11 2 oz)   10/03/22 (!) 177 kg (389 lb 8 oz)      Lab Results   Component Value Date    CREATININE 0 56 (L) 12/03/2022    CREATININE 0 59 (L) 12/02/2022    CREATININE 0 77 08/31/2022           No intake or output data in the 24 hours ending 12/03/22 0819  Weight (last 2 days)     Date/Time Weight    12/02/22 1905 179 (395)    12/02/22 18:03:57 179 (395)        Invasive Devices     Peripheral Intravenous Line  Duration           Peripheral IV 12/02/22 Right;Ventral (anterior) Forearm <1 day                  Physical Exam  Vitals and nursing note reviewed  Constitutional:       General: She is not in acute distress  Appearance: She is well-developed and well-nourished  She is obese  Comments: On 4 5 L NC in NAD   HENT:      Head: Normocephalic and atraumatic  Neck:      Comments: Unable to access for JVD due to patient body habitus  Cardiovascular:      Rate and Rhythm: Normal rate and regular rhythm  Pulses: Intact distal pulses  Heart sounds: Normal heart sounds  No murmur heard  No friction rub  Pulmonary:      Effort: Pulmonary effort is normal  No respiratory distress  Breath sounds: Wheezing and rales present  Abdominal:      General: Bowel sounds are normal  There is distension  Palpations: Abdomen is soft  Tenderness: There is no abdominal tenderness  Musculoskeletal:         General: No tenderness  Normal range of motion  Cervical back: Normal range of motion and neck supple  Right lower leg: Edema present  Left lower leg: Edema present  Skin:     General: Skin is warm and dry  Findings: No erythema  Neurological:      Mental Status: She is alert and oriented to person, place, and time  Psychiatric:         Mood and Affect: Mood and affect and mood normal          Behavior: Behavior normal          Thought Content:  Thought content normal          Judgment: Judgment normal            LABORATORY RESULTS:      CBC with diff:   Results from last 7 days   Lab Units 12/03/22  0658 12/02/22  1852 12/02/22  1304   WBC Thousand/uL 5 44  --  5 44   HEMOGLOBIN g/dL 11 8  --  12 2   HEMATOCRIT % 39 2  --  40 1   MCV fL 98  --  94   PLATELETS Thousands/uL 139* 164 163   MCH pg 29 5  --  28 7   MCHC g/dL 30 1*  --  30 4*   RDW % 15 6*  --  15 4*   MPV fL 10 7 10 8 10 9   NRBC AUTO /100 WBCs  --   --  0       CMP:  Results from last 7 days   Lab Units 12/03/22  0658 12/02/22  1304   POTASSIUM mmol/L 4 1 4 9   CHLORIDE mmol/L 104 105   CO2 mmol/L 33* 31   BUN mg/dL 14 11   CREATININE mg/dL 0 56* 0 59*   CALCIUM mg/dL 9 1 9 5   AST U/L  --  26   ALT U/L  --  20   ALK PHOS U/L  --  73   EGFR ml/min/1 73sq m 101 99       BMP:  Results from last 7 days   Lab Units 12/03/22  0658 12/02/22  1304   POTASSIUM mmol/L 4 1 4 9   CHLORIDE mmol/L 104 105   CO2 mmol/L 33* 31   BUN mg/dL 14 11   CREATININE mg/dL 0 56* 0 59*   CALCIUM mg/dL 9 1 9 5          Lab Results   Component Value Date    NTBNP 23 08/31/2022    NTBNP 27 11/04/2019    NTBNP 65 07/03/2019            Results from last 7 days   Lab Units 12/03/22  0658   MAGNESIUM mg/dL 2 2     Lipid Profile:   Lab Results   Component Value Date    CHOL 206 07/30/2015     Lab Results   Component Value Date    HDL 41 (L) 08/31/2022    HDL 42 (L) 08/18/2020    HDL 45 07/30/2015     Lab Results   Component Value Date    LDLCALC 123 (H) 08/31/2022    LDLCALC 131 (H) 08/18/2020    LDLCALC 136 (H) 07/30/2015     Lab Results   Component Value Date    TRIG 115 08/31/2022    TRIG 108 08/18/2020    TRIG 126 07/30/2015         Cardiac testing:   Results for orders placed during the hospital encounter of 02/10/21    Echo complete with contrast if indicated    Narrative  [address] [facility], [address] [facility]  [phone]    Transthoracic Echocardiogram  2D, M-mode, Doppler, and Color Doppler    Study date:  10-Feb-2021    Patient: George Soto  MR number: [id number]  Account number: [de-identified]  : 1960  Age: 63 years  Gender: Female  Status: Outpatient  Location: [address] [place] [address] and [facility]  Height: 65 in  Weight: 386 1 lb  BP: 110/ 74 mmHg    Indications: Pedal edema;SOB on exertion;CHF  Diagnoses: R06 02 - Shortness of breath, R60 9 - Edema, unspecified    Sonographer:  DARYL Walters  Primary Physician:  Parveen Gonsalez DO  Referring Physician:  Parveen Gonsalez DO  Group:  [id number]  Cardiology Fellow: Christiane Bañuelos MD  Interpreting Physician:  Jeff Pacheco MD    SUMMARY    LEFT VENTRICLE:  Systolic function was normal  Ejection fraction was estimated to be 65 %  There were no regional wall motion abnormalities  Doppler parameters were consistent with abnormal left ventricular relaxation (grade 1 diastolic dysfunction)  TRICUSPID VALVE:  There was trace regurgitation  IVC, HEPATIC VEINS:  The inferior vena cava was dilated  Respirophasic changes were blunted (less than 50% variation)  HISTORY: PRIOR HISTORY: BRADY;Smoker; Morbid obesity;HLD;HTN;COPD  PROCEDURE: The study was performed in the [address] [facility]  This was a routine study  The transthoracic approach was used  The study included complete 2D imaging, M-mode, complete spectral Doppler, and color Doppler  The  heart rate was 80 bpm, at the start of the study  Images were obtained from the parasternal, apical, subcostal, and suprasternal notch acoustic windows  Echocardiographic views were limited due to poor acoustic window availability,  decreased penetration, and lung interference  This was a technically difficult study  LEFT VENTRICLE: Size was normal  Systolic function was normal  Ejection fraction was estimated to be 65 %  There were no regional wall motion abnormalities   Wall thickness was normal  DOPPLER: Doppler parameters were consistent with  abnormal left ventricular relaxation (grade 1 diastolic dysfunction)  RIGHT VENTRICLE: The size was normal  Systolic function was normal  Wall thickness was normal     LEFT ATRIUM: Size was normal     RIGHT ATRIUM: Size was normal     MITRAL VALVE: Valve structure was normal  There was normal leaflet separation  DOPPLER: The transmitral velocity was within the normal range  There was no evidence for stenosis  There was no regurgitation  AORTIC VALVE: The valve was trileaflet  Leaflets exhibited normal thickness and normal cuspal separation  DOPPLER: Transaortic velocity was within the normal range  There was no evidence for stenosis  There was no regurgitation  TRICUSPID VALVE: The valve structure was normal  There was normal leaflet separation  DOPPLER: The transtricuspid velocity was within the normal range  There was no evidence for stenosis  There was trace regurgitation  PULMONIC VALVE: Leaflets exhibited normal thickness, no calcification, and normal cuspal separation  DOPPLER: The transpulmonic velocity was within the normal range  There was no regurgitation  PERICARDIUM: There was no pericardial effusion  The pericardium was normal in appearance  AORTA: The root exhibited normal size  SYSTEMIC VEINS: IVC: The inferior vena cava was dilated  Respirophasic changes were blunted (less than 50% variation)      SYSTEM MEASUREMENT TABLES    2D  %FS: 27 94 %  Ao Diam: 3 29 cm  EDV(Teich): 171 86 ml  EF(Teich): 53 32 %  ESV(Teich): 80 23 ml  IVSd: 1 21 cm  LA Area: 23 96 cm2  LA Diam: 4 68 cm  LVEDV MOD A4C: 109 9 ml  LVEF MOD A4C: 64 35 %  LVESV MOD A4C: 39 18 ml  LVIDd: 5 88 cm  LVIDs: 4 24 cm  LVLd A4C: 8 59 cm  LVLs A4C: 6 49 cm  LVPWd: 1 21 cm  RA Area: 17 2 cm2  RVIDd: 4 01 cm  SV MOD A4C: 70 72 ml  SV(Teich): 91 63 ml    CW  AV Vmax: 1 57 m/s  AV maxP 92 mmHg    MM  TAPSE: 2 7 cm    PW  E' Sept: 0 04 m/s  E/E' Sept: 22 85  MV A Vito: 1 05 m/s  MV Dec [place]: 3 42 m/s2  MV DecT: 258 2 ms  MV E Vito: 0 88 m/s  MV E/A Ratio: 0 84  MV PHT: 74 88 ms  MVA By PHT: 2 94 cm2    [employer] Accredited Echocardiography [facility]    Prepared and electronically signed by    Lucita Martino MD  Signed 10-Feb-2021 13:57:17    No results found for this or any previous visit  No valid procedures specified  No results found for this or any previous visit  Imaging: I have personally reviewed pertinent reports  XR chest 1 view portable    Result Date: 12/2/2022  Narrative: CHEST INDICATION:   shortness of breath  COMPARISON:  Chest x-ray 11/4/2019 EXAM PERFORMED/VIEWS:  XR CHEST PORTABLE FINDINGS: Heart size is not well evaluated on this single portable AP view  Prominence of the pulmonary vasculature suggests mild to moderate pulmonary vascular congestion  No pneumothorax or pleural effusion  Osseous structures appear within normal limits for patient age  Impression: Prominence of the pulmonary vasculature suggests mild to moderate pulmonary vascular congestion  Findings concur with the preliminary ER interpretation  Workstation performed: [id number]           Counseling / Coordination of Care  Total floor / unit time spent today 45 minutes  Greater than 50% of total time was spent with the patient and / or family counseling and / or coordination of care  A description of the counseling / coordination of care: Review of history, current assessment, development of a plan  Code Status: Level 1 - Full Code    ** Please Note: Dragon 360 Dictation voice to text software may have been used in the creation of this document   **

## 2022-12-03 NOTE — PHYSICAL THERAPY NOTE
PHYSICAL THERAPY EVALUATION  NAME:  Corinna Suttonole  DATE: 12/03/22    AGE:   63 y o  Mrn:   [id number]  ADMIT DX:  CHF exacerbation ([place] [address] ) [I50 9]  Problem List:   Patient Active Problem List   Diagnosis    Essential hypertension    Mixed stress and urge urinary incontinence    COPD (chronic obstructive pulmonary disease) ([place] [address] )    Mixed hyperlipidemia    Morbid obesity ([place] [address] )    Morbid obesity with body mass index (BMI) of 60 0 to 69 9 in [facility])    Cigarette nicotine dependence without complication    Obstructive sleep apnea syndrome    Insomnia due to medical condition    Chronic diastolic congestive heart failure ([place] [address] )    Lymphedema of both lower extremities    Pre-diabetes    Hypoxia    Respiratory failure with hypoxia ([place] [address] )    Acute exacerbation of CHF (congestive heart failure) ([place] [address] )         Length Of Stay: 1  Performed at least 2 patient identifiers during session: Name and ID bracelet  PHYSICAL THERAPY EVALUATION :        12/03/22 0946   PT Last Visit   PT Visit Date 12/03/22   Note Type   Note type Evaluation   Pain Assessment   Pain Assessment Tool 0-10   Pain Score No Pain   Restrictions/Precautions   Weight Bearing Precautions Per Order No   Other Precautions Telemetry;O2   Home Living   Type of [address] [place] [address] Two level;Stairs to enter without rails;1/2 bath on main level;Bed/bath upstairs  (bed/bath upstairs)   Bathroom Shower/Tub Tub/shower unit   Bathroom Toilet Standard   Bathroom Equipment Grab bars in [facility] [address];Cane   Additional Comments pt reports she has RW and SPC though does not use in home  Has recently been using RW outside of homedue to more difficulty getting around (left knee pain) and SOB   Prior Function   Level of Cole Independent with ADLs; Independent with IADLS   Lives With Daughter   Basim Cooley in the last 6 months 1 to 4   Comments does not drive,   General Family/Caregiver Present No   Cognition   Overall Cognitive Status WFL   Orientation Level Oriented X4   Following Commands Follows multistep commands without difficulty   Subjective   Subjective i dotn feel any better  I have tightness in my chest that may be worse than yesterday   RUE Assessment   RUE Assessment WFL   LUE Assessment   LUE Assessment WFL   RLE Assessment   RLE Assessment WFL   LLE Assessment   LLE Assessment   (grossly 4/5 ( pain at left knee))   Bed Mobility   Supine to Sit 6  Modified independent   Additional items HOB elevated; Increased time required  (performs with sig difficulty)   Transfers   Sit to Stand 6  Modified independent   Stand to Sit 6  Modified independent   Ambulation/Elevation   Gait pattern Wide FABY; Forward Flexion; Short stride; Excessively slow   Gait Assistance 6  Modified independent   Additional items Assist x 1  (for O2 line follow)   Assistive Device Rolling walker   Distance 70'   Balance   Static Sitting Good   Dynamic Sitting Fair +   Static Standing Fair +   Ambulatory Fair   Endurance Deficit   Endurance Deficit Yes   Endurance Deficit Description SOB and fatigue   Activity Tolerance   Activity Tolerance Patient limited by fatigue   Medical Staff Made Aware yes   Nurse Made Aware yes, NICHOLE Zimmer   Assessment   Prognosis Good   Problem List Decreased endurance;Decreased mobility;Obesity;Pain   Barriers to Discharge Comments MARIA FERNANDA home though pt demonstrates appropriate strength and LE advancement in order to perform stairs Also FF stairs to bed and bath   Goals   Patient Goals to get home   STG Expiration Date 12/10/22   Plan   Treatment/Interventions LE strengthening/ROM; Elevations; Therapeutic exercise; Functional transfer training; Endurance training;Patient/family training;Equipment eval/education; Bed mobility;Gait training;Spoke to nursing   PT Frequency 2-3x/wk   Recommendation   PT Discharge Recommendation Home with home health rehabilitation   [facility]   Turning in Bed Without Bedrails 4   Lying on Back to Sitting on Edge of Flat Bed 4   Moving Bed to Chair 4   Standing Up From Chair 4   Walk in Room 4   Climb 3-5 Stairs 3   Basic Mobility Inpatient Raw Score 23   Basic Mobility Standardized Score 50 88   Highest Level Of Mobility   -HL Goal 7: Walk 25 feet or more   JH-HLM Achieved 7: Walk 25 feet or more   End of Consult   Patient Position at End of Consult Seated edge of bed; All needs within reach     (Please find full objective findings from PT assessment regarding body systems outlined above)  Assessment: Pt is a 63 y o  female seen for PT evaluation s/p admit [facility] on 12/2/2022 w/ Respiratory failure with hypoxia ([facility] [address] )  Order placed for PT  Upon evaluation: Pt requires  Yeimy  assistance for bed mobility, transfers, and ambulation with RW     Pt's clinical presentation is currently evolving given the functional mobility deficits above, especially (but not limited to) decreased endurance, decreased activity tolerance, decreased functional mobility tolerance, and SOB upon exertion, coupled with fall risks including hx of falls, and combined with medical complications of  COPD, morbid obesity, CHF   Pt to benefit from continued skilled PT tx while in hospital and upon DC to address deficits as defined above and maximize level of functional mobility  Recommend ther ex next 1-2 sessions  Goals: Pt will: Perform ambulation with LRAD for >200 feet  with  modified I  to increase Indep in home environment  Perform FF stairs with railing and w/modified I to return to home with MARIA FERNANDA and return to [facility] home  The patient's AM-PAC Basic Mobility Inpatient Short Form Raw Score is 23 , Standardized Score is 50 88   A standardized score greater than 42 9 suggests the patient may benefit from discharge to home, which does coincide with above PT recommendations   However please refer to therapist recommendation for discharge planning given other factors that may influence destination  Adapted from Walter Reis Use of 6-clicks to Provide Decision Support in [place] [facility] [place]  [address], [place]  AP[facility] 2017 [facility]  Comorbidities affecting pt's physical performance at time of assessment include: obesity, COPD, and CHF  has a past medical history of Arthritis, CHF (congestive heart failure) ([facility] [address] ), COPD (chronic obstructive pulmonary disease) ([facility] [address] ), Depression, High blood pressure, Hypertension, and Urinary incontinence  ,  has a past surgical history that includes Hysterectomy; Oophorectomy (Bilateral); Replacement total knee (Right, 2016); Total abdominal hysterectomy w/ bilateral salpingoophorectomy (2006); and Incontinence surgery    Personal factors affecting pt at time of IE include: hx of non-compliance, steps to enter environment, multi-level environment, inability to perform IADLs, inability to perform ADLs, inability to ambulate household distances, and inability to navigate community distances  Portions of the record may have been created with voice recognition software  Occasional wrong word or "sound a like" substitutions may have occurred due to the inherent limitations of voice recognition software    Read the chart carefully and recognize, using context, where substitutions have occurred    Nadir Sanchez PT, DPT

## 2022-12-03 NOTE — PROGRESS NOTES
[facility]  Progress Note - hospitals 1960, 63 y o  female MRN: [id number]  Unit/Bed#: W -01 Encounter: [id number]  Primary Care Provider: Millie Jimenez DO   Date and time admitted to hospital: 12/2/2022 12:28 PM    * Acute exacerbation of CHF (congestive heart failure) ([facility] [address] )  Assessment & Plan  Wt Readings from Last 3 Encounters:   12/02/22 (!) 179 kg (395 lb)   12/02/22 (!) 182 kg (400 lb 11 2 oz)   10/03/22 (!) 177 kg (389 lb 8 oz)     · Presented with 2 weeks history progressively worsening shortness of breath, orthopnea and lower extremity edema with weight gain secondary to noncompliance with diuretics  · Most recent echocardiogram in February of 2010 showed EF 65%, no wall motion abnormalities, grade 1 diastolic dysfunction, no hemodynamically significant valvular disease, normal RV function  · Chest x-ray showed findings to suggest mild to moderate pulmonary vascular congestion  · Volume status difficult to assess due to body habitus with morbid obesity  · She was started on Lasix 40 mg b i d  With minimal volume output  Lasix dose increased today 80 mg b i d  Per Cardiology recommendations  · Continue to monitor with daily weights, I's and O's, low-salt diet  · Patient transferred to monitored bed in view of high-dose Lasix      Respiratory failure with hypoxia ([facility] )  Assessment & Plan  · POA, likely multifactorial in the setting of morbid obesity with likely underlying OHS, sleep apnea and acute on chronic CHF  O2 sats 90% at rest which dropped to 80% with minimal exertion  · Not on home oxygen at baseline    Currently requiring up to 4 5 L supplemental nasal cannula O2 with sats 89-90% at rest   · CXR revealed " Prominence of the pulmonary vasculature suggests mild to moderate pulmonary vascular congestion"  · Patient started on IV diuretics as above  · Continue to wean nasal cannula oxygen for sats > 88%  · Continue respiratory protocol    COPD (chronic obstructive pulmonary disease) (HCC)  Assessment & Plan  · No acute exacerbation at this time  · Continue respiratory protocol, nebs p r n  · Supplemental nasal cannula O2 to maintain sats > 88%    Morbid obesity (HCC)  Assessment & Plan  · BMI: 66 68  · Recommend therapeutic lifestyle changes to promote weight loss    Essential hypertension  Assessment & Plan  · Blood pressure acceptable  · Continue lisinopril at home dose      VTE Pharmacologic Prophylaxis:   Pharmacologic: Enoxaparin (Lovenox)  Mechanical VTE Prophylaxis in Place: No    Patient Centered Rounds: I have performed bedside rounds with nursing staff today  Discussions with Specialists or Other Care Team Provider:  Nursing    Education and Discussions with Family / Patient:  Patient/offered to update family, patient declined and will update them herself    Current Length of Stay: 1 day(s)    Current Patient Status: Inpatient   Certification Statement: The patient will continue to require additional inpatient hospital stay due to Above diagnosis and care plan    Discharge Plan:  Pending clinical improvement    Code Status: Level 1 - Full Code      Subjective:   No new complaints or acute overnight events  Reports continued dyspnea with exertion  Denies chest pain    Objective:     Vitals:   Temp (24hrs), Av 3 °F (36 8 °C), Min:98 °F (36 7 °C), Max:98 6 °F (37 °C)    Temp:  [98 °F (36 7 °C)-98 6 °F (37 °C)] 98 °F (36 7 °C)  HR:  [80-92] 84  Resp:  [17-22] 18  BP: (111-189)/(60-96) 111/60  SpO2:  [89 %-95 %] 89 %  Body mass index is 65 93 kg/m²  Input and Output Summary (last 24 hours):        Intake/Output Summary (Last 24 hours) at 12/3/2022 1305  Last data filed at 12/3/2022 1100  Gross per 24 hour   Intake 250 ml   Output 1200 ml   Net -950 ml       Physical Exam:  General Appearance:    Alert, cooperative, no distress, appropriately responsive    Head:    Normocephalic, mucous membranes moist   Eyes:    Conjunctiva/corneas clear, EOM's intact   Neck:   Supple   Resp:     Diffuse rhonchi bilaterally, decreased at the bases,    Heart:    Regular rate and rhythm, S1 and S2    Abdomen:     Soft, morbidly obese, nontender   Extremities:  + bilateral lower extremity edema difficult to assess   Neurologic:  nonfocal         Additional Data:     Labs:    Results from last 7 days   Lab Units 12/03/22  0658 12/02/22  1852 12/02/22  1304   WBC Thousand/uL 5 44  --  5 44   HEMOGLOBIN g/dL 11 8  --  12 2   HEMATOCRIT % 39 2  --  40 1   PLATELETS Thousands/uL 139*   < > 163   NEUTROS PCT %  --   --  69   LYMPHS PCT %  --   --  16   MONOS PCT %  --   --  13*   EOS PCT %  --   --  0    < > = values in this interval not displayed  Results from last 7 days   Lab Units 12/03/22  0658 12/02/22  1304   POTASSIUM mmol/L 4 1 4 9   CHLORIDE mmol/L 104 105   CO2 mmol/L 33* 31   BUN mg/dL 14 11   CREATININE mg/dL 0 56* 0 59*   CALCIUM mg/dL 9 1 9 5   ALK PHOS U/L  --  73   ALT U/L  --  20   AST U/L  --  26           * I Have Reviewed All Lab Data Listed Above  * Additional Pertinent Lab Tests Reviewed:  [address] Admission Reviewed    Cultures:   Blood Culture: No results found for: BLOODCX  Urine Culture: No results found for: URINECX  Sputum Culture: No components found for: SPUTUMCX  Wound Culture: No results found for: WOUNDCULT    Last 24 Hours Medication List:   Current Facility-Administered Medications   Medication Dose Route Frequency Provider Last Rate   • acetaminophen  650 mg Oral Q6H PRN Cathie Gibson MD     • albuterol  2 puff Inhalation Q6H PRN Watrosalia Reynolds MD     • albuterol  2 5 mg Nebulization Q6H PRN Randall Rizzo MD     • enoxaparin  60 mg Subcutaneous Q12H [address] Magnus Reynolds MD     • furosemide  80 mg Intravenous BID (diuretic) Josee Reyna PA-C     • lisinopril  10 mg Oral Daily Stephen Anjelica Luis MD          Today, Patient Was Seen By: Armando English MD    ** Please Note: Dragon 360 Dictation voice to text software may have been used in the creation of this document   **

## 2022-12-03 NOTE — PLAN OF CARE
Problem: PAIN - ADULT  Goal: Verbalizes/displays adequate comfort level or baseline comfort level  Description: Interventions:  - Encourage patient to monitor pain and request assistance  - Assess pain using appropriate pain scale  - Administer analgesics based on type and severity of pain and evaluate response  - Implement non-pharmacological measures as appropriate and evaluate response  - Consider cultural and social influences on pain and pain management  - Notify physician/advanced practitioner if interventions unsuccessful or patient reports new pain  Outcome: Progressing     Problem: INFECTION - ADULT  Goal: Absence or prevention of progression during hospitalization  Description: INTERVENTIONS:  - Assess and monitor for signs and symptoms of infection  - Monitor lab/diagnostic results  - Monitor all insertion sites, i e  indwelling lines, tubes, and drains  - Monitor endotracheal if appropriate and nasal secretions for changes in amount and color  - [place] appropriate cooling/warming therapies per order  - Administer medications as ordered  - Instruct and encourage patient and family to use good hand hygiene technique  - Identify and instruct in appropriate isolation precautions for identified infection/condition  Outcome: Progressing  Goal: Absence of fever/infection during neutropenic period  Description: INTERVENTIONS:  - Monitor WBC    Outcome: Progressing     Problem: SAFETY ADULT  Goal: Patient will remain free of falls  Description: INTERVENTIONS:  - Educate patient/family on patient safety including physical limitations  - Instruct patient to call for assistance with activity   - Consult OT/PT to assist with strengthening/mobility   - Keep Call bell within reach  - Keep bed low and locked with side rails adjusted as appropriate  - Keep care items and personal belongings within reach  - Initiate and maintain comfort rounds  - Make Fall Risk Sign visible to staff  - Apply yellow socks and bracelet for high fall risk patients  - Consider moving patient to room near nurses station  Outcome: Progressing  Goal: Maintain or return to baseline ADL function  Description: INTERVENTIONS:  -  Assess patient's ability to carry out ADLs; assess patient's baseline for ADL function and identify physical deficits which impact ability to perform ADLs (bathing, care of mouth/teeth, toileting, grooming, dressing, etc )  - Assess/evaluate cause of self-care deficits   - Assess range of motion  - Assess patient's mobility; develop plan if impaired  - Assess patient's need for assistive devices and provide as appropriate  - Encourage maximum independence but intervene and supervise when necessary  - Involve family in performance of ADLs  - Assess for home care needs following discharge   - Consider OT consult to assist with ADL evaluation and planning for discharge  - Provide patient education as appropriate  Outcome: Progressing  Goal: Maintains/Returns to pre admission functional level  Description: INTERVENTIONS:  - Perform BMAT or MOVE assessment daily    - Set and communicate daily mobility goal to care team and patient/family/caregiver     - Collaborate with rehabilitation services on mobility goals if consulted  -  Out of bed for toileting  - Record patient progress and toleration of activity level   Outcome: Progressing     Problem: DISCHARGE PLANNING  Goal: Discharge to home or other facility with appropriate resources  Description: INTERVENTIONS:  - Identify barriers to discharge w/patient and caregiver  - Arrange for needed discharge resources and transportation as appropriate  - Identify discharge learning needs (meds, wound care, etc )  - Arrange for interpretive services to assist at discharge as needed  - Refer to Case Management Department for coordinating discharge planning if the patient needs post-hospital services based on physician/advanced practitioner order or complex needs related to functional status, cognitive ability, or social support system  Outcome: Progressing     Problem: Knowledge Deficit  Goal: Patient/family/caregiver demonstrates understanding of disease process, treatment plan, medications, and discharge instructions  Description: Complete learning assessment and assess knowledge base  Interventions:  - Provide teaching at level of understanding  - Provide teaching via preferred learning methods  Outcome: Progressing     Problem: MOBILITY - ADULT  Goal: Maintain or return to baseline ADL function  Description: INTERVENTIONS:  -  Assess patient's ability to carry out ADLs; assess patient's baseline for ADL function and identify physical deficits which impact ability to perform ADLs (bathing, care of mouth/teeth, toileting, grooming, dressing, etc )  - Assess/evaluate cause of self-care deficits   - Assess range of motion  - Assess patient's mobility; develop plan if impaired  - Assess patient's need for assistive devices and provide as appropriate  - Encourage maximum independence but intervene and supervise when necessary  - Involve family in performance of ADLs  - Assess for home care needs following discharge   - Consider OT consult to assist with ADL evaluation and planning for discharge  - Provide patient education as appropriate  Outcome: Progressing  Goal: Maintains/Returns to pre admission functional level  Description: INTERVENTIONS:  - Perform BMAT or MOVE assessment daily    - Set and communicate daily mobility goal to care team and patient/family/caregiver     - Collaborate with rehabilitation services on mobility goals if consulted  - Out of bed for toileting  - Record patient progress and toleration of activity level   Outcome: Progressing     Problem: Potential for Falls  Goal: Patient will remain free of falls  Description: INTERVENTIONS:  - Educate patient/family on patient safety including physical limitations  - Instruct patient to call for assistance with activity   - Consult OT/PT to assist with strengthening/mobility   - Keep Call bell within reach  - Keep bed low and locked with side rails adjusted as appropriate  - Keep care items and personal belongings within reach  - Initiate and maintain comfort rounds  - Make Fall Risk Sign visible to staff  - Apply yellow socks and bracelet for high fall risk patients  - Consider moving patient to room near nurses station  Outcome: Progressing

## 2022-12-04 LAB
ANION GAP SERPL CALCULATED.3IONS-SCNC: 1 MMOL/L (ref 4–13)
AORTIC ROOT: 3.5 CM
APICAL FOUR CHAMBER EJECTION FRACTION: 66 %
ASCENDING AORTA: 3.5 CM
BUN SERPL-MCNC: 15 MG/DL (ref 5–25)
CALCIUM SERPL-MCNC: 9 MG/DL (ref 8.4–10.2)
CHLORIDE SERPL-SCNC: 99 MMOL/L (ref 96–108)
CO2 SERPL-SCNC: 38 MMOL/L (ref 21–32)
CREAT SERPL-MCNC: 0.73 MG/DL (ref 0.6–1.3)
E WAVE DECELERATION TIME: 336 MS
FRACTIONAL SHORTENING: 24 (ref 28–44)
GFR SERPL CREATININE-BSD FRML MDRD: 89 ML/MIN/1.73SQ M
GLUCOSE SERPL-MCNC: 122 MG/DL (ref 65–140)
INTERVENTRICULAR SEPTUM IN DIASTOLE (PARASTERNAL SHORT AXIS VIEW): 1.5 CM
INTERVENTRICULAR SEPTUM: 1.5 CM (ref 0.6–1.1)
LAAS-AP2: 24 CM2
LAAS-AP4: 24.3 CM2
LEFT ATRIUM SIZE: 5.4 CM
LEFT INTERNAL DIMENSION IN SYSTOLE: 3.1 CM (ref 2.1–4)
LEFT VENTRICULAR INTERNAL DIMENSION IN DIASTOLE: 4.1 CM (ref 3.5–6)
LEFT VENTRICULAR POSTERIOR WALL IN END DIASTOLE: 1.5 CM
LEFT VENTRICULAR STROKE VOLUME: 36 ML
LVSV (TEICH): 36 ML
MV E'TISSUE VEL-SEP: 7 CM/S
MV PEAK A VEL: 0.91 M/S
MV PEAK E VEL: 88 CM/S
MV STENOSIS PRESSURE HALF TIME: 97 MS
MV VALVE AREA P 1/2 METHOD: 2.27
POTASSIUM SERPL-SCNC: 4.1 MMOL/L (ref 3.5–5.3)
RIGHT ATRIUM AREA SYSTOLE A4C: 19.2 CM2
RIGHT VENTRICLE ID DIMENSION: 4.2 CM
SL CV LEFT ATRIUM LENGTH A2C: 6.2 CM
SL CV LV EF: 70
SL CV PED ECHO LEFT VENTRICLE DIASTOLIC VOLUME (MOD BIPLANE) 2D: 74 ML
SL CV PED ECHO LEFT VENTRICLE SYSTOLIC VOLUME (MOD BIPLANE) 2D: 39 ML
SODIUM SERPL-SCNC: 138 MMOL/L (ref 135–147)

## 2022-12-04 RX ADMIN — ACETAMINOPHEN 650 MG: 325 TABLET, FILM COATED ORAL at 17:39

## 2022-12-04 RX ADMIN — ENOXAPARIN SODIUM 60 MG: 60 INJECTION SUBCUTANEOUS at 09:00

## 2022-12-04 RX ADMIN — FUROSEMIDE 80 MG: 10 INJECTION, SOLUTION INTRAMUSCULAR; INTRAVENOUS at 15:51

## 2022-12-04 RX ADMIN — ENOXAPARIN SODIUM 60 MG: 60 INJECTION SUBCUTANEOUS at 20:45

## 2022-12-04 RX ADMIN — LISINOPRIL 10 MG: 10 TABLET ORAL at 08:59

## 2022-12-04 RX ADMIN — FUROSEMIDE 80 MG: 10 INJECTION, SOLUTION INTRAMUSCULAR; INTRAVENOUS at 08:59

## 2022-12-04 NOTE — ASSESSMENT & PLAN NOTE
·  likely multifactorial in the setting of morbid obesity with likely underlying OHS, sleep apnea and acute on chronic CHF  Significant pulmonary vascular congestion on imaging  · No known h/o underlying lung disease, not on home oxygent supplementation at baseline    · Oxygen requirements continued to be a 4 5 L  · Will continue IV diuretics as stated above  · Continue to wean nasal cannula oxygen for sats > 88%  · Continue respiratory protocol

## 2022-12-04 NOTE — PROGRESS NOTES
Cardiology Progress Note - Ashleigh Tian 63 y o  female MRN: [id number]    Unit/Bed#: S -01 Encounter: [id number]        Subjective:    No significant events overnight  No chest pain  Her dyspnea is better  Review of Systems   Constitutional: Positive for malaise/fatigue  Cardiovascular: Negative for chest pain  Respiratory: Positive for shortness of breath  Objective:   Vitals: Blood pressure 129/66, pulse 90, temperature 97 9 °F (36 6 °C), temperature source Oral, resp  rate 17, height 5' 5" (1 651 m), weight (!) 180 kg (396 lb), SpO2 93 %  , Body mass index is 65 9 kg/m² ,   Orthostatic Blood Pressures    Flowsheet Row Most Recent Value   Blood Pressure 129/66 filed at 12/04/2022 0071   Patient Position - Orthostatic VS Lying filed at 12/04/2022 4980         Systolic (64HZN), CATIA:216 , Min:111 , GXE:288     Diastolic (08GQM), WCR:72, Min:60, Max:68      Intake/Output Summary (Last 24 hours) at 12/4/2022 0926  Last data filed at 12/4/2022 9269  Gross per 24 hour   Intake 760 ml   Output 4075 ml   Net -3315 ml     Weight (last 2 days)     Date/Time Weight    12/03/22 1345 180 (396)    12/03/22 10:28:47 180 (396 17)    12/02/22 1905 179 (395)    12/02/22 18:03:57 179 (395)                Physical Exam  Vitals and nursing note reviewed  Constitutional:       Appearance: Normal appearance  HENT:      Head: Normocephalic  Nose: Nose normal       Mouth/Throat:      Mouth: Mucous membranes are moist    Eyes:      General: No scleral icterus  Conjunctiva/sclera: Conjunctivae normal    Cardiovascular:      Rate and Rhythm: Normal rate and regular rhythm  Heart sounds: No murmur heard  No gallop  Pulmonary:      Effort: Pulmonary effort is normal  No respiratory distress  Breath sounds: Normal breath sounds  No wheezing or rales  Abdominal:      General: Abdomen is flat  Bowel sounds are normal  There is no distension  Palpations: Abdomen is soft  Tenderness: There is no abdominal tenderness  There is no guarding  Musculoskeletal:      Cervical back: Normal range of motion and neck supple  Right lower leg: Edema present  Left lower leg: Edema present  Skin:     General: Skin is warm and dry  Neurological:      General: No focal deficit present  Mental Status: She is alert and oriented to person, place, and time     Psychiatric:         Mood and Affect: Mood normal          Behavior: Behavior normal            Laboratory Results:        CBC with diff:   Results from last 7 days   Lab Units 12/03/22  0658 12/02/22  1852 12/02/22  1304   WBC Thousand/uL 5 44  --  5 44   HEMOGLOBIN g/dL 11 8  --  12 2   HEMATOCRIT % 39 2  --  40 1   MCV fL 98  --  94   PLATELETS Thousands/uL 139* 164 163   MCH pg 29 5  --  28 7   MCHC g/dL 30 1*  --  30 4*   RDW % 15 6*  --  15 4*   MPV fL 10 7 10 8 10 9   NRBC AUTO /100 WBCs  --   --  0         CMP:  Results from last 7 days   Lab Units 12/04/22  0453 12/03/22 0658 12/02/22  1304   POTASSIUM mmol/L 4 1 4 1 4 9   CHLORIDE mmol/L 99 104 105   CO2 mmol/L 38* 33* 31   BUN mg/dL 15 14 11   CREATININE mg/dL 0 73 0 56* 0 59*   CALCIUM mg/dL 9 0 9 1 9 5   AST U/L  --   --  26   ALT U/L  --   --  20   ALK PHOS U/L  --   --  73   EGFR ml/min/1 73sq m 89 101 99         BMP:  Results from last 7 days   Lab Units 12/04/22  0453 12/03/22  0658 12/02/22  1304   POTASSIUM mmol/L 4 1 4 1 4 9   CHLORIDE mmol/L 99 104 105   CO2 mmol/L 38* 33* 31   BUN mg/dL 15 14 11   CREATININE mg/dL 0 73 0 56* 0 59*   CALCIUM mg/dL 9 0 9 1 9 5       BNP:   Recent Labs     12/02/22  1304   BNP 49       Magnesium:   Results from last 7 days   Lab Units 12/03/22  0658   MAGNESIUM mg/dL 2 2       Coags:       TSH: No results found for: TSH    Hemoglobin A1C       Lipid Profile:       Cardiac testing:   Results for orders placed during the hospital encounter of 02/10/21    Echo complete with contrast if indicated    Vicenta Sahni [facility], [address] [place] [address]  [phone]    Transthoracic Echocardiogram  2D, M-mode, Doppler, and Color Doppler    Study date:  10-Feb-2021    Patient: Derek Mariscal  MR number: [id number]  Account number: [de-identified]  : 1960  Age: 63 years  Gender: Female  Status: Outpatient  Location: [address] [place] [address] and [facility]  Height: 65 in  Weight: 386 1 lb  BP: 110/ 74 mmHg    Indications: Pedal edema;SOB on exertion;CHF  Diagnoses: R06 02 - Shortness of breath, R60 9 - Edema, unspecified    Sonographer:  DARYL Solano  Primary Physician:  Briseida Sragent DO  Referring Physician:  Briseida Sargent DO  Group:  [facility]  Cardiology Fellow: Lupillo Packer MD  Interpreting Physician:  Valdemar Bueno MD    SUMMARY    LEFT VENTRICLE:  Systolic function was normal  Ejection fraction was estimated to be 65 %  There were no regional wall motion abnormalities  Doppler parameters were consistent with abnormal left ventricular relaxation (grade 1 diastolic dysfunction)  TRICUSPID VALVE:  There was trace regurgitation  IVC, HEPATIC VEINS:  The inferior vena cava was dilated  Respirophasic changes were blunted (less than 50% variation)  HISTORY: PRIOR HISTORY: BRADY;Smoker; Morbid obesity;HLD;HTN;COPD  PROCEDURE: The study was performed in the [address] [facility]  This was a routine study  The transthoracic approach was used  The study included complete 2D imaging, M-mode, complete spectral Doppler, and color Doppler  The  heart rate was 80 bpm, at the start of the study  Images were obtained from the parasternal, apical, subcostal, and suprasternal notch acoustic windows  Echocardiographic views were limited due to poor acoustic window availability,  decreased penetration, and lung interference  This was a technically difficult study      LEFT VENTRICLE: Size was normal  Systolic function was normal  Ejection fraction was estimated to be 65 %  There were no regional wall motion abnormalities  Wall thickness was normal  DOPPLER: Doppler parameters were consistent with  abnormal left ventricular relaxation (grade 1 diastolic dysfunction)  RIGHT VENTRICLE: The size was normal  Systolic function was normal  Wall thickness was normal     LEFT ATRIUM: Size was normal     RIGHT ATRIUM: Size was normal     MITRAL VALVE: Valve structure was normal  There was normal leaflet separation  DOPPLER: The transmitral velocity was within the normal range  There was no evidence for stenosis  There was no regurgitation  AORTIC VALVE: The valve was trileaflet  Leaflets exhibited normal thickness and normal cuspal separation  DOPPLER: Transaortic velocity was within the normal range  There was no evidence for stenosis  There was no regurgitation  TRICUSPID VALVE: The valve structure was normal  There was normal leaflet separation  DOPPLER: The transtricuspid velocity was within the normal range  There was no evidence for stenosis  There was trace regurgitation  PULMONIC VALVE: Leaflets exhibited normal thickness, no calcification, and normal cuspal separation  DOPPLER: The transpulmonic velocity was within the normal range  There was no regurgitation  PERICARDIUM: There was no pericardial effusion  The pericardium was normal in appearance  AORTA: The root exhibited normal size  SYSTEMIC VEINS: IVC: The inferior vena cava was dilated  Respirophasic changes were blunted (less than 50% variation)      SYSTEM MEASUREMENT TABLES    2D  %FS: 27 94 %  Ao Diam: 3 29 cm  EDV(Teich): 171 86 ml  EF(Teich): 53 32 %  ESV(Teich): 80 23 ml  IVSd: 1 21 cm  LA Area: 23 96 cm2  LA Diam: 4 68 cm  LVEDV MOD A4C: 109 9 ml  LVEF MOD A4C: 64 35 %  LVESV MOD A4C: 39 18 ml  LVIDd: 5 88 cm  LVIDs: 4 24 cm  LVLd A4C: 8 59 cm  LVLs A4C: 6 49 cm  LVPWd: 1 21 cm  RA Area: 17 2 cm2  RVIDd: 4 01 cm  SV MOD A4C: 70 72 ml  SV(Teich): 91 63 ml    CW  AV Vmax: 1 57 m/s  AV maxPG: 9 92 mmHg    MM  TAPSE: 2 7 cm    PW  E' Sept: 0 04 m/s  E/E' Sept: 22 85  MV A Vito: 1 05 m/s  MV Dec [place]: 3 42 m/s2  MV DecT: 258 2 ms  MV E Vito: 0 88 m/s  MV E/A Ratio: 0 84  MV PHT: 74 88 ms  MVA By PHT: 2 94 cm2    [address] [facility]    Prepared and electronically signed by    Aminah Hamlin MD  Signed 10-Feb-2021 13:57:17    No results found for this or any previous visit  No results found for this or any previous visit  No results found for this or any previous visit  Meds/Allergies   current meds:   Current Facility-Administered Medications   Medication Dose Route Frequency   • acetaminophen (TYLENOL) tablet 650 mg  650 mg Oral Q6H PRN   • albuterol (PROVENTIL HFA,VENTOLIN HFA) inhaler 2 puff  2 puff Inhalation Q6H PRN   • albuterol inhalation solution 2 5 mg  2 5 mg Nebulization Q6H PRN   • enoxaparin (LOVENOX) subcutaneous injection 60 mg  60 mg Subcutaneous Q12H SYED   • furosemide (LASIX) injection 80 mg  80 mg Intravenous BID (diuretic)   • lisinopril (ZESTRIL) tablet 10 mg  10 mg Oral Daily     Medications Prior to Admission   Medication   • acetaminophen (TYLENOL) 500 mg tablet   • albuterol (PROVENTIL HFA,VENTOLIN HFA) 90 mcg/act inhaler   • furosemide (LASIX) 20 mg tablet   • lisinopril (ZESTRIL) 10 mg tablet          Assessment:  Principal Problem:    Acute exacerbation of CHF (congestive heart failure) ([facility])  Active Problems:    Essential hypertension    COPD (chronic obstructive pulmonary disease) ([facility])    Morbid obesity ([facility])    Respiratory failure with hypoxia ([facility])      Acute on Chronic Diastolic CHF: Patient is diuresing well  Continue with IV lasix  Her renal function is stable  Continue Lisinopril for hypertension  Counseling / Coordination of Care  Total floor / unit time spent today 25 minutes  Greater than 50% of total time was spent with the patient and / or family counseling and / or coordination of care    A description of the counseling / coordination of care

## 2022-12-04 NOTE — PROGRESS NOTES
[facility]  Progress Note - Ashleigh Row 1960, 63 y o  female MRN: [id number]  Unit/Bed#: S -01 Encounter: [id number]  Primary Care Provider: Sakina Hinson DO   Date and time admitted to hospital: 12/2/2022 12:28 PM    * Acute exacerbation of CHF (congestive heart failure) ([facility] [address] )  Assessment & Plan  Wt Readings from Last 3 Encounters:   12/03/22 (!) 180 kg (396 lb)   12/02/22 (!) 182 kg (400 lb 11 2 oz)   10/03/22 (!) 177 kg (389 lb 8 oz)     · POA : HERNANDEZ + orthopnea + GIO + weight gain , vascular congestion on imaging  · Poorly compliant to diuretic therapy  • Dry weight seems to be aroundDry weight:  389 lb , on admission:  395 lb per standing scale  · echocardiogram (2/2010) showed EF 65%, no wall motion abnormalities, grade 1 diastolic dysfunction, no hemodynamically significant valvular disease, normal RV function  · Volume status difficult to assess due to body habitus with morbid obesity  · On 80 mg b i d  Per Cardiology recommendations, UO 1 8 patient is -3 L  · Continue to monitor with daily weights, I's and O's, low-salt diet, fluid restriction 1500  · Monitor potassium magnesium keep potassium above 4 magnesium above 2        Respiratory failure with hypoxia (HCC)  Assessment & Plan  ·  likely multifactorial in the setting of morbid obesity with likely underlying OHS, sleep apnea and acute on chronic CHF  Significant pulmonary vascular congestion on imaging  · No known h/o underlying lung disease, not on home oxygent supplementation at baseline  · Oxygen requirements continued to be a 4 5 L  · Will continue IV diuretics as stated above  · Continue to wean nasal cannula oxygen for sats > 88%  · Continue respiratory protocol    COPD (chronic obstructive pulmonary disease) (HCC)  Assessment & Plan  · No acute exacerbation at this time  · Continue respiratory protocol, nebs p r n    · Supplemental nasal cannula O2 to maintain sats > 88%    Morbid obesity ([facility] [address] )  Assessment & Plan  · BMI: 66 68  · Recommend therapeutic lifestyle changes to promote weight loss    Essential hypertension  Assessment & Plan  · Blood pressure acceptable  · Continue lisinopril at home dose        VTE Pharmacologic Prophylaxis:   VTE Score: 5 Moderate Risk (Score 3-4) - Pharmacological DVT Prophylaxis Ordered: Enoxaparin (Lovenox)  Mechanical VTE Prophylaxis in Place: Yes    Patient Centered Rounds: I have performed bedside rounds with nursing staff today  Discussions with Specialists or Other Care Team Provider:  Attending physician    Education and Discussions with Family / Patient: Updated  (daughter) via phone  Current Length of Stay: 2 day(s)    Current Patient Status: Inpatient     Discharge Plan / Estimated Discharge Date: Anticipate discharge in 48 hrs to home  Code Status: Level 1 - Full Code      Subjective:   Reports fatigue, persistent shortness of breath, denies any chest pain or GI disturbances  Objective:     Vitals:   Temp (24hrs), Av °F (36 7 °C), Min:97 9 °F (36 6 °C), Max:98 3 °F (36 8 °C)    Temp:  [97 9 °F (36 6 °C)-98 3 °F (36 8 °C)] 97 9 °F (36 6 °C)  HR:  [79-90] 90  Resp:  [17-18] 17  BP: (125-130)/(66-68) 129/66  SpO2:  [88 %-96 %] 93 %  Body mass index is 65 9 kg/m²  Input and Output Summary (last 24 hours): Intake/Output Summary (Last 24 hours) at 2022 1442  Last data filed at 2022 1200  Gross per 24 hour   Intake 760 ml   Output 3975 ml   Net -3215 ml       Physical Exam:     Physical Exam  Vitals and nursing note reviewed  Constitutional:       General: She is not in acute distress  Appearance: She is normal weight  She is ill-appearing  She is not toxic-appearing  HENT:      Head: Normocephalic and atraumatic        Right Ear: Tympanic membrane normal       Left Ear: Tympanic membrane normal       Nose: Nose normal       Mouth/Throat:      Mouth: Mucous membranes are moist    Eyes:      Extraocular Movements: Extraocular movements intact  Conjunctiva/sclera: Conjunctivae normal       Pupils: Pupils are equal, round, and reactive to light  Cardiovascular:      Rate and Rhythm: Normal rate  Pulses: Normal pulses  Heart sounds: No murmur heard  No gallop  Pulmonary:      Effort: Pulmonary effort is normal  No respiratory distress  Breath sounds: Rales present  No wheezing  Comments: Decreased breath sounds bilaterally  Chest:      Chest wall: No tenderness  Abdominal:      General: Bowel sounds are normal  There is no distension  Palpations: Abdomen is soft  Tenderness: There is no abdominal tenderness  Musculoskeletal:         General: Normal range of motion  Cervical back: Normal range of motion  Right lower leg: Edema present  Left lower leg: Edema present  Skin:     General: Skin is warm  Capillary Refill: Capillary refill takes 2 to 3 seconds  Neurological:      Mental Status: She is alert and oriented to person, place, and time  Psychiatric:         Mood and Affect: Mood normal          Behavior: Behavior normal          Thought Content: Thought content normal          Judgment: Judgment normal           Additional Data:     Labs:  Results from last 7 days   Lab Units 12/03/22  0658 12/02/22  1852 12/02/22  1304   WBC Thousand/uL 5 44  --  5 44   HEMOGLOBIN g/dL 11 8  --  12 2   HEMATOCRIT % 39 2  --  40 1   PLATELETS Thousands/uL 139*   < > 163   NEUTROS PCT %  --   --  69   LYMPHS PCT %  --   --  16   MONOS PCT %  --   --  13*   EOS PCT %  --   --  0    < > = values in this interval not displayed       Results from last 7 days   Lab Units 12/04/22  0453 12/03/22  0658 12/02/22  1304   SODIUM mmol/L 138   < > 140   POTASSIUM mmol/L 4 1   < > 4 9   CHLORIDE mmol/L 99   < > 105   CO2 mmol/L 38*   < > 31   BUN mg/dL 15   < > 11   CREATININE mg/dL 0 73   < > 0 59*   ANION GAP mmol/L 1*   < > 4   CALCIUM mg/dL 9 0   < > 9 5   ALBUMIN g/dL --   --  3 8   TOTAL BILIRUBIN mg/dL  --   --  0 59   ALK PHOS U/L  --   --  73   ALT U/L  --   --  20   AST U/L  --   --  26   GLUCOSE RANDOM mg/dL 122   < > 90    < > = values in this interval not displayed  Results from last 7 days   Lab Units 12/03/22  0658 12/02/22  1304   PROCALCITONIN ng/ml 0 08 0 08       Imaging: No pertinent imaging reviewed  Recent Cultures (last 7 days):           Lines/Drains:  Invasive Devices     Peripheral Intravenous Line  Duration           Long-Dwell Peripheral IV (Midline) 67/51/51 Left Cephalic <1 day                Telemetry:   Telemetry Orders (From admission, onward)             48 Hour Telemetry Monitoring  Continuous x 48 hours        References:    Telemetry Guidelines   Question:  Reason for 48 Hour Telemetry  Answer:  Acute Decompensated CHF (continuous diuretic infusion or total diuretic dose > 200 mg daily, associated electrolyte derangement, ionotropic drip, history of ventricular arrhythmia, or new EF <35%)                    Last 24 Hours Medication List:   Current Facility-Administered Medications   Medication Dose Route Frequency Provider Last Rate   • acetaminophen  650 mg Oral Q6H PRN Franklin Asher MD     • albuterol  2 puff Inhalation Q6H PRN Mariella Mcfarland MD     • albuterol  2 5 mg Nebulization Q6H PRN Mariella Mcfarland MD     • enoxaparin  60 mg Subcutaneous Q12H [address] Emily Yip MD     • furosemide  80 mg Intravenous BID (diuretic) Cristobal Lim PA-C     • lisinopril  10 mg Oral Daily Mariella Mcfarland MD          Today, Patient Was Seen By: Stephanie Randle MD    ** Please Note: This note has been constructed using a voice recognition system       Nutrition Assessment and Intervention:     Reviewed food recall journal      Physical Activity Assessment and Intervention:    Activity journal reviewed      Emotional and Mental Well-being, Sleep, Connectedness Assessment and Intervention:    Sleep/stress assessment performed      Tobacco and Toxic Substance Assessment and Intervention:     Tobacco use screening performed    Alcohol and drug use screening performed

## 2022-12-04 NOTE — ASSESSMENT & PLAN NOTE
Wt Readings from Last 3 Encounters:   12/03/22 (!) 180 kg (396 lb)   12/02/22 (!) 182 kg (400 lb 11 2 oz)   10/03/22 (!) 177 kg (389 lb 8 oz)     · POA : HERNANDEZ + orthopnea + GIO + weight gain , vascular congestion on imaging  · Poorly compliant to diuretic therapy  • Dry weight seems to be aroundDry weight:  389 lb , on admission:  395 lb per standing scale  · echocardiogram (2/2010) showed EF 65%, no wall motion abnormalities, grade 1 diastolic dysfunction, no hemodynamically significant valvular disease, normal RV function  · Volume status difficult to assess due to body habitus with morbid obesity  · On 80 mg b i d   Per Cardiology recommendations, UO 1 8 patient is -3 L  · Continue to monitor with daily weights, I's and O's, low-salt diet, fluid restriction 1500  · Monitor potassium magnesium keep potassium above 4 magnesium above 2

## 2022-12-04 NOTE — PLAN OF CARE
Problem: PAIN - ADULT  Goal: Verbalizes/displays adequate comfort level or baseline comfort level  Description: Interventions:  - Encourage patient to monitor pain and request assistance  - Assess pain using appropriate pain scale  - Administer analgesics based on type and severity of pain and evaluate response  - Implement non-pharmacological measures as appropriate and evaluate response  - Consider cultural and social influences on pain and pain management  - Notify physician/advanced practitioner if interventions unsuccessful or patient reports new pain  Outcome: Progressing     Problem: INFECTION - ADULT  Goal: Absence or prevention of progression during hospitalization  Description: INTERVENTIONS:  - Assess and monitor for signs and symptoms of infection  - Monitor lab/diagnostic results  - Monitor all insertion sites, i e  indwelling lines, tubes, and drains  - Monitor endotracheal if appropriate and nasal secretions for changes in amount and color  - [place] appropriate cooling/warming therapies per order  - Administer medications as ordered  - Instruct and encourage patient and family to use good hand hygiene technique  - Identify and instruct in appropriate isolation precautions for identified infection/condition  Outcome: Progressing     Problem: SAFETY ADULT  Goal: Maintain or return to baseline ADL function  Description: INTERVENTIONS:  -  Assess patient's ability to carry out ADLs; assess patient's baseline for ADL function and identify physical deficits which impact ability to perform ADLs (bathing, care of mouth/teeth, toileting, grooming, dressing, etc )  - Assess/evaluate cause of self-care deficits   - Assess range of motion  - Assess patient's mobility; develop plan if impaired  - Assess patient's need for assistive devices and provide as appropriate  - Encourage maximum independence but intervene and supervise when necessary  - Involve family in performance of ADLs  - Assess for home care needs following discharge   - Consider OT consult to assist with ADL evaluation and planning for discharge  - Provide patient education as appropriate  Outcome: Progressing     Problem: Knowledge Deficit  Goal: Patient/family/caregiver demonstrates understanding of disease process, treatment plan, medications, and discharge instructions  Description: Complete learning assessment and assess knowledge base    Interventions:  - Provide teaching at level of understanding  - Provide teaching via preferred learning methods  Outcome: Progressing     Problem: DISCHARGE PLANNING  Goal: Discharge to home or other facility with appropriate resources  Description: INTERVENTIONS:  - Identify barriers to discharge w/patient and caregiver  - Arrange for needed discharge resources and transportation as appropriate  - Identify discharge learning needs (meds, wound care, etc )  - Arrange for interpretive services to assist at discharge as needed  - Refer to Case Management Department for coordinating discharge planning if the patient needs post-hospital services based on physician/advanced practitioner order or complex needs related to functional status, cognitive ability, or social support system  Outcome: Progressing

## 2022-12-05 ENCOUNTER — EPISODE CHANGES (OUTPATIENT)
Dept: CASE MANAGEMENT | Facility: OTHER | Age: 62
End: 2022-12-05

## 2022-12-05 LAB
ANION GAP SERPL CALCULATED.3IONS-SCNC: 3 MMOL/L (ref 4–13)
BASOPHILS # BLD AUTO: 0.02 THOUSANDS/ÂΜL (ref 0–0.1)
BASOPHILS NFR BLD AUTO: 0 % (ref 0–1)
BUN SERPL-MCNC: 17 MG/DL (ref 5–25)
CALCIUM SERPL-MCNC: 9.1 MG/DL (ref 8.4–10.2)
CHLORIDE SERPL-SCNC: 97 MMOL/L (ref 96–108)
CO2 SERPL-SCNC: 38 MMOL/L (ref 21–32)
CREAT SERPL-MCNC: 0.66 MG/DL (ref 0.6–1.3)
EOSINOPHIL # BLD AUTO: 0.15 THOUSAND/ÂΜL (ref 0–0.61)
EOSINOPHIL NFR BLD AUTO: 3 % (ref 0–6)
ERYTHROCYTE [DISTWIDTH] IN BLOOD BY AUTOMATED COUNT: 15.2 % (ref 11.6–15.1)
FLUAV RNA RESP QL NAA+PROBE: NEGATIVE
FLUBV RNA RESP QL NAA+PROBE: NEGATIVE
GFR SERPL CREATININE-BSD FRML MDRD: 96 ML/MIN/1.73SQ M
GLUCOSE SERPL-MCNC: 122 MG/DL (ref 65–140)
HCT VFR BLD AUTO: 40.4 % (ref 34.8–46.1)
HGB BLD-MCNC: 12 G/DL (ref 11.5–15.4)
IMM GRANULOCYTES # BLD AUTO: 0.03 THOUSAND/UL (ref 0–0.2)
IMM GRANULOCYTES NFR BLD AUTO: 1 % (ref 0–2)
LYMPHOCYTES # BLD AUTO: 0.72 THOUSANDS/ÂΜL (ref 0.6–4.47)
LYMPHOCYTES NFR BLD AUTO: 13 % (ref 14–44)
MAGNESIUM SERPL-MCNC: 2.1 MG/DL (ref 1.9–2.7)
MCH RBC QN AUTO: 28.4 PG (ref 26.8–34.3)
MCHC RBC AUTO-ENTMCNC: 29.7 G/DL (ref 31.4–37.4)
MCV RBC AUTO: 96 FL (ref 82–98)
MONOCYTES # BLD AUTO: 0.6 THOUSAND/ÂΜL (ref 0.17–1.22)
MONOCYTES NFR BLD AUTO: 11 % (ref 4–12)
NEUTROPHILS # BLD AUTO: 4.08 THOUSANDS/ÂΜL (ref 1.85–7.62)
NEUTS SEG NFR BLD AUTO: 72 % (ref 43–75)
NRBC BLD AUTO-RTO: 0 /100 WBCS
PLATELET # BLD AUTO: 190 THOUSANDS/UL (ref 149–390)
PMV BLD AUTO: 10.6 FL (ref 8.9–12.7)
POTASSIUM SERPL-SCNC: 4.1 MMOL/L (ref 3.5–5.3)
RBC # BLD AUTO: 4.23 MILLION/UL (ref 3.81–5.12)
RSV RNA RESP QL NAA+PROBE: NEGATIVE
SARS-COV-2 RNA RESP QL NAA+PROBE: NEGATIVE
SODIUM SERPL-SCNC: 138 MMOL/L (ref 135–147)
WBC # BLD AUTO: 5.6 THOUSAND/UL (ref 4.31–10.16)

## 2022-12-05 RX ORDER — METHYLPREDNISOLONE SODIUM SUCCINATE 40 MG/ML
40 INJECTION, POWDER, LYOPHILIZED, FOR SOLUTION INTRAMUSCULAR; INTRAVENOUS DAILY
Status: DISCONTINUED | OUTPATIENT
Start: 2022-12-06 | End: 2022-12-06

## 2022-12-05 RX ORDER — ALBUTEROL SULFATE 2.5 MG/3ML
2.5 SOLUTION RESPIRATORY (INHALATION)
Status: DISCONTINUED | OUTPATIENT
Start: 2022-12-05 | End: 2022-12-05

## 2022-12-05 RX ADMIN — ALBUTEROL SULFATE 2.5 MG: 2.5 SOLUTION RESPIRATORY (INHALATION) at 09:10

## 2022-12-05 RX ADMIN — ENOXAPARIN SODIUM 60 MG: 60 INJECTION SUBCUTANEOUS at 20:48

## 2022-12-05 RX ADMIN — FUROSEMIDE 80 MG: 10 INJECTION, SOLUTION INTRAMUSCULAR; INTRAVENOUS at 17:23

## 2022-12-05 RX ADMIN — LISINOPRIL 10 MG: 10 TABLET ORAL at 08:32

## 2022-12-05 RX ADMIN — ALBUTEROL SULFATE 2.5 MG: 2.5 SOLUTION RESPIRATORY (INHALATION) at 13:20

## 2022-12-05 RX ADMIN — FUROSEMIDE 80 MG: 10 INJECTION, SOLUTION INTRAMUSCULAR; INTRAVENOUS at 08:32

## 2022-12-05 RX ADMIN — ENOXAPARIN SODIUM 60 MG: 60 INJECTION SUBCUTANEOUS at 08:32

## 2022-12-05 NOTE — ASSESSMENT & PLAN NOTE
Wt Readings from Last 3 Encounters:   12/05/22 (!) 174 kg (384 lb 7 7 oz)   12/02/22 (!) 182 kg (400 lb 11 2 oz)   10/03/22 (!) 177 kg (389 lb 8 oz)     · POA : HERNANDEZ + orthopnea + GIO + weight gain , vascular congestion on imaging  · Poorly compliant to diuretic therapy  • Dry weight seems to be aroundDry weight:  389 lb , on admission:  395 lb per standing scale  · echocardiogram (2/2010) showed EF 65%, no wall motion abnormalities, grade 1 diastolic dysfunction, no hemodynamically significant valvular disease, normal RV function  · Volume status difficult to assess due to body habitus with morbid obesity  · On 80 mg b i d   Per Cardiology recommendations, UO 2 4 - 4 4 L, weight down 11 lb  · Continue to monitor with daily weights, I's and O's, low-salt diet, fluid restriction 1500  · Monitor potassium magnesium keep potassium above 4 magnesium above 2

## 2022-12-05 NOTE — PROGRESS NOTES
General Cardiology   Progress Note -  Team One   Mt Sanchez 63 y o  female MRN: [id number]    Unit/Bed#: S -01 Encounter: [id number]    Assessment/ Plan    1  Acute on chronic HFpEF   On furosemide 80 mg IV BID   Non compliant with diuretics at home prior to admission   Monitor I/Os -2 4 L in 24 hours   Daily weights 384 lbs   Creatinine stable: 0 66  Continue 2 gram Na diet and 1500 ml fluid restriction   Reviewed HF education with patient     2  Hypertension   BP stable: 148/67  On lisinopril 10 mg PO daily     3  COPD  Followed by primary team   On albuterol     4  Morbid obesity   BMI 63    Subjective  Patient resting comfortable in chair  She reports wheezing this morning but better now  No chest pain, SOB or palpitations  No fever or chills  Review of Systems   Constitutional: Negative for chills and fever  HENT: Negative for congestion  Cardiovascular: Positive for leg swelling  Negative for chest pain, dyspnea on exertion and orthopnea  Respiratory: Negative for shortness of breath  Musculoskeletal: Negative for falls  Gastrointestinal: Negative for bloating, nausea and vomiting  Neurological: Negative for dizziness and light-headedness  Psychiatric/Behavioral: Negative for altered mental status  All other systems reviewed and are negative  Objective:   Vitals: Blood pressure 148/67, pulse 89, temperature 99 1 °F (37 3 °C), temperature source Oral, resp  rate 18, height 5' 5" (1 651 m), weight (!) 174 kg (384 lb 7 7 oz), SpO2 90 %  ,       Body mass index is 63 98 kg/m²  ,     Systolic (26HZR), FJZ:814 , Min:115 , FDQ:642     Diastolic (06KBC), HS, Min:58, Max:67      Intake/Output Summary (Last 24 hours) at 2022 1118  Last data filed at 2022 0601  Gross per 24 hour   Intake 600 ml   Output 925 ml   Net -325 ml     Weight (last 2 days)     Date/Time Weight    22 0600 174 (384 48)    22 1345 180 (396)    22 10:28:47 180 (396 17) Telemetry Review: Normal sinus rhythm     Physical Exam  Constitutional:       General: She is not in acute distress  Appearance: She is obese  HENT:      Head: Normocephalic  Mouth/Throat:      Mouth: Mucous membranes are moist    Cardiovascular:      Rate and Rhythm: Normal rate and regular rhythm  Pulses: Normal pulses  Pulmonary:      Effort: Pulmonary effort is normal  No respiratory distress  Comments: Decreased in bases   5 L NC  Abdominal:      General: Bowel sounds are normal       Palpations: Abdomen is soft  Musculoskeletal:         General: Swelling present  Normal range of motion  Cervical back: Neck supple  Comments: Chronic lymphedema    Skin:     General: Skin is warm and dry  Capillary Refill: Capillary refill takes less than 2 seconds  Neurological:      General: No focal deficit present  Mental Status: She is alert and oriented to person, place, and time     Psychiatric:         Mood and Affect: Mood normal          LABORATORY RESULTS      CBC with diff:   Results from last 7 days   Lab Units 12/05/22  0645 12/03/22  0658 12/02/22  1852 12/02/22  1304   WBC Thousand/uL 5 60 5 44  --  5 44   HEMOGLOBIN g/dL 12 0 11 8  --  12 2   HEMATOCRIT % 40 4 39 2  --  40 1   MCV fL 96 98  --  94   PLATELETS Thousands/uL 190 139* 164 163   MCH pg 28 4 29 5  --  28 7   MCHC g/dL 29 7* 30 1*  --  30 4*   RDW % 15 2* 15 6*  --  15 4*   MPV fL 10 6 10 7 10 8 10 9   NRBC AUTO /100 WBCs 0  --   --  0       CMP:  Results from last 7 days   Lab Units 12/05/22  0645 12/04/22  0453 12/03/22  0658 12/02/22  1304   POTASSIUM mmol/L 4 1 4 1 4 1 4 9   CHLORIDE mmol/L 97 99 104 105   CO2 mmol/L 38* 38* 33* 31   BUN mg/dL 17 15 14 11   CREATININE mg/dL 0 66 0 73 0 56* 0 59*   CALCIUM mg/dL 9 1 9 0 9 1 9 5   AST U/L  --   --   --  26   ALT U/L  --   --   --  20   ALK PHOS U/L  --   --   --  73   EGFR ml/min/1 73sq m 96 89 101 99       BMP:  Results from last 7 days   Lab Units 22  0645 22  0453 22  0658 22  1304   POTASSIUM mmol/L 4 1 4 1 4 1 4 9   CHLORIDE mmol/L 97 99 104 105   CO2 mmol/L 38* 38* 33* 31   BUN mg/dL 17 15 14 11   CREATININE mg/dL 0 66 0 73 0 56* 0 59*   CALCIUM mg/dL 9 1 9 0 9 1 9 5       Lab Results   Component Value Date    NTBNP 23 2022    NTBNP 27 2019    NTBNP 65 2019             Results from last 7 days   Lab Units 22  0645 22  0658   MAGNESIUM mg/dL 2 1 2 2                           Lipid Profile:   Lab Results   Component Value Date    CHOL 206 2015     Lab Results   Component Value Date    HDL 41 (L) 2022    HDL 42 (L) 2020    HDL 45 2015     Lab Results   Component Value Date    LDLCALC 123 (H) 2022    LDLCALC 131 (H) 2020    LDLCALC 136 (H) 2015     Lab Results   Component Value Date    TRIG 115 2022    TRIG 108 2020    TRIG 126 2015       Cardiac testing:   Results for orders placed during the hospital encounter of 02/10/21    Echo complete with contrast if indicated    Vic[address] Ramirez [address]  [phone]    Transthoracic Echocardiogram  2D, M-mode, Doppler, and Color Doppler    Study date:  10-Feb-2021    Patient: Mallory Adorno  MR number: [id number]  Account number: [de-identified]  : 1960  Age: 63 years  Gender: Female  Status: Outpatient  [facility]: [address] [place] [address] [facility]  Height: 65 in  Weight: 386 1 lb  BP: 110/ 74 mmHg    Indications: Pedal edema;SOB on exertion;CHF  Diagnoses: R06 02 - Shortness of breath, R60 9 - Edema, unspecified    Sonographer:  DARYL Ballard  Primary Physician:  Georgina Iqbal DO  Referring Physician:  Georgina Iqbal DO  Group:  [id number]  Cardiology Fellow:   Hammad Cazares MD  Interpreting Physician:  Brandon Fernandez MD    SUMMARY    LEFT VENTRICLE:  Systolic function was normal  Ejection fraction was estimated to be 65 %  There were no regional wall motion abnormalities  Doppler parameters were consistent with abnormal left ventricular relaxation (grade 1 diastolic dysfunction)  TRICUSPID VALVE:  There was trace regurgitation  IVC, HEPATIC VEINS:  The inferior vena cava was dilated  Respirophasic changes were blunted (less than 50% variation)  HISTORY: PRIOR HISTORY: BRADY;Smoker; Morbid obesity;HLD;HTN;COPD  PROCEDURE: The study was performed in the [address] [facility]  This was a routine study  The transthoracic approach was used  The study included complete 2D imaging, M-mode, complete spectral Doppler, and color Doppler  The  heart rate was 80 bpm, at the start of the study  Images were obtained from the parasternal, apical, subcostal, and suprasternal notch acoustic windows  Echocardiographic views were limited due to poor acoustic window availability,  decreased penetration, and lung interference  This was a technically difficult study  LEFT VENTRICLE: Size was normal  Systolic function was normal  Ejection fraction was estimated to be 65 %  There were no regional wall motion abnormalities  Wall thickness was normal  DOPPLER: Doppler parameters were consistent with  abnormal left ventricular relaxation (grade 1 diastolic dysfunction)  RIGHT VENTRICLE: The size was normal  Systolic function was normal  Wall thickness was normal     LEFT ATRIUM: Size was normal     RIGHT ATRIUM: Size was normal     MITRAL VALVE: Valve structure was normal  There was normal leaflet separation  DOPPLER: The transmitral velocity was within the normal range  There was no evidence for stenosis  There was no regurgitation  AORTIC VALVE: The valve was trileaflet  Leaflets exhibited normal thickness and normal cuspal separation  DOPPLER: Transaortic velocity was within the normal range  There was no evidence for stenosis  There was no regurgitation      TRICUSPID VALVE: The valve structure was normal  There was normal leaflet separation  DOPPLER: The transtricuspid velocity was within the normal range  There was no evidence for stenosis  There was trace regurgitation  PULMONIC VALVE: Leaflets exhibited normal thickness, no calcification, and normal cuspal separation  DOPPLER: The transpulmonic velocity was within the normal range  There was no regurgitation  PERICARDIUM: There was no pericardial effusion  The pericardium was normal in appearance  AORTA: The root exhibited normal size  SYSTEMIC VEINS: IVC: The inferior vena cava was dilated  Respirophasic changes were blunted (less than 50% variation)  SYSTEM MEASUREMENT TABLES    2D  %FS: 27 94 %  Ao Diam: 3 29 cm  EDV(Teich): 171 86 ml  EF(Teich): 53 32 %  ESV(Teich): 80 23 ml  IVSd: 1 21 cm  LA Area: 23 96 cm2  LA Diam: 4 68 cm  LVEDV MOD A4C: 109 9 ml  LVEF MOD A4C: 64 35 %  LVESV MOD A4C: 39 18 ml  LVIDd: 5 88 cm  LVIDs: 4 24 cm  LVLd A4C: 8 59 cm  LVLs A4C: 6 49 cm  LVPWd: 1 21 cm  RA Area: 17 2 cm2  RVIDd: 4 01 cm  SV MOD A4C: 70 72 ml  SV(Teich): 91 63 ml    CW  AV Vmax: 1 57 m/s  AV maxP 92 mmHg    MM  TAPSE: 2 7 cm    PW  E' Sept: 0 04 m/s  E/E' Sept: 22 85  MV A Vito: 1 05 m/s  MV Dec [place]: 3 42 m/s2  MV DecT: 258 2 ms  MV E Vito: 0 88 m/s  MV E/A Ratio: 0 84  MV PHT: 74 88 ms  MVA By PHT: 2 94 cm2    [employer] Accredited [facility]    Prepared and electronically signed by    Britney Ramachandran MD  Signed 10-Feb-2021 13:57:17    No results found for this or any previous visit  No results found for this or any previous visit  No valid procedures specified  No results found for this or any previous visit      Meds/Allergies   all current active meds have been reviewed and current meds:   Current Facility-Administered Medications   Medication Dose Route Frequency   • acetaminophen (TYLENOL) tablet 650 mg  650 mg Oral Q6H PRN   • albuterol (PROVENTIL HFA,VENTOLIN HFA) inhaler 2 puff  2 puff Inhalation Q6H PRN   • albuterol inhalation solution 2 5 mg  2 5 mg Nebulization TID   • enoxaparin (LOVENOX) subcutaneous injection 60 mg  60 mg Subcutaneous Q12H SYED   • furosemide (LASIX) injection 80 mg  80 mg Intravenous BID (diuretic)   • lisinopril (ZESTRIL) tablet 10 mg  10 mg Oral Daily     Medications Prior to Admission   Medication   • acetaminophen (TYLENOL) 500 mg tablet   • albuterol (PROVENTIL HFA,VENTOLIN HFA) 90 mcg/act inhaler   • furosemide (LASIX) 20 mg tablet   • lisinopril (ZESTRIL) 10 mg tablet     Counseling / Coordination of Care  Total floor / unit time spent today 20 minutes  Greater than 50% of total time was spent with the patient and / or family counseling and / or coordination of care  ** Please Note: Dragon 360 Dictation voice to text software may have been used in the creation of this document   **

## 2022-12-05 NOTE — PLAN OF CARE
Problem: PAIN - ADULT  Goal: Verbalizes/displays adequate comfort level or baseline comfort level  Description: Interventions:  - Encourage patient to monitor pain and request assistance  - Assess pain using appropriate pain scale  - Administer analgesics based on type and severity of pain and evaluate response  - Implement non-pharmacological measures as appropriate and evaluate response  - Consider cultural and social influences on pain and pain management  - Notify physician/advanced practitioner if interventions unsuccessful or patient reports new pain  Outcome: Progressing     Problem: INFECTION - ADULT  Goal: Absence or prevention of progression during hospitalization  Description: INTERVENTIONS:  - Assess and monitor for signs and symptoms of infection  - Monitor lab/diagnostic results  - Monitor all insertion sites, i e  indwelling lines, tubes, and drains  - Monitor endotracheal if appropriate and nasal secretions for changes in amount and color  - Admire appropriate cooling/warming therapies per order  - Administer medications as ordered  - Instruct and encourage patient and family to use good hand hygiene technique  - Identify and instruct in appropriate isolation precautions for identified infection/condition  Outcome: Progressing  Goal: Absence of fever/infection during neutropenic period  Description: INTERVENTIONS:  - Monitor WBC    Outcome: Progressing     Problem: SAFETY ADULT  Goal: Patient will remain free of falls  Description: INTERVENTIONS:  - Educate patient/family on patient safety including physical limitations  - Instruct patient to call for assistance with activity   - Consult OT/PT to assist with strengthening/mobility   - Keep Call bell within reach  - Keep bed low and locked with side rails adjusted as appropriate  - Keep care items and personal belongings within reach  - Initiate and maintain comfort rounds  - Make Fall Risk Sign visible to staff  - Offer Toileting every 2 Hours, in advance of need  - Initiate/Maintain bed alarm  - Obtain necessary fall risk management equipment: alarms  - Apply yellow socks and bracelet for high fall risk patients  - Consider moving patient to room near nurses station  Outcome: Progressing  Goal: Maintain or return to baseline ADL function  Description: INTERVENTIONS:  -  Assess patient's ability to carry out ADLs; assess patient's baseline for ADL function and identify physical deficits which impact ability to perform ADLs (bathing, care of mouth/teeth, toileting, grooming, dressing, etc )  - Assess/evaluate cause of self-care deficits   - Assess range of motion  - Assess patient's mobility; develop plan if impaired  - Assess patient's need for assistive devices and provide as appropriate  - Encourage maximum independence but intervene and supervise when necessary  - Involve family in performance of ADLs  - Assess for home care needs following discharge   - Consider OT consult to assist with ADL evaluation and planning for discharge  - Provide patient education as appropriate  Outcome: Progressing  Goal: Maintains/Returns to pre admission functional level  Description: INTERVENTIONS:  - Perform BMAT or MOVE assessment daily    - Set and communicate daily mobility goal to care team and patient/family/caregiver  - Collaborate with rehabilitation services on mobility goals if consulted  - Perform Range of Motion 4 times a day  - Reposition patient every 4 hours    - Dangle patient 4 times a day  - Stand patient 4 times a day  - Ambulate patient 4 times a day  - Out of bed to chair 3 times a day   - Out of bed for meals 3 times a day  - Out of bed for toileting  - Record patient progress and toleration of activity level   Outcome: Progressing     Problem: DISCHARGE PLANNING  Goal: Discharge to home or other facility with appropriate resources  Description: INTERVENTIONS:  - Identify barriers to discharge w/patient and caregiver  - Arrange for needed discharge resources and transportation as appropriate  - Identify discharge learning needs (meds, wound care, etc )  - Arrange for interpretive services to assist at discharge as needed  - Refer to Case Management Department for coordinating discharge planning if the patient needs post-hospital services based on physician/advanced practitioner order or complex needs related to functional status, cognitive ability, or social support system  Outcome: Progressing     Problem: Knowledge Deficit  Goal: Patient/family/caregiver demonstrates understanding of disease process, treatment plan, medications, and discharge instructions  Description: Complete learning assessment and assess knowledge base  Interventions:  - Provide teaching at level of understanding  - Provide teaching via preferred learning methods  Outcome: Progressing     Problem: MOBILITY - ADULT  Goal: Maintain or return to baseline ADL function  Description: INTERVENTIONS:  -  Assess patient's ability to carry out ADLs; assess patient's baseline for ADL function and identify physical deficits which impact ability to perform ADLs (bathing, care of mouth/teeth, toileting, grooming, dressing, etc )  - Assess/evaluate cause of self-care deficits   - Assess range of motion  - Assess patient's mobility; develop plan if impaired  - Assess patient's need for assistive devices and provide as appropriate  - Encourage maximum independence but intervene and supervise when necessary  - Involve family in performance of ADLs  - Assess for home care needs following discharge   - Consider OT consult to assist with ADL evaluation and planning for discharge  - Provide patient education as appropriate  Outcome: Progressing  Goal: Maintains/Returns to pre admission functional level  Description: INTERVENTIONS:  - Perform BMAT or MOVE assessment daily    - Set and communicate daily mobility goal to care team and patient/family/caregiver     - Collaborate with rehabilitation services on mobility goals if consulted  - Perform Range of Motion 4 times a day  - Reposition patient every 4 hours    - Dangle patient 4 times a day  - Stand patient 4 times a day  - Ambulate patient 4 times a day  - Out of bed to chair 3 times a day   - Out of bed for meals 3 times a day  - Out of bed for toileting  - Record patient progress and toleration of activity level   Outcome: Progressing     Problem: Potential for Falls  Goal: Patient will remain free of falls  Description: INTERVENTIONS:  - Educate patient/family on patient safety including physical limitations  - Instruct patient to call for assistance with activity   - Consult OT/PT to assist with strengthening/mobility   - Keep Call bell within reach  - Keep bed low and locked with side rails adjusted as appropriate  - Keep care items and personal belongings within reach  - Initiate and maintain comfort rounds  - Make Fall Risk Sign visible to staff  - Offer Toileting every 2 Hours, in advance of need  - Initiate/Maintain bed alarm  - Obtain necessary fall risk management equipment: alarms  - Apply yellow socks and bracelet for high fall risk patients  - Consider moving patient to room near nurses station  Outcome: Progressing     Problem: Prexisting or High Potential for Compromised Skin Integrity  Goal: Skin integrity is maintained or improved  Description: INTERVENTIONS:  - Identify patients at risk for skin breakdown  - Assess and monitor skin integrity  - Assess and monitor nutrition and hydration status  - Monitor labs   - Assess for incontinence   - Turn and reposition patient  - Assist with mobility/ambulation  - Relieve pressure over bony prominences  - Avoid friction and shearing  - Provide appropriate hygiene as needed including keeping skin clean and dry  - Evaluate need for skin moisturizer/barrier cream  - Collaborate with interdisciplinary team   - Patient/family teaching  - Consider wound care consult   Outcome: Progressing

## 2022-12-05 NOTE — ASSESSMENT & PLAN NOTE
· Today noted actively wheezing, will check influenza/ RSV   · Continue respiratory protocol, nebs p r n    · Supplemental nasal cannula O2 to maintain sats > 88%

## 2022-12-05 NOTE — PROGRESS NOTES
[facility]  Progress Note - Diana Pichardo 1960, 63 y o  female MRN: [id number]  Unit/Bed#: S -01 Encounter: [id number]  Primary Care Provider: Estrella Dias DO   Date and time admitted to hospital: 12/2/2022 12:28 PM    * Acute exacerbation of CHF (congestive heart failure) ([facility] )  Assessment & Plan  Wt Readings from Last 3 Encounters:   12/05/22 (!) 174 kg (384 lb 7 7 oz)   12/02/22 (!) 182 kg (400 lb 11 2 oz)   10/03/22 (!) 177 kg (389 lb 8 oz)     · POA : HERNANDEZ + orthopnea + GIO + weight gain , vascular congestion on imaging  · Poorly compliant to diuretic therapy  • Dry weight seems to be aroundDry weight:  389 lb , on admission:  395 lb per standing scale  · echocardiogram (2/2010) showed EF 65%, no wall motion abnormalities, grade 1 diastolic dysfunction, no hemodynamically significant valvular disease, normal RV function  · Volume status difficult to assess due to body habitus with morbid obesity  · On 80 mg b i d  Per Cardiology recommendations, UO 2 4 - 4 4 L, weight down 11 lb  · Continue to monitor with daily weights, I's and O's, low-salt diet, fluid restriction 1500  · Monitor potassium magnesium keep potassium above 4 magnesium above 2        Respiratory failure with hypoxia (HCC)  Assessment & Plan  ·  likely multifactorial in the setting of morbid obesity with likely underlying OHS, sleep apnea and acute on chronic CHF  Significant pulmonary vascular congestion on imaging  · No known h/o underlying lung disease, not on home oxygent supplementation at baseline  · Oxygen requirements continued to be a 4 5 L  · Will continue IV diuretics as stated above  · Continue to wean nasal cannula oxygen for sats > 88%  · Continue respiratory protocol    COPD (chronic obstructive pulmonary disease) ([facility] )  Assessment & Plan    · Today noted actively wheezing, will check influenza/ RSV   · Continue respiratory protocol, nebs p r n    · Supplemental nasal cannula O2 to maintain sats > 88%    Morbid obesity (HCC)  Assessment & Plan  · BMI: 66 68  · Recommend therapeutic lifestyle changes to promote weight loss    Essential hypertension  Assessment & Plan  · Blood pressure acceptable  · Continue lisinopril at home dose      VTE Pharmacologic Prophylaxis:   VTE Score: 5 Moderate Risk (Score 3-4) - Pharmacological DVT Prophylaxis Ordered: Enoxaparin (Lovenox)  Mechanical VTE Prophylaxis in Place: Yes    Patient Centered Rounds: I have performed bedside rounds with nursing staff today  Discussions with Specialists or Other Care Team Provider:  Attending physician    Education and Discussions with Family / Patient: Updated  (daughter) via phone  Current Length of Stay: 3 day(s)    Current Patient Status: Inpatient     Discharge Plan / Estimated Discharge Date: Anticipate discharge in 48 hrs to home  Code Status: Level 1 - Full Code      Subjective:   No overnight events, continue on oxygen supplementation 4 5 L, actively wheezing this morning    Objective:     Vitals:   Temp (24hrs), Av 5 °F (36 9 °C), Min:97 9 °F (36 6 °C), Max:99 1 °F (37 3 °C)    Temp:  [97 9 °F (36 6 °C)-99 1 °F (37 3 °C)] 99 1 °F (37 3 °C)  HR:  [83-89] 89  Resp:  [18] 18  BP: (135-148)/(65-67) 148/67  SpO2:  [90 %-93 %] 92 %  Body mass index is 63 98 kg/m²  Input and Output Summary (last 24 hours): Intake/Output Summary (Last 24 hours) at 2022 1640  Last data filed at 2022 0601  Gross per 24 hour   Intake 360 ml   Output 925 ml   Net -565 ml       Physical Exam:     Physical Exam  Vitals and nursing note reviewed  Constitutional:       General: She is not in acute distress  Appearance: She is obese  She is not toxic-appearing  HENT:      Head: Normocephalic and atraumatic        Right Ear: Tympanic membrane normal       Left Ear: Tympanic membrane normal       Nose: Nose normal       Mouth/Throat:      Mouth: Mucous membranes are moist       Pharynx: Oropharynx is clear  Eyes:      Extraocular Movements: Extraocular movements intact  Conjunctiva/sclera: Conjunctivae normal       Pupils: Pupils are equal, round, and reactive to light  Cardiovascular:      Rate and Rhythm: Normal rate  Pulses: Normal pulses  Heart sounds: No murmur heard  No gallop  Pulmonary:      Effort: Pulmonary effort is normal  No respiratory distress  Breath sounds: Wheezing and rales present  Abdominal:      General: Bowel sounds are normal  There is no distension  Palpations: Abdomen is soft  Tenderness: There is no abdominal tenderness  Musculoskeletal:      Cervical back: Normal range of motion  Right lower leg: Edema present  Left lower leg: Edema present  Skin:     General: Skin is warm  Capillary Refill: Capillary refill takes 2 to 3 seconds  Neurological:      Mental Status: She is alert and oriented to person, place, and time  Psychiatric:         Mood and Affect: Mood normal          Behavior: Behavior normal          Thought Content: Thought content normal          Judgment: Judgment normal           Additional Data:     Labs:  Results from last 7 days   Lab Units 12/05/22  0645   WBC Thousand/uL 5 60   HEMOGLOBIN g/dL 12 0   HEMATOCRIT % 40 4   PLATELETS Thousands/uL 190   NEUTROS PCT % 72   LYMPHS PCT % 13*   MONOS PCT % 11   EOS PCT % 3     Results from last 7 days   Lab Units 12/05/22  0645 12/03/22  0658 12/02/22  1304   SODIUM mmol/L 138   < > 140   POTASSIUM mmol/L 4 1   < > 4 9   CHLORIDE mmol/L 97   < > 105   CO2 mmol/L 38*   < > 31   BUN mg/dL 17   < > 11   CREATININE mg/dL 0 66   < > 0 59*   ANION GAP mmol/L 3*   < > 4   CALCIUM mg/dL 9 1   < > 9 5   ALBUMIN g/dL  --   --  3 8   TOTAL BILIRUBIN mg/dL  --   --  0 59   ALK PHOS U/L  --   --  73   ALT U/L  --   --  20   AST U/L  --   --  26   GLUCOSE RANDOM mg/dL 122   < > 90    < > = values in this interval not displayed                   Results from last 7 days   Lab Units 12/03/22  0658 12/02/22  1304   PROCALCITONIN ng/ml 0 08 0 08       Imaging: No pertinent imaging reviewed  Recent Cultures (last 7 days):           Lines/Drains:  Invasive Devices     Peripheral Intravenous Line  Duration           Long-Dwell Peripheral IV (Midline) 66/77/70 Left Cephalic 1 day                Telemetry:   Telemetry Orders (From admission, onward)             48 Hour Telemetry Monitoring  Continuous x 48 hours        References:    Telemetry Guidelines   Question:  Reason for 48 Hour Telemetry  Answer:  Acute Decompensated CHF (continuous diuretic infusion or total diuretic dose > 200 mg daily, associated electrolyte derangement, ionotropic drip, history of ventricular arrhythmia, or new EF <35%)                    Last 24 Hours Medication List:   Current Facility-Administered Medications   Medication Dose Route Frequency Provider Last Rate   • acetaminophen  650 mg Oral Q6H PRN Dung Barajas MD     • albuterol  2 puff Inhalation Q6H PRN Lilli Tucker MD     • albuterol  2 5 mg Nebulization TID Josefa Randle MD     • enoxaparin  60 mg Subcutaneous Q12H 41 [facility]     • furosemide  80 mg Intravenous BID (diuretic) Prem Rich PA-C     • lisinopril  10 mg Oral Daily Lilli Tucker MD          Today, Patient Was Seen By: Rubina Collins MD    ** Please Note: This note has been constructed using a voice recognition system

## 2022-12-06 LAB
ANION GAP SERPL CALCULATED.3IONS-SCNC: 3 MMOL/L (ref 4–13)
BUN SERPL-MCNC: 19 MG/DL (ref 5–25)
CALCIUM SERPL-MCNC: 9.5 MG/DL (ref 8.4–10.2)
CHLORIDE SERPL-SCNC: 97 MMOL/L (ref 96–108)
CO2 SERPL-SCNC: 39 MMOL/L (ref 21–32)
CREAT SERPL-MCNC: 0.7 MG/DL (ref 0.6–1.3)
GFR SERPL CREATININE-BSD FRML MDRD: 94 ML/MIN/1.73SQ M
GLUCOSE SERPL-MCNC: 132 MG/DL (ref 65–140)
MAGNESIUM SERPL-MCNC: 2.1 MG/DL (ref 1.9–2.7)
POTASSIUM SERPL-SCNC: 3.9 MMOL/L (ref 3.5–5.3)
SODIUM SERPL-SCNC: 139 MMOL/L (ref 135–147)

## 2022-12-06 RX ORDER — IPRATROPIUM BROMIDE AND ALBUTEROL SULFATE 2.5; .5 MG/3ML; MG/3ML
3 SOLUTION RESPIRATORY (INHALATION)
Status: DISCONTINUED | OUTPATIENT
Start: 2022-12-06 | End: 2022-12-06

## 2022-12-06 RX ORDER — METHYLPREDNISOLONE SODIUM SUCCINATE 40 MG/ML
40 INJECTION, POWDER, LYOPHILIZED, FOR SOLUTION INTRAMUSCULAR; INTRAVENOUS EVERY 12 HOURS SCHEDULED
Status: DISCONTINUED | OUTPATIENT
Start: 2022-12-06 | End: 2022-12-08

## 2022-12-06 RX ORDER — LEVALBUTEROL 1.25 MG/.5ML
1.25 SOLUTION, CONCENTRATE RESPIRATORY (INHALATION)
Status: DISCONTINUED | OUTPATIENT
Start: 2022-12-06 | End: 2022-12-09 | Stop reason: HOSPADM

## 2022-12-06 RX ORDER — BENZONATATE 100 MG/1
200 CAPSULE ORAL 3 TIMES DAILY
Status: DISCONTINUED | OUTPATIENT
Start: 2022-12-06 | End: 2022-12-09 | Stop reason: HOSPADM

## 2022-12-06 RX ORDER — GUAIFENESIN 600 MG/1
1200 TABLET, EXTENDED RELEASE ORAL 2 TIMES DAILY
Status: DISCONTINUED | OUTPATIENT
Start: 2022-12-06 | End: 2022-12-09 | Stop reason: HOSPADM

## 2022-12-06 RX ADMIN — ENOXAPARIN SODIUM 60 MG: 60 INJECTION SUBCUTANEOUS at 21:12

## 2022-12-06 RX ADMIN — ENOXAPARIN SODIUM 60 MG: 60 INJECTION SUBCUTANEOUS at 08:53

## 2022-12-06 RX ADMIN — LEVALBUTEROL HYDROCHLORIDE 1.25 MG: 1.25 SOLUTION, CONCENTRATE RESPIRATORY (INHALATION) at 20:32

## 2022-12-06 RX ADMIN — ACETAMINOPHEN 650 MG: 325 TABLET, FILM COATED ORAL at 07:39

## 2022-12-06 RX ADMIN — FUROSEMIDE 80 MG: 10 INJECTION, SOLUTION INTRAMUSCULAR; INTRAVENOUS at 18:01

## 2022-12-06 RX ADMIN — LISINOPRIL 10 MG: 10 TABLET ORAL at 08:52

## 2022-12-06 RX ADMIN — METHYLPREDNISOLONE SODIUM SUCCINATE 40 MG: 40 INJECTION, POWDER, FOR SOLUTION INTRAMUSCULAR; INTRAVENOUS at 08:52

## 2022-12-06 RX ADMIN — IPRATROPIUM BROMIDE 0.5 MG: 0.5 SOLUTION RESPIRATORY (INHALATION) at 20:32

## 2022-12-06 RX ADMIN — BENZONATATE 200 MG: 100 CAPSULE ORAL at 13:05

## 2022-12-06 RX ADMIN — GUAIFENESIN 1200 MG: 600 TABLET ORAL at 18:00

## 2022-12-06 RX ADMIN — METHYLPREDNISOLONE SODIUM SUCCINATE 40 MG: 40 INJECTION, POWDER, FOR SOLUTION INTRAMUSCULAR; INTRAVENOUS at 21:12

## 2022-12-06 RX ADMIN — BENZONATATE 200 MG: 100 CAPSULE ORAL at 18:01

## 2022-12-06 RX ADMIN — BENZONATATE 200 MG: 100 CAPSULE ORAL at 21:12

## 2022-12-06 RX ADMIN — FUROSEMIDE 80 MG: 10 INJECTION, SOLUTION INTRAMUSCULAR; INTRAVENOUS at 08:52

## 2022-12-06 RX ADMIN — GUAIFENESIN 1200 MG: 600 TABLET ORAL at 13:05

## 2022-12-06 NOTE — ASSESSMENT & PLAN NOTE
· Today noted actively wheezing, covid/ influenza/ RSV neg x2  · Solu-Medrol dose increased to twice daily  · Nebulizations place a scheduled  · Supplemental nasal cannula O2 to maintain sats > 88%

## 2022-12-06 NOTE — PROGRESS NOTES
General Cardiology   Progress Note -  [facility]   Starla Adler 63 y o  female MRN: [id number]    Unit/Bed#: S -01 Encounter: [id number]    Assessment/ Plan    1  Acute on chronic HFpEF   On furosemide 80 mg IV BID   Non compliant with diuretics at home prior to admission   Monitor I/Os - 460 ml in 24 hours inaccurate due to incontinence per patient   Daily weights 381 lbs today from 384 lbs yesterday   Creatinine stable: 0 70  Continue 2 gram Na diet and 1500 ml fluid restriction   Reviewed HF education with patient      2  Hypertension   BP stable: 120/66  On lisinopril 10 mg PO daily      3  COPD  Followed by primary team   On albuterol      4  Morbid obesity   BMI 63    Subjective  Patient resting in bed  She is on 5 L NC and does not wear oxygen at home  Discussed with nursing staff to titrate as tolerated  No chest pain, palpitations or dizziness  She does report nasal congestion and mild SOB  No fever or chills  Review of Systems   Constitutional: Negative for chills and fever  HENT: Negative for congestion  Cardiovascular: Positive for dyspnea on exertion and leg swelling  Negative for chest pain, orthopnea and palpitations  Respiratory: Negative for shortness of breath  Musculoskeletal: Negative for falls  Gastrointestinal: Negative for bloating and nausea  Neurological: Negative for dizziness and light-headedness  Psychiatric/Behavioral: Negative for altered mental status  All other systems reviewed and are negative  Objective:   Vitals: Blood pressure 113/79, pulse 90, temperature 98 1 °F (36 7 °C), resp  rate 17, height 5' 5" (1 651 m), weight (!) 173 kg (381 lb 6 3 oz), SpO2 91 % ,       Body mass index is 63 47 kg/m²  ,     Systolic (16EXJ), XVD:387 , Min:113 , PGO:101     Diastolic (83RHZ), FJV:82, Min:58, Max:79          Intake/Output Summary (Last 24 hours) at 12/6/2022 0851  Last data filed at 12/6/2022 0809  Gross per 24 hour   Intake 1252 ml   Output 700 ml Net 552 ml     Weight (last 2 days)     Date/Time Weight    12/06/22 0536 173 (381 4)    12/05/22 0600 174 (384 48)        Telemetry Review: Normal sinus rhythm, no ectopy     Physical Exam  Constitutional:       General: She is not in acute distress  Appearance: She is obese  HENT:      Head: Normocephalic  Mouth/Throat:      Mouth: Mucous membranes are moist    Cardiovascular:      Rate and Rhythm: Normal rate and regular rhythm  Pulses: Normal pulses  Pulmonary:      Effort: Pulmonary effort is normal  No respiratory distress  Comments: 5 L NC   Decreased in bases   Abdominal:      General: Bowel sounds are normal       Palpations: Abdomen is soft  Musculoskeletal:         General: Swelling present  Normal range of motion  Cervical back: Neck supple  Comments: Lymphedema bilateral LE    Skin:     General: Skin is warm and dry  Capillary Refill: Capillary refill takes less than 2 seconds  Neurological:      Mental Status: She is alert and oriented to person, place, and time     Psychiatric:         Mood and Affect: Mood normal          LABORATORY RESULTS      CBC with diff:   Results from last 7 days   Lab Units 12/05/22  0645 12/03/22  0658 12/02/22  1852 12/02/22  1304   WBC Thousand/uL 5 60 5 44  --  5 44   HEMOGLOBIN g/dL 12 0 11 8  --  12 2   HEMATOCRIT % 40 4 39 2  --  40 1   MCV fL 96 98  --  94   PLATELETS Thousands/uL 190 139* 164 163   MCH pg 28 4 29 5  --  28 7   MCHC g/dL 29 7* 30 1*  --  30 4*   RDW % 15 2* 15 6*  --  15 4*   MPV fL 10 6 10 7 10 8 10 9   NRBC AUTO /100 WBCs 0  --   --  0       CMP:  Results from last 7 days   Lab Units 12/06/22  0531 12/05/22  0645 12/04/22  0453 12/03/22  0658 12/02/22  1304   POTASSIUM mmol/L 3 9 4 1 4 1 4 1 4 9   CHLORIDE mmol/L 97 97 99 104 105   CO2 mmol/L 39* 38* 38* 33* 31   BUN mg/dL 19 17 15 14 11   CREATININE mg/dL 0 70 0 66 0 73 0 56* 0 59*   CALCIUM mg/dL 9 5 9 1 9 0 9 1 9 5   AST U/L  --   --   --   --  26   ALT U/L  --   --   --   --  20   ALK PHOS U/L  --   --   --   --  73   EGFR ml/min/1 73sq m 94 96 89 101 99       BMP:  Results from last 7 days   Lab Units 22  0531 22  0645 22  0453 22  0658 22  1304   POTASSIUM mmol/L 3 9 4 1 4 1 4 1 4 9   CHLORIDE mmol/L 97 97 99 104 105   CO2 mmol/L 39* 38* 38* 33* 31   BUN mg/dL 19 17 15 14 11   CREATININE mg/dL 0 70 0 66 0 73 0 56* 0 59*   CALCIUM mg/dL 9 5 9 1 9 0 9 1 9 5       Lab Results   Component Value Date    NTBNP 23 2022    NTBNP 27 2019    NTBNP 65 2019             Results from last 7 days   Lab Units 22  0531 22  0645 22  0658   MAGNESIUM mg/dL 2 1 2 1 2 2                           Lipid Profile:   Lab Results   Component Value Date    CHOL 206 2015     Lab Results   Component Value Date    HDL 41 (L) 2022    HDL 42 (L) 2020    HDL 45 2015     Lab Results   Component Value Date    LDLCALC 123 (H) 2022    LDLCALC 131 (H) 2020    LDLCALC 136 (H) 2015     Lab Results   Component Value Date    TRIG 115 2022    TRIG 108 2020    TRIG 126 2015       Cardiac testing:   Results for orders placed during the hospital encounter of 02/10/21    Echo complete with contrast if indicated    Narrative  [facility] [address]  [phone]    Transthoracic Echocardiogram  2D, M-mode, Doppler, and Color Doppler    Study date:  10-Feb-2021    Patient: Zara Jacobs  MR number: [id number]  Account number: [de-identified]  : 1960  Age: 63 years  Gender: Female  Status: Outpatient  Location: [address] [place] [address] [facility]  Height: 65 in  Weight: 386 1 lb  BP: 110/ 74 mmHg    Indications: Pedal edema;SOB on exertion;CHF      Diagnoses: R06 02 - Shortness of breath, R60 9 - Edema, unspecified    Sonographer:  DARYL Joshua  Primary Physician:  Manuel Wu DO  Referring Physician:  Viji Oliver Roshan Gonzalez DO  Group:  [id number]  Cardiology Fellow: Franki Toney MD  Interpreting Physician:  Kirsten Rhodes MD    SUMMARY    LEFT VENTRICLE:  Systolic function was normal  Ejection fraction was estimated to be 65 %  There were no regional wall motion abnormalities  Doppler parameters were consistent with abnormal left ventricular relaxation (grade 1 diastolic dysfunction)  TRICUSPID VALVE:  There was trace regurgitation  IVC, HEPATIC VEINS:  The inferior vena cava was dilated  Respirophasic changes were blunted (less than 50% variation)  HISTORY: PRIOR HISTORY: BRADY;Smoker; Morbid obesity;HLD;HTN;COPD  PROCEDURE: The study was performed in the [place] [address] [facility]  This was a routine study  The transthoracic approach was used  The study included complete 2D imaging, M-mode, complete spectral Doppler, and color Doppler  The  heart rate was 80 bpm, at the start of the study  Images were obtained from the parasternal, apical, subcostal, and suprasternal notch acoustic windows  Echocardiographic views were limited due to poor acoustic window availability,  decreased penetration, and lung interference  This was a technically difficult study  LEFT VENTRICLE: Size was normal  Systolic function was normal  Ejection fraction was estimated to be 65 %  There were no regional wall motion abnormalities  Wall thickness was normal  DOPPLER: Doppler parameters were consistent with  abnormal left ventricular relaxation (grade 1 diastolic dysfunction)  RIGHT VENTRICLE: The size was normal  Systolic function was normal  Wall thickness was normal     LEFT ATRIUM: Size was normal     RIGHT ATRIUM: Size was normal     MITRAL VALVE: Valve structure was normal  There was normal leaflet separation  DOPPLER: The transmitral velocity was within the normal range  There was no evidence for stenosis  There was no regurgitation  AORTIC VALVE: The valve was trileaflet   Leaflets exhibited normal thickness and normal cuspal separation  DOPPLER: Transaortic velocity was within the normal range  There was no evidence for stenosis  There was no regurgitation  TRICUSPID VALVE: The valve structure was normal  There was normal leaflet separation  DOPPLER: The transtricuspid velocity was within the normal range  There was no evidence for stenosis  There was trace regurgitation  PULMONIC VALVE: Leaflets exhibited normal thickness, no calcification, and normal cuspal separation  DOPPLER: The transpulmonic velocity was within the normal range  There was no regurgitation  PERICARDIUM: There was no pericardial effusion  The pericardium was normal in appearance  AORTA: The root exhibited normal size  SYSTEMIC VEINS: IVC: The inferior vena cava was dilated  Respirophasic changes were blunted (less than 50% variation)  SYSTEM MEASUREMENT TABLES    2D  %FS: 27 94 %  Ao Diam: 3 29 cm  EDV(Teich): 171 86 ml  EF(Teich): 53 32 %  ESV(Teich): 80 23 ml  IVSd: 1 21 cm  LA Area: 23 96 cm2  LA Diam: 4 68 cm  LVEDV MOD A4C: 109 9 ml  LVEF MOD A4C: 64 35 %  LVESV MOD A4C: 39 18 ml  LVIDd: 5 88 cm  LVIDs: 4 24 cm  LVLd A4C: 8 59 cm  LVLs A4C: 6 49 cm  LVPWd: 1 21 cm  RA Area: 17 2 cm2  RVIDd: 4 01 cm  SV MOD A4C: 70 72 ml  SV(Teich): 91 63 ml    CW  AV Vmax: 1 57 m/s  AV maxP 92 mmHg    MM  TAPSE: 2 7 cm    PW  E' Sept: 0 04 m/s  E/E' Sept: 22 85  MV A Vito: 1 05 m/s  MV Dec Tift: 3 42 m/s2  MV DecT: 258 2 ms  MV E Vito: 0 88 m/s  MV E/A Ratio: 0 84  MV PHT: 74 88 ms  MVA By PHT: 2 94 cm2    [facility] Accredited [facility]chocardi[facility]    Prepared and electronically signed by    Damien Leigh MD  Signed 10-Feb-2021 13:57:17    No results found for this or any previous visit  No results found for this or any previous visit  No valid procedures specified  No results found for this or any previous visit      Meds/Allergies   all current active meds have been reviewed and current meds: Current Facility-Administered Medications   Medication Dose Route Frequency   • acetaminophen (TYLENOL) tablet 650 mg  650 mg Oral Q6H PRN   • albuterol (PROVENTIL HFA,VENTOLIN HFA) inhaler 2 puff  2 puff Inhalation Q6H PRN   • enoxaparin (LOVENOX) subcutaneous injection 60 mg  60 mg Subcutaneous Q12H SYED   • furosemide (LASIX) injection 80 mg  80 mg Intravenous BID (diuretic)   • lisinopril (ZESTRIL) tablet 10 mg  10 mg Oral Daily   • methylPREDNISolone sodium succinate (Solu-MEDROL) injection 40 mg  40 mg Intravenous Daily     Medications Prior to Admission   Medication   • acetaminophen (TYLENOL) 500 mg tablet   • albuterol (PROVENTIL HFA,VENTOLIN HFA) 90 mcg/act inhaler   • furosemide (LASIX) 20 mg tablet   • lisinopril (ZESTRIL) 10 mg tablet     Counseling / Coordination of Care  Total floor / unit time spent today 20 minutes  Greater than 50% of total time was spent with the patient and / or family counseling and / or coordination of care  ** Please Note: Dragon 360 Dictation voice to text software may have been used in the creation of this document   **

## 2022-12-06 NOTE — PROGRESS NOTES
[facility]  Progress Note - Pete Atwood 1960, 63 y o  female MRN: [id number]  Unit/Bed#: S -01 Encounter: [id number]  Primary Care Provider: Deirc Scott DO   Date and time admitted to hospital: 12/2/2022 12:28 PM    * Acute exacerbation of CHF (congestive heart failure) ([facility] [address] )  Assessment & Plan  Wt Readings from Last 3 Encounters:   12/06/22 (!) 174 kg (384 lb 0 7 oz)   12/02/22 (!) 182 kg (400 lb 11 2 oz)   10/03/22 (!) 177 kg (389 lb 8 oz)     · POA : HERNANDEZ + orthopnea + GIO + weight gain , vascular congestion on imaging  · Poorly compliant to diuretic therapy  • Dry weight seems to be aroundDry weight:  389 lb , on admission:  395 lb per standing scale  · echocardiogram (2/2010) showed EF 65%, no wall motion abnormalities, grade 1 diastolic dysfunction, no hemodynamically significant valvular disease, normal RV function  · Volume status difficult to assess due to body habitus with morbid obesity  · On 80 mg b i d  Per Cardiology recommendations, UO 1L -3 9 concern from innacuracy ? , weight down 15 lb  · Continue to monitor with daily weights, I's and O's, low-salt diet, fluid restriction 1500  · Monitor potassium magnesium keep potassium above 4 magnesium above 2        Respiratory failure with hypoxia (HCC)  Assessment & Plan  ·  likely multifactorial in the setting of morbid obesity with likely underlying OHS, sleep apnea and acute on chronic CHF as well as COPD , significant pulmonary vascular congestion on imaging  · No known h/o underlying lung disease, not on home oxygent supplementation at baseline    · Is capable to wean down on oxygen requirements to 3 L from 5 L  · Will continue IV diuretics as stated above  · Continue to wean nasal cannula oxygen for sats > 88%  · Continue respiratory protocol    COPD (chronic obstructive pulmonary disease) (HCC)  Assessment & Plan    · Today noted actively wheezing, covid/ influenza/ RSV neg x2  · Solu-Medrol dose increased to twice daily  · Nebulizations place a scheduled  · Supplemental nasal cannula O2 to maintain sats > 88%    Morbid obesity (HCC)  Assessment & Plan  · BMI: 66 68  · Recommend therapeutic lifestyle changes to promote weight loss    Essential hypertension  Assessment & Plan  · Blood pressure acceptable  · Continue lisinopril at home dose          VTE Pharmacologic Prophylaxis:   VTE Score: 5 Moderate Risk (Score 3-4) - Pharmacological DVT Prophylaxis Ordered: Enoxaparin (Lovenox)  Mechanical VTE Prophylaxis in Place: No    Patient Centered Rounds: I have performed bedside rounds with nursing staff today  Discussions with Specialists or Other Care Team Provider: Attending physician    Education and Discussions with Family / Patient: Updated  (daughter) via phone  Current Length of Stay: 4 day(s)    Current Patient Status: Inpatient     Discharge Plan / Estimated Discharge Date: Anticipate discharge in 48 hrs to home  Code Status: Level 1 - Full Code      Subjective:   No overnight events, however persistent wheezing is slowly improving    Objective:     Vitals:   Temp (24hrs), Av 4 °F (36 9 °C), Min:98 1 °F (36 7 °C), Max:98 6 °F (37 °C)    Temp:  [98 1 °F (36 7 °C)-98 6 °F (37 °C)] 98 5 °F (36 9 °C)  HR:  [82-93] 82  Resp:  [16-18] 16  BP: (113-135)/(58-85) 135/63  SpO2:  [91 %-93 %] 93 %  Body mass index is 63 91 kg/m²  Input and Output Summary (last 24 hours): Intake/Output Summary (Last 24 hours) at 2022 1605  Last data filed at 2022 0919  Gross per 24 hour   Intake 1252 ml   Output 1600 ml   Net -348 ml       Physical Exam:     Physical Exam  Vitals and nursing note reviewed  Constitutional:       General: She is not in acute distress  Appearance: Normal appearance  She is obese  She is not toxic-appearing  HENT:      Head: Normocephalic and atraumatic        Right Ear: Tympanic membrane normal       Left Ear: Tympanic membrane normal       Nose: Nose normal       Mouth/Throat:      Mouth: Mucous membranes are moist    Eyes:      Extraocular Movements: Extraocular movements intact  Conjunctiva/sclera: Conjunctivae normal       Pupils: Pupils are equal, round, and reactive to light  Cardiovascular:      Rate and Rhythm: Normal rate  Pulses: Normal pulses  Heart sounds: No murmur heard  No gallop  Pulmonary:      Effort: Pulmonary effort is normal  No respiratory distress  Breath sounds: Wheezing present  No rales  Chest:      Chest wall: No tenderness  Abdominal:      General: Bowel sounds are normal  There is no distension  Palpations: Abdomen is soft  Tenderness: There is no abdominal tenderness  Musculoskeletal:      Cervical back: Normal range of motion  No rigidity  Right lower leg: Edema present  Left lower leg: Edema present  Lymphadenopathy:      Cervical: No cervical adenopathy  Skin:     General: Skin is warm  Capillary Refill: Capillary refill takes 2 to 3 seconds  Neurological:      Mental Status: She is alert and oriented to person, place, and time  Psychiatric:         Mood and Affect: Mood normal          Behavior: Behavior normal          Thought Content:  Thought content normal          Judgment: Judgment normal           Additional Data:     Labs:  Results from last 7 days   Lab Units 12/05/22  0645   WBC Thousand/uL 5 60   HEMOGLOBIN g/dL 12 0   HEMATOCRIT % 40 4   PLATELETS Thousands/uL 190   NEUTROS PCT % 72   LYMPHS PCT % 13*   MONOS PCT % 11   EOS PCT % 3     Results from last 7 days   Lab Units 12/06/22  0531 12/03/22  0658 12/02/22  1304   SODIUM mmol/L 139   < > 140   POTASSIUM mmol/L 3 9   < > 4 9   CHLORIDE mmol/L 97   < > 105   CO2 mmol/L 39*   < > 31   BUN mg/dL 19   < > 11   CREATININE mg/dL 0 70   < > 0 59*   ANION GAP mmol/L 3*   < > 4   CALCIUM mg/dL 9 5   < > 9 5   ALBUMIN g/dL  --   --  3 8   TOTAL BILIRUBIN mg/dL  --   --  0 59   ALK PHOS U/L  --   --  73 ALT U/L  --   --  20   AST U/L  --   --  26   GLUCOSE RANDOM mg/dL 132   < > 90    < > = values in this interval not displayed  Results from last 7 days   Lab Units 12/03/22  0658 12/02/22  1304   PROCALCITONIN ng/ml 0 08 0 08       Imaging: No pertinent imaging reviewed  Recent Cultures (last 7 days):           Lines/Drains:  Invasive Devices     Peripheral Intravenous Line  Duration           Long-Dwell Peripheral IV (Midline) 59/11/23 Left Cephalic 2 days                Telemetry:        Last 24 Hours Medication List:   Current Facility-Administered Medications   Medication Dose Route Frequency Provider Last Rate   • acetaminophen  650 mg Oral Q6H PRN Sussy Lazo MD     • albuterol  2 puff Inhalation Q6H PRN Stephen Orozco MD     • benzonatate  200 mg Oral TID Michael Yuen, DO     • enoxaparin  60 mg Subcutaneous Q12H [address] Brittany Orozco MD     • furosemide  80 mg Intravenous BID (diuretic) Yana Giron PA-C     • guaiFENesin  1,200 mg Oral BID Michael Yuen, DO     • ipratropium-albuterol  3 mL Nebulization Q6H Carlene Laurent MD     • lisinopril  10 mg Oral Daily Stephen Orozco MD     • methylPREDNISolone sodium succinate  40 mg Intravenous Q12H Richard [address], DO          Today, Patient Was Seen By: Carlene Laurent MD    ** Please Note: This note has been constructed using a voice recognition system

## 2022-12-06 NOTE — ASSESSMENT & PLAN NOTE
Wt Readings from Last 3 Encounters:   12/06/22 (!) 174 kg (384 lb 0 7 oz)   12/02/22 (!) 182 kg (400 lb 11 2 oz)   10/03/22 (!) 177 kg (389 lb 8 oz)     · POA : HERNANDEZ + orthopnea + GIO + weight gain , vascular congestion on imaging  · Poorly compliant to diuretic therapy  • Dry weight seems to be aroundDry weight:  389 lb , on admission:  395 lb per standing scale  · echocardiogram (2/2010) showed EF 65%, no wall motion abnormalities, grade 1 diastolic dysfunction, no hemodynamically significant valvular disease, normal RV function  · Volume status difficult to assess due to body habitus with morbid obesity  · On 80 mg b i d  Per Cardiology recommendations, UO 1L -3 9 concern from innacuracy ? , weight down 15 lb  · Continue to monitor with daily weights, I's and O's, low-salt diet, fluid restriction 1500  · Monitor potassium magnesium keep potassium above 4 magnesium above 2

## 2022-12-06 NOTE — ASSESSMENT & PLAN NOTE
·  likely multifactorial in the setting of morbid obesity with likely underlying OHS, sleep apnea and acute on chronic CHF as well as COPD , significant pulmonary vascular congestion on imaging  · No known h/o underlying lung disease, not on home oxygent supplementation at baseline    · Is capable to wean down on oxygen requirements to 3 L from 5 L  · Will continue IV diuretics as stated above  · Continue to wean nasal cannula oxygen for sats > 88%  · Continue respiratory protocol

## 2022-12-06 NOTE — CASE MANAGEMENT
Case Management Discharge Planning Note    Patient name Lucila Lim  Location S /S -33 MRN [id number]  : 1960 Date 2022       Current Admission Date: 2022  Current Admission Diagnosis:Acute exacerbation of CHF (congestive heart failure) [facility])   Patient Active Problem List    Diagnosis Date Noted   • Hypoxia 2022   • [facility] 2022   • Acute exacerbation of CHF (congestive heart failure) ([place] [address] ) 2022   • Pre-diabetes 2022   • Lymphedema of both lower extremities 2021   • Chronic diastolic congestive heart failure ([place] [address] ) 2021   • Insomnia due to medical condition 10/08/2020   • Obstructive sleep apnea syndrome 2020   • Cigarette nicotine dependence without complication    • Morbid obesity with body mass index (BMI) of 60 0 to 69 9 in adult [facility]) 2020   • Essential hypertension 07/10/2019   • Mixed stress and urge urinary incontinence 07/10/2019   • COPD (chronic obstructive pulmonary disease) ([place] [address] ) 07/10/2019   • Mixed hyperlipidemia 07/10/2019   • Morbid obesity ([place] [address] ) 07/10/2019      LOS (days): 4  Geometric Mean LOS (GMLOS) (days): 3 90  Days to GMLOS:-0 1     OBJECTIVE:  Risk of Unplanned Readmission Score: 9 04         Current admission status: Inpatient   Preferred Pharmacy:   [address], [address] [facility]  [address] [place] [address]  Phone: [phone] Fax: [phone] [facility], [address]  [address] [facility] [address]  Phone: [phone] Fax: [phone]    Primary Care Provider: Janine Ayala DO    Primary Insurance: MEDICARE  Secondary Insurance: Darvin 6    DISCHARGE DETAILS:    Discharge planning discussed with[de-identified] Patient  Freedom of Choice: Yes  Comments - Freedom of Choice: Pt reported being agreeable to the recommendation of home Pt, denied having a [facility] agency of choice and gave permission for a blanket referral  CM discussed freedom of choice and made the referral        [address]         Is the patient interested in [facility] at discharge?: Yes  [address] requested[de-identified] Physical [address] Name[de-identified] Other  [address] Provider[de-identified] PCP  Home Health Services Needed[de-identified] Gait/ADL Training  Homebound Criteria Met[de-identified] Uses an Assist Device (i e  cane, walker, etc), Requires the Assistance of Another Person for Safe Ambulation or to Leave the Home  Supporting Clincal Findings[de-identified] Limited Endurance, Fatigues Easliy in [employer]    Other Referral/Resources/Interventions Provided:  Interventions: [facility]  Referral Comments: G[facility] referral made to home PT services      Treatment Team Recommendation: Home with [address]  Discharge Destination Plan[de-identified] Home with [address]

## 2022-12-06 NOTE — DISCHARGE INSTRUCTIONS
Take your medications as directed, and keep your follow up appointments  Adhere to a heart healthy lifestyle, maintaining a low sodium diet  Daily weight and record    If your weight increases 2-3 lbs in one day, or 5 lbs in 2 days, you are short of breath or have lower extremity swelling, please call Nurse Serg Corbin at  [place] [address] office  at [phone]

## 2022-12-07 LAB
ANION GAP SERPL CALCULATED.3IONS-SCNC: 2 MMOL/L (ref 4–13)
BUN SERPL-MCNC: 21 MG/DL (ref 5–25)
CALCIUM SERPL-MCNC: 10.1 MG/DL (ref 8.4–10.2)
CHLORIDE SERPL-SCNC: 99 MMOL/L (ref 96–108)
CO2 SERPL-SCNC: 38 MMOL/L (ref 21–32)
CREAT SERPL-MCNC: 0.6 MG/DL (ref 0.6–1.3)
GFR SERPL CREATININE-BSD FRML MDRD: 99 ML/MIN/1.73SQ M
GLUCOSE SERPL-MCNC: 160 MG/DL (ref 65–140)
MAGNESIUM SERPL-MCNC: 2.3 MG/DL (ref 1.9–2.7)
POTASSIUM SERPL-SCNC: 4.1 MMOL/L (ref 3.5–5.3)
SODIUM SERPL-SCNC: 139 MMOL/L (ref 135–147)

## 2022-12-07 RX ORDER — HYDROCODONE POLISTIREX AND CHLORPHENIRAMINE POLISTIREX 10; 8 MG/5ML; MG/5ML
5 SUSPENSION, EXTENDED RELEASE ORAL EVERY 12 HOURS PRN
Status: DISCONTINUED | OUTPATIENT
Start: 2022-12-07 | End: 2022-12-09 | Stop reason: HOSPADM

## 2022-12-07 RX ORDER — METOLAZONE 5 MG/1
5 TABLET ORAL ONCE
Status: COMPLETED | OUTPATIENT
Start: 2022-12-07 | End: 2022-12-07

## 2022-12-07 RX ADMIN — METOLAZONE 5 MG: 5 TABLET ORAL at 12:19

## 2022-12-07 RX ADMIN — GUAIFENESIN 1200 MG: 600 TABLET ORAL at 08:22

## 2022-12-07 RX ADMIN — ENOXAPARIN SODIUM 60 MG: 60 INJECTION SUBCUTANEOUS at 21:51

## 2022-12-07 RX ADMIN — IPRATROPIUM BROMIDE 0.5 MG: 0.5 SOLUTION RESPIRATORY (INHALATION) at 07:21

## 2022-12-07 RX ADMIN — BENZONATATE 200 MG: 100 CAPSULE ORAL at 08:22

## 2022-12-07 RX ADMIN — FUROSEMIDE 80 MG: 10 INJECTION, SOLUTION INTRAMUSCULAR; INTRAVENOUS at 08:22

## 2022-12-07 RX ADMIN — LEVALBUTEROL HYDROCHLORIDE 1.25 MG: 1.25 SOLUTION, CONCENTRATE RESPIRATORY (INHALATION) at 20:27

## 2022-12-07 RX ADMIN — GUAIFENESIN 1200 MG: 600 TABLET ORAL at 17:01

## 2022-12-07 RX ADMIN — LEVALBUTEROL HYDROCHLORIDE 1.25 MG: 1.25 SOLUTION, CONCENTRATE RESPIRATORY (INHALATION) at 13:23

## 2022-12-07 RX ADMIN — HYDROCODONE POLISTIREX AND CHLORPHENIRAMINE POLISTIREX 5 ML: 10; 8 SUSPENSION, EXTENDED RELEASE ORAL at 18:39

## 2022-12-07 RX ADMIN — IPRATROPIUM BROMIDE 0.5 MG: 0.5 SOLUTION RESPIRATORY (INHALATION) at 13:23

## 2022-12-07 RX ADMIN — IPRATROPIUM BROMIDE 0.5 MG: 0.5 SOLUTION RESPIRATORY (INHALATION) at 20:26

## 2022-12-07 RX ADMIN — LISINOPRIL 10 MG: 10 TABLET ORAL at 08:22

## 2022-12-07 RX ADMIN — METHYLPREDNISOLONE SODIUM SUCCINATE 40 MG: 40 INJECTION, POWDER, FOR SOLUTION INTRAMUSCULAR; INTRAVENOUS at 08:21

## 2022-12-07 RX ADMIN — LEVALBUTEROL HYDROCHLORIDE 1.25 MG: 1.25 SOLUTION, CONCENTRATE RESPIRATORY (INHALATION) at 07:21

## 2022-12-07 RX ADMIN — FUROSEMIDE 80 MG: 10 INJECTION, SOLUTION INTRAMUSCULAR; INTRAVENOUS at 17:01

## 2022-12-07 RX ADMIN — METHYLPREDNISOLONE SODIUM SUCCINATE 40 MG: 40 INJECTION, POWDER, FOR SOLUTION INTRAMUSCULAR; INTRAVENOUS at 21:48

## 2022-12-07 RX ADMIN — BENZONATATE 200 MG: 100 CAPSULE ORAL at 21:48

## 2022-12-07 RX ADMIN — BENZONATATE 200 MG: 100 CAPSULE ORAL at 17:01

## 2022-12-07 RX ADMIN — ENOXAPARIN SODIUM 60 MG: 60 INJECTION SUBCUTANEOUS at 08:22

## 2022-12-07 NOTE — PROGRESS NOTES
General Cardiology   Progress Note -  Team One   Oracio Gamboa 63 y o  female MRN: [id number]    Unit/Bed#: S -01 Encounter: [id number]    Assessment/ Plan    1  Acute on chronic HFpEF   On furosemide 80 mg IV BID, will continue   Non compliant with diuretics at home prior to admission   Monitor I/Os - 1 3 L in 24 hours   Daily weights 378 lbs today from 384 lbs yesterday   Creatinine stable: 0 60  Continue 2 gram Na diet and 1500 ml fluid restriction   Reviewed HF education with patient      2  Hypertension   BP stable: 134/68  On lisinopril 10 mg PO daily      3  COPD  Followed by primary team   On albuterol      4  Morbid obesity   BMI 63    Patient scheduled to follow up with Karen Lockwood 12/20/2022  Subjective  Patient resting comfortable in chair  She reports she had a good night  No complaint of SOB, chest pain or palpitations  No fever or chills  Review of Systems   Constitutional: Negative for chills and fever  HENT: Negative for congestion  Cardiovascular: Positive for dyspnea on exertion and leg swelling  Negative for chest pain, orthopnea and palpitations  Respiratory: Negative for shortness of breath  Musculoskeletal: Negative for falls  Gastrointestinal: Negative for bloating, nausea and vomiting  Neurological: Negative for dizziness and light-headedness  Psychiatric/Behavioral: Negative for altered mental status  All other systems reviewed and are negative  Objective:   Vitals: Blood pressure 139/71, pulse 81, temperature 98 8 °F (37 1 °C), resp  rate 18, height 5' 5" (1 651 m), weight (!) 172 kg (378 lb 15 5 oz), SpO2 92 %  ,       Body mass index is 63 06 kg/m²  ,     Systolic (37ZIQ), WZK:301 , Min:127 , YJK:844     Diastolic (42CPM), OGY:65, Min:63, Max:71          Intake/Output Summary (Last 24 hours) at 12/7/2022 1041  Last data filed at 12/7/2022 0844  Gross per 24 hour   Intake --   Output 2200 ml   Net -2200 ml     Weight (last 2 days) Date/Time Weight    12/07/22 0804 172 (378 97)    12/07/22 07:47:17 172 (378 97)    12/07/22 0538 175 (386 47)    12/06/22 0926 174 (384 04)    12/06/22 0536 173 (381 4)    12/05/22 0600 174 (384 48)        Telemetry Review: No telemetry     Physical Exam  Constitutional:       General: She is not in acute distress  Appearance: She is obese  HENT:      Head: Normocephalic  Mouth/Throat:      Mouth: Mucous membranes are moist    Cardiovascular:      Rate and Rhythm: Normal rate and regular rhythm  Pulses: Normal pulses  Comments: Distant heart tones  Pulmonary:      Effort: Pulmonary effort is normal  No respiratory distress  Comments: 3 L NC   Fine expiratory wheezing   Abdominal:      General: Bowel sounds are normal       Palpations: Abdomen is soft  Musculoskeletal:         General: Swelling present  Normal range of motion  Cervical back: Neck supple  Comments: Bilateral lower extremity lymphedema    Skin:     General: Skin is warm and dry  Capillary Refill: Capillary refill takes less than 2 seconds  Neurological:      General: No focal deficit present  Mental Status: She is alert and oriented to person, place, and time     Psychiatric:         Mood and Affect: Mood normal          LABORATORY RESULTS      CBC with diff:   Results from last 7 days   Lab Units 12/05/22  0645 12/03/22  0658 12/02/22  1852 12/02/22  1304   WBC Thousand/uL 5 60 5 44  --  5 44   HEMOGLOBIN g/dL 12 0 11 8  --  12 2   HEMATOCRIT % 40 4 39 2  --  40 1   MCV fL 96 98  --  94   PLATELETS Thousands/uL 190 139* 164 163   MCH pg 28 4 29 5  --  28 7   MCHC g/dL 29 7* 30 1*  --  30 4*   RDW % 15 2* 15 6*  --  15 4*   MPV fL 10 6 10 7 10 8 10 9   NRBC AUTO /100 WBCs 0  --   --  0       CMP:  Results from last 7 days   Lab Units 12/07/22  0550 12/06/22  0531 12/05/22  0645 12/04/22  0453 12/03/22  0658 12/02/22  1304   POTASSIUM mmol/L 4 1 3 9 4 1 4 1 4 1 4 9   CHLORIDE mmol/L 99 97 97 99 104 105 CO2 mmol/L 38* 39* 38* 38* 33* 31   BUN mg/dL 21 19 17 15 14 11   CREATININE mg/dL 0 60 0 70 0 66 0 73 0 56* 0 59*   CALCIUM mg/dL 10 1 9 5 9 1 9 0 9 1 9 5   AST U/L  --   --   --   --   --  26   ALT U/L  --   --   --   --   --  20   ALK PHOS U/L  --   --   --   --   --  73   EGFR ml/min/1 73sq m 99 94 96 89 101 99       BMP:  Results from last 7 days   Lab Units 22  0550 22  0531 22  0645 22  0453 22  0658 22  1304   POTASSIUM mmol/L 4 1 3 9 4 1 4 1 4 1 4 9   CHLORIDE mmol/L 99 97 97 99 104 105   CO2 mmol/L 38* 39* 38* 38* 33* 31   BUN mg/dL 21 19 17 15 14 11   CREATININE mg/dL 0 60 0 70 0 66 0 73 0 56* 0 59*   CALCIUM mg/dL 10 1 9 5 9 1 9 0 9 1 9 5       Lab Results   Component Value Date    NTBNP 23 2022    NTBNP 27 2019    NTBNP 65 2019             Results from last 7 days   Lab Units 22  0550 22  0531 22  0645 22  0658   MAGNESIUM mg/dL 2 3 2 1 2 1 2 2                           Lipid Profile:   Lab Results   Component Value Date    CHOL 206 2015     Lab Results   Component Value Date    HDL 41 (L) 2022    HDL 42 (L) 2020    HDL 45 2015     Lab Results   Component Value Date    LDLCALC 123 (H) 2022    LDLCALC 131 (H) 2020    LDLCALC 136 (H) 2015     Lab Results   Component Value Date    TRIG 115 2022    TRIG 108 2020    TRIG 126 2015       Cardiac testing:   Results for orders placed during the hospital encounter of 02/10/21    Echo complete with contrast if indicated    [address]  [facility], [address] [facility]  [phone]    Transthoracic Echocardiogram  2D, M-mode, Doppler, and Color Doppler    Study date:  10-Feb-2021    Patient: Chanelle Cotter  MR number: [id number]  Account number: [de-identified]  : 1960  Age: 63 years  Gender: Female  Status: Outpatient  [facility]  Height: 65 in  Weight: 386 1 lb  BP: 110/ 74 mmHg    Indications: Pedal edema;SOB on exertion;CHF  Diagnoses: R06 02 - Shortness of breath, R60 9 - Edema, unspecified    Sonographer:  DARYL Khalil  Primary Physician:  Carol Ann Humphrey DO  Referring Physician:  Carol Ann Humphrey DO  Group:  [facility]  Cardiology Fellow: Nupur Lee MD  Interpreting Physician:  Kizzy Kelley MD    SUMMARY    LEFT VENTRICLE:  Systolic function was normal  Ejection fraction was estimated to be 65 %  There were no regional wall motion abnormalities  Doppler parameters were consistent with abnormal left ventricular relaxation (grade 1 diastolic dysfunction)  TRICUSPID VALVE:  There was trace regurgitation  IVC, HEPATIC VEINS:  The inferior vena cava was dilated  Respirophasic changes were blunted (less than 50% variation)  HISTORY: PRIOR HISTORY: BRADY;Smoker; Morbid obesity;HLD;HTN;COPD  PROCEDURE: The study was performed in the [address] [facility]  This was a routine study  The transthoracic approach was used  The study included complete 2D imaging, M-mode, complete spectral Doppler, and color Doppler  The  heart rate was 80 bpm, at the start of the study  Images were obtained from the parasternal, apical, subcostal, and suprasternal notch acoustic windows  Echocardiographic views were limited due to poor acoustic window availability,  decreased penetration, and lung interference  This was a technically difficult study  LEFT VENTRICLE: Size was normal  Systolic function was normal  Ejection fraction was estimated to be 65 %  There were no regional wall motion abnormalities  Wall thickness was normal  DOPPLER: Doppler parameters were consistent with  abnormal left ventricular relaxation (grade 1 diastolic dysfunction)      RIGHT VENTRICLE: The size was normal  Systolic function was normal  Wall thickness was normal     LEFT ATRIUM: Size was normal     RIGHT ATRIUM: Size was normal     MITRAL VALVE: Valve structure was normal  There was normal leaflet separation  DOPPLER: The transmitral velocity was within the normal range  There was no evidence for stenosis  There was no regurgitation  AORTIC VALVE: The valve was trileaflet  Leaflets exhibited normal thickness and normal cuspal separation  DOPPLER: Transaortic velocity was within the normal range  There was no evidence for stenosis  There was no regurgitation  TRICUSPID VALVE: The valve structure was normal  There was normal leaflet separation  DOPPLER: The transtricuspid velocity was within the normal range  There was no evidence for stenosis  There was trace regurgitation  PULMONIC VALVE: Leaflets exhibited normal thickness, no calcification, and normal cuspal separation  DOPPLER: The transpulmonic velocity was within the normal range  There was no regurgitation  PERICARDIUM: There was no pericardial effusion  The pericardium was normal in appearance  AORTA: The root exhibited normal size  SYSTEMIC VEINS: IVC: The inferior vena cava was dilated  Respirophasic changes were blunted (less than 50% variation)      SYSTEM MEASUREMENT TABLES    2D  %FS: 27 94 %  Ao Diam: 3 29 cm  EDV(Teich): 171 86 ml  EF(Teich): 53 32 %  ESV(Teich): 80 23 ml  IVSd: 1 21 cm  LA Area: 23 96 cm2  LA Diam: 4 68 cm  LVEDV MOD A4C: 109 9 ml  LVEF MOD A4C: 64 35 %  LVESV MOD A4C: 39 18 ml  LVIDd: 5 88 cm  LVIDs: 4 24 cm  LVLd A4C: 8 59 cm  LVLs A4C: 6 49 cm  LVPWd: 1 21 cm  RA Area: 17 2 cm2  RVIDd: 4 01 cm  SV MOD A4C: 70 72 ml  SV(Teich): 91 63 ml    CW  AV Vmax: 1 57 m/s  AV maxP 92 mmHg    MM  TAPSE: 2 7 cm    PW  E' Sept: 0 04 m/s  E/E' Sept: 22 85  MV A Vito: 1 05 m/s  MV Dec Elliott: 3 42 m/s2  MV DecT: 258 2 ms  MV E Vito: 0 88 m/s  MV E/A Ratio: 0 84  MV PHT: 74 88 ms  MVA By PHT: 2 94 cm2    [employer] Accredited Echocardiography Laboratory    Prepared and electronically signed by    Martinez Carnes MD  Signed 10-Feb-2021 13:57:17    No results found for this or any previous visit  No results found for this or any previous visit  No valid procedures specified  No results found for this or any previous visit  Meds/Allergies   all current active meds have been reviewed and current meds:   Current Facility-Administered Medications   Medication Dose Route Frequency   • acetaminophen (TYLENOL) tablet 650 mg  650 mg Oral Q6H PRN   • albuterol (PROVENTIL HFA,VENTOLIN HFA) inhaler 2 puff  2 puff Inhalation Q6H PRN   • benzonatate (TESSALON PERLES) capsule 200 mg  200 mg Oral TID   • enoxaparin (LOVENOX) subcutaneous injection 60 mg  60 mg Subcutaneous Q12H SYED   • furosemide (LASIX) injection 80 mg  80 mg Intravenous BID (diuretic)   • guaiFENesin (MUCINEX) 12 hr tablet 1,200 mg  1,200 mg Oral BID   • hydrocodone-chlorpheniramine polistirex (TUSSIONEX) ER suspension 5 mL  5 mL Oral Q12H PRN   • ipratropium (ATROVENT) 0 02 % inhalation solution 0 5 mg  0 5 mg Nebulization TID   • levalbuterol (XOPENEX) inhalation solution 1 25 mg  1 25 mg Nebulization TID   • lisinopril (ZESTRIL) tablet 10 mg  10 mg Oral Daily   • methylPREDNISolone sodium succinate (Solu-MEDROL) injection 40 mg  40 mg Intravenous Q12H SYED     Medications Prior to Admission   Medication   • acetaminophen (TYLENOL) 500 mg tablet   • albuterol (PROVENTIL HFA,VENTOLIN HFA) 90 mcg/act inhaler   • furosemide (LASIX) 20 mg tablet   • lisinopril (ZESTRIL) 10 mg tablet     Counseling / Coordination of Care  Total floor / unit time spent today 20 minutes  Greater than 50% of total time was spent with the patient and / or family counseling and / or coordination of care  ** Please Note: Dragon 360 Dictation voice to text software may have been used in the creation of this document   **

## 2022-12-07 NOTE — PLAN OF CARE
Problem: PHYSICAL THERAPY ADULT  Goal: Performs mobility at highest level of function for planned discharge setting  See evaluation for individualized goals  Description: Treatment/Interventions: LE strengthening/ROM, Elevations, Therapeutic exercise, Functional transfer training, Endurance training, Patient/family training, Equipment eval/education, Bed mobility, Gait training, Spoke to nursing          See flowsheet documentation for full assessment, interventions and recommendation  Alexei Clifford PT, DPT   Outcome: Progressing  Note: Prognosis: Good  Problem List: Decreased endurance, Decreased mobility, Obesity, Pain  Assessment: Patient agreeable to partcipate in therapy session  Patient remains consistent with mod I for transfers and ambulation however requires increased time throughout  Pt able to ambulate short gait distances with no AD and close supervision and increased distance with roller walker  Initated stair training with CGA initally progressing to close supervision with B UE handrail and step to pattern  Pt with mild increased exertion with stairs however SpO2 above 90%  Continue to focus on OOB mobility with progression of ambulation and stair training as appropriate and able  Barriers to Discharge Comments: [facility] FERNANDA home though pt demonstrates appropriate strength and LE advancement in order to perform stairs Also FF stairs to bed and bath  PT Discharge Recommendation: Home with home health rehabilitation    See flowsheet documentation for full assessment

## 2022-12-07 NOTE — ASSESSMENT & PLAN NOTE
·  likely multifactorial in the setting of morbid obesity with likely underlying OHS, sleep apnea and acute on chronic CHF as well as COPD , significant pulmonary vascular congestion on imaging  · not on home oxygent supplementation at baseline    ·  capable to wean down on oxygen requirements to 3 L from 5 L  · Will continue IV diuretics as stated above  · Continue to wean nasal cannula oxygen for sats > 88%  · Continue respiratory protocol

## 2022-12-07 NOTE — ASSESSMENT & PLAN NOTE
Wt Readings from Last 3 Encounters:   12/07/22 (!) 172 kg (378 lb 15 5 oz)   12/02/22 (!) 182 kg (400 lb 11 2 oz)   10/03/22 (!) 177 kg (389 lb 8 oz)     · POA : HERNANDEZ + orthopnea + GIO + weight gain , vascular congestion on imaging  · Poorly compliant to diuretic therapy  • Dry weight seems to be aroundDry weight:  389 lb , on admission:  395 lb per standing scale  · echocardiogram (2/2010) showed EF 65%, no wall motion abnormalities, grade 1 diastolic dysfunction, no hemodynamically significant valvular disease, normal RV function  · Volume status difficult to assess due to body habitus with morbid obesity  · On 80 mg b i d  Per Cardiology recommendations, UO 2 4 -6 3 concern from innacuracy ? , weight down 13 lb, improvement in bilateral lower extremity edema  · Continue to monitor with daily weights, I's and O's, low-salt diet, fluid restriction 1500  · Monitor potassium magnesium keep potassium above 4 magnesium above 2

## 2022-12-07 NOTE — CASE MANAGEMENT
Case Management Progress Note    Patient name Valerie Ponce [address] 433/S -[id number] MRN [id number]  : 1960 Date 2022       LOS (days): 5  Geometric Mean LOS (GMLOS) (days): 3 90  Days to GMLOS:-0 9        OBJECTIVE:        Current admission status: Inpatient  Preferred Pharmacy:   [facility], [address]  [address] [place] [address]  Phone: [phone] Fax: [phone] [facility], [address]  [facility]  [address] [facility] [address]  Phone: [phone] Fax: [phone]    Primary Care Provider: Sixto Runner, DO    Primary Insurance: MEDICARE  Secondary Insurance: [address]    PROGRESS NOTE:  Pt accepted the offered [facility] AT [facility] services of [address] [place] [address] OF [place], [facility]  for home PT

## 2022-12-07 NOTE — ASSESSMENT & PLAN NOTE
·  wheezing improving, cough noted to be more productive  ·  covid/ influenza/ RSV neg x2  · Solu-Medrol dose increased to twice daily plus antitussives and mucolytics  · Nebulizations place a scheduled  · Supplemental nasal cannula O2 to maintain sats > 88%

## 2022-12-07 NOTE — PROGRESS NOTES
[facility]  Progress Note - Laurel Anon 1960, 63 y o  female MRN: [id number]  Unit/Bed#: S -01 Encounter: [id number]  Primary Care Provider: Margot Darby DO   Date and time admitted to hospital: 12/2/2022 12:28 PM    * Acute exacerbation of CHF (congestive heart failure) ([facility] [address] )  Assessment & Plan  Wt Readings from Last 3 Encounters:   12/07/22 (!) 172 kg (378 lb 15 5 oz)   12/02/22 (!) 182 kg (400 lb 11 2 oz)   10/03/22 (!) 177 kg (389 lb 8 oz)     · POA : HERNANDEZ + orthopnea + GIO + weight gain , vascular congestion on imaging  · Poorly compliant to diuretic therapy  • Dry weight seems to be aroundDry weight:  389 lb , on admission:  395 lb per standing scale  · echocardiogram (2/2010) showed EF 65%, no wall motion abnormalities, grade 1 diastolic dysfunction, no hemodynamically significant valvular disease, normal RV function  · Volume status difficult to assess due to body habitus with morbid obesity  · On 80 mg b i d  Per Cardiology recommendations, UO 2 4 -6 3 concern from innacuracy ? , weight down 13 lb, improvement in bilateral lower extremity edema  · Continue to monitor with daily weights, I's and O's, low-salt diet, fluid restriction 1500  · Monitor potassium magnesium keep potassium above 4 magnesium above 2        Respiratory failure with hypoxia (HCC)  Assessment & Plan  ·  likely multifactorial in the setting of morbid obesity with likely underlying OHS, sleep apnea and acute on chronic CHF as well as COPD , significant pulmonary vascular congestion on imaging  · not on home oxygent supplementation at baseline    ·  capable to wean down on oxygen requirements to 3 L from 5 L  · Will continue IV diuretics as stated above  · Continue to wean nasal cannula oxygen for sats > 88%  · Continue respiratory protocol    COPD (chronic obstructive pulmonary disease) (HCC)  Assessment & Plan    ·  wheezing improving, cough noted to be more productive  ·  covid/ influenza/ RSV neg x2  · Solu-Medrol dose increased to twice daily plus antitussives and mucolytics  · Nebulizations place a scheduled  · Supplemental nasal cannula O2 to maintain sats > 88%    Morbid obesity (HCC)  Assessment & Plan  · BMI: 66 68  · Recommend therapeutic lifestyle changes to promote weight loss    Essential hypertension  Assessment & Plan  · Blood pressure acceptable  · Continue lisinopril at home dose        VTE Pharmacologic Prophylaxis:   VTE Score: 5 Moderate Risk (Score 3-4) - Pharmacological DVT Prophylaxis Ordered: Enoxaparin (Lovenox)  Mechanical VTE Prophylaxis in Place: No    Patient Centered Rounds: I have performed bedside rounds with nursing staff today  Discussions with Specialists or Other Care Team Provider: Attending physician    Education and Discussions with Family / Patient: Updated  (daughter) via phone  Current Length of Stay: 5 day(s)    Current Patient Status: Inpatient     Discharge Plan / Estimated Discharge Date: Anticipate discharge in 48 hrs to home  Code Status: Level 1 - Full Code      Subjective:   No overnight events, reports increasing in cough however is more productive denies any chest pain wheezing noted to be decreasing, shortness of breath improving  Objective:     Vitals:   Temp (24hrs), Av 6 °F (37 °C), Min:98 5 °F (36 9 °C), Max:98 8 °F (37 1 °C)    Temp:  [98 5 °F (36 9 °C)-98 8 °F (37 1 °C)] 98 8 °F (37 1 °C)  HR:  [75-86] 86  Resp:  [16-18] 18  BP: (127-140)/(56-71) 140/56  SpO2:  [88 %-97 %] 93 %  Body mass index is 63 06 kg/m²  Input and Output Summary (last 24 hours): Intake/Output Summary (Last 24 hours) at 2022 1358  Last data filed at 2022 0844  Gross per 24 hour   Intake --   Output 2200 ml   Net -2200 ml       Physical Exam:     Physical Exam  Vitals and nursing note reviewed  Constitutional:       General: She is not in acute distress  Appearance: She is obese   She is not toxic-appearing  HENT:      Head: Normocephalic and atraumatic  Right Ear: Tympanic membrane normal       Left Ear: Tympanic membrane normal       Nose: Nose normal       Mouth/Throat:      Mouth: Mucous membranes are moist    Eyes:      Extraocular Movements: Extraocular movements intact  Conjunctiva/sclera: Conjunctivae normal       Pupils: Pupils are equal, round, and reactive to light  Cardiovascular:      Rate and Rhythm: Normal rate  Pulses: Normal pulses  Heart sounds: No murmur heard  No gallop  Pulmonary:      Effort: Pulmonary effort is normal  No respiratory distress  Breath sounds: No wheezing or rales  Comments: No active wheezing during my exam, bilateral mobilization of secretions  Chest:      Chest wall: No tenderness  Abdominal:      General: Bowel sounds are normal  There is no distension  Palpations: Abdomen is soft  Tenderness: There is no abdominal tenderness  Musculoskeletal:      Cervical back: Normal range of motion  Right lower leg: Edema present  Left lower leg: Edema present  Comments: Lower extremity edema improving   Skin:     General: Skin is warm  Capillary Refill: Capillary refill takes 2 to 3 seconds  Neurological:      Mental Status: She is alert and oriented to person, place, and time  Psychiatric:         Mood and Affect: Mood normal          Behavior: Behavior normal          Thought Content:  Thought content normal          Judgment: Judgment normal           Additional Data:     Labs:  Results from last 7 days   Lab Units 12/05/22  0645   WBC Thousand/uL 5 60   HEMOGLOBIN g/dL 12 0   HEMATOCRIT % 40 4   PLATELETS Thousands/uL 190   NEUTROS PCT % 72   LYMPHS PCT % 13*   MONOS PCT % 11   EOS PCT % 3     Results from last 7 days   Lab Units 12/07/22  0550 12/03/22  0658 12/02/22  1304   SODIUM mmol/L 139   < > 140   POTASSIUM mmol/L 4 1   < > 4 9   CHLORIDE mmol/L 99   < > 105   CO2 mmol/L 38*   < > 31 BUN mg/dL 21   < > 11   CREATININE mg/dL 0 60   < > 0 59*   ANION GAP mmol/L 2*   < > 4   CALCIUM mg/dL 10 1   < > 9 5   ALBUMIN g/dL  --   --  3 8   TOTAL BILIRUBIN mg/dL  --   --  0 59   ALK PHOS U/L  --   --  73   ALT U/L  --   --  20   AST U/L  --   --  26   GLUCOSE RANDOM mg/dL 160*   < > 90    < > = values in this interval not displayed  Results from last 7 days   Lab Units 12/03/22  0658 12/02/22  1304   PROCALCITONIN ng/ml 0 08 0 08       Imaging: No pertinent imaging reviewed  Recent Cultures (last 7 days):           Lines/Drains:  Invasive Devices     Peripheral Intravenous Line  Duration           Long-Dwell Peripheral IV (Midline) 00/58/61 Left Cephalic 3 days                Telemetry:        Last 24 Hours Medication List:   Current Facility-Administered Medications   Medication Dose Route Frequency Provider Last Rate   • acetaminophen  650 mg Oral Q6H PRN Scott Oseguera MD     • albuterol  2 puff Inhalation Q6H PRN Stephen Clark MD     • benzonatate  200 mg Oral TID Gweneth Glaze, DO     • enoxaparin  60 mg Subcutaneous Q12H [address] Linsey Clark MD     • furosemide  80 mg Intravenous BID (diuretic) Sonam Golden PA-C     • guaiFENesin  1,200 mg Oral BID Gweneth Glaze, DO     • hydrocodone-chlorpheniramine polistirex  5 mL Oral Q12H PRN Gweneth Glaze, DO     • ipratropium  0 5 mg Nebulization TID Gweneth Glaze, DO     • levalbuterol  1 25 mg Nebulization TID Gweneth Glaze, DO     • lisinopril  10 mg Oral Daily Stephen Wells MD     • methylPREDNISolone sodium succinate  40 mg Intravenous Q12H Richard [address], DO          Today, Patient Was Seen By: Kelli Jang MD    ** Please Note: This note has been constructed using a voice recognition system

## 2022-12-07 NOTE — PLAN OF CARE
Problem: PAIN - ADULT  Goal: Verbalizes/displays adequate comfort level or baseline comfort level  Description: Interventions:  - Encourage patient to monitor pain and request assistance  - Assess pain using appropriate pain scale  - Administer analgesics based on type and severity of pain and evaluate response  - Implement non-pharmacological measures as appropriate and evaluate response  - Consider cultural and social influences on pain and pain management  - Notify physician/advanced practitioner if interventions unsuccessful or patient reports new pain  Outcome: Progressing     Problem: INFECTION - ADULT  Goal: Absence or prevention of progression during hospitalization  Description: INTERVENTIONS:  - Assess and monitor for signs and symptoms of infection  - Monitor lab/diagnostic results  - Monitor all insertion sites, i e  indwelling lines, tubes, and drains  - Monitor endotracheal if appropriate and nasal secretions for changes in amount and color  - [place] appropriate cooling/warming therapies per order  - Administer medications as ordered  - Instruct and encourage patient and family to use good hand hygiene technique  - Identify and instruct in appropriate isolation precautions for identified infection/condition  Outcome: Progressing  Goal: Absence of fever/infection during neutropenic period  Description: INTERVENTIONS:  - Monitor WBC    Outcome: Progressing     Problem: SAFETY ADULT  Goal: Patient will remain free of falls  Description: INTERVENTIONS:  - Educate patient/family on patient safety including physical limitations  - Instruct patient to call for assistance with activity   - Consult OT/PT to assist with strengthening/mobility   - Keep Call bell within reach  - Keep bed low and locked with side rails adjusted as appropriate  - Keep care items and personal belongings within reach  - Initiate and maintain comfort rounds  - Make Fall Risk Sign visible to staff  - Offer Toileting every 2 Hours, in advance of need  - Initiate/Maintain alarm  - Obtain necessary fall risk management equipment  - Apply yellow socks and bracelet for high fall risk patients  - Consider moving patient to room near nurses station  Outcome: Progressing  Goal: Maintain or return to baseline ADL function  Description: INTERVENTIONS:  -  Assess patient's ability to carry out ADLs; assess patient's baseline for ADL function and identify physical deficits which impact ability to perform ADLs (bathing, care of mouth/teeth, toileting, grooming, dressing, etc )  - Assess/evaluate cause of self-care deficits   - Assess range of motion  - Assess patient's mobility; develop plan if impaired  - Assess patient's need for assistive devices and provide as appropriate  - Encourage maximum independence but intervene and supervise when necessary  - Involve family in performance of ADLs  - Assess for home care needs following discharge   - Consider OT consult to assist with ADL evaluation and planning for discharge  - Provide patient education as appropriate  Outcome: Progressing  Goal: Maintains/Returns to pre admission functional level  Description: INTERVENTIONS:  - Perform BMAT or MOVE assessment daily    - Set and communicate daily mobility goal to care team and patient/family/caregiver  - Collaborate with rehabilitation services on mobility goals if consulted  - Perform Range of Motion 3 times a day  - Reposition patient every 2 hours    - Dangle patient 3 times a day  - Stand patient 3 times a day  - Ambulate patient 3 times a day  - Out of bed to chair 3 times a day   - Out of bed for meals 3 times a day  - Out of bed for toileting  - Record patient progress and toleration of activity level   Outcome: Progressing     Problem: DISCHARGE PLANNING  Goal: Discharge to home or other facility with appropriate resources  Description: INTERVENTIONS:  - Identify barriers to discharge w/patient and caregiver  - Arrange for needed discharge resources and transportation as appropriate  - Identify discharge learning needs (meds, wound care, etc )  - Arrange for interpretive services to assist at discharge as needed  - Refer to Case Management Department for coordinating discharge planning if the patient needs post-hospital services based on physician/advanced practitioner order or complex needs related to functional status, cognitive ability, or social support system  Outcome: Progressing     Problem: Knowledge Deficit  Goal: Patient/family/caregiver demonstrates understanding of disease process, treatment plan, medications, and discharge instructions  Description: Complete learning assessment and assess knowledge base  Interventions:  - Provide teaching at level of understanding  - Provide teaching via preferred learning methods  Outcome: Progressing     Problem: MOBILITY - ADULT  Goal: Maintain or return to baseline ADL function  Description: INTERVENTIONS:  -  Assess patient's ability to carry out ADLs; assess patient's baseline for ADL function and identify physical deficits which impact ability to perform ADLs (bathing, care of mouth/teeth, toileting, grooming, dressing, etc )  - Assess/evaluate cause of self-care deficits   - Assess range of motion  - Assess patient's mobility; develop plan if impaired  - Assess patient's need for assistive devices and provide as appropriate  - Encourage maximum independence but intervene and supervise when necessary  - Involve family in performance of ADLs  - Assess for home care needs following discharge   - Consider OT consult to assist with ADL evaluation and planning for discharge  - Provide patient education as appropriate  Outcome: Progressing  Goal: Maintains/Returns to pre admission functional level  Description: INTERVENTIONS:  - Perform BMAT or MOVE assessment daily    - Set and communicate daily mobility goal to care team and patient/family/caregiver     - Collaborate with rehabilitation services on mobility goals if consulted  - Perform Range of Motion 3 times a day  - Reposition patient every 2 hours    - Dangle patient 3 times a day  - Stand patient 3 times a day  - Ambulate patient 3 times a day  - Out of bed to chair 3 times a day   - Out of bed for meals 3 times a day  - Out of bed for toileting  - Record patient progress and toleration of activity level   Outcome: Progressing     Problem: Potential for Falls  Goal: Patient will remain free of falls  Description: INTERVENTIONS:  - Educate patient/family on patient safety including physical limitations  - Instruct patient to call for assistance with activity   - Consult OT/PT to assist with strengthening/mobility   - Keep Call bell within reach  - Keep bed low and locked with side rails adjusted as appropriate  - Keep care items and personal belongings within reach  - Initiate and maintain comfort rounds  - Make Fall Risk Sign visible to staff  - Offer Toileting every 2 Hours, in advance of need  - Initiate/Maintain alarm  - Obtain necessary fall risk management equipment  - Apply yellow socks and bracelet for high fall risk patients  - Consider moving patient to room near nurses station  Outcome: Progressing     Problem: Prexisting or High Potential for Compromised Skin Integrity  Goal: Skin integrity is maintained or improved  Description: INTERVENTIONS:  - Identify patients at risk for skin breakdown  - Assess and monitor skin integrity  - Assess and monitor nutrition and hydration status  - Monitor labs   - Assess for incontinence   - Turn and reposition patient  - Assist with mobility/ambulation  - Relieve pressure over bony prominences  - Avoid friction and shearing  - Provide appropriate hygiene as needed including keeping skin clean and dry  - Evaluate need for skin moisturizer/barrier cream  - Collaborate with interdisciplinary team   - Patient/family teaching  - Consider wound care consult   Outcome: Progressing

## 2022-12-07 NOTE — PLAN OF CARE
Problem: OCCUPATIONAL THERAPY ADULT  Goal: Performs self-care activities at highest level of function for planned discharge setting  See evaluation for individualized goals  Description:   Outcome: Progressing  Note: Limitation: Decreased ADL status, Decreased endurance, Decreased high-level ADLs  Prognosis: Good  Assessment: Pt is a 63 y o  female seen for OT evaluation at [facility], admitted 12/2/2022 w/ Acute exacerbation of CHF (congestive heart failure) ([address] )  OT completed extensive review of pt's medical and social history  Comorbidities affecting pt's functional performance at time of assessment include: HTN, COPD, morbid obesity, hypoxia, CHF, urinary incontinence  Personal factors affecting pt at time of IE include:steps to enter environment, difficulty performing ADLS, difficulty performing IADLS , decreased initiation and engagement  and health management   Prior to admission, pt was living in [place], [place], bed/bath upstairs, with daughter and her family and was independent with ADL & light IADL  Upon evaluation, pt presents to OT below baseline due to the following performance deficits: weakness, decreased strength and decreased tolerance  Pt to benefit from continued skilled OT tx while in the hospital to address deficits as defined above and maximize level of functional independence w ADL's and functional mobility  Occupational Performance areas to address include: bathing/shower, toilet hygiene, dressing, functional mobility, cleaning, meal prep, household maintenance and care of children  The patient's raw score on the AM-PAC Daily Activity inpatient short form is 20, standardized score is 42 03, greater than 39 4  Patients at this level are likely to benefit from DC to home  Based on findings, pt is of high complexity, due to medical comorbidities  At this time, OT recommendations at time of discharge are home OT & family support       OT Discharge Recommendation: Home with home health rehabilitation

## 2022-12-07 NOTE — OCCUPATIONAL THERAPY NOTE
Occupational Therapy Evaluation      Mayra Montes    12/7/2022    Principal Problem:    Acute exacerbation of CHF (congestive heart failure) (HCC)  Active Problems:    Essential hypertension    COPD (chronic obstructive pulmonary disease) (HCC)    Morbid obesity (HCC)    Respiratory failure with hypoxia ([address] )      Past Medical History:   Diagnosis Date    Arthritis     CHF (congestive heart failure) (HCC)     COPD (chronic obstructive pulmonary disease) (HCC)     Depression     High blood pressure     Hypertension     Urinary incontinence        Past Surgical History:   Procedure Laterality Date    HYSTERECTOMY      INCONTINENCE SURGERY      OOPHORECTOMY Bilateral     REPLACEMENT TOTAL KNEE Right 2016    TOTAL ABDOMINAL HYSTERECTOMY W/ BILATERAL SALPINGOOPHORECTOMY  2006 12/07/22 1140   OT Last Visit   OT Visit Date 12/07/22   Note Type   Note type Evaluation   Pain Assessment   Pain Assessment Tool 0-10   Pain Score No Pain   Restrictions/Precautions   Weight Bearing Precautions Per Order No   Other Precautions Fall Risk; Chair Alarm; Bed Alarm   Home Living   Type of [address] [place] [address] Two level;Stairs to enter with rails;Bed/bath upstairs;1/2 bath on main level   Bathroom Shower/Tub Tub/shower unit   Bathroom Toilet Standard   Bathroom Equipment Grab bars in [facility] Walker;Cane   Prior Function   Level of [place] Independent with ADLs; Independent with IADLS   Lives With Daughter  (9yo twin granddaughters)   Receives Help From Family  (Pt reports someone is always home with her  Even when her daughter/daughter's family go away, they have other family check in or stay with her )   IADLs Independent with medication management; Independent with meal prep; Family/Friend/Other provides transportation  (Pt cooks, cleans kitchen, does dishes  Family does laundry & heavy cleaning   Pt occasionally watches 6yo granddaughters while her daughter is working from home )   Falls in the last 6 months 1 to 4   ADL   Eating Assistance 7  Independent   Grooming Assistance 7  Independent   UB Bathing Assistance 6  Modified Independent   LB Bathing Assistance 4  Minimal Assistance   UB Dressing Assistance 5  Supervision/Setup   LB Dressing Assistance 3  Moderate Assistance   Toileting Assistance  3  Moderate Assistance   Additional Comments Pt requiring assistance with mona care this session  States she doesn't usually need help at home  Bed Mobility   Additional Comments Pt received OOB in chair   Transfers   Sit to Stand 6  Modified independent   Stand to Sit 6  Modified independent   Stand pivot 6  Modified independent   Functional Mobility   Functional Mobility 5  Supervision   Additional items Rolling walker   Activity Tolerance   Activity Tolerance Patient limited by fatigue   RUE Assessment   RUE Assessment WFL   LUE Assessment   LUE Assessment WFL   Hand Function   Gross Motor Coordination Functional   Fine Motor Coordination Functional   Psychosocial   Psychosocial (WDL) WDL   Cognition   Overall Cognitive Status WFL   Arousal/Participation Alert; Cooperative   Attention Within functional limits   Orientation Level Oriented X4   Memory Within functional limits   Following Commands Follows multistep commands without difficulty   Assessment   Limitation Decreased ADL status; Decreased endurance;Decreased high-level ADLs   Prognosis Good   Assessment Pt is a 63 y o  female seen for OT evaluation at [facility], admitted 12/2/2022 w/ Acute exacerbation of CHF (congestive heart failure) ([facility] [address] )  OT completed extensive review of pt's medical and social history  Comorbidities affecting pt's functional performance at time of assessment include: HTN, COPD, morbid obesity, hypoxia, CHF, urinary incontinence   Personal factors affecting pt at time of IE include:steps to enter environment, difficulty performing ADLS, difficulty performing IADLS , decreased initiation and engagement  and health management   Prior to admission, pt was living in [place], [place], bed/bath upstairs, with daughter and her family and was independent with ADL & light IADL  Upon evaluation, pt presents to OT below baseline due to the following performance deficits: weakness, decreased strength and decreased tolerance  Pt to benefit from continued skilled OT tx while in the hospital to address deficits as defined above and maximize level of functional independence w ADL's and functional mobility  Occupational Performance areas to address include: bathing/shower, toilet hygiene, dressing, functional mobility, cleaning, meal prep, household maintenance and care of children  The patient's raw score on the AM-PAC Daily Activity inpatient short form is 20, standardized score is 42 03, greater than 39 4  Patients at this level are likely to benefit from DC to home  Based on findings, pt is of high complexity, due to medical comorbidities  At this time, OT recommendations at time of discharge are home OT & family support  Plan   Treatment Interventions ADL retraining;Functional transfer training; Endurance training;Patient/family training;Equipment evaluation/education; Compensatory technique education; Energy conservation;Continued evaluation   Goal Expiration Date 12/17/22   OT Frequency 1-2x/wk   Recommendation   OT Discharge Recommendation Home with home health rehabilitation   AM-PAC Daily Activity Inpatient   Lower Body Dressing 3   Bathing 3   Toileting 3   Upper Body Dressing 3   Grooming 4   Eating 4   Daily Activity Raw Score 20   Daily Activity Standardized Score (Calc for Raw Score >=11) 42 03   AM-PAC Applied Cognition Inpatient   Following a Speech/Presentation 4   Understanding Ordinary Conversation 4   Taking Medications 4   Remembering Where Things Are Placed or Put Away 4   Remembering List of 4-5 Errands 4   Taking Care of Complicated Tasks 4   Applied Cognition Raw Score 24   Applied Cognition Standardized Score 62 21     Pt will achieve the following goals within 10 days  *Pt will complete UB bathing and dressing with independence  *Pt will complete LB bathing and dressing with independence   *Pt will complete toileting (hygiene and clothing management) with independence    *Pt will complete bed mobility with independence, with bed flat and no side rail to prep for purposeful tasks    *Pt will perform functional transfers with independence in order to complete ADL routine  *Pt will increase standing tolerance to 5+ minutes in order to complete ADL routine  *Pt will complete item retrieval and light home management with supervision while demonstrating good safety  *Pt will demonstrate increased activity tolerance in order to complete ADL routine  *Pt will participate in UE therapeutic exercise in order to maximize strength for ADL transfers  *Pt will sit on EOB for 10+ minutes for increased safety with seated activity tolerance during ADL tasks  *Pt will identify 3-5 fall risks to ensure safety upon discharge      [employer], MS, OTR/L

## 2022-12-07 NOTE — PLAN OF CARE
Problem: PAIN - ADULT  Goal: Verbalizes/displays adequate comfort level or baseline comfort level  Description: Interventions:  - Encourage patient to monitor pain and request assistance  - Assess pain using appropriate pain scale  - Administer analgesics based on type and severity of pain and evaluate response  - Implement non-pharmacological measures as appropriate and evaluate response  - Consider cultural and social influences on pain and pain management  - Notify physician/advanced practitioner if interventions unsuccessful or patient reports new pain  Outcome: Progressing     Problem: INFECTION - ADULT  Goal: Absence or prevention of progression during hospitalization  Description: INTERVENTIONS:  - Assess and monitor for signs and symptoms of infection  - Monitor lab/diagnostic results  - Monitor all insertion sites, i e  indwelling lines, tubes, and drains  - Monitor endotracheal if appropriate and nasal secretions for changes in amount and color  - Lawrence appropriate cooling/warming therapies per order  - Administer medications as ordered  - Instruct and encourage patient and family to use good hand hygiene technique  - Identify and instruct in appropriate isolation precautions for identified infection/condition  Outcome: Progressing     Problem: SAFETY ADULT  Goal: Maintain or return to baseline ADL function  Description: INTERVENTIONS:  -  Assess patient's ability to carry out ADLs; assess patient's baseline for ADL function and identify physical deficits which impact ability to perform ADLs (bathing, care of mouth/teeth, toileting, grooming, dressing, etc )  - Assess/evaluate cause of self-care deficits   - Assess range of motion  - Assess patient's mobility; develop plan if impaired  - Assess patient's need for assistive devices and provide as appropriate  - Encourage maximum independence but intervene and supervise when necessary  - Involve family in performance of ADLs  - Assess for home care needs following discharge   - Consider OT consult to assist with ADL evaluation and planning for discharge  - Provide patient education as appropriate  Outcome: Progressing     Problem: DISCHARGE PLANNING  Goal: Discharge to home or other facility with appropriate resources  Description: INTERVENTIONS:  - Identify barriers to discharge w/patient and caregiver  - Arrange for needed discharge resources and transportation as appropriate  - Identify discharge learning needs (meds, wound care, etc )  - Arrange for interpretive services to assist at discharge as needed  - Refer to Case Management Department for coordinating discharge planning if the patient needs post-hospital services based on physician/advanced practitioner order or complex needs related to functional status, cognitive ability, or social support system  Outcome: Progressing     Problem: Knowledge Deficit  Goal: Patient/family/caregiver demonstrates understanding of disease process, treatment plan, medications, and discharge instructions  Description: Complete learning assessment and assess knowledge base    Interventions:  - Provide teaching at level of understanding  - Provide teaching via preferred learning methods  Outcome: Progressing     Problem: MOBILITY - ADULT  Goal: Maintain or return to baseline ADL function  Description: INTERVENTIONS:  -  Assess patient's ability to carry out ADLs; assess patient's baseline for ADL function and identify physical deficits which impact ability to perform ADLs (bathing, care of mouth/teeth, toileting, grooming, dressing, etc )  - Assess/evaluate cause of self-care deficits   - Assess range of motion  - Assess patient's mobility; develop plan if impaired  - Assess patient's need for assistive devices and provide as appropriate  - Encourage maximum independence but intervene and supervise when necessary  - Involve family in performance of ADLs  - Assess for home care needs following discharge   - Consider OT consult to assist with ADL evaluation and planning for discharge  - Provide patient education as appropriate  Outcome: Progressing

## 2022-12-07 NOTE — PHYSICAL THERAPY NOTE
PHYSICAL THERAPY NOTE    Patient Name: Lea Staples  FWTHD'N Date: 22 1154   PT Last Visit   PT Visit Date 22   Pain Assessment   Pain Assessment Tool 0-10   Pain Score No Pain   Restrictions/Precautions   Weight Bearing Precautions Per Order No   General   Family/Caregiver Present No   Subjective   Subjective Patient seated OOB in recliner and is agreeable to participate in therapy session  Pt identifers obtained from name &   Bed Mobility   Supine to Sit Unable to assess   Sit to Supine Unable to assess   Additional Comments Patient seated OOB in recliner pre and post session with chair alarm engaged, call bell and belongings in reach  Transfers   Sit to Stand 6  Modified independent   Stand to Sit 6  Modified independent   Ambulation/Elevation   Gait pattern Wide FABY; Forward Flexion; Short stride; Excessively slow   Gait Assistance 6  Modified independent   Additional items Assist x 1   Assistive Device Rolling walker;None   Distance 15' x2 without RW and 80' x1 with RW   Stair Management Assistance 5  Supervision  (CGA initally to close supervision)   Additional items Assist x 1;Verbal cues; Increased time required   Stair Management Technique Two rails; Step to pattern; Foreward   Number of Stairs 12   Balance   Static Sitting Good   Dynamic Sitting Fair +   Static Standing Fair +   Ambulatory Fair   Endurance Deficit   Endurance Deficit Yes   Endurance Deficit Description SOB and fatigue   Activity Tolerance   Activity Tolerance Patient limited by fatigue   Nurse Made Aware Spoke to RN   Assessment   Prognosis Good   Problem List Decreased endurance;Decreased mobility;Obesity;Pain   Assessment Patient agreeable to partcipate in therapy session  Patient remains consistent with mod I for transfers and ambulation however requires increased time throughout   Pt able to ambulate short gait distances with no AD and close supervision and increased distance with roller walker  Initated stair training with CGA initally progressing to close supervision with B UE handrail and step to pattern  Pt with mild increased exertion with stairs however SpO2 above 90%  Continue to focus on OOB mobility with progression of ambulation and stair training as appropriate and able  Goals   Patient Goals to get home   STG Expiration Date 12/10/22   PT Treatment Day 1   Plan   Treatment/Interventions Functional transfer training;LE strengthening/ROM; Elevations; Endurance training;Patient/family training;Equipment eval/education; Bed mobility;Gait training;Spoke to nursing   PT Frequency 2-3x/wk   Recommendation   PT Discharge Recommendation Home with home health rehabilitation   [address] Mobility Inpatient   Turning in Bed Without Bedrails 4   Lying on Back to Sitting on Edge of Flat Bed 4   Moving Bed to Chair 4   Standing Up From Chair 4   Walk in Room 4   Climb 3-5 Stairs 3   Basic Mobility Inpatient Raw Score 23   Basic Mobility Standardized Score 50 88   Highest Level Of Mobility   JH-HLM Goal 7: Walk 25 feet or more   JH-HLM Achieved 7: Walk 25 feet or more       The patient's AM-PAC Basic Mobility Inpatient Short Form Raw Score is 23  A Raw score of greater than 16 suggests the patient may benefit from discharge to home  Please also refer to the recommendation of the Physical Therapist for safe discharge planning        Donna Rosen

## 2022-12-08 LAB
ANION GAP SERPL CALCULATED.3IONS-SCNC: 5 MMOL/L (ref 4–13)
BUN SERPL-MCNC: 29 MG/DL (ref 5–25)
CALCIUM SERPL-MCNC: 10.6 MG/DL (ref 8.4–10.2)
CHLORIDE SERPL-SCNC: 95 MMOL/L (ref 96–108)
CO2 SERPL-SCNC: 39 MMOL/L (ref 21–32)
CREAT SERPL-MCNC: 0.64 MG/DL (ref 0.6–1.3)
GFR SERPL CREATININE-BSD FRML MDRD: 97 ML/MIN/1.73SQ M
GLUCOSE SERPL-MCNC: 143 MG/DL (ref 65–140)
MAGNESIUM SERPL-MCNC: 2.3 MG/DL (ref 1.9–2.7)
POTASSIUM SERPL-SCNC: 4.1 MMOL/L (ref 3.5–5.3)
SODIUM SERPL-SCNC: 139 MMOL/L (ref 135–147)

## 2022-12-08 RX ORDER — METHYLPREDNISOLONE SODIUM SUCCINATE 40 MG/ML
40 INJECTION, POWDER, LYOPHILIZED, FOR SOLUTION INTRAMUSCULAR; INTRAVENOUS DAILY
Status: DISCONTINUED | OUTPATIENT
Start: 2022-12-09 | End: 2022-12-09

## 2022-12-08 RX ADMIN — ACETAMINOPHEN 650 MG: 325 TABLET, FILM COATED ORAL at 19:42

## 2022-12-08 RX ADMIN — LEVALBUTEROL HYDROCHLORIDE 1.25 MG: 1.25 SOLUTION, CONCENTRATE RESPIRATORY (INHALATION) at 08:20

## 2022-12-08 RX ADMIN — GUAIFENESIN 1200 MG: 600 TABLET ORAL at 08:34

## 2022-12-08 RX ADMIN — METHYLPREDNISOLONE SODIUM SUCCINATE 40 MG: 40 INJECTION, POWDER, FOR SOLUTION INTRAMUSCULAR; INTRAVENOUS at 08:41

## 2022-12-08 RX ADMIN — LEVALBUTEROL HYDROCHLORIDE 1.25 MG: 1.25 SOLUTION, CONCENTRATE RESPIRATORY (INHALATION) at 20:13

## 2022-12-08 RX ADMIN — LEVALBUTEROL HYDROCHLORIDE 1.25 MG: 1.25 SOLUTION, CONCENTRATE RESPIRATORY (INHALATION) at 13:59

## 2022-12-08 RX ADMIN — IPRATROPIUM BROMIDE 0.5 MG: 0.5 SOLUTION RESPIRATORY (INHALATION) at 08:20

## 2022-12-08 RX ADMIN — FUROSEMIDE 80 MG: 10 INJECTION, SOLUTION INTRAMUSCULAR; INTRAVENOUS at 08:35

## 2022-12-08 RX ADMIN — ENOXAPARIN SODIUM 60 MG: 60 INJECTION SUBCUTANEOUS at 08:40

## 2022-12-08 RX ADMIN — GUAIFENESIN 1200 MG: 600 TABLET ORAL at 17:47

## 2022-12-08 RX ADMIN — ENOXAPARIN SODIUM 60 MG: 60 INJECTION SUBCUTANEOUS at 21:38

## 2022-12-08 RX ADMIN — HYDROCODONE POLISTIREX AND CHLORPHENIRAMINE POLISTIREX 5 ML: 10; 8 SUSPENSION, EXTENDED RELEASE ORAL at 09:43

## 2022-12-08 RX ADMIN — FUROSEMIDE 80 MG: 10 INJECTION, SOLUTION INTRAMUSCULAR; INTRAVENOUS at 16:21

## 2022-12-08 RX ADMIN — IPRATROPIUM BROMIDE 0.5 MG: 0.5 SOLUTION RESPIRATORY (INHALATION) at 13:59

## 2022-12-08 RX ADMIN — BENZONATATE 200 MG: 100 CAPSULE ORAL at 21:38

## 2022-12-08 RX ADMIN — LISINOPRIL 10 MG: 10 TABLET ORAL at 08:34

## 2022-12-08 RX ADMIN — BENZONATATE 200 MG: 100 CAPSULE ORAL at 16:18

## 2022-12-08 RX ADMIN — IPRATROPIUM BROMIDE 0.5 MG: 0.5 SOLUTION RESPIRATORY (INHALATION) at 20:13

## 2022-12-08 RX ADMIN — BENZONATATE 200 MG: 100 CAPSULE ORAL at 08:34

## 2022-12-08 NOTE — PLAN OF CARE
Problem: CARDIOVASCULAR - ADULT  Goal: Maintains optimal cardiac output and hemodynamic stability  Description: INTERVENTIONS:  - Monitor I/O, vital signs and rhythm  - Monitor for S/S and trends of decreased cardiac output  - Administer and titrate ordered vasoactive medications to optimize hemodynamic stability  - Assess quality of pulses, skin color and temperature  - Assess for signs of decreased coronary artery perfusion  - Instruct patient to report change in severity of symptoms  Outcome: Progressing  Goal: Absence of cardiac dysrhythmias or at baseline rhythm  Description: INTERVENTIONS:  - Continuous cardiac monitoring, vital signs, obtain 12 lead EKG if ordered  - Administer antiarrhythmic and heart rate control medications as ordered  - Monitor electrolytes and administer replacement therapy as ordered  Outcome: Progressing     Problem: RESPIRATORY - ADULT  Goal: Achieves optimal ventilation and oxygenation  Description: INTERVENTIONS:  - Assess for changes in respiratory status  - Assess for changes in mentation and behavior  - Position to facilitate oxygenation and minimize respiratory effort  - Oxygen administered by appropriate delivery if ordered  - Initiate smoking cessation education as indicated  - Encourage broncho-pulmonary hygiene including cough, deep breathe, Incentive Spirometry  - Assess the need for suctioning and aspirate as needed  - Assess and instruct to report SOB or any respiratory difficulty  - Respiratory Therapy support as indicated  Outcome: Progressing     Problem: Prexisting or High Potential for Compromised Skin Integrity  Goal: Skin integrity is maintained or improved  Description: INTERVENTIONS:  - Identify patients at risk for skin breakdown  - Assess and monitor skin integrity  - Assess and monitor nutrition and hydration status  - Monitor labs   - Assess for incontinence   - Turn and reposition patient  - Assist with mobility/ambulation  - Relieve pressure over bony prominences  - Avoid friction and shearing  - Provide appropriate hygiene as needed including keeping skin clean and dry  - Evaluate need for skin moisturizer/barrier cream  - Collaborate with interdisciplinary team   - Patient/family teaching  - Consider wound care consult   Outcome: Progressing     Problem: MOBILITY - ADULT  Goal: Maintain or return to baseline ADL function  Description: INTERVENTIONS:  -  Assess patient's ability to carry out ADLs; assess patient's baseline for ADL function and identify physical deficits which impact ability to perform ADLs (bathing, care of mouth/teeth, toileting, grooming, dressing, etc )  - Assess/evaluate cause of self-care deficits   - Assess range of motion  - Assess patient's mobility; develop plan if impaired  - Assess patient's need for assistive devices and provide as appropriate  - Encourage maximum independence but intervene and supervise when necessary  - Involve family in performance of ADLs  - Assess for home care needs following discharge   - Consider OT consult to assist with ADL evaluation and planning for discharge  - Provide patient education as appropriate  Outcome: Progressing

## 2022-12-08 NOTE — ASSESSMENT & PLAN NOTE
·  wheezing improving, cough noted to be more productive  ·  covid/ influenza/ RSV neg x2  Plan   · Will decrease Solu-Medrol dose to once  daily , continue  antitussives and mucolytics, transition to po steroids tomorrow   · Continue nebs   · Supplemental nasal cannula O2 to maintain sats > 88%

## 2022-12-08 NOTE — PROGRESS NOTES
General Cardiology   Progress Note -  Team One   Ashleigh Tian 63 y o  female MRN: [id number]    Unit/Bed#: S -01 Encounter: [id number]    Assessment/ Plan:    1  Acute on chronic HFpEF   On furosemide 80 mg IV BID currently; got a dose of metolazone yesterday with good response  Non compliant with diuretics at home prior to admission; ordered lasix 20mg PO BID; issues with urinary incontinence  Monitor I/Os - 3 4 L in 24 hours; overall negative 9 7L  Daily weights 370 lbs today from 378 lbs yesterday and 395lbs from admission  Creatinine stable: 0 64  Continue 2 gram Na diet and 1500 ml fluid restriction     Continue current dose diuretics today: lasix 80mg BID; repeat BMP in am; possible transition to oral tomorrow       2  Hypertension   BP stable: Avg 144/69 past 24 hours  On lisinopril 10 mg PO daily      3  COPD  Followed by primary team   On albuterol; cough improving     4  Morbid obesity   BMI 63     Patient scheduled to follow up with Mari Gillis 12/20/2022  Subjective: Pt seen for follow up; no events overnight  Cough was better last night with cough medicine; slept well  No chest pain/palpitations  No SOB at rest; has been on 3L NC  Still making large amount of urine and feels like LE swelling is improving  Review of Systems   Constitutional: Negative for decreased appetite and fever  Cardiovascular: Positive for leg swelling (improving)  Negative for chest pain, orthopnea, palpitations and syncope  Respiratory: Positive for cough  Negative for sputum production  Gastrointestinal: Negative for nausea and vomiting  Genitourinary: Negative for dysuria  Neurological: Negative for dizziness and light-headedness  Psychiatric/Behavioral: Negative for altered mental status  Objective:   Vitals: Blood pressure 160/82, pulse 97, temperature 98 4 °F (36 9 °C), resp  rate 16, height 5' 5" (1 651 m), weight (!) 168 kg (370 lb 13 oz), SpO2 94 %  ,     Body mass index is 61 71 kg/m²  ,     Systolic (32NSJ), DRY:971 , Min:128 , FXO:638     Diastolic (44BIA), AND:62, Min:56, Max:82      Intake/Output Summary (Last 24 hours) at 2022 0909  Last data filed at 2022 0839  Gross per 24 hour   Intake 772 ml   Output 2750 ml   Net -1978 ml     Weight (last 2 days)     Date/Time Weight    22 0600 168 (370 81)    22 0804 172 (378 97)    22 07:47:17 172 (378 97)    22 0538 175 (386 47)    22 0926 174 (384 04)    22 0536 173 (381 4)        Telemetry Review:   No telemetry    Physical Exam  Vitals reviewed  Constitutional:       Appearance: Normal appearance  She is obese  HENT:      Head: Normocephalic  Mouth/Throat:      Mouth: Mucous membranes are moist    Cardiovascular:      Rate and Rhythm: Normal rate and regular rhythm  Heart sounds: S1 normal and S2 normal  No murmur heard  Pulmonary:      Effort: Pulmonary effort is normal       Breath sounds: Wheezing present  No rales  Comments: + dry cough; on 3L NC: turned down to 2L; has continuous pulse oximetry  Abdominal:      Palpations: Abdomen is soft  Musculoskeletal:      Right lower le+ Edema present  Left lower le+ Edema present  Skin:     General: Skin is warm  Neurological:      Mental Status: She is alert and oriented to person, place, and time     Psychiatric:         Mood and Affect: Mood normal        LABORATORY RESULTS      CBC with diff:   Results from last 7 days   Lab Units 22  0645 22  0658 22  1852 22  1304   WBC Thousand/uL 5 60 5 44  --  5 44   HEMOGLOBIN g/dL 12 0 11 8  --  12 2   HEMATOCRIT % 40 4 39 2  --  40 1   MCV fL 96 98  --  94   PLATELETS Thousands/uL 190 139* 164 163   MCH pg 28 4 29 5  --  28 7   MCHC g/dL 29 7* 30 1*  --  30 4*   RDW % 15 2* 15 6*  --  15 4*   MPV fL 10 6 10 7 10 8 10 9   NRBC AUTO /100 WBCs 0  --   --  0     CMP:  Results from last 7 days   Lab Units 22  0529 22  0550 12/06/22  0531 12/05/22  0645 12/04/22  0453 12/03/22  0658 12/02/22  1304   POTASSIUM mmol/L 4 1 4 1 3 9 4 1 4 1 4 1 4 9   CHLORIDE mmol/L 95* 99 97 97 99 104 105   CO2 mmol/L 39* 38* 39* 38* 38* 33* 31   BUN mg/dL 29* 21 19 17 15 14 11   CREATININE mg/dL 0 64 0 60 0 70 0 66 0 73 0 56* 0 59*   CALCIUM mg/dL 10 6* 10 1 9 5 9 1 9 0 9 1 9 5   AST U/L  --   --   --   --   --   --  26   ALT U/L  --   --   --   --   --   --  20   ALK PHOS U/L  --   --   --   --   --   --  73   EGFR ml/min/1 73sq m 97 99 94 96 89 101 99     BMP:  Results from last 7 days   Lab Units 12/08/22  0529 12/07/22  0550 12/06/22  0531 12/05/22  0645 12/04/22  0453 12/03/22  0658 12/02/22  1304   POTASSIUM mmol/L 4 1 4 1 3 9 4 1 4 1 4 1 4 9   CHLORIDE mmol/L 95* 99 97 97 99 104 105   CO2 mmol/L 39* 38* 39* 38* 38* 33* 31   BUN mg/dL 29* 21 19 17 15 14 11   CREATININE mg/dL 0 64 0 60 0 70 0 66 0 73 0 56* 0 59*   CALCIUM mg/dL 10 6* 10 1 9 5 9 1 9 0 9 1 9 5     Lab Results   Component Value Date    NTBNP 23 08/31/2022    NTBNP 27 11/04/2019    NTBNP 65 07/03/2019     Results from last 7 days   Lab Units 12/08/22  0529 12/07/22  0550 12/06/22  0531 12/05/22  0645 12/03/22  0658   MAGNESIUM mg/dL 2 3 2 3 2 1 2 1 2 2     Lipid Profile:   Lab Results   Component Value Date    CHOL 206 07/30/2015     Lab Results   Component Value Date    HDL 41 (L) 08/31/2022    HDL 42 (L) 08/18/2020    HDL 45 07/30/2015     Lab Results   Component Value Date    LDLCALC 123 (H) 08/31/2022    LDLCALC 131 (H) 08/18/2020    LDLCALC 136 (H) 07/30/2015     Lab Results   Component Value Date    TRIG 115 08/31/2022    TRIG 108 08/18/2020    TRIG 126 07/30/2015     Cardiac testing:   Results for orders placed during the hospital encounter of 02/10/21    Echo complete with contrast if indicated    Vic[address] Ramirez [address]  [phone]    Transthoracic Echocardiogram  2D, M-mode, Doppler, and Color Doppler    Study date:  10-Feb-2021    Patient: Rita Mcneil  MR number: [id number]  Account number: [de-identified]  : 1960  Age: 63 years  Gender: Female  Status: Outpatient  Location: [address] [place] [address] and [facility] [place]  Height: 65 in  Weight: 386 1 lb  BP: 110/ 74 mmHg    Indications: Pedal edema;SOB on exertion;CHF  Diagnoses: R06 02 - Shortness of breath, R60 9 - Edema, unspecified    Sonographer:  DARYL Fernandez  Primary Physician:  Aida Veal DO  Referring Physician:  Aida Vela DO  Group:  [id number]  Cardiology Fellow: Dianne Underwood MD  Interpreting Physician:  Dari Watts MD    SUMMARY    LEFT VENTRICLE:  Systolic function was normal  Ejection fraction was estimated to be 65 %  There were no regional wall motion abnormalities  Doppler parameters were consistent with abnormal left ventricular relaxation (grade 1 diastolic dysfunction)  TRICUSPID VALVE:  There was trace regurgitation  IVC, HEPATIC VEINS:  The inferior vena cava was dilated  Respirophasic changes were blunted (less than 50% variation)  HISTORY: PRIOR HISTORY: BRADY;Smoker; Morbid obesity;HLD;HTN;COPD  PROCEDURE: The study was performed in the [address] [facility] [place]  This was a routine study  The transthoracic approach was used  The study included complete 2D imaging, M-mode, complete spectral Doppler, and color Doppler  The  heart rate was 80 bpm, at the start of the study  Images were obtained from the parasternal, apical, subcostal, and suprasternal notch acoustic windows  Echocardiographic views were limited due to poor acoustic window availability,  decreased penetration, and lung interference  This was a technically difficult study  LEFT VENTRICLE: Size was normal  Systolic function was normal  Ejection fraction was estimated to be 65 %  There were no regional wall motion abnormalities   Wall thickness was normal  DOPPLER: Doppler parameters were consistent with  abnormal left ventricular relaxation (grade 1 diastolic dysfunction)  RIGHT VENTRICLE: The size was normal  Systolic function was normal  Wall thickness was normal     LEFT ATRIUM: Size was normal     RIGHT ATRIUM: Size was normal     MITRAL VALVE: Valve structure was normal  There was normal leaflet separation  DOPPLER: The transmitral velocity was within the normal range  There was no evidence for stenosis  There was no regurgitation  AORTIC VALVE: The valve was trileaflet  Leaflets exhibited normal thickness and normal cuspal separation  DOPPLER: Transaortic velocity was within the normal range  There was no evidence for stenosis  There was no regurgitation  TRICUSPID VALVE: The valve structure was normal  There was normal leaflet separation  DOPPLER: The transtricuspid velocity was within the normal range  There was no evidence for stenosis  There was trace regurgitation  PULMONIC VALVE: Leaflets exhibited normal thickness, no calcification, and normal cuspal separation  DOPPLER: The transpulmonic velocity was within the normal range  There was no regurgitation  PERICARDIUM: There was no pericardial effusion  The pericardium was normal in appearance  AORTA: The root exhibited normal size  SYSTEMIC VEINS: IVC: The inferior vena cava was dilated  Respirophasic changes were blunted (less than 50% variation)      SYSTEM MEASUREMENT TABLES    2D  %FS: 27 94 %  Ao Diam: 3 29 cm  EDV(Teich): 171 86 ml  EF(Teich): 53 32 %  ESV(Teich): 80 23 ml  IVSd: 1 21 cm  LA Area: 23 96 cm2  LA Diam: 4 68 cm  LVEDV MOD A4C: 109 9 ml  LVEF MOD A4C: 64 35 %  LVESV MOD A4C: 39 18 ml  LVIDd: 5 88 cm  LVIDs: 4 24 cm  LVLd A4C: 8 59 cm  LVLs A4C: 6 49 cm  LVPWd: 1 21 cm  RA Area: 17 2 cm2  RVIDd: 4 01 cm  SV MOD A4C: 70 72 ml  SV(Teich): 91 63 ml    CW  AV Vmax: 1 57 m/s  AV maxP 92 mmHg    MM  TAPSE: 2 7 cm    PW  E' Sept: 0 04 m/s  E/E' Sept: 22 85  MV A Vito: 1 05 m/s  MV Dec [place]: 3 42 m/s2  MV DecT: 258 2 ms  MV E Vito: 0 88 m/s  MV E/A Ratio: 0 84  MV PHT: 74 88 ms  MVA By PHT: 2 94 cm2    [facility] Accredited Echocardi[facility]    Prepared and electronically signed by    Cammie Graham MD  Signed 10-Feb-2021 13:57:17    No results found for this or any previous visit  No results found for this or any previous visit  No valid procedures specified  No results found for this or any previous visit  Meds/Allergies   current meds:   Current Facility-Administered Medications   Medication Dose Route Frequency   • acetaminophen (TYLENOL) tablet 650 mg  650 mg Oral Q6H PRN   • albuterol (PROVENTIL HFA,VENTOLIN HFA) inhaler 2 puff  2 puff Inhalation Q6H PRN   • benzonatate (TESSALON PERLES) capsule 200 mg  200 mg Oral TID   • enoxaparin (LOVENOX) subcutaneous injection 60 mg  60 mg Subcutaneous Q12H SYED   • furosemide (LASIX) injection 80 mg  80 mg Intravenous BID (diuretic)   • guaiFENesin (MUCINEX) 12 hr tablet 1,200 mg  1,200 mg Oral BID   • hydrocodone-chlorpheniramine polistirex (TUSSIONEX) ER suspension 5 mL  5 mL Oral Q12H PRN   • ipratropium (ATROVENT) 0 02 % inhalation solution 0 5 mg  0 5 mg Nebulization TID   • levalbuterol (XOPENEX) inhalation solution 1 25 mg  1 25 mg Nebulization TID   • lisinopril (ZESTRIL) tablet 10 mg  10 mg Oral Daily   • methylPREDNISolone sodium succinate (Solu-MEDROL) injection 40 mg  40 mg Intravenous Q12H [facility]     Medications Prior to Admission   Medication   • acetaminophen (TYLENOL) 500 mg tablet   • albuterol (PROVENTIL HFA,VENTOLIN HFA) 90 mcg/act inhaler   • furosemide (LASIX) 20 mg tablet   • lisinopril (ZESTRIL) 10 mg tablet     Assessment:  Principal Problem:    Acute exacerbation of CHF (congestive heart failure) ([facility])  Active Problems:    Essential hypertension    COPD (chronic obstructive pulmonary disease) ([facility])    Morbid obesity ([facility])    Respiratory failure with hypoxia ([facility] [address] )    Counseling / Coordination of Care  Total floor / unit time spent today 20 minutes  Greater than 50% of total time was spent with the patient and / or family counseling and / or coordination of care  ** Please Note: Dragon 360 Dictation voice to text software may have been used in the creation of this document   **

## 2022-12-08 NOTE — ASSESSMENT & PLAN NOTE
·  likely multifactorial in the setting of morbid obesity with likely underlying OHS, sleep apnea and acute on chronic CHF as well as COPD , significant pulmonary vascular congestion on imaging  · not on home oxygent supplementation at baseline    ·  capable to wean down on oxygen requirements to 3 L from 5 L  · Will continue IV diuretics as stated above  · Continue to wean nasal cannula oxygen for sats > 88%  · Continue respiratory protocol  · Home o2 eval

## 2022-12-08 NOTE — ASSESSMENT & PLAN NOTE
Wt Readings from Last 3 Encounters:   12/08/22 (!) 168 kg (370 lb 13 oz)   12/02/22 (!) 182 kg (400 lb 11 2 oz)   10/03/22 (!) 177 kg (389 lb 8 oz)     · POA : HERNANDEZ + orthopnea + GIO + weight gain , vascular congestion on imaging  · Poorly compliant to diuretic therapy  • Dry weight seems to be aroundDry weight:  389 lb , on admission:  395 lb per standing scale  · echocardiogram (2/2010) showed EF 65%, no wall motion abnormalities, grade 1 diastolic dysfunction, no hemodynamically significant valvular disease, normal RV function  · Volume status difficult to assess due to body habitus with morbid obesity  · On 80 mg b i d   Per Cardiology recommendations, x1 dose of metolazone 5mg yesterday UO 3 4 -9 7  weight down 25 lb, improvement in bilateral lower extremity edema  · Will continue IV lasix 80 mg bid only until further recs by cards , may consider transition to PO diuretics tmw   · Continue to monitor with daily weights, I's and O's, low-salt diet, fluid restriction 1500  · Monitor potassium magnesium keep potassium above 4 magnesium above 2

## 2022-12-08 NOTE — PROGRESS NOTES
[facility]  Progress Note - Pete Atwood 1960, 63 y o  female MRN: [id number]  Unit/Bed#: S -01 Encounter: [id number]  Primary Care Provider: Deric Scott DO   Date and time admitted to hospital: 12/2/2022 12:28 PM    * Acute exacerbation of CHF (congestive heart failure) ([facility] [address] )  Assessment & Plan  Wt Readings from Last 3 Encounters:   12/08/22 (!) 168 kg (370 lb 13 oz)   12/02/22 (!) 182 kg (400 lb 11 2 oz)   10/03/22 (!) 177 kg (389 lb 8 oz)     · POA : HERNANDEZ + orthopnea + GIO + weight gain , vascular congestion on imaging  · Poorly compliant to diuretic therapy  • Dry weight seems to be aroundDry weight:  389 lb , on admission:  395 lb per standing scale  · echocardiogram (2/2010) showed EF 65%, no wall motion abnormalities, grade 1 diastolic dysfunction, no hemodynamically significant valvular disease, normal RV function  · Volume status difficult to assess due to body habitus with morbid obesity  · On 80 mg b i d  Per Cardiology recommendations, x1 dose of metolazone 5mg yesterday UO 3 4 -9 7  weight down 25 lb, improvement in bilateral lower extremity edema  · Will continue IV lasix 80 mg bid only until further recs by cards , may consider transition to PO diuretics tmw   · Continue to monitor with daily weights, I's and O's, low-salt diet, fluid restriction 1500  · Monitor potassium magnesium keep potassium above 4 magnesium above 2        Respiratory failure with hypoxia (HCC)  Assessment & Plan  ·  likely multifactorial in the setting of morbid obesity with likely underlying OHS, sleep apnea and acute on chronic CHF as well as COPD , significant pulmonary vascular congestion on imaging  · not on home oxygent supplementation at baseline    ·  capable to wean down on oxygen requirements to 3 L from 5 L  · Will continue IV diuretics as stated above  · Continue to wean nasal cannula oxygen for sats > 88%  · Continue respiratory protocol  · Home o2 eval    COPD (chronic obstructive pulmonary disease) (HCC)  Assessment & Plan    ·  wheezing improving, cough noted to be more productive  ·  covid/ influenza/ RSV neg x2  Plan   · Will decrease Solu-Medrol dose to once  daily , continue  antitussives and mucolytics, transition to po steroids tomorrow   · Continue nebs   · Supplemental nasal cannula O2 to maintain sats > 88%    Morbid obesity (HCC)  Assessment & Plan  · BMI: 66 68  · Recommend therapeutic lifestyle changes to promote weight loss    Essential hypertension  Assessment & Plan  · Blood pressure acceptable  · Continue lisinopril at home dose      VTE Pharmacologic Prophylaxis:   VTE Score: 5 Moderate Risk (Score 3-4) - Pharmacological DVT Prophylaxis Ordered: Enoxaparin (Lovenox)  Mechanical VTE Prophylaxis in Place: No    Patient Centered Rounds: I have performed bedside rounds with nursing staff today  Discussions with Specialists or Other Care Team Provider: Attending physician    Education and Discussions with Family / Patient: Updated  (daughter) via phone  Current Length of Stay: 6 day(s)    Current Patient Status: Inpatient     Discharge Plan / Estimated Discharge Date: Anticipate discharge in 48 hrs to home  Code Status: Level 1 - Full Code      Subjective:   No overnight events, significant improvement in breathing pattern, no longer wheezy, however persistent mucousy cough    Objective:     Vitals:   Temp (24hrs), Av 4 °F (36 9 °C), Min:98 3 °F (36 8 °C), Max:98 6 °F (37 °C)    Temp:  [98 3 °F (36 8 °C)-98 6 °F (37 °C)] 98 4 °F (36 9 °C)  HR:  [83-97] 97  Resp:  [16-18] 16  BP: (128-160)/(62-82) 160/82  SpO2:  [90 %-94 %] 94 %  Body mass index is 61 71 kg/m²  Input and Output Summary (last 24 hours):        Intake/Output Summary (Last 24 hours) at 2022 1440  Last data filed at 2022 0927  Gross per 24 hour   Intake 772 ml   Output 3450 ml   Net -2678 ml       Physical Exam:     Physical Exam  Vitals and nursing note reviewed  Constitutional:       General: She is not in acute distress  Appearance: She is obese  She is not toxic-appearing  HENT:      Head: Normocephalic and atraumatic  Right Ear: Tympanic membrane normal       Left Ear: Tympanic membrane normal       Nose: Nose normal       Mouth/Throat:      Mouth: Mucous membranes are moist    Eyes:      Extraocular Movements: Extraocular movements intact  Conjunctiva/sclera: Conjunctivae normal       Pupils: Pupils are equal, round, and reactive to light  Cardiovascular:      Rate and Rhythm: Normal rate  Pulses: Normal pulses  Heart sounds: No murmur heard  Gallop present  Pulmonary:      Effort: Pulmonary effort is normal  No respiratory distress  Breath sounds: No wheezing or rales  Comments: No active wheezing during my exam, mobilization of secretions bilaterally  Chest:      Chest wall: No tenderness  Abdominal:      General: Bowel sounds are normal  There is no distension  Palpations: Abdomen is soft  Tenderness: There is no abdominal tenderness  Musculoskeletal:      Cervical back: Normal range of motion  Right lower leg: Edema present  Left lower leg: Edema present  Comments: Improvement of bilateral lower extremity edema   Skin:     General: Skin is warm  Capillary Refill: Capillary refill takes 2 to 3 seconds  Neurological:      Mental Status: She is alert and oriented to person, place, and time  Psychiatric:         Mood and Affect: Mood normal          Behavior: Behavior normal          Thought Content:  Thought content normal          Judgment: Judgment normal           Additional Data:     Labs:  Results from last 7 days   Lab Units 12/05/22  0645   WBC Thousand/uL 5 60   HEMOGLOBIN g/dL 12 0   HEMATOCRIT % 40 4   PLATELETS Thousands/uL 190   NEUTROS PCT % 72   LYMPHS PCT % 13*   MONOS PCT % 11   EOS PCT % 3     Results from last 7 days   Lab Units 12/08/22  0529 12/03/22  8756 12/02/22  1304   SODIUM mmol/L 139   < > 140   POTASSIUM mmol/L 4 1   < > 4 9   CHLORIDE mmol/L 95*   < > 105   CO2 mmol/L 39*   < > 31   BUN mg/dL 29*   < > 11   CREATININE mg/dL 0 64   < > 0 59*   ANION GAP mmol/L 5   < > 4   CALCIUM mg/dL 10 6*   < > 9 5   ALBUMIN g/dL  --   --  3 8   TOTAL BILIRUBIN mg/dL  --   --  0 59   ALK PHOS U/L  --   --  73   ALT U/L  --   --  20   AST U/L  --   --  26   GLUCOSE RANDOM mg/dL 143*   < > 90    < > = values in this interval not displayed  Results from last 7 days   Lab Units 12/03/22  0658 12/02/22  1304   PROCALCITONIN ng/ml 0 08 0 08       Imaging: No pertinent imaging reviewed      Recent Cultures (last 7 days):           Lines/Drains:  Invasive Devices     Peripheral Intravenous Line  Duration           Long-Dwell Peripheral IV (Midline) 54/74/25 Left Cephalic 4 days                Telemetry:        Last 24 Hours Medication List:   Current Facility-Administered Medications   Medication Dose Route Frequency Provider Last Rate   • acetaminophen  650 mg Oral Q6H PRN Arron Smallwood MD     • albuterol  2 puff Inhalation Q6H PRN Stephen Bonilla MD     • benzonatate  200 mg Oral TID Ed Bates DO     • enoxaparin  60 mg Subcutaneous Q12H 41 Sameera Bonilla MD     • furosemide  80 mg Intravenous BID (diuretic) Skyler Patel PA-C     • guaiFENesin  1,200 mg Oral BID Ed Bates DO     • hydrocodone-chlorpheniramine polistirex  5 mL Oral Q12H PRN Ed Bates DO     • ipratropium  0 5 mg Nebulization TID Ed Bates DO     • levalbuterol  1 25 mg Nebulization TID Ed Bates DO     • lisinopril  10 mg Oral Daily Stephen Stephen MD     • [START ON 12/9/2022] methylPREDNISolone sodium succinate  40 mg Intravenous Daily Dennis Villavicencio MD          Today, Patient Was Seen By: Dennis Villavicencio MD    ** Please Note: This note has been constructed using a voice recognition system

## 2022-12-09 VITALS
DIASTOLIC BLOOD PRESSURE: 68 MMHG | OXYGEN SATURATION: 90 % | WEIGHT: 293 LBS | BODY MASS INDEX: 48.82 KG/M2 | TEMPERATURE: 98.2 F | HEART RATE: 81 BPM | SYSTOLIC BLOOD PRESSURE: 112 MMHG | HEIGHT: 65 IN | RESPIRATION RATE: 22 BRPM

## 2022-12-09 LAB
ANION GAP SERPL CALCULATED.3IONS-SCNC: 8 MMOL/L (ref 4–13)
BUN SERPL-MCNC: 47 MG/DL (ref 5–25)
CALCIUM SERPL-MCNC: 10.4 MG/DL (ref 8.4–10.2)
CHLORIDE SERPL-SCNC: 92 MMOL/L (ref 96–108)
CO2 SERPL-SCNC: 37 MMOL/L (ref 21–32)
CREAT SERPL-MCNC: 0.95 MG/DL (ref 0.6–1.3)
DME PARACHUTE DELIVERY DATE EXPECTED: NORMAL
DME PARACHUTE DELIVERY DATE REQUESTED: NORMAL
DME PARACHUTE ITEM DESCRIPTION: NORMAL
DME PARACHUTE ORDER STATUS: NORMAL
DME PARACHUTE SUPPLIER NAME: NORMAL
DME PARACHUTE SUPPLIER PHONE: NORMAL
GFR SERPL CREATININE-BSD FRML MDRD: 65 ML/MIN/1.73SQ M
GLUCOSE SERPL-MCNC: 112 MG/DL (ref 65–140)
MAGNESIUM SERPL-MCNC: 2.3 MG/DL (ref 1.9–2.7)
POTASSIUM SERPL-SCNC: 3.7 MMOL/L (ref 3.5–5.3)
SODIUM SERPL-SCNC: 137 MMOL/L (ref 135–147)

## 2022-12-09 RX ORDER — HYDROCODONE POLISTIREX AND CHLORPHENIRAMINE POLISTIREX 10; 8 MG/5ML; MG/5ML
5 SUSPENSION, EXTENDED RELEASE ORAL EVERY 12 HOURS PRN
Qty: 120 ML | Refills: 0 | Status: SHIPPED | OUTPATIENT
Start: 2022-12-09 | End: 2022-12-19

## 2022-12-09 RX ORDER — GUAIFENESIN 1200 MG/1
1200 TABLET, EXTENDED RELEASE ORAL 2 TIMES DAILY
Qty: 14 TABLET | Refills: 0 | Status: SHIPPED | OUTPATIENT
Start: 2022-12-09 | End: 2022-12-15 | Stop reason: ALTCHOICE

## 2022-12-09 RX ORDER — PREDNISONE 20 MG/1
40 TABLET ORAL DAILY
Status: DISCONTINUED | OUTPATIENT
Start: 2022-12-09 | End: 2022-12-09 | Stop reason: HOSPADM

## 2022-12-09 RX ORDER — BENZONATATE 200 MG/1
200 CAPSULE ORAL 3 TIMES DAILY
Qty: 20 CAPSULE | Refills: 0 | Status: SHIPPED | OUTPATIENT
Start: 2022-12-09 | End: 2022-12-16

## 2022-12-09 RX ORDER — PREDNISONE 20 MG/1
TABLET ORAL
Qty: 15 TABLET | Refills: 0 | Status: SHIPPED | OUTPATIENT
Start: 2022-12-10 | End: 2022-12-22

## 2022-12-09 RX ORDER — FUROSEMIDE 40 MG/1
40 TABLET ORAL 2 TIMES DAILY
Qty: 60 TABLET | Refills: 0 | Status: SHIPPED | OUTPATIENT
Start: 2022-12-09 | End: 2023-01-08

## 2022-12-09 RX ORDER — FUROSEMIDE 40 MG/1
40 TABLET ORAL
Status: DISCONTINUED | OUTPATIENT
Start: 2022-12-09 | End: 2022-12-09 | Stop reason: HOSPADM

## 2022-12-09 RX ORDER — POTASSIUM CHLORIDE 20 MEQ/1
20 TABLET, EXTENDED RELEASE ORAL DAILY
Status: DISCONTINUED | OUTPATIENT
Start: 2022-12-09 | End: 2022-12-09 | Stop reason: HOSPADM

## 2022-12-09 RX ADMIN — LEVALBUTEROL HYDROCHLORIDE 1.25 MG: 1.25 SOLUTION, CONCENTRATE RESPIRATORY (INHALATION) at 07:56

## 2022-12-09 RX ADMIN — GUAIFENESIN 1200 MG: 600 TABLET ORAL at 10:43

## 2022-12-09 RX ADMIN — INFLUENZA A VIRUS A/WISCONSIN/588/2019 (H1N1) RECOMBINANT HEMAGGLUTININ ANTIGEN, INFLUENZA A VIRUS A/DARWIN/6/2021 (H3N2) RECOMBINANT HEMAGGLUTININ ANTIGEN, INFLUENZA B VIRUS B/AUSTRIA/1359417/2021 RECOMBINANT HEMAGGLUTININ ANTIGEN, AND INFLUENZA B VIRUS B/PHUKET/3073/2013 RECOMBINANT HEMAGGLUTININ ANTIGEN 0.5 ML: 45; 45; 45; 45 INJECTION INTRAMUSCULAR at 14:48

## 2022-12-09 RX ADMIN — IPRATROPIUM BROMIDE 0.5 MG: 0.5 SOLUTION RESPIRATORY (INHALATION) at 13:36

## 2022-12-09 RX ADMIN — GUAIFENESIN 1200 MG: 600 TABLET ORAL at 17:59

## 2022-12-09 RX ADMIN — BENZONATATE 200 MG: 100 CAPSULE ORAL at 17:59

## 2022-12-09 RX ADMIN — ENOXAPARIN SODIUM 60 MG: 60 INJECTION SUBCUTANEOUS at 10:44

## 2022-12-09 RX ADMIN — PREDNISONE 40 MG: 20 TABLET ORAL at 10:44

## 2022-12-09 RX ADMIN — FUROSEMIDE 80 MG: 10 INJECTION, SOLUTION INTRAMUSCULAR; INTRAVENOUS at 10:44

## 2022-12-09 RX ADMIN — POTASSIUM CHLORIDE 20 MEQ: 1500 TABLET, EXTENDED RELEASE ORAL at 11:48

## 2022-12-09 RX ADMIN — LEVALBUTEROL HYDROCHLORIDE 1.25 MG: 1.25 SOLUTION, CONCENTRATE RESPIRATORY (INHALATION) at 13:36

## 2022-12-09 RX ADMIN — LISINOPRIL 10 MG: 10 TABLET ORAL at 10:43

## 2022-12-09 RX ADMIN — IPRATROPIUM BROMIDE 0.5 MG: 0.5 SOLUTION RESPIRATORY (INHALATION) at 07:55

## 2022-12-09 RX ADMIN — BENZONATATE 200 MG: 100 CAPSULE ORAL at 10:44

## 2022-12-09 NOTE — ASSESSMENT & PLAN NOTE
Wt Readings from Last 3 Encounters:   12/09/22 (!) 167 kg (368 lb 9 6 oz)   12/02/22 (!) 182 kg (400 lb 11 2 oz)   10/03/22 (!) 177 kg (389 lb 8 oz)     · POA : HERNANDEZ + orthopnea + GIO + weight gain , vascular congestion on imaging  · Poorly compliant to diuretic therapy  • Dry weight seems to be aroundDry weight:  389 lb , on admission:  395 lb per standing scale  · echocardiogram (2/2010) showed EF 65%, no wall motion abnormalities, grade 1 diastolic dysfunction, no hemodynamically significant valvular disease, normal RV function  · Volume status difficult to assess due to body habitus with morbid obesity  · On 80 mg b i d  Per Cardiology recommendations, x1 dose of metolazone 5mg yesterday UO 2 2 -10 l  weight down 27 lb, improvement in bilateral lower extremity edema , stable from medicine and cardiology standpoint for discharge with below recommendations :  Plan   · Cardiology recommendations : continue furosemide 40 mg po bid ,  · Kidney indices noted with slight increased, will recheck a bmp in 3 days, patient instructed to discuss results with her primary care physician    · Patient reported on admission, not been complaint with diuretic therapy due to urinary incontinence ( ambulatory referral to urology provided)  · Recommended to continue to follow a low-salt diet, fluid restriction 1500  · Continue follow up with cardiology and pcp outpatient

## 2022-12-09 NOTE — PLAN OF CARE
Problem: PHYSICAL THERAPY ADULT  Goal: Performs mobility at highest level of function for planned discharge setting  See evaluation for individualized goals  Description: Treatment/Interventions: LE strengthening/ROM, Elevations, Therapeutic exercise, Functional transfer training, Endurance training, Patient/family training, Equipment eval/education, Bed mobility, Gait training, Spoke to nursing          See flowsheet documentation for full assessment, interventions and recommendation  Shira Oliver PT, DPT   Outcome: Progressing  Note: Prognosis: Good  Problem List: Decreased endurance, Decreased mobility, Obesity, Pain  Assessment: pt began tx session seated in recliner and was agreeable to participate in PT intervention  pt continues to be mod I for all functional transfers to RW and w/o an AD  pt was able to complete multiple STS in order to strengthen LE's and increase endurance and safety with all functioanl transfers  pt continues to be able to ambulate short distance w/o an AD and longer distances with RW as pt ambulated 200'x1 RW and 15'x2 w/o an AD  additional was not possible due to SOB and fatigue  pt w/o LOB while ambulating in todays tx session as pt was able to hold a conversation and avoid obstacles in [facility] hallway  pt was able to complete 12 steps in todays tx session with /s and bilateral hand rail use for safety and balance  pt Sp02 remained above 90% throughout tx session  post tx session pt in recliner with call bell, all needs met and set up for lunch  pt would benefit from continued skilled PT intervention in order to increase activity tolerance and ambulation distance     Barriers to Discharge Comments: MARIA FERNANDA home though pt demonstrates appropriate strength and LE advancement in order to perform stairs Also FF stairs to bed and bath  PT Discharge Recommendation: Home with home health rehabilitation    See flowsheet documentation for full assessment

## 2022-12-09 NOTE — DISCHARGE INSTR - AVS FIRST PAGE
Dear Kenia Tenorio,     It was our pleasure to care for you here at [facility]  It is our hope that we were always able to exceed the expected standards for your care during your stay  You were hospitalized due to exacerbation of congestive heart failure ( fluid in lungs from the heart)  You were cared for on the 4th floor by Rubina Collins MD under the service of Juhi Campbell DO with the [facility] who covers for your primary care physician (PCP), Luis Alberto Waggoner DO, while you were hospitalized  If you have any questions or concerns related to this hospitalization, you may contact us at [phone]  For follow up as well as any medication refills, we recommend that you follow up with your primary care physician  A registered nurse will reach out to you by phone within a few days after your discharge to answer any additional questions that you may have after going home  However, at this time we provide for you here, the most important instructions / recommendations at discharge:     Notable Medication Adjustments -   none  Testing Required after Discharge -   Basic metabolic panel in 3 days   Important follow up information -   Please follow as instructed below   Other Instructions -   Lasix [furosemide] has been prescribed please take 1 tablet 40 mg 2 times a day to help to eliminate of the fluid  Basic metabolic panel has been ordered please make the order effective in the next 3 days and discuss the results with your primary care physician  Please continue oxygen on nasal cannula 3 L as recommended by respiratory therapy  Please continue Tessalon Perles 1 tablet 3 times a day to help you get a bit of mucus as well as Mucinex  Please continue tucinex as instructed  Please continue prednisone 40 mg daily for 3 days and then 30 mg daily for 3 days then 20 mg daily for 3 days then 10 mg daily for 3 days then stop    Please continue to follow-up with your primary care physician  Please continue the rest of your home medications  Please continue follow-up with cardiology outpatient  Please continue fluid restriction 1500 and sodium restriction 2 g daily in order to avoid reaccumulation of fluid  Please review this entire after visit summary as additional general instructions including medication list, appointments, activity, diet, any pertinent wound care, and other additional recommendations from your care team that may be provided for you        Sincerely,     Rosa Gomes MD

## 2022-12-09 NOTE — ASSESSMENT & PLAN NOTE
·  likely multifactorial in the setting of morbid obesity with likely underlying OHS, sleep apnea and acute on chronic CHF as well as COPD , significant pulmonary vascular congestion on imaging  · not on home oxygent supplementation at baseline    ·  capable to wean down on oxygen requirements to 3 L from 5 L  · Transitioned to oral steroids, steroid taper 40 mg ordered   · Home o2 eval, patient will require 3L NC , supplies ordered   · Continue follow up with pcp

## 2022-12-09 NOTE — CASE MANAGEMENT
Case Management Assessment    Patient name Jarod Ponce S /S -71 MRN [id number]  : 1960 Date 2022       Current Admission Date: 2022  Current Admission Diagnosis:Acute exacerbation of CHF (congestive heart failure) [facility])   Patient Active Problem List    Diagnosis Date Noted   • Hypoxia 2022   • Res[facility] 2022   • Acute exacerbation of CHF (congestive heart failure) ([facility] ) 2022   • Pre-diabetes 2022   • Lymphedema of both lower extremities 2021   • Chronic diastolic congestive heart failure ([facility] ) 2021   • Insomnia due to medical condition 10/08/2020   • Obstructive sleep apnea syndrome 2020   • Cigarette nicotine dependence without complication    • Morbid obesity with body mass index (BMI) of 60 0 to 69 9 in adult [facility]) 2020   • Essential hypertension 07/10/2019   • Mixed stress and urge urinary incontinence 07/10/2019   • COPD (chronic obstructive pulmonary disease) ([facility] ) 07/10/2019   • Mixed hyperlipidemia 07/10/2019   • Morbid obesity ([facility] ) 07/10/2019      LOS (days): 7  Geometric Mean LOS (GMLOS) (days): 3 90  Days to GMLOS:-3     OBJECTIVE:    Risk of Unplanned Readmission Score: 11 41         Current admission status: Inpatient       Preferred Pharmacy:   [facility], [place] [address]  [address] [place] [address]  Phone: [phone] Fax: [phone] [facility], [address]  [address] [facility] [address]  Phone: [phone] Fax: [phone]    Primary Care Provider: Jennifer Rodgers DO    Primary Insurance: MEDICARE  Secondary Insurance: [address]    ASSESSMENT:  [address] 26 Proxies    There are no active Health Care Proxies on file                        Patient Information  Admitted from[de-identified] Home  Mental Status: Alert  During Assessment patient was accompanied by: Not accompanied during assessment  Assessment information provided by[de-identified] Patient  Primary Caregiver: Self  Support Systems: Self, Family members  Home entry access options   Select all that apply : Stairs  Number of steps to enter home : 3  Do the steps have railings?: Yes  Type of Current Residence: 2 story home  Upon entering residence, is there a bedroom on the main floor (no further steps)?: No  A bedroom is located on the following floor levels of residence (select all that apply):: 2nd Floor  Upon entering residence, is there a bathroom on the main floor (no further steps)?: No  Indicate which floors of current residence have a bathroom (select all the apply):: 2nd Floor  Number of steps to 2nd floor from main floor: One Flight  In the last 12 months, was there a time when you were not able to pay the mortgage or rent on time?: No  In the last 12 months, was there a time when you did not have a steady place to sleep or slept in a shelter (including now)?: No  Homeless/housing insecurity resource given?: N/A  Living Arrangements: Lives w/ Daughter, Lives w/ Extended Family    Activities of Daily Living Prior to Admission  Functional Status: Independent  Completes ADLs independently?: Yes  Ambulates independently?: Yes  Does patient use assisted devices?: No  Does patient currently own DME?: No  Does patient have a history of Outpatient Therapy (PT/OT)?: No  Does the patient have a history of Short-Term Rehab?: No  Does patient have a history of HHC?: No  Does patient currently have [facility]?: No         Patient Information Continued  Income Source: Pension/senior care  Does patient have prescription coverage?: Yes  Within the past 12 months, you worried that your food would run out before you got the money to buy more : Never true  Within the past 12 months, the food you bought just didn't last and you didn't have money to get more : Never true  Food insecurity resource given?: N/A  Does patient receive dialysis treatments?: No  Does patient have a history of substance abuse?: No  Does patient have a history of Mental Health Diagnosis?: No         Means of Transportation  Means of Transport to Appts[de-identified] Family transport  In the past 12 months, has lack of transportation kept you from medical appointments or from getting medications?: No  In the past 12 months, has lack of transportation kept you from meetings, work, or from getting things needed for daily living?: No  Was application for public transport provided?: N/A

## 2022-12-09 NOTE — PHYSICAL THERAPY NOTE
PHYSICAL THERAPY NOTE          Patient Name: Marcie Hermosillo  OMLTU'Z Date: 12/9/2022 12/09/22 1142   PT Last Visit   PT Visit Date 12/09/22   Note Type   Note Type Treatment   Pain Assessment   Pain Assessment Tool 0-10   Pain Score No Pain   Patient's Stated Pain Goal No pain   Hospital Pain Intervention(s) Ambulation/increased activity;Repositioned; Rest   Multiple Pain Sites No   Restrictions/Precautions   Weight Bearing Precautions Per Order No   Other Precautions Chair Alarm; Bed Alarm; Fall Risk   General   Chart Reviewed Yes   Response to Previous Treatment Patient with no complaints from previous session  Family/Caregiver Present No   Cognition   Overall Cognitive Status WFL   Arousal/Participation Alert; Responsive; Cooperative   Attention Within functional limits   Orientation Level Oriented X4   Memory Within functional limits   Following Commands Follows all commands and directions without difficulty   Comments pt was pleasant and cooperative throughout tx session   Subjective   Subjective pt was agreeable to participate in PT intervention   Bed Mobility   Rolling R Unable to assess   Rolling L Unable to assess   Supine to Sit Unable to assess   Sit to Supine Unable to assess   Additional Comments pt seated Oob in the recliner pre/post tx session   Transfers   Sit to Stand 6  Modified independent   Stand to Sit 6  Modified independent   Stand pivot 6  Modified independent   Additional Comments pt was able to perform multiple STS with and w/o RW   Ambulation/Elevation   Gait pattern Wide FABY; Decreased foot clearance; Short stride; Foward flexed; Excessively slow   Gait Assistance 5  Supervision   Additional items Assist x 1;Verbal cues   Assistive Device Rolling walker;None   Distance 200'x1 RW 15'x2 w/o an AD   Stair Management Assistance 5  Supervision   Additional items Assist x 1;Verbal cues   Stair Management Technique Two rails; Step to pattern; Foreward;Backward   Number of Stairs 12   Balance   Static Sitting Good   Dynamic Sitting Fair +   Static Standing Fair +   Ambulatory Fair   Endurance Deficit   Endurance Deficit Yes   Endurance Deficit Description SOB and fatigue   Activity Tolerance   Activity Tolerance Patient limited by fatigue; Other (Comment)  (SOB)   Nurse Made Aware Spoke to RN   Exercises   Hip Abduction Sitting;15 reps;AROM; Bilateral   Hip Adduction Sitting;15 reps;AROM; Bilateral  (pillow squeezes)   Knee AROM Long Arc Quad Sitting;15 reps;AROM; Bilateral   Ankle Pumps Sitting;20 reps;AROM; Bilateral   Marching Sitting;10 reps;AROM; Bilateral   Assessment   Prognosis Good   Problem List Decreased endurance;Decreased mobility;Obesity;Pain   Assessment pt began tx session seated in recliner and was agreeable to participate in PT intervention  pt continues to be mod I for all functional transfers to RW and w/o an AD  pt was able to complete multiple STS in order to strengthen LE's and increase endurance and safety with all functioanl transfers  pt continues to be able to ambulate short distance w/o an AD and longer distances with RW as pt ambulated 200'x1 RW and 15'x2 w/o an AD  additional was not possible due to SOB and fatigue  pt w/o LOB while ambulating in [facility] tx session as pt was able to hold a conversation and avoid obstacles in [facility] hallway  pt was able to complete 12 steps in [facility] tx session with /s and bilateral hand rail use for safety and balance  pt Sp02 remained above 90% throughout tx session  post tx session pt in recliner with call bell, all needs met and set up for lunch  pt would benefit from continued skilled PT intervention in order to increase activity tolerance and ambulation distance   Goals   Patient Goals to go home today   STG Expiration Date 12/10/22   PT Treatment Day 2   Plan   Treatment/Interventions Functional transfer training;LE strengthening/ROM; Elevations; Therapeutic exercise; Endurance training;Patient/family training;Equipment eval/education; Bed mobility;Gait training;Spoke to nursing   Progress Progressing toward goals   PT Frequency 2-3x/wk   Recommendation   PT Discharge Recommendation Home with home health rehabilitation   [address] [place] [address] Mobility Inpatient   Turning in Bed Without Bedrails 4   Lying on Back to Sitting on Edge of Flat Bed 4   Moving Bed to Chair 4   Standing Up From Chair 4   Walk in Room 3   Climb 3-5 Stairs 3   Basic Mobility Inpatient Raw Score 22   Basic Mobility Standardized Score 47 4   Highest Level Of Mobility   JH-HLM Goal 7: Walk 25 feet or more   JH-HLM Achieved 7: Walk 25 feet or more   Education   Education Provided Mobility training;Assistive device; Other  (stair trials)   Patient Demonstrates acceptance/verbal understanding   End of Consult   Patient Position at End of Consult Bedside chair;Bed/Chair alarm activated; All needs within reach   The patient's AM-PAC Basic Mobility Inpatient Short Form Raw Score is 22  A Raw score of greater than 16 suggests the patient may benefit from discharge to home  Please also refer to the recommendation of the Physical Therapist for safe discharge planning       Princess Chaudhary

## 2022-12-09 NOTE — CASE MANAGEMENT
Case Management Progress Note    Patient name Cachorro Rehman  Location S /S -37 MRN [id number]  : 1960 Date 2022       LOS (days): 7  Geometric Mean LOS (GMLOS) (days): 3 90  Days to GMLOS:-3 1        OBJECTIVE:        Current admission status: Inpatient  Preferred Pharmacy:   [facility], [address]  [address] [place] [address]  Phone: [phone] Fax: [phone] [facility], [address] [facility] [address]  [address] [address]  [address] [address]  Phone: [phone] Fax: [phone]    Primary Care Provider: Tasia Banerjee DO    Primary Insurance: MEDICARE  Secondary Insurance: [address]    PROGRESS NOTE:    Cm confirmed that home O2 was delivered to patient's room this afternoon  Cm informed MD, who will work on [employer] [facility] [employer] [facility] [employer]

## 2022-12-09 NOTE — RESPIRATORY THERAPY NOTE
Home Oxygen Qualifying Test     Patient name: Gracie Murray        : 1960   Date of Test:  2022  Diagnosis:    Home Oxygen Test:    **Medicare Guidelines require item(s) 1-5 on all ambulatory patients or 1 and 2 on non-ambulatory patients  1  Baseline SPO2 on Room Air at rest 85 %   a  If <= 88% on Room Air add O2 via NC to obtain SpO2 >=88%  If LPM needed, document LPM 1 needed to reach =>88%    2  SPO2 during exertion on Room Air 85  %  a  During exertion monitor SPO2  If SPO2 increases >=89%, do not add supplemental oxygen    3  SPO2 on Oxygen at Rest 88 % at 1 LPM    4  SPO2 during exertion on Oxygen 91 % at 3 LPM    5  Test performed during exertion activity  [x]  Supplemental Home Oxygen is indicated  []  Client does not qualify for home oxygen  Respiratory Additional Notes- PT ambulated 6 minutes  Several pauses for increased SOB  Distance walked 150 Meters     Josiane Masters, RT

## 2022-12-09 NOTE — ASSESSMENT & PLAN NOTE
·  wheezing improving, cough noted to be more productive  ·  covid/ influenza/ RSV neg x2  Plan   · Prednisone 40 mg , taper ordered decreased 10 mg every 3 days   · Continue antitussives and mucolytics  · Continue follow up with pcp

## 2022-12-09 NOTE — PLAN OF CARE
Problem: PAIN - ADULT  Goal: Verbalizes/displays adequate comfort level or baseline comfort level  Description: Interventions:  - Encourage patient to monitor pain and request assistance  - Assess pain using appropriate pain scale  - Administer analgesics based on type and severity of pain and evaluate response  - Implement non-pharmacological measures as appropriate and evaluate response  - Consider cultural and social influences on pain and pain management  - Notify physician/advanced practitioner if interventions unsuccessful or patient reports new pain  Outcome: Progressing     Problem: INFECTION - ADULT  Goal: Absence or prevention of progression during hospitalization  Description: INTERVENTIONS:  - Assess and monitor for signs and symptoms of infection  - Monitor lab/diagnostic results  - Monitor all insertion sites, i e  indwelling lines, tubes, and drains  - Monitor endotracheal if appropriate and nasal secretions for changes in amount and color  - Patrick appropriate cooling/warming therapies per order  - Administer medications as ordered  - Instruct and encourage patient and family to use good hand hygiene technique  - Identify and instruct in appropriate isolation precautions for identified infection/condition  Outcome: Progressing  Goal: Absence of fever/infection during neutropenic period  Description: INTERVENTIONS:  - Monitor WBC    Outcome: Progressing     Problem: SAFETY ADULT  Goal: Patient will remain free of falls  Description: INTERVENTIONS:  - Educate patient/family on patient safety including physical limitations  - Instruct patient to call for assistance with activity   - Consult OT/PT to assist with strengthening/mobility   - Keep Call bell within reach  - Keep bed low and locked with side rails adjusted as appropriate  - Keep care items and personal belongings within reach  - Initiate and maintain comfort rounds  - Make Fall Risk Sign visible to staff  - Apply yellow socks and bracelet for high fall risk patients  - Consider moving patient to room near nurses station  Outcome: Progressing  Goal: Maintain or return to baseline ADL function  Description: INTERVENTIONS:  -  Assess patient's ability to carry out ADLs; assess patient's baseline for ADL function and identify physical deficits which impact ability to perform ADLs (bathing, care of mouth/teeth, toileting, grooming, dressing, etc )  - Assess/evaluate cause of self-care deficits   - Assess range of motion  - Assess patient's mobility; develop plan if impaired  - Assess patient's need for assistive devices and provide as appropriate  - Encourage maximum independence but intervene and supervise when necessary  - Involve family in performance of ADLs  - Assess for home care needs following discharge   - Consider OT consult to assist with ADL evaluation and planning for discharge  - Provide patient education as appropriate  Outcome: Progressing  Goal: Maintains/Returns to pre admission functional level  Description: INTERVENTIONS:  - Perform BMAT or MOVE assessment daily    - Set and communicate daily mobility goal to care team and patient/family/caregiver     - Collaborate with rehabilitation services on mobility goals if consulted  - Out of bed for toileting  - Record patient progress and toleration of activity level   Outcome: Progressing     Problem: DISCHARGE PLANNING  Goal: Discharge to home or other facility with appropriate resources  Description: INTERVENTIONS:  - Identify barriers to discharge w/patient and caregiver  - Arrange for needed discharge resources and transportation as appropriate  - Identify discharge learning needs (meds, wound care, etc )  - Arrange for interpretive services to assist at discharge as needed  - Refer to Case Management Department for coordinating discharge planning if the patient needs post-hospital services based on physician/advanced practitioner order or complex needs related to functional status, cognitive ability, or social support system  Outcome: Progressing     Problem: Knowledge Deficit  Goal: Patient/family/caregiver demonstrates understanding of disease process, treatment plan, medications, and discharge instructions  Description: Complete learning assessment and assess knowledge base  Interventions:  - Provide teaching at level of understanding  - Provide teaching via preferred learning methods  Outcome: Progressing     Problem: MOBILITY - ADULT  Goal: Maintain or return to baseline ADL function  Description: INTERVENTIONS:  -  Assess patient's ability to carry out ADLs; assess patient's baseline for ADL function and identify physical deficits which impact ability to perform ADLs (bathing, care of mouth/teeth, toileting, grooming, dressing, etc )  - Assess/evaluate cause of self-care deficits   - Assess range of motion  - Assess patient's mobility; develop plan if impaired  - Assess patient's need for assistive devices and provide as appropriate  - Encourage maximum independence but intervene and supervise when necessary  - Involve family in performance of ADLs  - Assess for home care needs following discharge   - Consider OT consult to assist with ADL evaluation and planning for discharge  - Provide patient education as appropriate  Outcome: Progressing  Goal: Maintains/Returns to pre admission functional level  Description: INTERVENTIONS:  - Perform BMAT or MOVE assessment daily    - Set and communicate daily mobility goal to care team and patient/family/caregiver     - Collaborate with rehabilitation services on mobility goals if consulted  - Out of bed for toileting  - Record patient progress and toleration of activity level   Outcome: Progressing     Problem: Potential for Falls  Goal: Patient will remain free of falls  Description: INTERVENTIONS:  - Educate patient/family on patient safety including physical limitations  - Instruct patient to call for assistance with activity   - Consult OT/PT to assist with strengthening/mobility   - Keep Call bell within reach  - Keep bed low and locked with side rails adjusted as appropriate  - Keep care items and personal belongings within reach  - Initiate and maintain comfort rounds  - Apply yellow socks and bracelet for high fall risk patients  - Consider moving patient to room near nurses station  Outcome: Progressing     Problem: Prexisting or High Potential for Compromised Skin Integrity  Goal: Skin integrity is maintained or improved  Description: INTERVENTIONS:  - Identify patients at risk for skin breakdown  - Assess and monitor skin integrity  - Assess and monitor nutrition and hydration status  - Monitor labs   - Assess for incontinence   - Turn and reposition patient  - Assist with mobility/ambulation  - Relieve pressure over bony prominences  - Avoid friction and shearing  - Provide appropriate hygiene as needed including keeping skin clean and dry  - Evaluate need for skin moisturizer/barrier cream  - Collaborate with interdisciplinary team   - Patient/family teaching  - Consider wound care consult   Outcome: Progressing     Problem: CARDIOVASCULAR - ADULT  Goal: Maintains optimal cardiac output and hemodynamic stability  Description: INTERVENTIONS:  - Monitor I/O, vital signs and rhythm  - Monitor for S/S and trends of decreased cardiac output  - Administer and titrate ordered vasoactive medications to optimize hemodynamic stability  - Assess quality of pulses, skin color and temperature  - Assess for signs of decreased coronary artery perfusion  - Instruct patient to report change in severity of symptoms  Outcome: Progressing  Goal: Absence of cardiac dysrhythmias or at baseline rhythm  Description: INTERVENTIONS:  - Continuous cardiac monitoring, vital signs, obtain 12 lead EKG if ordered  - Administer antiarrhythmic and heart rate control medications as ordered  - Monitor electrolytes and administer replacement therapy as ordered  Outcome: Progressing     Problem: RESPIRATORY - ADULT  Goal: Achieves optimal ventilation and oxygenation  Description: INTERVENTIONS:  - Assess for changes in respiratory status  - Assess for changes in mentation and behavior  - Position to facilitate oxygenation and minimize respiratory effort  - Oxygen administered by appropriate delivery if ordered  - Initiate smoking cessation education as indicated  - Encourage broncho-pulmonary hygiene including cough, deep breathe, Incentive Spirometry  - Assess the need for suctioning and aspirate as needed  - Assess and instruct to report SOB or any respiratory difficulty  - Respiratory Therapy support as indicated  Outcome: Progressing

## 2022-12-09 NOTE — DISCHARGE SUMMARY
[facility]  Discharge- Starla Dears 1960, 63 y o  female MRN: [id number]  Unit/Bed#: S -01 Encounter: [id number]  Primary Care Provider: Dana Dhaliwal DO   Date and time admitted to hospital: 12/2/2022 12:28 PM    * Acute exacerbation of CHF (congestive heart failure) ([facility] [address] )  Assessment & Plan  Wt Readings from Last 3 Encounters:   12/09/22 (!) 167 kg (368 lb 9 6 oz)   12/02/22 (!) 182 kg (400 lb 11 2 oz)   10/03/22 (!) 177 kg (389 lb 8 oz)     · POA : HERNANDEZ + orthopnea + GIO + weight gain , vascular congestion on imaging  · Poorly compliant to diuretic therapy  • Dry weight seems to be aroundDry weight:  389 lb , on admission:  395 lb per standing scale  · echocardiogram (2/2010) showed EF 65%, no wall motion abnormalities, grade 1 diastolic dysfunction, no hemodynamically significant valvular disease, normal RV function  · Volume status difficult to assess due to body habitus with morbid obesity  · On 80 mg b i d  Per Cardiology recommendations, x1 dose of metolazone 5mg yesterday UO 2 2 -10 l  weight down 27 lb, improvement in bilateral lower extremity edema , stable from medicine and cardiology standpoint for discharge with below recommendations :  Plan   · Cardiology recommendations : continue furosemide 40 mg po bid ,  · Kidney indices noted with slight increased, will recheck a bmp in 3 days, patient instructed to discuss results with her primary care physician    · Patient reported on admission, not been complaint with diuretic therapy due to urinary incontinence ( ambulatory referral to urology provided)  · Recommended to continue to follow a low-salt diet, fluid restriction 1500  · Continue follow up with cardiology and pcp outpatient           Respiratory failure with hypoxia ([facility] )  Assessment & Plan  ·  likely multifactorial in the setting of morbid obesity with likely underlying OHS, sleep apnea and acute on chronic CHF as well as COPD , significant pulmonary vascular congestion on imaging  · not on home oxygent supplementation at baseline  ·  capable to wean down on oxygen requirements to 3 L from 5 L  · Transitioned to oral steroids, steroid taper 40 mg ordered   · Home o2 eval, patient will require 3L NC , supplies ordered   · Continue follow up with pcp     COPD (chronic obstructive pulmonary disease) ([facility])  Assessment & Plan    ·  wheezing improving, cough noted to be more productive  ·  covid/ influenza/ RSV neg x2  Plan   · Prednisone 40 mg , taper ordered decreased 10 mg every 3 days   · Continue antitussives and mucolytics  · Continue follow up with pcp     Morbid obesity ([facility] [address] )  Assessment & Plan  · BMI: 66 68  · Recommend therapeutic lifestyle changes to promote weight loss    Essential hypertension  Assessment & Plan  · Blood pressure acceptable  · Continue lisinopril at home dose      Discharging Resident Physician: Rubina Collins MD  Attending: Juhi Campbell DO  PCP: Luis Alberto Waggoner DO  Admission Date: 12/2/2022  Discharge Date: 12/09/22    Disposition:     Home with home health care    Reason for Admission: Acute exacerbation of congestive heart failure    Consultations During Hospital Stay:  · Cardiology    Procedures Performed:     · none    Significant Findings / Test Results:     · none    Incidental Findings:   ·     Test Results Pending at Discharge (will require follow up): · None     Outpatient Tests Requested:  · Basic metabolic panel in 3 days    Complications: No    Hospital Course:     Kenia Tenorio is a 63 y o  female patient who originally presented to the hospital on 12/2/2022 due to shortness of breath  Patient mentioning the symptoms started approximately 2 weeks, primarily dyspnea on exertion, only able to work 10 feet without getting out of breath  Bilateral lower extremity edema was also noted as well as unintentional weight gain of 10 pounds over the past month    Patient has an underlying history of CHF, COPD, hypertension, she reports poorly compliance to her diuretic therapy since it worsens her underlying urinary incontinence  Patient was evaluated by her PCP prior to admission, she was noted hypoxic on ambulation saturating below 80% also with significant dyspnea while holding conversations, she was instructed to come to the emergency department for further evaluation, management for exacerbation of congestive heart failure was initiated with Lasix 40 mg twice daily, with poor clinical response, dose adjusted to 80 mg twice daily which seems more adequate for patient, breathing pattern improving, weight down 25 pounds, improvement lower extremity edema  Patient is stable from medicine and cardiology standpoint for discharge with above recommendations  Condition at Discharge: good     Discharge Day Visit / Exam:     Subjective: No overnight events, does not report any acute complaints, improvement in breathing pattern and cough  Vitals: Blood Pressure: 120/69 (12/09/22 1043)  Pulse: 81 (12/09/22 0657)  Temperature: 97 6 °F (36 4 °C) (12/09/22 0657)  Temp Source: Oral (12/09/22 0657)  Respirations: 18 (12/09/22 0657)  Height: 5' 5" (165 1 cm) (12/07/22 0747)  Weight - Scale: (!) 167 kg (368 lb 9 6 oz) (12/09/22 0600)  SpO2: 96 % (12/09/22 0700)  Exam:   Physical Exam  Vitals and nursing note reviewed  Constitutional:       General: She is not in acute distress  Appearance: She is normal weight  She is not toxic-appearing  HENT:      Head: Normocephalic and atraumatic  Right Ear: Tympanic membrane normal       Left Ear: Tympanic membrane normal       Nose: Nose normal  No congestion or rhinorrhea  Mouth/Throat:      Mouth: Mucous membranes are moist       Pharynx: Oropharynx is clear  No oropharyngeal exudate or posterior oropharyngeal erythema  Eyes:      Extraocular Movements: Extraocular movements intact        Conjunctiva/sclera: Conjunctivae normal       Pupils: Pupils are equal, round, and reactive to light  Cardiovascular:      Rate and Rhythm: Normal rate  Pulses: Normal pulses  Heart sounds: No murmur heard  No gallop  Pulmonary:      Effort: Pulmonary effort is normal  No respiratory distress  Breath sounds: Rales present  No wheezing  Chest:      Chest wall: No tenderness  Abdominal:      General: Bowel sounds are normal  There is no distension  Palpations: Abdomen is soft  Tenderness: There is no abdominal tenderness  Musculoskeletal:         General: Normal range of motion  Cervical back: Normal range of motion  Right lower leg: Edema present  Left lower leg: Edema present  Comments: Improved bilateral lower extremity edema   Skin:     General: Skin is warm  Capillary Refill: Capillary refill takes 2 to 3 seconds  Neurological:      Mental Status: She is alert and oriented to person, place, and time  Psychiatric:         Mood and Affect: Mood normal          Thought Content: Thought content normal          Judgment: Judgment normal            Discussion with Family: patient's daughter updated, all questions answered    Discharge instructions/Information to patient and family:   See after visit summary for information provided to patient and family  Provisions for Follow-Up Care:  See after visit summary for information related to follow-up care and any pertinent home health orders  Planned Readmission: no     Discharge Medications:  See after visit summary for reconciled discharge medications provided to patient and family        ** Please Note: This note has been constructed using a voice recognition system    Nutrition Assessment and Intervention:     Reviewed food recall journal      Physical Activity Assessment and Intervention:    Activity journal reviewed      Emotional and Mental Well-being, Sleep, Connectedness Assessment and Intervention:    Sleep/stress assessment performed      Tobacco and Toxic Substance Assessment and Intervention:     Tobacco use screening performed    Alcohol and drug use screening performed

## 2022-12-09 NOTE — PROGRESS NOTES
General Cardiology   Progress Note -  Team One   Gail Carrion 63 y o  female MRN: [id number]    Unit/Bed#: S -01 Encounter: [id number]    Assessment/ Plan    1  Acute on chronic HFpEF   Received furosemide 80 mg IV BID   Will start furosemide 40 mg PO BID and recommend at discharge   Non compliant with diuretics at home prior to admission   Monitor I/Os - 948 ml  in 24 hours and -10 L since admission   Daily weights 368 lbs today from 395 lbs on admission    Creatinine stable: 0 95  Continue 2 gram Na diet and 1500 ml fluid restriction   Reviewed HF education with patient in length      2  Hypertension   BP stable: 124/71  On lisinopril 10 mg PO daily      3  COPD  Followed by primary team   On albuterol      4  Morbid obesity   BMI 63     Patient scheduled to follow up with Theodore CARDENAS 12/20/2022         Subjective  Patient reports she feels great today and is eager to go home  No complaint of chest pain, SOB or palpitations  She has cough that is non productive  Review of Systems   Constitutional: Negative for chills and fever  HENT: Negative for congestion  Cardiovascular: Negative for chest pain, dyspnea on exertion, orthopnea and palpitations  Respiratory: Positive for cough  Negative for shortness of breath  Musculoskeletal: Negative for falls  Gastrointestinal: Negative for bloating, nausea and vomiting  Neurological: Negative for dizziness and light-headedness  All other systems reviewed and are negative  Objective:   Vitals: Blood pressure 144/71, pulse 81, temperature 97 6 °F (36 4 °C), temperature source Oral, resp  rate 18, height 5' 5" (1 651 m), weight (!) 167 kg (368 lb 9 6 oz), SpO2 96 %  ,       Body mass index is 61 34 kg/m²  ,     Systolic (67KSW), LHC:865 , Min:110 , UJC:971     Diastolic (57JWT), LEBRON:77, Min:65, Max:81          Intake/Output Summary (Last 24 hours) at 12/9/2022 1048  Last data filed at 12/9/2022 0601  Gross per 24 hour   Intake 480 ml Output 1500 ml   Net -1020 ml     Weight (last 2 days)     Date/Time Weight    12/09/22 0600 167 (368 6)    12/08/22 0600 168 (370 81)    12/07/22 0804 172 (378 97)    12/07/22 07:47:17 172 (378 97)    12/07/22 0538 175 (386 47)        Telemetry Review: No telemetry       Physical Exam  Constitutional:       General: She is not in acute distress  Appearance: She is obese  HENT:      Head: Normocephalic  Mouth/Throat:      Mouth: Mucous membranes are moist    Cardiovascular:      Rate and Rhythm: Normal rate and regular rhythm  Pulses: Normal pulses  Pulmonary:      Effort: Pulmonary effort is normal  No respiratory distress  Comments: 2 L NC   Decreased in bases   Abdominal:      General: Bowel sounds are normal       Palpations: Abdomen is soft  Musculoskeletal:         General: Swelling present  Normal range of motion  Cervical back: Neck supple  Comments: Chronic lower extremity lymphedema    Skin:     General: Skin is warm and dry  Capillary Refill: Capillary refill takes less than 2 seconds  Neurological:      Mental Status: She is alert and oriented to person, place, and time     Psychiatric:         Mood and Affect: Mood normal          LABORATORY RESULTS      CBC with diff:   Results from last 7 days   Lab Units 12/05/22  0645 12/03/22  0658 12/02/22  1852 12/02/22  1304   WBC Thousand/uL 5 60 5 44  --  5 44   HEMOGLOBIN g/dL 12 0 11 8  --  12 2   HEMATOCRIT % 40 4 39 2  --  40 1   MCV fL 96 98  --  94   PLATELETS Thousands/uL 190 139* 164 163   MCH pg 28 4 29 5  --  28 7   MCHC g/dL 29 7* 30 1*  --  30 4*   RDW % 15 2* 15 6*  --  15 4*   MPV fL 10 6 10 7 10 8 10 9   NRBC AUTO /100 WBCs 0  --   --  0       CMP:  Results from last 7 days   Lab Units 12/09/22  0510 12/08/22  0529 12/07/22  0550 12/06/22  0531 12/05/22  0645 12/04/22  0453 12/03/22  0658 12/02/22  1304   POTASSIUM mmol/L 3 7 4 1 4 1 3 9 4 1 4 1 4 1 4 9   CHLORIDE mmol/L 92* 95* 99 97 97 99 104 105 CO2 mmol/L 37* 39* 38* 39* 38* 38* 33* 31   BUN mg/dL 47* 29* 21 19 17 15 14 11   CREATININE mg/dL 0 95 0 64 0 60 0 70 0 66 0 73 0 56* 0 59*   CALCIUM mg/dL 10 4* 10 6* 10 1 9 5 9 1 9 0 9 1 9 5   AST U/L  --   --   --   --   --   --   --  26   ALT U/L  --   --   --   --   --   --   --  20   ALK PHOS U/L  --   --   --   --   --   --   --  73   EGFR ml/min/1 73sq m 65 97 99 94 96 89 101 99       BMP:  Results from last 7 days   Lab Units 12/09/22  0510 12/08/22  0529 12/07/22  0550 12/06/22  0531 12/05/22  0645 12/04/22  0453 12/03/22  0658   POTASSIUM mmol/L 3 7 4 1 4 1 3 9 4 1 4 1 4 1   CHLORIDE mmol/L 92* 95* 99 97 97 99 104   CO2 mmol/L 37* 39* 38* 39* 38* 38* 33*   BUN mg/dL 47* 29* 21 19 17 15 14   CREATININE mg/dL 0 95 0 64 0 60 0 70 0 66 0 73 0 56*   CALCIUM mg/dL 10 4* 10 6* 10 1 9 5 9 1 9 0 9 1       Lab Results   Component Value Date    NTBNP 23 08/31/2022    NTBNP 27 11/04/2019    NTBNP 65 07/03/2019             Results from last 7 days   Lab Units 12/09/22  0510 12/08/22  0529 12/07/22  0550 12/06/22  0531 12/05/22  0645 12/03/22  0658   MAGNESIUM mg/dL 2 3 2 3 2 3 2 1 2 1 2 2                           Lipid Profile:   Lab Results   Component Value Date    CHOL 206 07/30/2015     Lab Results   Component Value Date    HDL 41 (L) 08/31/2022    HDL 42 (L) 08/18/2020    HDL 45 07/30/2015     Lab Results   Component Value Date    LDLCALC 123 (H) 08/31/2022    LDLCALC 131 (H) 08/18/2020    LDLCALC 136 (H) 07/30/2015     Lab Results   Component Value Date    TRIG 115 08/31/2022    TRIG 108 08/18/2020    TRIG 126 07/30/2015       Cardiac testing:   Results for orders placed during the hospital encounter of 02/10/21    Echo complete with contrast if indicated    Narrative  [address]  [facility] [place], [address] [facility]  [phone]    Transthoracic Echocardiogram  2D, M-mode, Doppler, and Color Doppler    Study date:  10-Feb-2021    Patient: Kelsi Rosa  MR number: [id number]  Account number: [de-identified]  : 1960  Age: 63 years  Gender: Female  Status: Outpatient  Location: [address] [place] [address] and [facility]  Height: 65 in  Weight: 386 1 lb  BP: 110/ 74 mmHg    Indications: Pedal edema;SOB on exertion;CHF  Diagnoses: R06 02 - Shortness of breath, R60 9 - Edema, unspecified    Sonographer:  DARYL Galarza  Primary Physician:  Terence Martinez DO  Referring Physician:  Terence Martinez DO  Group:  [facility]  Cardiology Fellow: Minal Castellaon MD  Interpreting Physician:  Sheryl Rosenthal MD    SUMMARY    LEFT VENTRICLE:  Systolic function was normal  Ejection fraction was estimated to be 65 %  There were no regional wall motion abnormalities  Doppler parameters were consistent with abnormal left ventricular relaxation (grade 1 diastolic dysfunction)  TRICUSPID VALVE:  There was trace regurgitation  IVC, HEPATIC VEINS:  The inferior vena cava was dilated  Respirophasic changes were blunted (less than 50% variation)  HISTORY: PRIOR HISTORY: BRADY;Smoker; Morbid obesity;HLD;HTN;COPD  PROCEDURE: The study was performed in the [address] [facility]  This was a routine study  The transthoracic approach was used  The study included complete 2D imaging, M-mode, complete spectral Doppler, and color Doppler  The  heart rate was 80 bpm, at the start of the study  Images were obtained from the parasternal, apical, subcostal, and suprasternal notch acoustic windows  Echocardiographic views were limited due to poor acoustic window availability,  decreased penetration, and lung interference  This was a technically difficult study  LEFT VENTRICLE: Size was normal  Systolic function was normal  Ejection fraction was estimated to be 65 %  There were no regional wall motion abnormalities  Wall thickness was normal  DOPPLER: Doppler parameters were consistent with  abnormal left ventricular relaxation (grade 1 diastolic dysfunction)      RIGHT VENTRICLE: The size was normal  Systolic function was normal  Wall thickness was normal     LEFT ATRIUM: Size was normal     RIGHT ATRIUM: Size was normal     MITRAL VALVE: Valve structure was normal  There was normal leaflet separation  DOPPLER: The transmitral velocity was within the normal range  There was no evidence for stenosis  There was no regurgitation  AORTIC VALVE: The valve was trileaflet  Leaflets exhibited normal thickness and normal cuspal separation  DOPPLER: Transaortic velocity was within the normal range  There was no evidence for stenosis  There was no regurgitation  TRICUSPID VALVE: The valve structure was normal  There was normal leaflet separation  DOPPLER: The transtricuspid velocity was within the normal range  There was no evidence for stenosis  There was trace regurgitation  PULMONIC VALVE: Leaflets exhibited normal thickness, no calcification, and normal cuspal separation  DOPPLER: The transpulmonic velocity was within the normal range  There was no regurgitation  PERICARDIUM: There was no pericardial effusion  The pericardium was normal in appearance  AORTA: The root exhibited normal size  SYSTEMIC VEINS: IVC: The inferior vena cava was dilated  Respirophasic changes were blunted (less than 50% variation)      SYSTEM MEASUREMENT TABLES    2D  %FS: 27 94 %  Ao Diam: 3 29 cm  EDV(Teich): 171 86 ml  EF(Teich): 53 32 %  ESV(Teich): 80 23 ml  IVSd: 1 21 cm  LA Area: 23 96 cm2  LA Diam: 4 68 cm  LVEDV MOD A4C: 109 9 ml  LVEF MOD A4C: 64 35 %  LVESV MOD A4C: 39 18 ml  LVIDd: 5 88 cm  LVIDs: 4 24 cm  LVLd A4C: 8 59 cm  LVLs A4C: 6 49 cm  LVPWd: 1 21 cm  RA Area: 17 2 cm2  RVIDd: 4 01 cm  SV MOD A4C: 70 72 ml  SV(Teich): 91 63 ml    CW  AV Vmax: 1 57 m/s  AV maxP 92 mmHg    MM  TAPSE: 2 7 cm    PW  E' Sept: 0 04 m/s  E/E' Sept: 22 85  MV A Vito: 1 05 m/s  MV Dec Colquitt: 3 42 m/s2  MV DecT: 258 2 ms  MV E Vito: 0 88 m/s  MV E/A Ratio: 0 84  MV PHT: 74 88 ms  MVA By PHT: 2 94 cm2    [address] Accredited Ech[facility]ography [facility]    Prepared and electronically signed by    Vaughn Paris MD  Signed 10-Feb-2021 13:57:17    No results found for this or any previous visit  No results found for this or any previous visit  No valid procedures specified  No results found for this or any previous visit  Meds/Allergies   all current active meds have been reviewed and current meds:   Current Facility-Administered Medications   Medication Dose Route Frequency   • acetaminophen (TYLENOL) tablet 650 mg  650 mg Oral Q6H PRN   • albuterol (PROVENTIL HFA,VENTOLIN HFA) inhaler 2 puff  2 puff Inhalation Q6H PRN   • benzonatate (TESSALON PERLES) capsule 200 mg  200 mg Oral TID   • enoxaparin (LOVENOX) subcutaneous injection 60 mg  60 mg Subcutaneous Q12H SYED   • furosemide (LASIX) injection 80 mg  80 mg Intravenous BID (diuretic)   • guaiFENesin (MUCINEX) 12 hr tablet 1,200 mg  1,200 mg Oral BID   • hydrocodone-chlorpheniramine polistirex (TUSSIONEX) ER suspension 5 mL  5 mL Oral Q12H PRN   • influenza vaccine, recombinant, quadrivalent (FLUBLOK) IM injection 0 5 mL  0 5 mL Intramuscular Once   • ipratropium (ATROVENT) 0 02 % inhalation solution 0 5 mg  0 5 mg Nebulization TID   • levalbuterol (XOPENEX) inhalation solution 1 25 mg  1 25 mg Nebulization TID   • lisinopril (ZESTRIL) tablet 10 mg  10 mg Oral Daily   • predniSONE tablet 40 mg  40 mg Oral Daily     Medications Prior to Admission   Medication   • acetaminophen (TYLENOL) 500 mg tablet   • albuterol (PROVENTIL HFA,VENTOLIN HFA) 90 mcg/act inhaler   • furosemide (LASIX) 20 mg tablet   • lisinopril (ZESTRIL) 10 mg tablet     Counseling / Coordination of Care  Total floor / unit time spent today 20 minutes  Greater than 50% of total time was spent with the patient and / or family counseling and / or coordination of care        ** Please Note: Dragon 360 Dictation voice to text software may have been used in the creation of this document   **

## 2022-12-09 NOTE — CASE MANAGEMENT
Case Management Discharge Planning Note    Patient name Shimon Ponce S /S -12 MRN [id number]  : 1960 Date 2022       Current Admission Date: 2022  Current Admission Diagnosis:Acute exacerbation of CHF (congestive heart failure) [facility])   Patient Active Problem List    Diagnosis Date Noted   • Hypoxia 2022   • Respiratory failure with hypoxia ([facility] ) 2022   • Acute exacerbation of CHF (congestive heart failure) ([facility] ) 2022   • Pre-diabetes 2022   • Lymphedema of both lower extremities 2021   • Chronic diastolic congestive heart failure ([facility] ) 2021   • Insomnia due to medical condition 10/08/2020   • Obstructive sleep apnea syndrome 2020   • Cigarette nicotine dependence without complication 15/03/1391   • Morbid obesity with body mass index (BMI) of 60 0 to 69 9 in adult [facility]) 2020   • Essential hypertension 07/10/2019   • Mixed stress and urge urinary incontinence 07/10/2019   • COPD (chronic obstructive pulmonary disease) ([facility] ) 07/10/2019   • Mixed hyperlipidemia 07/10/2019   • Morbid obesity ([facility] ) 07/10/2019      LOS (days): 7  Geometric Mean LOS (GMLOS) (days): 3 90  Days to GMLOS:-3     OBJECTIVE:  Risk of Unplanned Readmission Score: 11 41         Current admission status: Inpatient   Preferred Pharmacy:   [facility]/pharmacy [id number] [place] [address] [address] [facility]  [address] [facility] [place] [address]  Phone: [phone] Fax: [phone] [facility] [address]  [address] [place] [address]  Phone: [phone] Fax: [phone]    Primary Care Provider: Terence Martinez DO    Primary Insurance: MEDICARE  Secondary Insurance: [place] [facility]    DISCHARGE DETAILS:    Discharge planning discussed with[de-identified] patient  Freedom of Choice: Yes  Comments - Freedom of Choice: Patient remains in agreement with dc to [facility] services   Cm confirmed that [facility] will provide services  Cm also confirmed with MD that patient will dc home with O2  Cm spoke with patient about new development and she is in agreement with referral to [facility] for O2 needs  CM contacted family/caregiver?: Yes  Were Treatment Team discharge recommendations reviewed with patient/caregiver?: Yes  Did patient/caregiver verbalize understanding of patient care needs?: Yes  Were patient/caregiver advised of the risks associated with not following Treatment Team discharge recommendations?: Yes         Requested [address]         Is the patient interested in [place] [id number] at discharge?: Yes  [address] requested[de-identified] Nursing, Occupational Therapy, Physical [address] Name[de-identified] Other ([facility])  [address] Provider[de-identified] PCP  Home Health Services Needed[de-identified] Evaluate Functional Status and Safety, Gait/ADL Training, Oxygen Via Nasal Cannula, Strengthening/Theraputic Exercises to Improve Function  Oxygen LPM Ordered (if applicable based on home health services needed):: 3 LPM  Homebound Criteria Met[de-identified] Uses an Assist Device (i e  cane, walker, etc), Requires the Assistance of Another Person for Safe Ambulation or to Leave the Home  Supporting Clincal Findings[de-identified] Fatigues Easliy in [employer], Limited Endurance    DME Referral Provided  Referral made for DME?: Yes  DME referral completed for the following items[de-identified] Home Oxygen concentrator  DME Supplier Name[de-identified] [employer]    Other Referral/Resources/Interventions Provided:  Referral Comments: [facility] C sent, [facility] accepted  Treatment Team Recommendation: Home with [address]  Discharge Destination Plan[de-identified] Home with Karl at Discharge : Family           ETA of Transport (Date): 12/09/22  ETA of Transport (Time): 1700              IMM Given (Date):: 12/09/22  IMM Given to[de-identified] Patient      IMM reviewed with patient, patient agrees with discharge determination

## 2022-12-13 ENCOUNTER — TRANSITIONAL CARE MANAGEMENT (OUTPATIENT)
Dept: INTERNAL MEDICINE CLINIC | Facility: OTHER | Age: 62
End: 2022-12-13

## 2022-12-15 ENCOUNTER — TELEMEDICINE (OUTPATIENT)
Dept: INTERNAL MEDICINE CLINIC | Age: 62
End: 2022-12-15

## 2022-12-15 DIAGNOSIS — Z76.89 ENCOUNTER FOR SUPPORT AND COORDINATION OF TRANSITION OF CARE: ICD-10-CM

## 2022-12-15 DIAGNOSIS — I10 ESSENTIAL HYPERTENSION: ICD-10-CM

## 2022-12-15 DIAGNOSIS — I50.32 CHRONIC DIASTOLIC CONGESTIVE HEART FAILURE (HCC): ICD-10-CM

## 2022-12-15 DIAGNOSIS — J44.1 CHRONIC OBSTRUCTIVE PULMONARY DISEASE WITH ACUTE EXACERBATION (HCC): Primary | ICD-10-CM

## 2022-12-15 NOTE — PROGRESS NOTES
Assessment & Plan     1  Chronic obstructive pulmonary disease with acute exacerbation (HCC)  Currently completing prednisone taper and intermittently using 3L NC for desaturation with ambulation  Hasn't needed to use albuterol inhaler  Endorses cough with yellow sputum  Denies wheezing, shortness of breath  · Continue albuterol inhaler, Tessalon perles, Tussionex  · Sleep medicine appointment 1/27/23  · Follow up in 1 month    2  Chronic diastolic congestive heart failure (HCC)  TTE during admission showed EF 70% and G1DD without major valvular abnormalities  Discharged on PO Lasix 40 mg BID  Complaint with Lasix, fluid and salt restrictions, and daily weights  No difficulty urinating, orthopnea, shortness of breath  Weight 372 lbs today  · Advised patient to complete BMP to monitor creatinine - Cr 0 95 on the day of discharge, up from 0 64  · Continue Lasix 40 mg BID  · Continue salt and 2 L fluid restriction, daily weights  · Cardiology appointment 12/20    3  Essential hypertension  · Continue lisinopril 40 mg     4  Encounter for support and coordination of transition of care         Subjective     Transitional Care Management Review:   Jaime Beck is a 63 y o  female here for TCM follow up  During the TCM phone call patient stated:  TCM Call     Date and time call was made  12/13/2022 10:20 AM    Hospital care reviewed  Records reviewed    Patient was hospitialized at  [address] [place] [address]    Date of Admission  12/02/22    Date of discharge  12/09/22    Diagnosis  acute exacerbation of CHF    Disposition  Home    Current Symptoms  Dizziness; Weakness; Cough; Shortness of breath    Dizziness severity  Mild      TCM Call     Post hospital issues  None    Scheduled for follow up?   Yes    Did you obtain your prescribed medications  Yes    Do you need help managing your prescriptions or medications  No    Is transportation to your appointment needed  No    I have advised the patient to call PCP with any new or worsening symptoms  Anila Avila GABE        74-year-old female with history of COPD, diastolic heart failure, BRADY, obesity, tobacco use who was recently admitted from 12/2-12/9 for COPD and heart failure exacerbations  Patient received nebulizers, IV steroids, antitussives, and discharged with home oxygen and prednisone taper  Patient also received IV diuresis and 1 dose of metolazone  Noted weight loss from 395 pounds to 368 pounds during admission  TTE showed EF 70% with G1DD and no significant valvular abnormalities  Cardiology was consulted and recommended to discharge patient with Lasix 40 mg BID  On the day of discharge, patient's creatinine increased to 0 95 from 0 64  Currently, endorses productive cough with yellow sputum  Using Tussionex at night and Tessalon perles twice a day  States breathing is better  Checks oxygen saturation throughout the day and uses 3 L oxygen intermittently if she feels fatigued  Notes oxygen saturation 87-88% with ambulation  She tries to avoid using oxygen when sleeping  Hasn't need albuterol inhaler  Weighing herself daily  Weight 372 pounds this morning, 370 the day before  Compliant with Lasix and salt and 2 L fluid restrictions  Sleep with head of bed elevated but less than 45 degrees which is her baseline  She has not been able to complete repeat BMP yet  TCM was completed via telephone  Review of Systems   Constitutional: Negative for appetite change, chills, diaphoresis, fever and unexpected weight change  HENT: Negative for congestion, ear pain, rhinorrhea and sore throat  Eyes: Negative for visual disturbance  Respiratory: Positive for cough  Negative for shortness of breath and wheezing  Cardiovascular: Negative for chest pain, palpitations and leg swelling  Gastrointestinal: Negative for abdominal pain, constipation, diarrhea, nausea and vomiting     Genitourinary: Negative for difficulty urinating, dysuria and hematuria  Musculoskeletal: Negative for back pain  Skin: Negative for rash  Neurological: Negative for dizziness, weakness, light-headedness, numbness and headaches  Objective     There were no vitals taken for this visit  Physical Exam  Pulmonary:      Comments: No conversational dyspnea  Neurological:      Mental Status: She is alert         Medications have been reviewed by provider in current encounter    Nolan Sanderson DO

## 2022-12-16 ENCOUNTER — APPOINTMENT (OUTPATIENT)
Dept: LAB | Age: 62
End: 2022-12-16

## 2022-12-16 DIAGNOSIS — I50.9 ACUTE ON CHRONIC CONGESTIVE HEART FAILURE, UNSPECIFIED HEART FAILURE TYPE (HCC): ICD-10-CM

## 2022-12-16 LAB
ANION GAP SERPL CALCULATED.3IONS-SCNC: 3 MMOL/L (ref 4–13)
BUN SERPL-MCNC: 26 MG/DL (ref 5–25)
CALCIUM SERPL-MCNC: 10.5 MG/DL (ref 8.3–10.1)
CHLORIDE SERPL-SCNC: 104 MMOL/L (ref 96–108)
CO2 SERPL-SCNC: 32 MMOL/L (ref 21–32)
CREAT SERPL-MCNC: 0.89 MG/DL (ref 0.6–1.3)
GFR SERPL CREATININE-BSD FRML MDRD: 70 ML/MIN/1.73SQ M
GLUCOSE P FAST SERPL-MCNC: 124 MG/DL (ref 65–99)
POTASSIUM SERPL-SCNC: 4.3 MMOL/L (ref 3.5–5.3)
SODIUM SERPL-SCNC: 139 MMOL/L (ref 135–147)

## 2022-12-19 ENCOUNTER — PATIENT OUTREACH (OUTPATIENT)
Dept: INTERNAL MEDICINE CLINIC | Age: 62
End: 2022-12-19

## 2022-12-19 NOTE — PROGRESS NOTES
Message left for pt requesting return call back for interest in care management  Pt in [facility] bundle for CHF

## 2022-12-21 ENCOUNTER — PATIENT OUTREACH (OUTPATIENT)
Dept: INTERNAL MEDICINE CLINIC | Age: 62
End: 2022-12-21

## 2022-12-21 NOTE — PROGRESS NOTES
Called and LVM  Pt returned call and discussed that this had been her first hospitalization for any health concern  Pt states that she is less short of breath  She does have a pulse ox and monitors her O2 sat and occasionally uses oxygen  She had bee a smoker since age 17 yrs old, quit 2 years ago, restarted for a few months and now totally quit at this past hospitalization  She does check her weight daily and today's was 373 lbs  Reviewed CHF weight gain rule  Pt reports that she sleeps in an adjustable bed and she has been lowering the head a bit more each night from when she first came home  She has home PT 2x a week  Pt has been waling as much as she can tolerate besides  Her daughter has been cooking and watching the sodium content  We reviewed food to avoid  Pt has substituted sausage with turkey sausage however is limiting it to once a week  Discussed processed foods  To avoid etc  Also asked pt to rechedule her post discharge Cardiology appt as she was a no show yesterday  She reports that 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

## 2022-12-23 ENCOUNTER — TELEPHONE (OUTPATIENT)
Dept: INTERNAL MEDICINE CLINIC | Age: 62
End: 2022-12-23

## 2022-12-23 NOTE — TELEPHONE ENCOUNTER
Patient called office to notify of weight gain of 4 pounds since yesterday  Weight was 373lb on 12/22, This morning(12/23/22) was 4lbs  Advised patient to call cardiologist and advise them of recent weight gain  Patient was in hospital and discharge on 12/9/22  Patient confirmed she is still taking 40mg of lasix BID            @9:48AM- Patient called office back and stated she cannot get a hold of cardiology because the cardiologist she saw only works in the hospital?  She just wants to know if she should do anything differently because of the 4 pound weight gain overnight  She said she normally does not see Cardiology and will need to schedule a new patient visit with them        Please advise, thank you

## 2022-12-23 NOTE — TELEPHONE ENCOUNTER
Discussed with Dr Myriam Fountain  Pt is to take 80 mg (2 tablets) of lasix in the morning and 40 mg at night and keep pending appt with cardiology on 1/3/2023  Pt verbally acknowledged understanding and will keep appt

## 2022-12-23 NOTE — TELEPHONE ENCOUNTER
Pt called stating she had a weight gain overnight of 3 pounds  Is taking Lasix 40 mg BID  Weights since leaving hospital have been     12/9    368 lb   12/12  372 8 lb   12/14  372 2 lb  12/16  373 2 lb  12/17  368 8 lb  12/18  370 4 lb  12/21  373 lb  12/22  374 lb  12/23  377 lb    Pt states she has SOB but that is her normal with COPD and she does not feel worse than usual  She had some dizziness last night as well as mild BL ankle swelling that she states is now better this AM  Denies any chest pain  She has upcoming appt with Megan Giordano, NP 1/3/2023  Please advise

## 2022-12-28 ENCOUNTER — OFFICE VISIT (OUTPATIENT)
Dept: INTERNAL MEDICINE CLINIC | Age: 62
End: 2022-12-28

## 2022-12-28 VITALS
BODY MASS INDEX: 48.82 KG/M2 | HEIGHT: 65 IN | WEIGHT: 293 LBS | SYSTOLIC BLOOD PRESSURE: 130 MMHG | DIASTOLIC BLOOD PRESSURE: 82 MMHG | HEART RATE: 100 BPM | TEMPERATURE: 97.8 F | OXYGEN SATURATION: 91 %

## 2022-12-28 DIAGNOSIS — R07.81 RIB PAIN ON RIGHT SIDE: ICD-10-CM

## 2022-12-28 DIAGNOSIS — Z00.00 ENCOUNTER FOR ANNUAL WELLNESS VISIT (AWV) IN MEDICARE PATIENT: ICD-10-CM

## 2022-12-28 DIAGNOSIS — Z12.11 SCREENING FOR MALIGNANT NEOPLASM OF COLON: ICD-10-CM

## 2022-12-28 DIAGNOSIS — Z12.31 ENCOUNTER FOR SCREENING MAMMOGRAM FOR MALIGNANT NEOPLASM OF BREAST: ICD-10-CM

## 2022-12-28 DIAGNOSIS — R05.2 SUBACUTE COUGH: ICD-10-CM

## 2022-12-28 DIAGNOSIS — J44.1 CHRONIC OBSTRUCTIVE PULMONARY DISEASE WITH ACUTE EXACERBATION (HCC): ICD-10-CM

## 2022-12-28 DIAGNOSIS — R31.9 HEMATURIA, UNSPECIFIED TYPE: Primary | ICD-10-CM

## 2022-12-28 DIAGNOSIS — N93.9 VAGINAL BLEEDING: ICD-10-CM

## 2022-12-28 LAB
SL AMB  POCT GLUCOSE, UA: NORMAL
SL AMB LEUKOCYTE ESTERASE,UA: NORMAL
SL AMB POCT BILIRUBIN,UA: NORMAL
SL AMB POCT BLOOD,UA: NORMAL
SL AMB POCT CLARITY,UA: CLEAR
SL AMB POCT COLOR,UA: YELLOW
SL AMB POCT KETONES,UA: NORMAL
SL AMB POCT NITRITE,UA: NORMAL
SL AMB POCT PH,UA: 6
SL AMB POCT SPECIFIC GRAVITY,UA: 1.03
SL AMB POCT URINE PROTEIN: NORMAL
SL AMB POCT UROBILINOGEN: 1

## 2022-12-28 RX ORDER — PREDNISONE 10 MG/1
TABLET ORAL
Qty: 30 TABLET | Refills: 0 | Status: SHIPPED | OUTPATIENT
Start: 2022-12-28

## 2022-12-28 RX ORDER — CEPHALEXIN 500 MG/1
500 CAPSULE ORAL EVERY 12 HOURS SCHEDULED
Qty: 14 CAPSULE | Refills: 0 | Status: SHIPPED | OUTPATIENT
Start: 2022-12-28 | End: 2023-01-04

## 2022-12-28 NOTE — PROGRESS NOTES
Assessment and Plan:     Problem List Items Addressed This Visit        Respiratory    COPD (chronic obstructive pulmonary disease) ([facility] [address] )    Relevant Medications    predniSONE 10 mg tablet   Other Visit Diagnoses     Hematuria, unspecified type    -  Primary    keflex x 7 days  if recurs check urine culture (unable to send today not enough specimen)    Relevant Medications    cephalexin (KEFLEX) 500 mg capsule    Other Relevant Orders    POCT urine dip auto non-scope (Completed)    Encounter for screening mammogram for malignant neoplasm of breast        Relevant Orders    Mammo screening bilateral w 3d & cad    Screening for malignant neoplasm of colon        Relevant Orders    Cologuard    Vaginal bleeding        questionable if vaginal or urine  has apt with urogyn upcoming  s/p complete hysterectomy  pt to monitor     Rib pain on right side        heat affected area  check xray stat     Relevant Orders    XR ribs bilateral 4+ vw w pa chest    Subacute cough        use tessalon - pt has at home     Relevant Orders    XR ribs bilateral 4+ vw w pa chest    Encounter for annual wellness visit (AWV) in Medicare patient               Preventive health issues were discussed with patient, and age appropriate screening tests were ordered as noted in patient's After Visit Summary  Personalized health advice and appropriate referrals for health education or preventive services given if needed, as noted in patient's After Visit Summary       History of Present Illness:     Patient presents for a Medicare Wellness Visit    64 y/o female with Hx chf, copd, htn presents c/o persistent cough since earlier this month when she was in the hospital   C/o hematuria or vaginal bleeding x 1 episode yesterday, pt states now this has resolved  Pt gave small urine sample today that shows trace blood only  Pt denies dysuria or urinary frequency and states her urine today seems normal  She reports she wears pads normally so it is difficult to tell if this was from vaginal or urinary source however pt has had complete hysterectomy    Pt c/o cough, persistent, not resolved after d/c in early December  She reports no sig improvement with promethazine with codiene   She has had coughing fits and at times has been so severe felt something "pop" on the right side   Now painful in this area  She has an albuterol inhaler but has not been using it often    She has not needed her O2 at home     Her weights have been stable, pt checks daily   On maintenance dose of lasix 40mg bid          Patient Care Team:  Stevan Krabbe, DO as PCP - General (Internal Medicine)  Tati Hernandez RN as RN Care Manager     Review of Systems:     Review of Systems   Constitutional: Negative for activity change, appetite change, chills, fatigue and fever  HENT: Positive for congestion  Negative for sore throat  Respiratory: Positive for cough and chest tightness  Negative for shortness of breath and wheezing  Cardiovascular: Negative for chest pain and leg swelling  Gastrointestinal: Negative for abdominal pain, constipation, diarrhea and nausea  Genitourinary: Positive for hematuria  Negative for dysuria and frequency  Musculoskeletal: Positive for arthralgias and gait problem  Skin: Negative for rash  Neurological: Negative for dizziness and headaches  Hematological: Does not bruise/bleed easily          Problem List:     Patient Active Problem List   Diagnosis   • Essential hypertension   • Mixed stress and urge urinary incontinence   • [facility]   • Mixed hyperlipidemia   • Morbid obesity ([facility] )   • Morbid obesity with body mass index (BMI) of 60 0 to 69 9 in adult [facility])   • Cigarette nicotine dependence without complication   • Obstructive sleep apnea syndrome   • Insomnia due to medical condition   • Chronic diastolic congestive heart failure ([facility] )   • Lymphedema of both lower extremities   • Pre-diabetes • Hypoxia   • Respiratory failure with hypoxia ([facility])   • Acute exacerbation of CHF (congestive heart failure) ([facility])      Past Medical and Surgical History:     Past Medical History:   Diagnosis Date   • Arthritis    • CHF (congestive heart failure) ([facility])    • COPD (chronic obstructive pulmonary disease) ([facility])    • Depression    • Eating disorder    • High blood pressure    • Hypertension    • Obesity All my life   • Urinary incontinence    • Visual impairment      Past Surgical History:   Procedure Laterality Date   • HYSTERECTOMY     • INCONTINENCE SURGERY     • JOINT REPLACEMENT  2015    Right knee   • OOPHORECTOMY Bilateral    • REPLACEMENT TOTAL KNEE Right 2016   • TOTAL ABDOMINAL HYSTERECTOMY W/ BILATERAL SALPINGOOPHORECTOMY        Family History:     Family History   Problem Relation Age of Onset   • Other Mother    • Aneurysm Mother         aortic   • Dementia Mother    • Heart failure Father    • Diabetes Father         Dad   • Lung cancer Sister    • Cancer Sister    • Dementia Maternal Grandmother    • Dementia Maternal Aunt       Social History:     Social History     Socioeconomic History   • Marital status: /Civil Union     Spouse name: None   • Number of children: None   • Years of education: None   • Highest education level: None   Occupational History   • None   Tobacco Use   • Smoking status: Former     Packs/day: 0 00     Years: 35 00     Pack years: 0 00     Types: Cigarettes     Start date: 1976     Quit date: 5/15/2021     Years since quittin 6   • Smokeless tobacco: Never   Vaping Use   • Vaping Use: Former   • Start date: 2018   • Substances: Nicotine   Substance and Sexual Activity   • Alcohol use: Not Currently   • Drug use: Not Currently     Types: Marijuana     Comment: monthly at most   • Sexual activity: Not Currently   Other Topics Concern   • None   Social History Narrative   • None     Social Determinants of Health     Financial Resource Strain: Medium Risk   • Difficulty of Paying Living Expenses: Somewhat hard   Food Insecurity: No Food Insecurity   • Worried About Running Out of Food in the Last Year: Never true   • Ran Out of Food in the Last Year: Never true   Transportation Needs: Unmet Transportation Needs   • Lack of Transportation (Medical): Yes   • Lack of Transportation (Non-Medical): No   Physical Activity: Not on file   Stress: Not on file   Social Connections: Not on file   Intimate Partner Violence: Not on file   Housing Stability: Unknown   • Unable to Pay for Housing in the Last Year: No   • Number of Places Lived in the Last Year: Not on file   • Unstable Housing in the Last Year: No      Medications and Allergies:     Current Outpatient Medications   Medication Sig Dispense Refill   • acetaminophen (TYLENOL) 500 mg tablet Take 500 mg by mouth every 6 (six) hours as needed for mild pain     • albuterol (PROVENTIL HFA,VENTOLIN HFA) 90 mcg/act inhaler Inhale 2 puffs every 6 (six) hours as needed for wheezing or shortness of breath 18 g 1   • cephalexin (KEFLEX) 500 mg capsule Take 1 capsule (500 mg total) by mouth every 12 (twelve) hours for 7 days 14 capsule 0   • furosemide (LASIX) 40 mg tablet Take 1 tablet (40 mg total) by mouth 2 (two) times a day 60 tablet 0   • lisinopril (ZESTRIL) 10 mg tablet Take 1 tablet (10 mg total) by mouth daily 90 tablet 0   • predniSONE 10 mg tablet 4 tablets daily for 3 days then 3 tablets daily x 3 days then 2 tablets daily x 3 days then 1 tablet daily for 3 days  30 tablet 0     No current facility-administered medications for this visit       No Known Allergies   Immunizations:     Immunization History   Administered Date(s) Administered   • COVID-19 PFIZER VACCINE 0 3 ML IM 03/15/2021, 04/03/2021, 11/17/2021   • INFLUENZA 12/09/2022   • Influenza, recombinant, quadrivalent,injectable, preservative free 12/02/2021, 12/09/2022   • Zoster Vaccine Recombinant 04/27/2018, 07/27/2018      Health Maintenance: Topic Date Due   • HIV Screening  Never done   • Breast Cancer Screening: Mammogram  09/17/2021   • Colorectal Cancer Screening  03/09/2023   • Hepatitis C Screening  Completed         Topic Date Due   • Hepatitis A Vaccine (1 of 2 - Risk 2-dose series) Never done   • Pneumococcal Vaccine: Pediatrics (0 to 5 Years) and At-Risk Patients (6 to 59 Years) (1 - PCV) Never done   • COVID-19 Vaccine (4 - Booster for Pfizer series) 01/12/2022   • Hepatitis B Vaccine (1 of 3 - Risk 3-dose series) Never done      Medicare Screening Tests and Risk Assessments:     Patria Craft is here for her Subsequent Wellness visit  Health Risk Assessment:   Patient rates overall health as poor  Patient feels that their physical health rating is much worse  Patient is dissatisfied with their life  Eyesight was rated as slightly worse  Hearing was rated as same  Patient feels that their emotional and mental health rating is same  Patients states they are never, rarely angry  Patient states they are often unusually tired/fatigued  Pain experienced in the last 7 days has been some  Patient's pain rating has been 7/10  Patient states that she has experienced weight loss or gain in last 6 months  Depression Screening:   PHQ-9 Score: 2      Fall Risk Screening: In the past year, patient has experienced: history of falling in past year    Number of falls: 1  Injured during fall?: No    Feels unsteady when standing or walking?: Yes    Worried about falling?: Yes      Urinary Incontinence Screening:   Patient has leaked urine accidently in the last six months  Home Safety:  Patient has trouble with stairs inside or outside of their home  Patient has working smoke alarms and has working carbon monoxide detector  Home safety hazards include: none  Nutrition:   Current diet is No Added Salt and Regular  Medications:   Patient is not currently taking any over-the-counter supplements  Patient is able to manage medications  Activities of Daily Living (ADLs)/Instrumental Activities of Daily Living (IADLs):   Walk and transfer into and out of bed and chair?: Yes  Dress and groom yourself?: Yes    Bathe or shower yourself?: Yes    Feed yourself? Yes  Do your laundry/housekeeping?: Yes  Manage your money, pay your bills and track your expenses?: Yes  Make your own meals?: Yes    Do your own shopping?: Yes    Previous Hospitalizations:   Any hospitalizations or ED visits within the last 12 months?: Yes    How many hospitalizations have you had in the last year?: 1-2    Advance Care Planning:   Living will: No      Cognitive Screening:   Provider or family/friend/caregiver concerned regarding cognition?: No    PREVENTIVE SCREENINGS      Cardiovascular Screening:    General: Screening Not Indicated and History Lipid Disorder      Diabetes Screening:     General: Screening Current      Colorectal Cancer Screening:     General: Screening Current      Breast Cancer Screening:     General: Patient Declines      Cervical Cancer Screening:    General: Screening Not Indicated      Osteoporosis Screening:    General: Risks and Benefits Discussed and Patient Declines      Abdominal Aortic Aneurysm (AAA) Screening:    Risk factors include: family history of AAA        Lung Cancer Screening:     General: Screening Not Indicated      Hepatitis C Screening:    General: Screening Current    Screening, Brief Intervention, and Referral to Treatment (SBIRT)    Screening      Single Item Drug Screening:  How often have you used an illegal drug (including marijuana) or a prescription medication for non-medical reasons in the past year? never    Single Item Drug Screen Score: 0  Interpretation: Negative screen for possible drug use disorder    Brief Intervention  Alcohol & drug use screenings were reviewed  No concerns regarding substance use disorder identified  No results found       Physical Exam:     /82 (BP Location: Left arm, Patient Position: Sitting, Cuff Size: Large)   Pulse 100   Temp 97 8 °F (36 6 °C) (Temporal)   Ht 5' 5" (1 651 m)   Wt (!) 170 kg (375 lb)   SpO2 91%   BMI 62 40 kg/m²     Physical Exam  Vitals reviewed  Constitutional:       General: She is not in acute distress  Appearance: She is obese  HENT:      Head: Normocephalic and atraumatic  Right Ear: Tympanic membrane, ear canal and external ear normal       Left Ear: Tympanic membrane, ear canal and external ear normal    Eyes:      General:         Right eye: No discharge  Left eye: No discharge  Extraocular Movements: Extraocular movements intact  Conjunctiva/sclera: Conjunctivae normal       Pupils: Pupils are equal, round, and reactive to light  Cardiovascular:      Rate and Rhythm: Normal rate and regular rhythm  Pulmonary:      Effort: No respiratory distress  Breath sounds: Wheezing (end exp wheeze b/l ) present  No rhonchi  Comments: Pain anterior R rib with palpation   Musculoskeletal:      Cervical back: Normal range of motion  Skin:     Findings: No erythema or rash  Neurological:      General: No focal deficit present  Mental Status: She is alert     Psychiatric:         Mood and Affect: Mood normal           Abdoulaye Johnson PA-C

## 2022-12-28 NOTE — PATIENT INSTRUCTIONS
Medicare Preventive Visit Patient Instructions  Thank you for completing your Welcome to Medicare Visit or Medicare Annual Wellness Visit today  Your next wellness visit will be due in one year (12/29/2023)  The screening/preventive services that you may require over the next 5-10 years are detailed below  Some tests may not apply to you based off risk factors and/or age  Screening tests ordered at today's visit but not completed yet may show as past due  Also, please note that scanned in results may not display below  Preventive Screenings:  Service Recommendations Previous Testing/Comments   Colorectal Cancer Screening  * Colonoscopy    * Fecal Occult Blood Test (FOBT)/Fecal Immunochemical Test (FIT)  * Fecal DNA/Cologuard Test  * Flexible Sigmoidoscopy Age: 39-70 years old   Colonoscopy: every 10 years (may be performed more frequently if at higher risk)  OR  FOBT/FIT: every 1 year  OR  Cologuard: every 3 years  OR  Sigmoidoscopy: every 5 years  Screening may be recommended earlier than age 39 if at higher risk for colorectal cancer  Also, an individualized decision between you and your healthcare provider will decide whether screening between the ages of 74-80 would be appropriate  Colonoscopy: Not on file  FOBT/FIT: Not on file  Cologuard: 03/09/2020  Sigmoidoscopy: Not on file    Screening Current     Breast Cancer Screening Age: 36 years old  Frequency: every 1-2 years  Not required if history of left and right mastectomy Mammogram: 09/17/2020        Cervical Cancer Screening Between the ages of 21-29, pap smear recommended once every 3 years  Between the ages of 33-67, can perform pap smear with HPV co-testing every 5 years     Recommendations may differ for women with a history of total hysterectomy, cervical cancer, or abnormal pap smears in past  Pap Smear: Not on file    Screening Not Indicated   Hepatitis C Screening Once for adults born between 1945 and 1965  More frequently in patients at high risk for Hepatitis C Hep C Antibody: 08/18/2020    Screening Current   Diabetes Screening 1-2 times per year if you're at risk for diabetes or have pre-diabetes Fasting glucose: 124 mg/dL (12/16/2022)  A1C: 6 1 % (8/31/2022)  Screening Current   Cholesterol Screening Once every 5 years if you don't have a lipid disorder  May order more often based on risk factors  Lipid panel: 08/31/2022    Screening Not Indicated  History Lipid Disorder     Other Preventive Screenings Covered by Medicare:  1  Abdominal Aortic Aneurysm (AAA) Screening: covered once if your at risk  You're considered to be at risk if you have a family history of AAA  2  Lung Cancer Screening: covers low dose CT scan once per year if you meet all of the following conditions: (1) Age 50-69; (2) No signs or symptoms of lung cancer; (3) Current smoker or have quit smoking within the last 15 years; (4) You have a tobacco smoking history of at least 20 pack years (packs per day multiplied by number of years you smoked); (5) You get a written order from a healthcare provider  3  Glaucoma Screening: covered annually if you're considered high risk: (1) You have diabetes OR (2) Family history of glaucoma OR (3)  aged 48 and older OR (3)  American aged 72 and older  3  Osteoporosis Screening: covered every 2 years if you meet one of the following conditions: (1) You're estrogen deficient and at risk for osteoporosis based off medical history and other findings; (2) Have a vertebral abnormality; (3) On glucocorticoid therapy for more than 3 months; (4) Have primary hyperparathyroidism; (5) On osteoporosis medications and need to assess response to drug therapy  · Last bone density test (DXA Scan): Not on file  5  HIV Screening: covered annually if you're between the age of 12-76  Also covered annually if you are younger than 13 and older than 72 with risk factors for HIV infection   For pregnant patients, it is covered up to 3 times per pregnancy  Immunizations:  Immunization Recommendations   Influenza Vaccine Annual influenza vaccination during flu season is recommended for all persons aged >= 6 months who do not have contraindications   Pneumococcal Vaccine   * Pneumococcal conjugate vaccine = PCV13 (Prevnar 13), PCV15 (Vaxneuvance), PCV20 (Prevnar 20)  * Pneumococcal polysaccharide vaccine = PPSV23 (Pneumovax) Adults 25-60 years old: 1-3 doses may be recommended based on certain risk factors  Adults 72 years old: 1-2 doses may be recommended based off what pneumonia vaccine you previously received   Hepatitis B Vaccine 3 dose series if at intermediate or high risk (ex: diabetes, end stage renal disease, liver disease)   Tetanus (Td) Vaccine - COST NOT COVERED BY MEDICARE PART B Following completion of primary series, a booster dose should be given every 10 years to maintain immunity against tetanus  Td may also be given as tetanus wound prophylaxis  Tdap Vaccine - COST NOT COVERED BY MEDICARE PART B Recommended at least once for all adults  For pregnant patients, recommended with each pregnancy  Shingles Vaccine (Shingrix) - COST NOT COVERED BY MEDICARE PART B  2 shot series recommended in those aged 48 and above     Health Maintenance Due:      Topic Date Due   • HIV Screening  Never done   • Breast Cancer Screening: Mammogram  09/17/2021   • Colorectal Cancer Screening  03/09/2023   • Hepatitis C Screening  Completed     Immunizations Due:      Topic Date Due   • Hepatitis A Vaccine (1 of 2 - Risk 2-dose series) Never done   • Pneumococcal Vaccine: Pediatrics (0 to 5 Years) and At-Risk Patients (6 to 59 Years) (1 - PCV) Never done   • COVID-19 Vaccine (4 - Booster for Pfizer series) 01/12/2022   • Hepatitis B Vaccine (1 of 3 - Risk 3-dose series) Never done     Advance Directives   What are advance directives? Advance directives are legal documents that state your wishes and plans for medical care   These plans are made ahead of time in case you lose your ability to make decisions for yourself  Advance directives can apply to any medical decision, such as the treatments you want, and if you want to donate organs  What are the types of advance directives? There are many types of advance directives, and each state has rules about how to use them  You may choose a combination of any of the following:  · Living will: This is a written record of the treatment you want  You can also choose which treatments you do not want, which to limit, and which to stop at a certain time  This includes surgery, medicine, IV fluid, and tube feedings  · Durable power of  for healthcare [facility]): This is a written record that states who you want to make healthcare choices for you when you are unable to make them for yourself  This person, called a proxy, is usually a family member or a friend  You may choose more than 1 proxy  · Do not resuscitate (DNR) order:  A DNR order is used in case your heart stops beating or you stop breathing  It is a request not to have certain forms of treatment, such as CPR  A DNR order may be included in other types of advance directives  · Medical directive: This covers the care that you want if you are in a coma, near death, or unable to make decisions for yourself  You can list the treatments you want for each condition  Treatment may include pain medicine, surgery, blood transfusions, dialysis, IV or tube feedings, and a ventilator (breathing machine)  · Values history: This document has questions about your views, beliefs, and how you feel and think about life  This information can help others choose the care that you would choose  Why are advance directives important? An advance directive helps you control your care  Although spoken wishes may be used, it is better to have your wishes written down  Spoken wishes can be misunderstood, or not followed  Treatments may be given even if you do not want them   An advance directive may make it easier for your family to make difficult choices about your care  Urinary Incontinence   Urinary incontinence (UI)  is when you lose control of your bladder  UI develops because your bladder cannot store or empty urine properly  The 3 most common types of UI are stress incontinence, urge incontinence, or both  Medicines:   · May be given to help strengthen your bladder control  Report any side effects of medication to your healthcare provider  Do pelvic muscle exercises often:  Your pelvic muscles help you stop urinating  Squeeze these muscles tight for 5 seconds, then relax for 5 seconds  Gradually work up to squeezing for 10 seconds  Do 3 sets of 15 repetitions a day, or as directed  This will help strengthen your pelvic muscles and improve bladder control  Train your bladder:  Go to the bathroom at set times, such as every 2 hours, even if you do not feel the urge to go  You can also try to hold your urine when you feel the urge to go  For example, hold your urine for 5 minutes when you feel the urge to go  As that becomes easier, hold your urine for 10 minutes  Self-care:   · Keep a UI record  Write down how often you leak urine and how much you leak  Make a note of what you were doing when you leaked urine  · Drink liquids as directed  You may need to limit the amount of liquid you drink to help control your urine leakage  Do not drink any liquid right before you go to bed  Limit or do not have drinks that contain caffeine or alcohol  · Prevent constipation  Eat a variety of high-fiber foods  Good examples are high-fiber cereals, beans, vegetables, and whole-grain breads  Walking is the best way to trigger your intestines to have a bowel movement  · Exercise regularly and maintain a healthy weight  Weight loss and exercise will decrease pressure on your bladder and help you control your leakage  · Use a catheter as directed  to help empty your bladder   A catheter is a tiny, plastic tube that is put into your bladder to drain your urine  · Go to behavior therapy as directed  Behavior therapy may be used to help you learn to control your urge to urinate  Weight Management   Why it is important to manage your weight:  Being overweight increases your risk of health conditions such as heart disease, high blood pressure, type 2 diabetes, and certain types of cancer  It can also increase your risk for osteoarthritis, sleep apnea, and other respiratory problems  Aim for a slow, steady weight loss  Even a small amount of weight loss can lower your risk of health problems  How to lose weight safely:  A safe and healthy way to lose weight is to eat fewer calories and get regular exercise  You can lose up about 1 pound a week by decreasing the number of calories you eat by 500 calories each day  Healthy meal plan for weight management:  A healthy meal plan includes a variety of foods, contains fewer calories, and helps you stay healthy  A healthy meal plan includes the following:  · Eat whole-grain foods more often  A healthy meal plan should contain fiber  Fiber is the part of grains, fruits, and vegetables that is not broken down by your body  Whole-grain foods are healthy and provide extra fiber in your diet  Some examples of whole-grain foods are whole-wheat breads and pastas, oatmeal, brown rice, and bulgur  · Eat a variety of vegetables every day  Include dark, leafy greens such as spinach, kale, keiko greens, and mustard greens  Eat yellow and orange vegetables such as carrots, sweet potatoes, and winter squash  · Eat a variety of fruits every day  Choose fresh or canned fruit (canned in its own juice or light syrup) instead of juice  Fruit juice has very little or no fiber  · Eat low-fat dairy foods  Drink fat-free (skim) milk or 1% milk  Eat fat-free yogurt and low-fat cottage cheese   Try low-fat cheeses such as mozzarella and other reduced-fat cheeses  · Choose meat and other protein foods that are low in fat  Choose beans or other legumes such as split peas or lentils  Choose fish, skinless poultry (chicken or turkey), or lean cuts of red meat (beef or pork)  Before you cook meat or poultry, cut off any visible fat  · Use less fat and oil  Try baking foods instead of frying them  Add less fat, such as margarine, sour cream, regular salad dressing and mayonnaise to foods  Eat fewer high-fat foods  Some examples of high-fat foods include french fries, doughnuts, ice cream, and cakes  · Eat fewer sweets  Limit foods and drinks that are high in sugar  This includes candy, cookies, regular soda, and sweetened drinks  Exercise:  Exercise at least 30 minutes per day on most days of the week  Some examples of exercise include walking, biking, dancing, and swimming  You can also fit in more physical activity by taking the stairs instead of the elevator or parking farther away from stores  Ask your healthcare provider about the best exercise plan for you  © Copyright [employer] 2018 Information is for End User's use only and may not be sold, redistributed or otherwise used for commercial purposes   All illustrations and images included in CareNotes® are the copyrighted property of [employer]  or [address] [place] [address] "[employer]pape

## 2022-12-29 ENCOUNTER — TELEPHONE (OUTPATIENT)
Dept: INTERNAL MEDICINE CLINIC | Age: 62
End: 2022-12-29

## 2022-12-29 ENCOUNTER — APPOINTMENT (OUTPATIENT)
Dept: RADIOLOGY | Age: 62
End: 2022-12-29

## 2022-12-29 DIAGNOSIS — R05.2 SUBACUTE COUGH: ICD-10-CM

## 2022-12-29 DIAGNOSIS — R07.81 RIB PAIN ON RIGHT SIDE: ICD-10-CM

## 2022-12-29 NOTE — PROGRESS NOTES
Cardiology  Heart Failure   Follow Up Office Visit Note     Oracio Gamboa   63 y o    female   MRN: [id number]  [address]  [address]  MARIA FERNANDA [address] [facility] Radha Wang [address] [phone]    PCP: Ludwig Cox DO  Cardiologist : will be Dr Dalia Braden            Summary of Recommendations  Low-sodium diet, Heart failure education as below  She admits to some sodium dietary indiscretion around [place], however her daughter is helping her with adherence  Stop lisinopril  Unclear if this is causing a cough  For the next 3 days, I advised her to take an extra dose of Lasix and increase potassium in her diet  Sleep study-sched for Jan  F/U bariatric program  Follow up will be scheduled with Dr Dalia Braden- pt request  F/U with me 1-2 weeks  Last Cologuard: 03/09/2020, up-to-date        Impression/plan  Chronic hypoxic respiratory failure, multifactorial, recent hosp adm12/2-12/9/22: acute HFpEF, COPD exacerbation  Discharged on a prednisone taper  Chronic HFpEF, recent adm for decompensation, felt to be multifactorial  -Discharge weight 368 pounds  She was 395 on admission  Since discharge she was placed back on steroids  She has gained some caloric weight  ? Volume as well  Wt Readings from Last 3 Encounters:   01/03/23 (!) 173 kg (382 lb)   12/28/22 (!) 170 kg (375 lb)   12/09/22 (!) 167 kg (368 lb 9 6 oz)   --beta-blocker:  none  --Diuretic:   Lasix 40 mg twice daily prior to admission Lasix 20 twice daily but was noncompliant due to incontinence  Has been referred to urology  For the next 3 days take an extra dose of Lasix  Increase potassium in her diet  --ACE/ARB/ARNI:  Lisinopril 10 mg daily  We will stop; unclear if this is causing a cough  --MRA:   --2 g sodium diet, 1800 cc fluid restriction  Daily weights, call weight gain 2-3 lb in 1 day or 5 lb in 5 days  Obstructive sleep apnea, probable  Sleep study recommended  Likely OHS  COPD    Recent exacerbation, discharged on a prednisone taper, and oxygen-which is new for her  Hypertension, essential  BP  120/74 On Lasix and lisinopril  Monitor with changes  Pre Diabetes  8/31/22: Hemoglobin A1c 6 1  Lipid screening  ASCVD risk: 5 3 %- borderline  Not discussed today   Latest Reference Range & Units 07/30/15 08:35 08/18/20 08:45 08/31/22 09:05   Cholesterol See Comment mg/dL 206 195 187   Triglycerides See Comment mg/dL 126 108 115   HDL >=50 mg/dL 45 42 (L) 41 (L)   Non-HDL Cholesterol mg/dl  153 (H) 146   LDL Calculated 0 - 100 mg/dL 136 (H) 131 (H) 123 (H)   Chol HDLC Ratio <5 0 (calc)  4 6      Morbid obesity, BMI 62  Has been referred to bariatric surgery  HxTobacco abuse  Cardiac testing  · TTE 12/10/21  EF 65%  No R WMA  Grade 1 DD  RV normal size and function  Atria normal size  • TTE 12/3/2022  EF 70%  Systolic function is vigorous  Grade 1 DD  Mild SHWETA  Mild TR            HPI:   David Barahona is a 24-year-old female with morbid obesity BMI 62, and essential hypertension  She does not follow with cardiology  Chronic medications include Lasix 20 mg twice daily, lisinopril 10 mg daily    ADM 12/2 - 12/9/2022  CC:HERNANDEZ + orthopnea + LE edema + weight gain   Adm wt 395  Referred by her PCP given hypoxia  EKG rhythm 84 bpm   At bedtime troponin x3  CBC, CMP stable  RSV flu COVID: Negative  CXR moderate pulmonary vascular congestion  TTE EF 65% no R WMA grade 1 DD normal RV function no significant valve disease  Treated for COPD exacerbation and acute on chronic heart failure preserved ejection fraction  Patient reported she stopped her diuretics back in October that was causing urinary incontinence  She was evaluated for gastric bypass surgery  Sleep study scheduled in January  Diuresed with IV Lasix 80  twice daily plus metolazone  At discharge still required oxygen    She declined staying in the hospital for additional treatment  She required treatment with high doses of IV steroids initially, nebs, antitussives  Followed by cardiology  Discharge weight : 368 pounds  Discharge creatinine: 0 9  Discharge diuretics: Lasix 40 mg twice daily  Previously nonadherent to her diuretic regimen given incontinent      1/3/23  Hospital follow-up, multifactorial hypoxic respiratory failure  ROS: Persistent cough  Placed back on steroids and an antibiotic by her PCP for bronchitis  Last labs 12/16/2022  Home weight:  374lb  Since on steroids, up to 382  Did have sauerkraut this weekend, plus bread  Back on steroids and also on Keflex for bronchitis  was prescribed lisinopril for 2 years, but wasn't taking it  Since her hospital visit, has been taking it  Last labs 12/16: Cr 0 89 BUN 26 K 4 3  /74  stop lisinopril  Bradykinin related cough? Some wt is caloric  Also is on steroids- could be caloric, could be some volume  Very difficult to tell  Will take an extra dose of Lasix 3 days in a row  She will see me in 1-2 weeks  If volume is still suspected, may switch to torsemide 20 BID        I have spent 40 minutes with Patient and family today in which greater than 50% of this time was spent in counseling/coordination of care regarding Intructions for management, Patient and family education, Importance of tx compliance and Risk factor reductions    Assessment  Diagnoses and all orders for this visit:    Hospital discharge follow-up    Chronic diastolic congestive heart failure ([address] )    Essential hypertension    Mixed hyperlipidemia    Obstructive sleep apnea syndrome    Pre-diabetes    Morbid obesity with body mass index (BMI) of 60 0 to 69 9 in [facility])    Tobacco abuse        Past Medical History:   Diagnosis Date   • Arthritis    • CHF (congestive heart failure) ([facility])    • COPD (chronic obstructive pulmonary disease) ([facility])    • Depression    • Eating disorder    • High blood pressure    • Hypertension    • Obesity All my life   • Urinary incontinence    • Visual impairment 2018 Review of Systems   Constitutional: Negative for chills  Cardiovascular: Positive for dyspnea on exertion and leg swelling  Negative for chest pain, claudication, cyanosis, irregular heartbeat, near-syncope, orthopnea, palpitations, paroxysmal nocturnal dyspnea and syncope  Respiratory: Positive for cough and shortness of breath  Gastrointestinal: Negative for heartburn and nausea  Neurological: Negative for dizziness, focal weakness, headaches, light-headedness and weakness  All other systems reviewed and are negative  No Known Allergies    Current Outpatient Medications:   •  acetaminophen (TYLENOL) 500 mg tablet, Take 500 mg by mouth every 6 (six) hours as needed for mild pain, Disp: , Rfl:   •  albuterol (PROVENTIL HFA,VENTOLIN HFA) 90 mcg/act inhaler, Inhale 2 puffs every 6 (six) hours as needed for wheezing or shortness of breath, Disp: 18 g, Rfl: 1  •  cephalexin (KEFLEX) 500 mg capsule, Take 1 capsule (500 mg total) by mouth every 12 (twelve) hours for 7 days, Disp: 14 capsule, Rfl: 0  •  furosemide (LASIX) 40 mg tablet, Take 1 tablet (40 mg total) by mouth 2 (two) times a day, Disp: 60 tablet, Rfl: 0  •  lisinopril (ZESTRIL) 10 mg tablet, Take 1 tablet (10 mg total) by mouth daily, Disp: 90 tablet, Rfl: 0  •  predniSONE 10 mg tablet, 4 tablets daily for 3 days then 3 tablets daily x 3 days then 2 tablets daily x 3 days then 1 tablet daily for 3 days  , Disp: 30 tablet, Rfl: 0    Social History     Socioeconomic History   • Marital status: /Civil Union     Spouse name: Not on file   • Number of children: Not on file   • Years of education: Not on file   • Highest education level: Not on file   Occupational History   • Not on file   Tobacco Use   • Smoking status: Former     Packs/day: 0 00     Years: 35 00     Pack years: 0 00     Types: Cigarettes     Start date: 1976     Quit date: 5/15/2021     Years since quittin 6   • Smokeless tobacco: Never   Vaping Use   • Vaping Use: Former   • Start date: 9/1/2018   • Substances: Nicotine   Substance and Sexual Activity   • Alcohol use: Not Currently   • Drug use: Not Currently     Types: Marijuana     Comment: monthly at most   • Sexual activity: Not Currently   Other Topics Concern   • Not on file   Social History Narrative   • Not on file     Social Determinants of Health     Financial Resource Strain: Medium Risk   • Difficulty of Paying Living Expenses: Somewhat hard   Food Insecurity: No Food Insecurity   • Worried About Running Out of Food in the Last Year: Never true   • Ran Out of Food in the Last Year: Never true   Transportation Needs: Unmet Transportation Needs   • Lack of Transportation (Medical): Yes   • Lack of Transportation (Non-Medical): No   Physical Activity: Not on file   Stress: Not on file   Social Connections: Not on file   Intimate Partner Violence: Not on file   Housing Stability: Unknown   • Unable to Pay for Housing in the Last Year: No   • Number of Places Lived in the Last Year: Not on file   • Unstable Housing in the Last Year: No       Family History   Problem Relation Age of Onset   • Other Mother    • Aneurysm Mother         aortic   • Dementia Mother    • Heart failure Father    • Diabetes Father         Dad   • Lung cancer Sister    • Cancer Sister    • Dementia Maternal Grandmother    • Dementia Maternal Aunt        Physical Exam  Vitals and nursing note reviewed  Constitutional:       General: She is not in acute distress  Appearance: She is obese  She is not diaphoretic  HENT:      Head: Normocephalic and atraumatic  Eyes:      Conjunctiva/sclera: Conjunctivae normal    Cardiovascular:      Rate and Rhythm: Normal rate and regular rhythm  Pulses: Intact distal pulses  Heart sounds: Heart sounds are distant  Pulmonary:      Effort: Pulmonary effort is normal       Breath sounds: Decreased breath sounds present     Abdominal:      General: Bowel sounds are normal  Palpations: Abdomen is soft  Musculoskeletal:         General: Normal range of motion  Cervical back: Normal range of motion and neck supple  Right lower leg: Edema present  Left lower leg: Edema present  Comments: LLE edema> RLE edema   Skin:     General: Skin is warm and dry  Neurological:      Mental Status: She is alert and oriented to person, place, and time  Vitals: There were no vitals taken for this visit  Wt Readings from Last 3 Encounters:   24 (!) 170 kg (375 lb)   22 (!) 167 kg (368 lb 9 6 oz)   22 (!) 182 kg (400 lb 11 2 oz)         Labs & Results:  Lab Results   Component Value Date    WBC 5 60 2022    HGB 12 0 2022    HCT 40 4 2022    MCV 96 2022     2022     BNP   Date Value Ref Range Status   2022 49 0 - 100 pg/mL Final     No components found for: CHEM    Results for orders placed during the hospital encounter of 02/10/21    Echo complete with contrast if indicated    Narrative  [address]  [address] [place] [address]  [phone]    Transthoracic Echocardiogram  2D, M-mode, Doppler, and Color Doppler    Study date:  10-Feb-2021    Patient: Cristino Pichardo  MR number: [id number]  Account number: [de-identified]  : 1960  Age: 63 years  Gender: Female  Status: Outpatient  Location: [address] [place] [address] [facility]  Height: 65 in  Weight: 386 1 lb  BP: 110/ 74 mmHg    Indications: Pedal edema;SOB on exertion;CHF  Diagnoses: R06 02 - Shortness of breath, R60 9 - Edema, unspecified    Sonographer:  DARYL Brunson  Primary Physician:  Sixto Runner, DO  Referring Physician:  Sixto Runner, DO  Group:  [facility]  Cardiology Fellow: Nathalie Montana MD  Interpreting Physician:  Sheridan Vasquez MD    SUMMARY    LEFT VENTRICLE:  Systolic function was normal  Ejection fraction was estimated to be 65 %    There were no regional wall motion abnormalities  Doppler parameters were consistent with abnormal left ventricular relaxation (grade 1 diastolic dysfunction)  TRICUSPID VALVE:  There was trace regurgitation  IVC, HEPATIC VEINS:  The inferior vena cava was dilated  Respirophasic changes were blunted (less than 50% variation)  HISTORY: PRIOR HISTORY: BRADY;Smoker; Morbid obesity;HLD;HTN;COPD  PROCEDURE: The study was performed in the [address] [facility] [place]  This was a routine study  The transthoracic approach was used  The study included complete 2D imaging, M-mode, complete spectral Doppler, and color Doppler  The  heart rate was 80 bpm, at the start of the study  Images were obtained from the parasternal, apical, subcostal, and suprasternal notch acoustic windows  Echocardiographic views were limited due to poor acoustic window availability,  decreased penetration, and lung interference  This was a technically difficult study  LEFT VENTRICLE: Size was normal  Systolic function was normal  Ejection fraction was estimated to be 65 %  There were no regional wall motion abnormalities  Wall thickness was normal  DOPPLER: Doppler parameters were consistent with  abnormal left ventricular relaxation (grade 1 diastolic dysfunction)  RIGHT VENTRICLE: The size was normal  Systolic function was normal  Wall thickness was normal     LEFT ATRIUM: Size was normal     RIGHT ATRIUM: Size was normal     MITRAL VALVE: Valve structure was normal  There was normal leaflet separation  DOPPLER: The transmitral velocity was within the normal range  There was no evidence for stenosis  There was no regurgitation  AORTIC VALVE: The valve was trileaflet  Leaflets exhibited normal thickness and normal cuspal separation  DOPPLER: Transaortic velocity was within the normal range  There was no evidence for stenosis  There was no regurgitation  TRICUSPID VALVE: The valve structure was normal  There was normal leaflet separation   DOPPLER: The transtricuspid velocity was within the normal range  There was no evidence for stenosis  There was trace regurgitation  PULMONIC VALVE: Leaflets exhibited normal thickness, no calcification, and normal cuspal separation  DOPPLER: The transpulmonic velocity was within the normal range  There was no regurgitation  PERICARDIUM: There was no pericardial effusion  The pericardium was normal in appearance  AORTA: The root exhibited normal size  SYSTEMIC VEINS: IVC: The inferior vena cava was dilated  Respirophasic changes were blunted (less than 50% variation)  SYSTEM MEASUREMENT TABLES    2D  %FS: 27 94 %  Ao Diam: 3 29 cm  EDV(Teich): 171 86 ml  EF(Teich): 53 32 %  ESV(Teich): 80 23 ml  IVSd: 1 21 cm  LA Area: 23 96 cm2  LA Diam: 4 68 cm  LVEDV MOD A4C: 109 9 ml  LVEF MOD A4C: 64 35 %  LVESV MOD A4C: 39 18 ml  LVIDd: 5 88 cm  LVIDs: 4 24 cm  LVLd A4C: 8 59 cm  LVLs A4C: 6 49 cm  LVPWd: 1 21 cm  RA Area: 17 2 cm2  RVIDd: 4 01 cm  SV MOD A4C: 70 72 ml  SV(Teich): 91 63 ml    CW  AV Vmax: 1 57 m/s  AV maxP 92 mmHg    MM  TAPSE: 2 7 cm    PW  E' Sept: 0 04 m/s  E/E' Sept: 22 85  MV A Vito: 1 05 m/s  MV Dec [place]: 3 42 m/s2  MV DecT: 258 2 ms  MV E Vito: 0 88 m/s  MV E/A Ratio: 0 84  MV PHT: 74 88 ms  MVA By PHT: 2 94 cm2    [employer] Accredited Echocardiography [facility]    Prepared and electronically signed by    Rosangela Jones MD  Signed 10-Feb-2021 13:57:17    No results found for this or any previous visit  This note was completed in part utilizing [employer] direct voice recognition software  Grammatical errors, random word insertion, spelling mistakes, and incomplete sentences may be an occasional consequence of the system secondary to software limitations, ambient noise and hardware issues  At the time of dictation, efforts were made to edit, clarify and /or correct errors  Please read the chart carefully and recognize, using context, where substitutions have occurred    If you have any questions or concerns about the context, text or information contained within the body of this dictation, please contact myself, the provider, for further clarification

## 2023-01-03 ENCOUNTER — OFFICE VISIT (OUTPATIENT)
Dept: CARDIOLOGY CLINIC | Facility: CLINIC | Age: 63
End: 2023-01-03

## 2023-01-03 ENCOUNTER — TELEPHONE (OUTPATIENT)
Dept: INTERNAL MEDICINE CLINIC | Age: 63
End: 2023-01-03

## 2023-01-03 VITALS
OXYGEN SATURATION: 91 % | WEIGHT: 293 LBS | SYSTOLIC BLOOD PRESSURE: 120 MMHG | BODY MASS INDEX: 48.82 KG/M2 | DIASTOLIC BLOOD PRESSURE: 74 MMHG | HEART RATE: 84 BPM | HEIGHT: 65 IN

## 2023-01-03 DIAGNOSIS — I10 ESSENTIAL HYPERTENSION: ICD-10-CM

## 2023-01-03 DIAGNOSIS — E78.2 MIXED HYPERLIPIDEMIA: ICD-10-CM

## 2023-01-03 DIAGNOSIS — R73.03 PRE-DIABETES: ICD-10-CM

## 2023-01-03 DIAGNOSIS — Z09 HOSPITAL DISCHARGE FOLLOW-UP: Primary | ICD-10-CM

## 2023-01-03 DIAGNOSIS — E66.01 MORBID OBESITY WITH BODY MASS INDEX (BMI) OF 60.0 TO 69.9 IN ADULT (HCC): ICD-10-CM

## 2023-01-03 DIAGNOSIS — I50.32 CHRONIC DIASTOLIC CONGESTIVE HEART FAILURE (HCC): ICD-10-CM

## 2023-01-03 DIAGNOSIS — Z72.0 TOBACCO ABUSE: ICD-10-CM

## 2023-01-03 DIAGNOSIS — G47.33 OBSTRUCTIVE SLEEP APNEA SYNDROME: ICD-10-CM

## 2023-01-03 NOTE — TELEPHONE ENCOUNTER
Called the patient and lmom stating that we would need her new insurance information from her  Kristine Elaine from [facility] asking for a new script for her PT to be faxed over to them  Patient recently has a new Bobby Jaime      Please call them back at [phone]    Fax# [phone]

## 2023-01-03 NOTE — LETTER
January 3, 2023     Markus lomax, [place]  [address] [facility]    Patient: Jaime Beck   YOB: 1960   Date of Visit: 1/3/2023       Dear Dr Joey Balderas: Thank you for referring Jaime Beck to me for evaluation  Below are my notes for this consultation  If you have questions, please do not hesitate to call me  I look forward to following your patient along with you  Sincerely,        RONALD Mar        CC: No Recipients  RONALD Mar  1/3/2023  4:13 PM  Sign when Signing Visit  Cardiology  Heart Failure   Follow Up Office Visit Note     Jaime Beck   63 y o    female   MRN: [id number]  [address]  MARIA FERNANDA [address] [facility] [place] [address]  [phone]  [phone]    PCP: Markus lomax DO  Cardiologist : will be Dr Arabella Soto            Summary of Recommendations  Low-sodium diet, Heart failure education as below  She admits to some sodium dietary indiscretion around [place], however her daughter is helping her with adherence  Stop lisinopril  Unclear if this is causing a cough  For the next 3 days, I advised her to take an extra dose of Lasix and increase potassium in her diet  Sleep study-sched for Jan F/U bariatric program  Follow up will be scheduled with Dr Arabella Soto- pt request  F/U with me 1-2 weeks  Last Cologuard: 03/09/2020, up-to-date        Impression/plan  Chronic hypoxic respiratory failure, multifactorial, recent hosp adm12/2-12/9/22: acute HFpEF, COPD exacerbation  Discharged on a prednisone taper  Chronic HFpEF, recent adm for decompensation, felt to be multifactorial  -Discharge weight 368 pounds  She was 395 on admission  Since discharge she was placed back on steroids  She has gained some caloric weight   ? Volume as well  Wt Readings from Last 3 Encounters:   01/03/23 (!) 173 kg (382 lb)   12/28/22 (!) 170 kg (375 lb)   12/09/22 (!) 167 kg (368 lb 9 6 oz) --beta-blocker:  none  --Diuretic:   Lasix 40 mg twice daily prior to admission Lasix 20 twice daily but was noncompliant due to incontinence  Has been referred to urology  For the next 3 days take an extra dose of Lasix  Increase potassium in her diet  --ACE/ARB/ARNI:  Lisinopril 10 mg daily  We will stop; unclear if this is causing a cough  --MRA:   --2 g sodium diet, 1800 cc fluid restriction  Daily weights, call weight gain 2-3 lb in 1 day or 5 lb in 5 days  Obstructive sleep apnea, probable  Sleep study recommended  Likely OHS  COPD  Recent exacerbation, discharged on a prednisone taper, and oxygen-which is new for her  Hypertension, essential  BP  120/74 On Lasix and lisinopril  Monitor with changes  Pre Diabetes  8/31/22: Hemoglobin A1c 6 1  Lipid screening  ASCVD risk: 5 3 %- borderline  Not discussed today   Latest Reference Range & Units 07/30/15 08:35 08/18/20 08:45 08/31/22 09:05   Cholesterol See Comment mg/dL 206 195 187   Triglycerides See Comment mg/dL 126 108 115   HDL >=50 mg/dL 45 42 (L) 41 (L)   Non-HDL Cholesterol mg/dl  153 (H) 146   LDL Calculated 0 - 100 mg/dL 136 (H) 131 (H) 123 (H)   Chol HDLC Ratio <5 0 (calc)  4 6      Morbid obesity, BMI 62  Has been referred to bariatric surgery  HxTobacco abuse  Cardiac testing  · TTE 12/10/21  EF 65%  No R WMA  Grade 1 DD  RV normal size and function  Atria normal size  • TTE 12/3/2022  EF 70%  Systolic function is vigorous  Grade 1 DD  Mild SHWETA  Mild TR            HPI:   Deng Johnson is a 68-year-old female with morbid obesity BMI 62, and essential hypertension  She does not follow with cardiology  Chronic medications include Lasix 20 mg twice daily, lisinopril 10 mg daily    ADM 12/2 - 12/9/2022  CC:HERNANDEZ + orthopnea + LE edema + weight gain   Adm wt 395  Referred by her PCP given hypoxia  EKG rhythm 84 bpm   At bedtime troponin x3  CBC, CMP stable    RSV flu COVID: Negative  CXR moderate pulmonary vascular congestion  TTE EF 65% no R WMA grade 1 DD normal RV function no significant valve disease  Treated for COPD exacerbation and acute on chronic heart failure preserved ejection fraction  Patient reported she stopped her diuretics back in October that was causing urinary incontinence  She was evaluated for gastric bypass surgery  Sleep study scheduled in January  Diuresed with IV Lasix 80  twice daily plus metolazone  At discharge still required oxygen  She declined staying in the hospital for additional treatment  She required treatment with high doses of IV steroids initially, nebs, antitussives  Followed by cardiology  Discharge weight : 368 pounds  Discharge creatinine: 0 9  Discharge diuretics: Lasix 40 mg twice daily  Previously nonadherent to her diuretic regimen given incontinent      1/3/23  Hospital follow-up, multifactorial hypoxic respiratory failure  ROS: Persistent cough  Placed back on steroids and an antibiotic by her PCP for bronchitis  Last labs 12/16/2022  Home weight:  374lb  Since on steroids, up to 382  Did have sauerkraut this weekend, plus bread  Back on steroids and also on Keflex for bronchitis  was prescribed lisinopril for 2 years, but wasn't taking it  Since her hospital visit, has been taking it  Last labs 12/16: Cr 0 89 BUN 26 K 4 3  /74  stop lisinopril  Bradykinin related cough? Some wt is caloric  Also is on steroids- could be caloric, could be some volume  Very difficult to tell  Will take an extra dose of Lasix 3 days in a row  She will see me in 1-2 weeks  If volume is still suspected, may switch to torsemide 20 BID        I have spent 40 minutes with Patient and family today in which greater than 50% of this time was spent in counseling/coordination of care regarding Intructions for management, Patient and family education, Importance of tx compliance and Risk factor reductions    Assessment  Diagnoses and all orders for this visit:    Hospital discharge follow-up    Chronic diastolic congestive heart failure ([address] )    Essential hypertension    Mixed hyperlipidemia    Obstructive sleep apnea syndrome    Pre-diabetes    Morbid obesity with body mass index (BMI) of 60 0 to 69 9 in adult [facility])    Tobacco abuse        Past Medical History:   Diagnosis Date   • Arthritis    • CHF (congestive heart failure) ([facility])    • COPD (chronic obstructive pulmonary disease) ([facility])    • Depression    • Eating disorder    • High blood pressure    • Hypertension    • Obesity All my life   • Urinary incontinence    • Visual impairment 2018       Review of Systems   Constitutional: Negative for chills  Cardiovascular: Positive for dyspnea on exertion and leg swelling  Negative for chest pain, claudication, cyanosis, irregular heartbeat, near-syncope, orthopnea, palpitations, paroxysmal nocturnal dyspnea and syncope  Respiratory: Positive for cough and shortness of breath  Gastrointestinal: Negative for heartburn and nausea  Neurological: Negative for dizziness, focal weakness, headaches, light-headedness and weakness  All other systems reviewed and are negative  No Known Allergies        Current Outpatient Medications:   •  acetaminophen (TYLENOL) 500 mg tablet, Take 500 mg by mouth every 6 (six) hours as needed for mild pain, Disp: , Rfl:   •  albuterol (PROVENTIL HFA,VENTOLIN HFA) 90 mcg/act inhaler, Inhale 2 puffs every 6 (six) hours as needed for wheezing or shortness of breath, Disp: 18 g, Rfl: 1  •  cephalexin (KEFLEX) 500 mg capsule, Take 1 capsule (500 mg total) by mouth every 12 (twelve) hours for 7 days, Disp: 14 capsule, Rfl: 0  •  furosemide (LASIX) 40 mg tablet, Take 1 tablet (40 mg total) by mouth 2 (two) times a day, Disp: 60 tablet, Rfl: 0  •  lisinopril (ZESTRIL) 10 mg tablet, Take 1 tablet (10 mg total) by mouth daily, Disp: 90 tablet, Rfl: 0  •  predniSONE 10 mg tablet, 4 tablets daily for 3 days then 3 tablets daily x 3 days then 2 tablets daily x 3 days then 1 tablet daily for 3 days  , Disp: 30 tablet, Rfl: 0    Social History     Socioeconomic History   • Marital status: /Civil Union     Spouse name: Not on file   • Number of children: Not on file   • Years of education: Not on file   • Highest education level: Not on file   Occupational History   • Not on file   Tobacco Use   • Smoking status: Former     Packs/day: 0 00     Years: 35 00     Pack years: 0 00     Types: Cigarettes     Start date: 1976     Quit date: 5/15/2021     Years since quittin 6   • Smokeless tobacco: Never   Vaping Use   • Vaping Use: Former   • Start date: 2018   • Substances: Nicotine   Substance and Sexual Activity   • Alcohol use: Not Currently   • Drug use: Not Currently     Types: Marijuana     Comment: monthly at most   • Sexual activity: Not Currently   Other Topics Concern   • Not on file   Social History Narrative   • Not on file     Social Determinants of Health     Financial Resource Strain: Medium Risk   • Difficulty of Paying Living Expenses: Somewhat hard   Food Insecurity: No Food Insecurity   • Worried About Running Out of Food in the Last Year: Never true   • Ran Out of Food in the Last Year: Never true   Transportation Needs: Unmet Transportation Needs   • Lack of Transportation (Medical): Yes   • Lack of Transportation (Non-Medical):  No   Physical Activity: Not on file   Stress: Not on file   Social Connections: Not on file   Intimate Partner Violence: Not on file   Housing Stability: Unknown   • Unable to Pay for Housing in the Last Year: No   • Number of Places Lived in the Last Year: Not on file   • Unstable Housing in the Last Year: No       Family History   Problem Relation Age of Onset   • Other Mother    • Aneurysm Mother         aortic   • Dementia Mother    • Heart failure Father    • Diabetes Father         Dad   • Lung cancer Sister    • Cancer Sister    • Dementia Maternal Grandmother    • Dementia Maternal Aunt        Physical Exam  Vitals and nursing note reviewed  Constitutional:       General: She is not in acute distress  Appearance: She is obese  She is not diaphoretic  HENT:      Head: Normocephalic and atraumatic  Eyes:      Conjunctiva/sclera: Conjunctivae normal    Cardiovascular:      Rate and Rhythm: Normal rate and regular rhythm  Pulses: Intact distal pulses  Heart sounds: Heart sounds are distant  Pulmonary:      Effort: Pulmonary effort is normal       Breath sounds: Decreased breath sounds present  Abdominal:      General: Bowel sounds are normal       Palpations: Abdomen is soft  Musculoskeletal:         General: Normal range of motion  Cervical back: Normal range of motion and neck supple  Right lower leg: Edema present  Left lower leg: Edema present  Comments: LLE edema> RLE edema   Skin:     General: Skin is warm and dry  Neurological:      Mental Status: She is alert and oriented to person, place, and time  Vitals: There were no vitals taken for this visit     Wt Readings from Last 3 Encounters:   22 (!) 170 kg (375 lb)   22 (!) 167 kg (368 lb 9 6 oz)   22 (!) 182 kg (400 lb 11 2 oz)         Labs & Results:  Lab Results   Component Value Date    WBC 5 60 2022    HGB 12 0 2022    HCT 40 4 2022    MCV 96 2022     2022     BNP   Date Value Ref Range Status   2022 49 0 - 100 pg/mL Final     No components found for: CHEM    Results for orders placed during the hospital encounter of 02/10/21    Echo complete with contrast if indicated    Narrative  [address]  [phone]    Transthoracic Echocardiogram  2D, M-mode, Doppler, and Color Doppler    Study date:  10-Feb-2021    Patient: Rita Mcneil  MR number: [id number]  Account number: [de-identified]  : 1960  Age: 63 years  Gender: Female  Status: Outpatient  Location: [address] [place] [address] [facility]  Height: 65 in  Weight: 386 1 lb  BP: 110/ 74 mmHg    Indications: Pedal edema;SOB on exertion;CHF  Diagnoses: R06 02 - Shortness of breath, R60 9 - Edema, unspecified    Sonographer:  DARYL Hunt  Primary Physician:  Sherice Anderson DO  Referring Physician:  Sherice Anderson DO  Group:  [id number]  Cardiology Fellow: Sonido Hilliard MD  Interpreting Physician:  Fang Hinson MD    SUMMARY    LEFT VENTRICLE:  Systolic function was normal  Ejection fraction was estimated to be 65 %  There were no regional wall motion abnormalities  Doppler parameters were consistent with abnormal left ventricular relaxation (grade 1 diastolic dysfunction)  TRICUSPID VALVE:  There was trace regurgitation  IVC, HEPATIC VEINS:  The inferior vena cava was dilated  Respirophasic changes were blunted (less than 50% variation)  HISTORY: PRIOR HISTORY: BRADY;Smoker; Morbid obesity;HLD;HTN;COPD  PROCEDURE: The study was performed in the [address] [facility]  This was a routine study  The transthoracic approach was used  The study included complete 2D imaging, M-mode, complete spectral Doppler, and color Doppler  The  heart rate was 80 bpm, at the start of the study  Images were obtained from the parasternal, apical, subcostal, and suprasternal notch acoustic windows  Echocardiographic views were limited due to poor acoustic window availability,  decreased penetration, and lung interference  This was a technically difficult study  LEFT VENTRICLE: Size was normal  Systolic function was normal  Ejection fraction was estimated to be 65 %  There were no regional wall motion abnormalities  Wall thickness was normal  DOPPLER: Doppler parameters were consistent with  abnormal left ventricular relaxation (grade 1 diastolic dysfunction)      RIGHT VENTRICLE: The size was normal  Systolic function was normal  Wall thickness was normal     LEFT ATRIUM: Size was normal     RIGHT ATRIUM: Size was normal     MITRAL VALVE: Valve structure was normal  There was normal leaflet separation  DOPPLER: The transmitral velocity was within the normal range  There was no evidence for stenosis  There was no regurgitation  AORTIC VALVE: The valve was trileaflet  Leaflets exhibited normal thickness and normal cuspal separation  DOPPLER: Transaortic velocity was within the normal range  There was no evidence for stenosis  There was no regurgitation  TRICUSPID VALVE: The valve structure was normal  There was normal leaflet separation  DOPPLER: The transtricuspid velocity was within the normal range  There was no evidence for stenosis  There was trace regurgitation  PULMONIC VALVE: Leaflets exhibited normal thickness, no calcification, and normal cuspal separation  DOPPLER: The transpulmonic velocity was within the normal range  There was no regurgitation  PERICARDIUM: There was no pericardial effusion  The pericardium was normal in appearance  AORTA: The root exhibited normal size  SYSTEMIC VEINS: IVC: The inferior vena cava was dilated  Respirophasic changes were blunted (less than 50% variation)      SYSTEM MEASUREMENT TABLES    2D  %FS: 27 94 %  Ao Diam: 3 29 cm  EDV(Teich): 171 86 ml  EF(Teich): 53 32 %  ESV(Teich): 80 23 ml  IVSd: 1 21 cm  LA Area: 23 96 cm2  LA Diam: 4 68 cm  LVEDV MOD A4C: 109 9 ml  LVEF MOD A4C: 64 35 %  LVESV MOD A4C: 39 18 ml  LVIDd: 5 88 cm  LVIDs: 4 24 cm  LVLd A4C: 8 59 cm  LVLs A4C: 6 49 cm  LVPWd: 1 21 cm  RA Area: 17 2 cm2  RVIDd: 4 01 cm  SV MOD A4C: 70 72 ml  SV(Teich): 91 63 ml    CW  AV Vmax: 1 57 m/s  AV maxP 92 mmHg    MM  TAPSE: 2 7 cm    PW  E' Sept: 0 04 m/s  E/E' Sept: 22 85  MV A Vito: 1 05 m/s  MV Dec Woods: 3 42 m/s2  MV DecT: 258 2 ms  MV E Vito: 0 88 m/s  MV E/A Ratio: 0 84  MV PHT: 74 88 ms  MVA By PHT: 2 94 cm2    [employer] Accredited Echocardiography Laboratory    Prepared and electronically signed by    Britney Ramachandran MD  Signed 10-Feb-2021 13:57:17    No results found for this or any previous visit  This note was completed in part utilizing [employer] direct voice recognition software  Grammatical errors, random word insertion, spelling mistakes, and incomplete sentences may be an occasional consequence of the system secondary to software limitations, ambient noise and hardware issues  At the time of dictation, efforts were made to edit, clarify and /or correct errors  Please read the chart carefully and recognize, using context, where substitutions have occurred    If you have any questions or concerns about the context, text or information contained within the body of this dictation, please contact myself, the provider, for further clarification

## 2023-01-03 NOTE — PATIENT INSTRUCTIONS

## 2023-01-06 ENCOUNTER — RA CDI HCC (OUTPATIENT)
Dept: OTHER | Facility: HOSPITAL | Age: 63
End: 2023-01-06

## 2023-01-06 DIAGNOSIS — I50.9 ACUTE ON CHRONIC CONGESTIVE HEART FAILURE, UNSPECIFIED HEART FAILURE TYPE (HCC): ICD-10-CM

## 2023-01-06 DIAGNOSIS — I50.9 CHF EXACERBATION (HCC): ICD-10-CM

## 2023-01-06 NOTE — PROGRESS NOTES
[facility]  coding opportunities          Chart Reviewed number of suggestions sent to Provider: 2  [phone]  [phone]     Patients Insurance        Commercial Insurance: Harsh Fontenot

## 2023-01-09 RX ORDER — FUROSEMIDE 40 MG/1
40 TABLET ORAL 2 TIMES DAILY
Qty: 60 TABLET | Refills: 0 | Status: SHIPPED | OUTPATIENT
Start: 2023-01-09 | End: 2023-02-08

## 2023-01-10 ENCOUNTER — PATIENT OUTREACH (OUTPATIENT)
Dept: INTERNAL MEDICINE CLINIC | Age: 63
End: 2023-01-10

## 2023-01-10 NOTE — PROGRESS NOTES
Message left for pt requesting return call back for outreach for f/u  Pt in [facility]

## 2023-01-16 PROBLEM — E83.52 HYPERCALCEMIA: Status: ACTIVE | Noted: 2023-01-16

## 2023-01-19 ENCOUNTER — TELEPHONE (OUTPATIENT)
Dept: INTERNAL MEDICINE CLINIC | Age: 63
End: 2023-01-19

## 2023-01-19 DIAGNOSIS — G47.33 OBSTRUCTIVE SLEEP APNEA SYNDROME: Primary | ICD-10-CM

## 2023-01-19 DIAGNOSIS — J96.21 ACUTE ON CHRONIC RESPIRATORY FAILURE WITH HYPOXIA (HCC): ICD-10-CM

## 2023-01-19 NOTE — TELEPHONE ENCOUNTER
Rhonda Alonso from Sleep medicine is calling for a referral order to be put into the chart for this patient due to her 2 ndary insurance requiring it  She states that if it is an ambulatory referral Sleep Medicine and it is require to be a physician to physician not for a study      Dx code: E77 80    DOS is for 01/27/2023

## 2023-01-23 NOTE — PROGRESS NOTES
Cardiology Follow Up    Luan Adair  1960  [id number]  [address] [facility] [address]  [phone]    1  Essential hypertension        2  Mixed hyperlipidemia        3  Obstructive sleep apnea syndrome        4  Chronic diastolic congestive heart failure ([place] [address] )        5  Morbid obesity with body mass index (BMI) of 60 0 to 69 9 in adult ([place] [address] )        6  Tobacco abuse        7  Morbid obesity ([place] [address] )  Ambulatory referral to Cardiology          Interval History: Patient is here for follow-up visit and preoperative clearance for bariatric surgery  Patient has HTN, COPD, probable BRADY and morbid obesity  She was admitted to the hospital December 2022 with decompensation  She was treated with diuretic  Echocardiogram done December 2022 demonstrated LVEF of 70% with vigorous LV wall motion noted  There was mild SHWETA  There was no significant valve disease  Patient had seen our CRNP January 2023  EKG done January 3, 2023 demonstrated NSR and was a normal tracing  Lisinopril was discontinued in the setting of cough  She has had no chest pain or significant dyspnea  Her vital signs are stable today       Patient Active Problem List   Diagnosis   • Essential hypertension   • Mixed stress and urge urinary incontinence   • COPD (chronic obstructive pulmonary disease) ([place] [address] )   • Mixed hyperlipidemia   • Morbid obesity ([place] [address] )   • Morbid obesity with body mass index (BMI) of 60 0 to 69 9 in adult [facility])   • Cigarette nicotine dependence without complication   • Obstructive sleep apnea syndrome   • Insomnia due to medical condition   • Chronic diastolic congestive heart failure ([place] [address] )   • Lymphedema of both lower extremities   • Pre-diabetes   • Hypoxia   • Respiratory failure with hypoxia (HCC)   • Acute exacerbation of CHF (congestive heart failure) (HCC)   • Hypercalcemia     Past Medical History:   Diagnosis Date   • Arthritis    • CHF (congestive heart failure) ([facility])    • COPD (chronic obstructive pulmonary disease) ([facility])    • Depression    • Eating disorder    • High blood pressure    • Hypertension    • Obesity All my life   • Urinary incontinence    • Visual impairment      Social History     Socioeconomic History   • Marital status:      Spouse name: Not on file   • Number of children: Not on file   • Years of education: Not on file   • Highest education level: Not on file   Occupational History   • Not on file   Tobacco Use   • Smoking status: Former     Packs/day: 0 00     Years: 35 00     Pack years: 0 00     Types: Cigarettes     Start date: 1976     Quit date: 5/15/2021     Years since quittin 7   • Smokeless tobacco: Never   Vaping Use   • Vaping Use: Former   • Start date: 2018   • Substances: Nicotine   Substance and Sexual Activity   • Alcohol use: Yes     Comment: occassional   • Drug use: Not Currently     Types: Marijuana     Comment: monthly at most   • Sexual activity: Not Currently   Other Topics Concern   • Not on file   Social History Narrative   • Not on file     Social Determinants of Health     Financial Resource Strain: Medium Risk   • Difficulty of Paying Living Expenses: Somewhat hard   Food Insecurity: No Food Insecurity   • Worried About Running Out of Food in the Last Year: Never true   • Ran Out of Food in the Last Year: Never true   Transportation Needs: Unmet Transportation Needs   • Lack of Transportation (Medical): Yes   • Lack of Transportation (Non-Medical):  No   Physical Activity: Not on file   Stress: Not on file   Social Connections: Not on file   Intimate Partner Violence: Not on file   Housing Stability: Unknown   • Unable to Pay for Housing in the Last Year: No   • Number of Places Lived in the Last Year: Not on file   • Unstable Housing in the Last Year: No      Family History   Problem Relation Age of Onset   • Other Mother    • Aneurysm Mother aortic   • Dementia Mother    • Heart failure Father    • Diabetes Father         Dad   • Lung cancer Sister    • Cancer Sister    • Dementia Maternal Grandmother    • Dementia Maternal Aunt      Past Surgical History:   Procedure Laterality Date   • HYSTERECTOMY     • INCONTINENCE SURGERY     • JOINT REPLACEMENT  2015    Right knee   • OOPHORECTOMY Bilateral    • REPLACEMENT TOTAL KNEE Right 2016   • TOTAL ABDOMINAL HYSTERECTOMY W/ BILATERAL SALPINGOOPHORECTOMY  2006       Current Outpatient Medications:   •  acetaminophen (TYLENOL) 500 mg tablet, Take 500 mg by mouth if needed for mild pain, Disp: , Rfl:   •  albuterol (PROVENTIL HFA,VENTOLIN HFA) 90 mcg/act inhaler, Inhale 2 puffs every 6 (six) hours as needed for wheezing or shortness of breath (Patient taking differently: Inhale 2 puffs if needed for wheezing or shortness of breath), Disp: 18 g, Rfl: 1  •  furosemide (LASIX) 40 mg tablet, Take 1 tablet (40 mg total) by mouth 2 (two) times a day, Disp: 60 tablet, Rfl: 0  Allergies   Allergen Reactions   • Lisinopril Cough       Labs:not applicable  Imaging: XR ribs bilateral 4+ vw w pa chest    Result Date: 12/29/2022  Narrative: BILATERAL RIBS AND CHEST - DUAL ENERGY INDICATION: COMPARISON: None EXAM PERFORMED/VIEWS:  XR RIBS BILATERAL 4+ VW W PA CHEST FINDINGS: The cardiomediastinal silhouette is unremarkable  The lungs are clear  No pleural effusions  There is no pneumothorax  No rib fractures are identified  Bilateral lower ribs evaluation restricted by technique, position and body habitus  Impression: No active pulmonary disease  No evidence of rib fractures as visualized  Workstation performed: [id number]       Review of Systems:  Review of Systems   All other systems reviewed and are negative        Physical Exam:  /76 (BP Location: Left arm, Patient Position: Sitting, Cuff Size: Large)   Pulse 75   Ht 5' 5" (1 651 m)   Wt (!) 173 kg (382 lb 4 8 oz)   SpO2 94%   BMI 63 62 kg/m² Physical Exam  Vitals reviewed  Constitutional:       Appearance: She is well-developed  HENT:      Head: Normocephalic and atraumatic  Eyes:      Conjunctiva/sclera: Conjunctivae normal       Pupils: Pupils are equal, round, and reactive to light  Cardiovascular:      Rate and Rhythm: Normal rate  Heart sounds: Normal heart sounds  Pulmonary:      Effort: Pulmonary effort is normal       Breath sounds: Normal breath sounds  Musculoskeletal:      Cervical back: Normal range of motion and neck supple  Skin:     General: Skin is warm and dry  Neurological:      Mental Status: She is alert and oriented to person, place, and time  Discussion/Summary: Patient is stable from a cardiac point of view  She is compensated on her current medical regimen  She is interested in losing weight and she is cleared from my point of view for bariatric surgery without further testing  I have asked her to call if there is a problem in the interim otherwise I will see her in follow-up in 6 months time

## 2023-01-27 ENCOUNTER — OFFICE VISIT (OUTPATIENT)
Dept: SLEEP CENTER | Facility: CLINIC | Age: 63
End: 2023-01-27

## 2023-01-27 VITALS
BODY MASS INDEX: 48.82 KG/M2 | WEIGHT: 293 LBS | DIASTOLIC BLOOD PRESSURE: 74 MMHG | HEART RATE: 78 BPM | SYSTOLIC BLOOD PRESSURE: 116 MMHG | HEIGHT: 65 IN

## 2023-01-27 DIAGNOSIS — E66.01 MORBID OBESITY (HCC): ICD-10-CM

## 2023-01-27 DIAGNOSIS — G47.33 OBSTRUCTIVE SLEEP APNEA SYNDROME: Primary | ICD-10-CM

## 2023-01-27 DIAGNOSIS — J44.1 CHRONIC OBSTRUCTIVE PULMONARY DISEASE WITH ACUTE EXACERBATION (HCC): ICD-10-CM

## 2023-01-27 DIAGNOSIS — J96.21 ACUTE ON CHRONIC RESPIRATORY FAILURE WITH HYPOXIA (HCC): ICD-10-CM

## 2023-01-27 NOTE — PROGRESS NOTES
Consultation - [facility], 1960, MRN: [id number]    1/27/2023        Reason for Consult / Principal Problem:  Evaluation of possible Obstructive Sleep Apnea  Hx of recent acute on chronic respiratory failure  Newly diagnosed with CHF         Thank you for the opportunity of participating in the evaluation and care of this patient in the Sleep Clinic at [facility]  Subjective:     HPI: Germain Larsen is a 66y o  year old female  She presents for a consultation regarding concern of possible obstructive sleep apnea  She is currently working with the bariatric specialists  She would like to have bariatric surgery done in the future  She reports that she has been slowly gaining weight over the years and gained 100lbs over the past few years  She tries to lose weight but struggles  She has a difficult time doing any exercise, due to shortness of breath  She was recently hospitalized in December with acute exacerbation of CHF with acute on chronic respiratory failure with hypoxia  She used oxygen while in the hospital and was sent home with O2  She reports that she is not using it  She states that pulse oximetry readings at home have been around 90% with some increase to 93% and down to 85%  She is not using oxygen at night  She did not use CPAP or BiPAP when hospitalized  She sleeps alone in an adjustable bed at what she estimates to be about 30 degrees  She awakens frequent for urination, but does not urinate much each time  She never feels rested or refreshed by sleep  She dozes easily when sedentary  Comorbid conditions: Morbid obesity  HTN  Chronic diastolic CHF with recent exacerbation  COPD    Sleep Study Results:  No prior sleep study      Employment:  She is currently disabled    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    Sleep Schedule:       Bedtime: 11:00-11:30pm      Latency:  10 minutes      Wakeup time:  During the week at 6:30am and between 7:00-9:00am on weekends    Awakenings:       Frequency: At least hourly      Causes: For urination      Duration:  Returns to sleep without difficulty    Daytime Sleepiness / Inappropriate Sleep:       Most severe:  3:00-4:00pm       Naps : On weekends      Time:  afternoons      Duration:  30-60 minutes, naps can be refreshing       Inappropriate drowsiness / sleep:  She dozes relatively easily with sedentary activities    Snoring:  She snores loudly  Apnea:  No reported witnessed apnea    Change in Weight:  She gained 100 lbs over the past 3 years  She is trying to lose but unsuccessfully  She is monitoring her weight daily and uses extra dose of furosemide, at parameters given if weight increases  Restless Leg Syndrome:  She occasionally notices some clinical symptoms consistent with this diagnosis when she gets into bed  She rotates her ankles when trying to go to sleep, which relaxes her  Other Complaints:   No reports of sleep walking, sleep talking, sleep paralysis or hallucinations surrounding sleep  She has very vivid dreams of her   during the night and in the morning, feeling like he is present  She does not act out her dreams  She occasionally awakens with a headache, but attributes this to a pillow causing the headache and headaches resolve once she sits up for awhile  No reports of bruxism  Social History:      Caffeine:  One cup of coffee, green tea - up to 24 ounces       Tobacco:   reports that she quit smoking about 20 months ago  Her smoking use included cigarettes  She started smoking about 47 years ago   She has never used smokeless tobacco     She quit smoking 4 months ago     E-cig/Vaping:    E-Cigarette/Vaping   • E-Cigarette Use Former User    • Start Date 20       E-Cigarette/Vaping Substances   • Nicotine Yes    • THC No    • CBD No    • Flavoring No • Other No    • Unknown No          Alcohol:   reports current alcohol use  Social - very rarely      Drugs:   reports that she does not currently use drugs after having used the following drugs: Marijuana  - occasional edibles       The review of systems and following portions of the patient's history were reviewed and updated as appropriate: allergies, current medications, past family history, past medical history, past social history, past surgical history and problem list         Objective:       Vitals:    01/27/23 1143   BP: 116/74   Pulse: 78   Weight: (!) 174 kg (383 lb 9 6 oz)   Height: 5' 5" (1 651 m)     Body mass index is 63 83 kg/m²  Neck Circumference: 20  [place] Sleepiness Scale:  Total score: 17      Current Outpatient Medications:   •  acetaminophen (TYLENOL) 500 mg tablet, Take 500 mg by mouth if needed for mild pain, Disp: , Rfl:   •  albuterol (PROVENTIL HFA,VENTOLIN HFA) 90 mcg/act inhaler, Inhale 2 puffs every 6 (six) hours as needed for wheezing or shortness of breath (Patient taking differently: Inhale 2 puffs if needed for wheezing or shortness of breath), Disp: 18 g, Rfl: 1  •  furosemide (LASIX) 40 mg tablet, Take 1 tablet (40 mg total) by mouth 2 (two) times a day, Disp: 60 tablet, Rfl: 0    Physical Exam  General Appearance:   Alert, cooperative, no distress, appears stated age, morbidly obese     Head:   Normocephalic, without obvious abnormality, atraumatic     Eyes:   PERRL, conjunctiva/corneas clear          Nose:  Nares normal, septum midline, mucosa normal, no drainage or sinus tenderness           Throat:  Lips, teeth and gums normal; tongue normal in size and midline in position; mucosa moist with low-lying soft palatal tissue, uvula only partially visualized, tonsils not visualized, Mallampati class 4       Neck:  Supple, short and thick, symmetrical, trachea midline, no adenopathy; no thyromegaly noted, no carotid bruit or JVD     Lungs:      Clear to auscultation bilaterally, decreased bases, respirations unlabored with some interruptions in long sentences for breathing     Heart:   Regular rate and rhythm, S1 and S2 normal, no murmur, rub or gallop       Extremities:  Extremities normal, atraumatic, no cyanosis and lymphedema in LE bilaterally L>R       Skin:  Skin color, texture, turgor normal, no rashes or lesions       Neurologic:  No focal deficits noted  ASSESSMENT / PLAN     1  Obstructive sleep apnea syndrome  Ambulatory Referral to Sleep Medicine    Split Study      2  Morbid obesity ([facility] [address] )  Ambulatory referral to Sleep Medicine    Split Study      3  Acute on chronic respiratory failure with hypoxia [facility])  Ambulatory Referral to Sleep Medicine    Split Study      4  Chronic obstructive pulmonary disease with acute exacerbation (HCC)  Split Study            Counseling / Coordination of Care  Total clinic time spent today 60 minutes  Greater than 50% of total time was spent with the patient and / or family counseling and / or coordination of care  A description of the counseling / coordination of care:     diagnostic results, instructions for management, risk factor reductions, prognosis, patient and family education, impressions, risks and benefits of treatment options and importance of compliance with treatment    Today we discussed the anatomy and physiology of the upper airway  I pointed out how changes in this region can result in both snoring and abnormal breathing events including apneas and hypopneas  I explained the most common co-morbidities of untreated sleep apnea  After this we talked about some forms of treatment including application of positive airway pressure, mandibular advancement devices and surgery  She reports that she has not been using the home oxygen during the day or at night  She was encouraged to use it at least during sleep and follow guidelines for use provided when prescribed      In order to evaluate the severity of Obstructive Sleep Apnea as a cause of the patient's worsening symptoms, a split study test with transcutaneous CO2 monitoring will be completed  If significant abnormal nocturnal breathing is detected, nasal CPAP/BiPAP will be titrated to find the optimum pressure needed to maintain upper airway patency during sleep  Following testing, the patient will return to the [address] to set up equipment  She will then need follow up to review compliance and effectiveness of treatment 31-91 days after set up  She will need [employer] equipment with a modem, due to limited transportation  The following instructions have been given to the patient today:    Patient Instructions   1  Schedule split night study  2  Schedule DME appointment with [facility] for CPAP set up  3  Schedule compliance visit and evaluation of CPAP  8-12 weeks after starting CPAP         Nursing Support:  When: Monday through Friday 7A-5PM except holidays  Where: Our direct line is [phone]  If you are having a true emergency please call 911  In the event that the line is busy or it is after hours please leave a voice message and we will return your call  Please speak clearly, leaving your full name, birth date, best number to reach you and the reason for your call  Medication refills: We will need the name of the medication, the dosage, the ordering provider, whether you get a 30 or 90 day refill, and the pharmacy name and address  Medications will be ordered by the provider only  Nurses cannot call in prescriptions  Please allow 7 days for medication refills  Physician requested updates: If your provider requested that you call with an update after starting medication, please be ready to provide us the medication and dosage, what time you take your medication, the time you attempt to fall asleep, time you fall asleep, when you wake up, and what time you get out of bed  Sleep Study Results:  We will contact you with sleep study results and/or next steps after the physician has reviewed your testing  Arnoldo Rosales, [facility]      Portions of the record may have been created with voice recognition software  Occasional wrong word or "sound a like" substitutions may have occurred due to the inherent limitations of voice recognition software  Read the chart carefully and recognize, using context, where substitutions have occurred

## 2023-01-27 NOTE — PATIENT INSTRUCTIONS
1   Schedule split night study  2  Schedule DME appointment with [facility] for CPAP set up  3  Schedule compliance visit and evaluation of CPAP  8-12 weeks after starting CPAP         Nursing Support:  When: Monday through Friday 7A-5PM except holidays  Where: Our direct line is [phone]  If you are having a true emergency please call 911  In the event that the line is busy or it is after hours please leave a voice message and we will return your call  Please speak clearly, leaving your full name, birth date, best number to reach you and the reason for your call  Medication refills: We will need the name of the medication, the dosage, the ordering provider, whether you get a 30 or 90 day refill, and the pharmacy name and address  Medications will be ordered by the provider only  Nurses cannot call in prescriptions  Please allow 7 days for medication refills  Physician requested updates: If your provider requested that you call with an update after starting medication, please be ready to provide us the medication and dosage, what time you take your medication, the time you attempt to fall asleep, time you fall asleep, when you wake up, and what time you get out of bed  Sleep Study Results: We will contact you with sleep study results and/or next steps after the physician has reviewed your testing

## 2023-02-02 ENCOUNTER — OFFICE VISIT (OUTPATIENT)
Dept: CARDIOLOGY CLINIC | Facility: CLINIC | Age: 63
End: 2023-02-02

## 2023-02-02 VITALS
HEIGHT: 65 IN | SYSTOLIC BLOOD PRESSURE: 120 MMHG | WEIGHT: 293 LBS | BODY MASS INDEX: 48.82 KG/M2 | DIASTOLIC BLOOD PRESSURE: 76 MMHG | HEART RATE: 75 BPM | OXYGEN SATURATION: 94 %

## 2023-02-02 DIAGNOSIS — E66.01 MORBID OBESITY (HCC): ICD-10-CM

## 2023-02-02 DIAGNOSIS — Z72.0 TOBACCO ABUSE: ICD-10-CM

## 2023-02-02 DIAGNOSIS — I50.32 CHRONIC DIASTOLIC CONGESTIVE HEART FAILURE (HCC): ICD-10-CM

## 2023-02-02 DIAGNOSIS — G47.33 OBSTRUCTIVE SLEEP APNEA SYNDROME: ICD-10-CM

## 2023-02-02 DIAGNOSIS — I10 ESSENTIAL HYPERTENSION: Primary | ICD-10-CM

## 2023-02-02 DIAGNOSIS — E78.2 MIXED HYPERLIPIDEMIA: ICD-10-CM

## 2023-02-02 DIAGNOSIS — E66.01 MORBID OBESITY WITH BODY MASS INDEX (BMI) OF 60.0 TO 69.9 IN ADULT (HCC): ICD-10-CM

## 2023-02-08 ENCOUNTER — PATIENT OUTREACH (OUTPATIENT)
Dept: INTERNAL MEDICINE CLINIC | Age: 63
End: 2023-02-08

## 2023-02-08 NOTE — PROGRESS NOTES
Pt had recent follow up with Cardiology and reports that she is managing well  She did gain a few pounds over the holiday but does not believe it is 'fluid weight'  Pt was given verbal approval to go ahead to consider  Bariatric surgery by Cardiology and she has an upcoming sleep study in March  Next week pt is having 9 teeth pulled and plates made  She had lost approximately 20 of the 40 pounds needed to lose in order to proceed with bariatric surgery this far  Pt is hoping that dental extraction will help with the remaining weight loss  She is feeling good except for having a slight cold currently   Pt in [facility] and is agreeable to follow up in a month

## 2023-02-27 ENCOUNTER — TELEMEDICINE (OUTPATIENT)
Dept: INTERNAL MEDICINE CLINIC | Facility: OTHER | Age: 63
End: 2023-02-27

## 2023-02-27 VITALS — HEART RATE: 85 BPM | BODY MASS INDEX: 48.82 KG/M2 | HEIGHT: 65 IN | WEIGHT: 293 LBS | OXYGEN SATURATION: 91 %

## 2023-02-27 DIAGNOSIS — U07.1 COVID-19 VIRUS INFECTION: Primary | ICD-10-CM

## 2023-02-27 DIAGNOSIS — I50.32 CHRONIC DIASTOLIC CONGESTIVE HEART FAILURE (HCC): ICD-10-CM

## 2023-02-27 DIAGNOSIS — E83.52 HYPERCALCEMIA: ICD-10-CM

## 2023-02-27 DIAGNOSIS — J44.1 CHRONIC OBSTRUCTIVE PULMONARY DISEASE WITH ACUTE EXACERBATION (HCC): ICD-10-CM

## 2023-02-27 DIAGNOSIS — I89.0 LYMPHEDEMA OF BOTH LOWER EXTREMITIES: ICD-10-CM

## 2023-02-27 DIAGNOSIS — E66.01 MORBID OBESITY WITH BODY MASS INDEX (BMI) OF 60.0 TO 69.9 IN ADULT (HCC): ICD-10-CM

## 2023-02-27 PROBLEM — J96.91 RESPIRATORY FAILURE WITH HYPOXIA (HCC): Status: RESOLVED | Noted: 2022-12-02 | Resolved: 2023-02-27

## 2023-02-27 PROBLEM — I50.9 ACUTE EXACERBATION OF CHF (CONGESTIVE HEART FAILURE) (HCC): Status: RESOLVED | Noted: 2022-12-02 | Resolved: 2023-02-27

## 2023-02-27 RX ORDER — NIRMATRELVIR AND RITONAVIR 300-100 MG
3 KIT ORAL 2 TIMES DAILY
Qty: 30 TABLET | Refills: 0 | Status: SHIPPED | OUTPATIENT
Start: 2023-02-27 | End: 2023-03-04

## 2023-02-27 NOTE — PROGRESS NOTES
Virtual Regular Visit    Verification of patient location:    Patient is located in the following state in which I hold an active license [place]      Assessment/Plan:    Problem List Items Addressed This Visit        Respiratory    COPD (chronic obstructive pulmonary disease) ([facility] [address] )    Relevant Orders    Comprehensive metabolic panel    CBC       Cardiovascular and Mediastinum    Chronic diastolic congestive heart failure ([facility] [address] )       Other    Morbid obesity with body mass index (BMI) of 60 0 to 69 9 in [facility])     Continue with low caloric diet and exercise as much as tolerated         Lymphedema of both lower extremities    Hypercalcemia     Patient with hypercalcemia on previous 3 blood work  Patient is not aware with the diagnosis  Will request intact PTH and repeat electrolytes in about 4 weeks and also advised to follow-up with PCP in 4 to 6 weeks  Relevant Orders    Comprehensive metabolic panel    PTH, intact    COVID-19 virus infection - Primary     With risk factors including congestive heart failure, COPD and morbid obesity will treat patient with Paxlovid  Side effect profile discussed with patient  No drug drug interaction noted  Continue with self quarantine until 7 to 10 days or completely symptom resolved  Also advised to take vitamin D3, vitamin C and zinc if available  Continue with albuterol inhaler  Relevant Medications    nirmatrelvir & ritonavir (Paxlovid, 300/100,) tablet therapy pack            Reason for visit is   Chief Complaint   Patient presents with   • COVID-19     COVID + Yesterday afternoon - Sinus and chest congestion, body aches, loss of taste and coughing     Sob      • Virtual Regular Visit        Encounter provider Roly Valdes MD    Provider located at [address] [place] [address]      Recent Visits  No visits were found meeting these conditions  Showing recent visits within past 7 days and meeting all other requirements  Today's Visits  Date Type Provider Dept   02/27/23 Telemedicine Ana Lilia Mora MD [facility]   Showing today's visits and meeting all other requirements  Future Appointments  No visits were found meeting these conditions  Showing future appointments within next 150 days and meeting all other requirements       The patient was identified by name and date of birth  Luis Mijares was informed that this is a telemedicine visit and that the visit is being conducted through the [employer]  She agrees to proceed     My office door was closed  No one else was in the room  She acknowledged consent and understanding of privacy and security of the video platform  The patient has agreed to participate and understands they can discontinue the visit at any time  Patient is aware this is a billable service  Subjective  Luis Mijares is a 66 y o  female cough mild sore throat and fatigue         Patient started URI-like symptoms 3 days ago  And tested with COVID-19 antigen test at home yesterday which is positive  Last week her daughter was sick with COVID-19 infection    She denied any fever chills nausea vomiting       Past Medical History:   Diagnosis Date   • Arthritis    • CHF (congestive heart failure) ([facility])    • COPD (chronic obstructive pulmonary disease) ([facility])    • Depression    • Eating disorder    • High blood pressure    • Hypertension    • Obesity All my life   • Urinary incontinence    • Visual impairment 2018       Past Surgical History:   Procedure Laterality Date   • HYSTERECTOMY     • INCONTINENCE SURGERY     • JOINT REPLACEMENT  2015    Right knee   • OOPHORECTOMY Bilateral    • REPLACEMENT TOTAL KNEE Right 2016   • TOTAL ABDOMINAL HYSTERECTOMY W/ BILATERAL SALPINGOOPHORECTOMY  2006       Current Outpatient Medications   Medication Sig Dispense Refill   • acetaminophen (TYLENOL) 500 mg tablet Take 500 mg by mouth if needed for mild pain     • albuterol (PROVENTIL HFA,VENTOLIN HFA) 90 mcg/act inhaler Inhale 2 puffs every 6 (six) hours as needed for wheezing or shortness of breath (Patient taking differently: Inhale 2 puffs if needed for wheezing or shortness of breath) 18 g 1   • furosemide (LASIX) 40 mg tablet Take 1 tablet (40 mg total) by mouth 2 (two) times a day 60 tablet 0   • nirmatrelvir & ritonavir (Paxlovid, 300/100,) tablet therapy pack Take 3 tablets by mouth 2 (two) times a day for 5 days Take 2 nirmatrelvir tablets + 1 ritonavir tablet together per dose 30 tablet 0     No current facility-administered medications for this visit  Allergies   Allergen Reactions   • Lisinopril Cough       Review of Systems   Constitutional: Positive for fatigue  Negative for fever  HENT: Positive for congestion, postnasal drip and sore throat  Negative for ear discharge, ear pain, sinus pressure, tinnitus and trouble swallowing  Eyes: Negative for discharge, itching and visual disturbance  Respiratory: Positive for cough  Negative for shortness of breath  Cardiovascular: Negative for chest pain and palpitations  Gastrointestinal: Negative for abdominal pain, diarrhea, nausea and vomiting  Endocrine: Negative for cold intolerance and polyuria  Genitourinary: Negative for difficulty urinating, dysuria and urgency  Musculoskeletal: Negative for arthralgias and neck pain  Skin: Negative for rash  Allergic/Immunologic: Negative for environmental allergies  Neurological: Negative for dizziness, weakness and headaches  Psychiatric/Behavioral: The patient is not nervous/anxious  Video Exam    Vitals:    02/27/23 1515   Pulse: 85   SpO2: 91%   Weight: (!) 173 kg (382 lb)   Height: 5' 5" (1 651 m)       Physical Exam  Constitutional:       Appearance: She is obese  She is not ill-appearing     HENT:      Right Ear: External ear normal       Left Ear: External ear normal       Nose: Congestion present  No rhinorrhea  Mouth/Throat:      Pharynx: Posterior oropharyngeal erythema present  No oropharyngeal exudate  Eyes:      Conjunctiva/sclera: Conjunctivae normal    Pulmonary:      Effort: Pulmonary effort is normal  No respiratory distress  Musculoskeletal:      Right lower leg: Edema present  Left lower leg: Edema present  Skin:     Findings: No erythema or rash  Neurological:      Mental Status: She is oriented to person, place, and time     Psychiatric:         Mood and Affect: Mood normal          Behavior: Behavior normal           I spent 15 minutes directly with the patient during this visit

## 2023-02-27 NOTE — ASSESSMENT & PLAN NOTE
With risk factors including congestive heart failure, COPD and morbid obesity will treat patient with Paxlovid  Side effect profile discussed with patient  No drug drug interaction noted  Continue with self quarantine until 7 to 10 days or completely symptom resolved  Also advised to take vitamin D3, vitamin C and zinc if available  Continue with albuterol inhaler

## 2023-02-27 NOTE — ASSESSMENT & PLAN NOTE
Patient with hypercalcemia on previous 3 blood work  Patient is not aware with the diagnosis  Will request intact PTH and repeat electrolytes in about 4 weeks and also advised to follow-up with PCP in 4 to 6 weeks

## 2023-03-09 ENCOUNTER — PATIENT OUTREACH (OUTPATIENT)
Dept: INTERNAL MEDICINE CLINIC | Age: 63
End: 2023-03-09

## 2023-03-09 NOTE — PROGRESS NOTES
Message left for pt stating that [facility] Bundle episode has ended  Unsure if pt desires to be kept in care management complex episode  Requested return call back

## 2023-03-10 ENCOUNTER — PATIENT OUTREACH (OUTPATIENT)
Dept: INTERNAL MEDICINE CLINIC | Age: 63
End: 2023-03-10

## 2023-03-10 NOTE — PROGRESS NOTES
Spoke with pt regarding interest to continue in a complex episode  Pt declines at this time  She did state that she has not been the 'best' regarding diet however she is having 2 teeth extracted next week and is motivated to get all of her follow up appts done and move forward with bariatric surgery  She does state that her family is a good support

## 2023-03-24 LAB

## 2023-04-03 ENCOUNTER — OFFICE VISIT (OUTPATIENT)
Dept: INTERNAL MEDICINE CLINIC | Age: 63
End: 2023-04-03

## 2023-04-03 VITALS
SYSTOLIC BLOOD PRESSURE: 128 MMHG | DIASTOLIC BLOOD PRESSURE: 76 MMHG | WEIGHT: 293 LBS | HEART RATE: 87 BPM | OXYGEN SATURATION: 92 % | TEMPERATURE: 97.1 F | BODY MASS INDEX: 48.82 KG/M2 | HEIGHT: 65 IN

## 2023-04-03 DIAGNOSIS — I89.0 LYMPHEDEMA OF BOTH LOWER EXTREMITIES: ICD-10-CM

## 2023-04-03 DIAGNOSIS — I50.32 CHRONIC DIASTOLIC CONGESTIVE HEART FAILURE (HCC): ICD-10-CM

## 2023-04-03 DIAGNOSIS — G47.33 OBSTRUCTIVE SLEEP APNEA SYNDROME: ICD-10-CM

## 2023-04-03 DIAGNOSIS — F33.1 MODERATE EPISODE OF RECURRENT MAJOR DEPRESSIVE DISORDER (HCC): Primary | ICD-10-CM

## 2023-04-03 DIAGNOSIS — E66.01 MORBID OBESITY WITH BODY MASS INDEX (BMI) OF 60.0 TO 69.9 IN ADULT (HCC): ICD-10-CM

## 2023-04-03 DIAGNOSIS — E83.52 HYPERCALCEMIA: ICD-10-CM

## 2023-04-03 DIAGNOSIS — I50.9 ACUTE ON CHRONIC CONGESTIVE HEART FAILURE, UNSPECIFIED HEART FAILURE TYPE (HCC): ICD-10-CM

## 2023-04-03 DIAGNOSIS — I50.9 CHF EXACERBATION (HCC): ICD-10-CM

## 2023-04-03 RX ORDER — FUROSEMIDE 40 MG/1
40 TABLET ORAL 2 TIMES DAILY
Qty: 180 TABLET | Refills: 1 | Status: SHIPPED | OUTPATIENT
Start: 2023-04-03 | End: 2023-05-03

## 2023-04-03 RX ORDER — ESCITALOPRAM OXALATE 10 MG/1
10 TABLET ORAL DAILY
Qty: 30 TABLET | Refills: 1 | Status: SHIPPED | OUTPATIENT
Start: 2023-04-03

## 2023-04-03 RX ORDER — LOSARTAN POTASSIUM 25 MG/1
25 TABLET ORAL DAILY
Qty: 30 TABLET | Refills: 1 | Status: SHIPPED | OUTPATIENT
Start: 2023-04-03

## 2023-04-03 RX ORDER — CLINDAMYCIN HYDROCHLORIDE 300 MG/1
1 CAPSULE ORAL 4 TIMES DAILY
COMMUNITY
Start: 2023-03-24 | End: 2023-04-30

## 2023-04-03 NOTE — ASSESSMENT & PLAN NOTE
Pt is considering bariatric surgery  Discussed emotional component to eating   Smaller portions, increase activity

## 2023-04-03 NOTE — PROGRESS NOTES
Assessment/Plan:    Obstructive sleep apnea syndrome  Has sleep study scheduled and on cancellation list to be seen sooner    Chronic diastolic congestive heart failure ([facility] [address] )  Wt Readings from Last 3 Encounters:   04/03/23 (!) 175 kg (386 lb)   02/27/23 (!) 173 kg (382 lb)   02/02/23 (!) 173 kg (382 lb 4 8 oz)     Cont lasix 40 mg BID  Add losartan 25 mg daily  Was on lisinopril but developed a cough  Did not have issues with hypotension while on lisinopril  Discussed ARB  Watch for dizziness with position changes or BP running low  Call if BP < 100/60 or dizziness, increasing edema, SOB  Go to ER with difficulty breathing gaining > 3 pounds 24 hours, CP  F/u in 1 week      Hypercalcemia  CMP, PTH, vit D 25 OH    Moderate episode of recurrent major depressive disorder (HCC)  Start lexapro 10 mg daily  Explained medication and possible side effects  Refer to psychologist  Recommend making list/goals, revisit things she used to like to do, exercise  Has good support system  F/u in 1 week    Morbid obesity with body mass index (BMI) of 60 0 to 69 9 in adult [facility])  Pt is considering bariatric surgery  Discussed emotional component to eating   Smaller portions, increase activity       Diagnoses and all orders for this visit:    Moderate episode of recurrent major depressive disorder (HCC)  -     escitalopram (Lexapro) 10 mg tablet; Take 1 tablet (10 mg total) by mouth daily  -     Ambulatory Referral to Psychology; Future  -     TSH, 3rd generation with Free T4 reflex; Future    CHF exacerbation (HCC)  -     furosemide (LASIX) 40 mg tablet; Take 1 tablet (40 mg total) by mouth 2 (two) times a day    Acute on chronic congestive heart failure, unspecified heart failure type (HCC)  -     furosemide (LASIX) 40 mg tablet; Take 1 tablet (40 mg total) by mouth 2 (two) times a day  -     losartan (COZAAR) 25 mg tablet; Take 1 tablet (25 mg total) by mouth daily    Hypercalcemia  -     Comprehensive metabolic panel;  Future  - "   PTH, intact; Future  -     Vitamin D 25 hydroxy; Future    Obstructive sleep apnea syndrome    Chronic diastolic congestive heart failure (HCC)    Lymphedema of both lower extremities    Morbid obesity with body mass index (BMI) of 60 0 to 69 9 in adult [facility])    Other orders  -     clindamycin (CLEOCIN) 300 MG capsule; Take 1 capsule by mouth 4 (four) times a day          BMI Counseling: Body mass index is 64 23 kg/m²  The BMI is above normal  Nutrition recommendations include decreasing portion sizes, encouraging healthy choices of fruits and vegetables, decreasing fast food intake, consuming healthier snacks, limiting drinks that contain sugar, moderation in carbohydrate intake and increasing intake of lean protein  Exercise recommendations include moderate physical activity 150 minutes/week and exercising 3-5 times per week  Patient referred to bariatric surgery  Rationale for BMI follow-up plan is due to patient being overweight or obese  Depression Screening and Follow-up Plan: Patient's depression screening was positive with a PHQ-2 score of 3  Their PHQ-9 score was 9  Subjective:          Patient ID: Luigi Estrada is a 66 y o  female  Depression  She feels it started after the death of her  in 2014  She has been coping by eating and has gained > 130 pounds  She lost her house and is now living with her daughter  She is frustrated with medical health  Ronita Dakins had all her teeth pulled and feels very embarrassed  She feels helpless  Decreased interests  She wakes up frequently due to urinary frequency  She falls asleep on the couch often  She has an appt for a sleep study but it is not until November  Appetite \"always there\" Good concentration, does not feel guilty or have any thoughts of self harm  Both daughters have anxiety but she does not know what they take for it  She denies any anxiety  CHF:  She has known CHF    She feels her symptoms worsen when she noticed " her voice getting hoarse  She noticed hoarseness about 1 week ago but has been feeling better over the weekend  Mild increased edema but she has lymphedema and finds it difficult to tell if it is from CHF  No change in her HERNANDEZ, stable  She has an adjustment bed so she never lays flat but she has been able to lay flatter than she was in the past   She has seen a cardiologist and had an echo 12/22  She was on lisinopril but developed a cough so it was stopped  She feels her CHF is improving and does not feel she is having an exacerbation  Congestive Heart Failure  Presents for follow-up visit  Associated symptoms include edema and fatigue  Pertinent negatives include no abdominal pain, chest pain, chest pressure, muscle weakness, near-syncope, palpitations or shortness of breath  The symptoms have been stable  Compliance problems include adherence to diet  Depression  This is a new problem  The current episode started more than 1 year ago  The problem occurs constantly  The problem has been gradually worsening  Associated symptoms include fatigue  Pertinent negatives include no abdominal pain, chest pain, chills, congestion, coughing, fever, headaches, nausea, sore throat or vomiting  Nothing aggravates the symptoms  She has tried nothing for the symptoms  The following portions of the patient's history were reviewed and updated as appropriate: past family history, past surgical history and problem list     Review of Systems   Constitutional: Positive for fatigue  Negative for chills and fever  HENT: Negative for congestion and sore throat  Eyes: Negative for visual disturbance  Respiratory: Negative for cough and shortness of breath  Cardiovascular: Negative for chest pain, palpitations, leg swelling and near-syncope  Gastrointestinal: Negative for abdominal pain, constipation, diarrhea, nausea and vomiting  Musculoskeletal: Negative for muscle weakness     Neurological: Positive for dizziness  Negative for headaches  Psychiatric/Behavioral: Positive for depression and dysphoric mood  Negative for sleep disturbance and suicidal ideas  The patient is not nervous/anxious            Past Medical History:   Diagnosis Date   • Arthritis    • CHF (congestive heart failure) ([facility])    • COPD (chronic obstructive pulmonary disease) ([facility])    • Depression    • Eating disorder    • High blood pressure    • Hypertension    • Obesity All my life   • Urinary incontinence    • Visual impairment 2018         Current Outpatient Medications:   •  acetaminophen (TYLENOL) 500 mg tablet, Take 500 mg by mouth if needed for mild pain, Disp: , Rfl:   •  albuterol (PROVENTIL HFA,VENTOLIN HFA) 90 mcg/act inhaler, Inhale 2 puffs every 6 (six) hours as needed for wheezing or shortness of breath (Patient taking differently: Inhale 2 puffs if needed for wheezing or shortness of breath), Disp: 18 g, Rfl: 1  •  clindamycin (CLEOCIN) 300 MG capsule, Take 1 capsule by mouth 4 (four) times a day, Disp: , Rfl:   •  escitalopram (Lexapro) 10 mg tablet, Take 1 tablet (10 mg total) by mouth daily, Disp: 30 tablet, Rfl: 1  •  furosemide (LASIX) 40 mg tablet, Take 1 tablet (40 mg total) by mouth 2 (two) times a day, Disp: 180 tablet, Rfl: 1  •  losartan (COZAAR) 25 mg tablet, Take 1 tablet (25 mg total) by mouth daily, Disp: 30 tablet, Rfl: 1    Allergies   Allergen Reactions   • Lisinopril Cough       Social History   Past Surgical History:   Procedure Laterality Date   • HYSTERECTOMY     • INCONTINENCE SURGERY     • JOINT REPLACEMENT  2015    Right knee   • OOPHORECTOMY Bilateral    • REPLACEMENT TOTAL KNEE Right 2016   • TOOTH EXTRACTION  02/15/2023    2/15/23,3/16/23,4/27/23   • TOTAL ABDOMINAL HYSTERECTOMY W/ BILATERAL SALPINGOOPHORECTOMY  2006     Family History   Problem Relation Age of Onset   • Other Mother    • Aneurysm Mother         aortic   • Dementia Mother    • Heart failure Father    • Diabetes Father         Dad   • "Lung cancer Sister    • Cancer Sister    • Dementia Maternal Grandmother    • Dementia Maternal Aunt        Health Maintenance   Topic Date Due   • Hepatitis A Vaccine (1 of 2 - Risk 2-dose series) Never done   • Pneumococcal Vaccine: Pediatrics (0 to 5 Years) and At-Risk Patients (6 to 59 Years) (1 - PCV) Never done   • HIV Screening  Never done   • Lung Cancer Screening  Never done   • PT PLAN OF CARE  05/21/2021   • Breast Cancer Screening: Mammogram  09/17/2021   • COVID-19 Vaccine (4 - Booster for Pfizer series) 01/12/2022   • Hepatitis B Vaccine (1 of 3 - Risk 3-dose series) Never done   • Colorectal Cancer Screening  03/09/2023   • BMI: Followup Plan  10/03/2023   • Depression Remission PHQ  10/03/2023   • Medicare Annual Wellness Visit (AWV)  12/28/2023   • BMI: Adult  04/03/2024   • Hepatitis C Screening  Completed   • Influenza Vaccine  Completed   • HIB Vaccine  Aged Out   • IPV Vaccine  Aged Out   • Meningococcal ACWY Vaccine  Aged Out   • HPV Vaccine  Aged Out     Immunization History   Administered Date(s) Administered   • COVID-19 PFIZER VACCINE 0 3 ML IM 03/15/2021, 04/03/2021, 11/17/2021   • INFLUENZA 12/09/2022   • Influenza, recombinant, quadrivalent,injectable, preservative free 12/02/2021, 12/09/2022   • Zoster Vaccine Recombinant 04/27/2018, 07/27/2018         Objective:  /76 (BP Location: Left arm, Patient Position: Sitting, Cuff Size: Large)   Pulse 87   Temp (!) 97 1 °F (36 2 °C) (Temporal)   Ht 5' 5\" (1 651 m)   Wt (!) 175 kg (386 lb)   SpO2 92% Comment: room air  BMI 64 23 kg/m²   Body mass index is 64 23 kg/m²  Physical Exam  Constitutional:       General: She is not in acute distress  Appearance: She is well-developed  She is obese  She is not ill-appearing or toxic-appearing  HENT:      Head: Normocephalic and atraumatic        Right Ear: Tympanic membrane, ear canal and external ear normal       Left Ear: Tympanic membrane, ear canal and external ear normal       " Nose: Nose normal       Mouth/Throat:      Comments: Most of teeth have been pulled  Eyes:      Conjunctiva/sclera: Conjunctivae normal       Pupils: Pupils are equal, round, and reactive to light  Cardiovascular:      Rate and Rhythm: Normal rate and regular rhythm  Heart sounds: Normal heart sounds  Pulmonary:      Effort: Pulmonary effort is normal  No respiratory distress  Breath sounds: Normal breath sounds  No wheezing  Musculoskeletal:      Cervical back: Normal range of motion and neck supple  Right lower leg: Edema present  Left lower leg: Edema present  Comments: Lymphedema, nonpitting   Skin:     General: Skin is warm and dry  Neurological:      Mental Status: She is alert  Psychiatric:         Thought Content:  Thought content normal          Judgment: Judgment normal       Comments: Very good insight and judgement

## 2023-04-03 NOTE — ASSESSMENT & PLAN NOTE
Wt Readings from Last 3 Encounters:   04/03/23 (!) 175 kg (386 lb)   02/27/23 (!) 173 kg (382 lb)   02/02/23 (!) 173 kg (382 lb 4 8 oz)     Cont lasix 40 mg BID  Add losartan 25 mg daily  Was on lisinopril but developed a cough  Did not have issues with hypotension while on lisinopril  Discussed ARB    Watch for dizziness with position changes or BP running low  Call if BP < 100/60 or dizziness, increasing edema, SOB  Go to ER with difficulty breathing gaining > 3 pounds 24 hours, CP  F/u in 1 week

## 2023-04-03 NOTE — ASSESSMENT & PLAN NOTE
Start lexapro 10 mg daily  Explained medication and possible side effects  Refer to psychologist  Recommend making list/goals, revisit things she used to like to do, exercise  Has good support system  F/u in 1 week

## 2023-05-31 DIAGNOSIS — I50.9 ACUTE ON CHRONIC CONGESTIVE HEART FAILURE, UNSPECIFIED HEART FAILURE TYPE (HCC): ICD-10-CM

## 2023-05-31 DIAGNOSIS — F33.1 MODERATE EPISODE OF RECURRENT MAJOR DEPRESSIVE DISORDER (HCC): ICD-10-CM

## 2023-05-31 RX ORDER — LOSARTAN POTASSIUM 25 MG/1
25 TABLET ORAL DAILY
Qty: 30 TABLET | Refills: 3 | Status: SHIPPED | OUTPATIENT
Start: 2023-05-31

## 2023-05-31 RX ORDER — ESCITALOPRAM OXALATE 10 MG/1
10 TABLET ORAL DAILY
Qty: 30 TABLET | Refills: 3 | Status: SHIPPED | OUTPATIENT
Start: 2023-05-31

## 2023-07-11 ENCOUNTER — APPOINTMENT (EMERGENCY)
Dept: CT IMAGING | Facility: HOSPITAL | Age: 63
DRG: 291 | End: 2023-07-11
Payer: COMMERCIAL

## 2023-07-11 ENCOUNTER — OFFICE VISIT (OUTPATIENT)
Dept: INTERNAL MEDICINE CLINIC | Facility: OTHER | Age: 63
End: 2023-07-11
Payer: COMMERCIAL

## 2023-07-11 ENCOUNTER — APPOINTMENT (EMERGENCY)
Dept: RADIOLOGY | Facility: HOSPITAL | Age: 63
DRG: 291 | End: 2023-07-11
Payer: COMMERCIAL

## 2023-07-11 ENCOUNTER — HOSPITAL ENCOUNTER (INPATIENT)
Facility: HOSPITAL | Age: 63
LOS: 5 days | Discharge: HOME | DRG: 291 | End: 2023-07-16
Attending: EMERGENCY MEDICINE | Admitting: INTERNAL MEDICINE
Payer: COMMERCIAL

## 2023-07-11 VITALS
BODY MASS INDEX: 48.82 KG/M2 | OXYGEN SATURATION: 94 % | WEIGHT: 293 LBS | SYSTOLIC BLOOD PRESSURE: 128 MMHG | HEART RATE: 73 BPM | TEMPERATURE: 98.1 F | DIASTOLIC BLOOD PRESSURE: 78 MMHG | HEIGHT: 65 IN

## 2023-07-11 DIAGNOSIS — I50.32 CHRONIC DIASTOLIC CONGESTIVE HEART FAILURE (HCC): ICD-10-CM

## 2023-07-11 DIAGNOSIS — F33.1 MODERATE EPISODE OF RECURRENT MAJOR DEPRESSIVE DISORDER (HCC): ICD-10-CM

## 2023-07-11 DIAGNOSIS — Z12.2 ENCOUNTER FOR SCREENING FOR LUNG CANCER: ICD-10-CM

## 2023-07-11 DIAGNOSIS — Z11.4 SCREENING FOR HIV (HUMAN IMMUNODEFICIENCY VIRUS): ICD-10-CM

## 2023-07-11 DIAGNOSIS — F17.211 NICOTINE DEPENDENCE, CIGARETTES, IN REMISSION: ICD-10-CM

## 2023-07-11 DIAGNOSIS — E66.01 MORBID OBESITY WITH BODY MASS INDEX (BMI) OF 60.0 TO 69.9 IN ADULT (HCC): ICD-10-CM

## 2023-07-11 DIAGNOSIS — R32 URINARY INCONTINENCE, UNSPECIFIED TYPE: ICD-10-CM

## 2023-07-11 DIAGNOSIS — I50.9 CHF (CONGESTIVE HEART FAILURE) (HCC): ICD-10-CM

## 2023-07-11 DIAGNOSIS — R06.02 SOB (SHORTNESS OF BREATH): Primary | ICD-10-CM

## 2023-07-11 DIAGNOSIS — I50.9 ACUTE ON CHRONIC CONGESTIVE HEART FAILURE, UNSPECIFIED HEART FAILURE TYPE (HCC): Primary | ICD-10-CM

## 2023-07-11 PROBLEM — R26.2 AMBULATORY DYSFUNCTION: Status: ACTIVE | Noted: 2023-07-11

## 2023-07-11 PROBLEM — I50.33 ACUTE ON CHRONIC HEART FAILURE WITH PRESERVED EJECTION FRACTION (HFPEF) (HCC): Status: ACTIVE | Noted: 2022-12-02

## 2023-07-11 LAB
2HR DELTA HS TROPONIN: -1 NG/L
ALBUMIN SERPL BCP-MCNC: 4.2 G/DL (ref 3.5–5)
ALP SERPL-CCNC: 77 U/L (ref 34–104)
ALT SERPL W P-5'-P-CCNC: 13 U/L (ref 7–52)
ANION GAP SERPL CALCULATED.3IONS-SCNC: 7 MMOL/L
AST SERPL W P-5'-P-CCNC: 13 U/L (ref 13–39)
BASOPHILS # BLD AUTO: 0.03 THOUSANDS/ÂΜL (ref 0–0.1)
BASOPHILS NFR BLD AUTO: 0 % (ref 0–1)
BILIRUB SERPL-MCNC: 0.53 MG/DL (ref 0.2–1)
BNP SERPL-MCNC: 29 PG/ML (ref 0–100)
BUN SERPL-MCNC: 10 MG/DL (ref 5–25)
CALCIUM SERPL-MCNC: 10.3 MG/DL (ref 8.4–10.2)
CARDIAC TROPONIN I PNL SERPL HS: 7 NG/L
CARDIAC TROPONIN I PNL SERPL HS: 8 NG/L
CHLORIDE SERPL-SCNC: 104 MMOL/L (ref 96–108)
CO2 SERPL-SCNC: 28 MMOL/L (ref 21–32)
CREAT SERPL-MCNC: 0.66 MG/DL (ref 0.6–1.3)
D DIMER PPP FEU-MCNC: 0.89 UG/ML FEU
EOSINOPHIL # BLD AUTO: 0.16 THOUSAND/ÂΜL (ref 0–0.61)
EOSINOPHIL NFR BLD AUTO: 2 % (ref 0–6)
ERYTHROCYTE [DISTWIDTH] IN BLOOD BY AUTOMATED COUNT: 15.4 % (ref 11.6–15.1)
GFR SERPL CREATININE-BSD FRML MDRD: 95 ML/MIN/1.73SQ M
GLUCOSE SERPL-MCNC: 101 MG/DL (ref 65–140)
HCT VFR BLD AUTO: 43.8 % (ref 34.8–46.1)
HGB BLD-MCNC: 13.5 G/DL (ref 11.5–15.4)
IMM GRANULOCYTES # BLD AUTO: 0.04 THOUSAND/UL (ref 0–0.2)
IMM GRANULOCYTES NFR BLD AUTO: 1 % (ref 0–2)
LYMPHOCYTES # BLD AUTO: 1.38 THOUSANDS/ÂΜL (ref 0.6–4.47)
LYMPHOCYTES NFR BLD AUTO: 20 % (ref 14–44)
MCH RBC QN AUTO: 29.4 PG (ref 26.8–34.3)
MCHC RBC AUTO-ENTMCNC: 30.8 G/DL (ref 31.4–37.4)
MCV RBC AUTO: 95 FL (ref 82–98)
MONOCYTES # BLD AUTO: 0.48 THOUSAND/ÂΜL (ref 0.17–1.22)
MONOCYTES NFR BLD AUTO: 7 % (ref 4–12)
NEUTROPHILS # BLD AUTO: 4.71 THOUSANDS/ÂΜL (ref 1.85–7.62)
NEUTS SEG NFR BLD AUTO: 70 % (ref 43–75)
NRBC BLD AUTO-RTO: 0 /100 WBCS
PLATELET # BLD AUTO: 190 THOUSANDS/UL (ref 149–390)
PMV BLD AUTO: 11 FL (ref 8.9–12.7)
POTASSIUM SERPL-SCNC: 3.7 MMOL/L (ref 3.5–5.3)
PROT SERPL-MCNC: 7.5 G/DL (ref 6.4–8.4)
RBC # BLD AUTO: 4.59 MILLION/UL (ref 3.81–5.12)
SODIUM SERPL-SCNC: 139 MMOL/L (ref 135–147)
WBC # BLD AUTO: 6.8 THOUSAND/UL (ref 4.31–10.16)

## 2023-07-11 PROCEDURE — 99214 OFFICE O/P EST MOD 30 MIN: CPT

## 2023-07-11 PROCEDURE — G1004 CDSM NDSC: HCPCS

## 2023-07-11 PROCEDURE — 71275 CT ANGIOGRAPHY CHEST: CPT

## 2023-07-11 PROCEDURE — 85379 FIBRIN DEGRADATION QUANT: CPT

## 2023-07-11 PROCEDURE — 99285 EMERGENCY DEPT VISIT HI MDM: CPT

## 2023-07-11 PROCEDURE — 80053 COMPREHEN METABOLIC PANEL: CPT

## 2023-07-11 PROCEDURE — 96374 THER/PROPH/DIAG INJ IV PUSH: CPT

## 2023-07-11 PROCEDURE — 85025 COMPLETE CBC W/AUTO DIFF WBC: CPT

## 2023-07-11 PROCEDURE — 84484 ASSAY OF TROPONIN QUANT: CPT

## 2023-07-11 PROCEDURE — 71045 X-RAY EXAM CHEST 1 VIEW: CPT

## 2023-07-11 PROCEDURE — 99223 1ST HOSP IP/OBS HIGH 75: CPT | Performed by: NURSE PRACTITIONER

## 2023-07-11 PROCEDURE — 36415 COLL VENOUS BLD VENIPUNCTURE: CPT

## 2023-07-11 PROCEDURE — 93005 ELECTROCARDIOGRAM TRACING: CPT

## 2023-07-11 PROCEDURE — 83880 ASSAY OF NATRIURETIC PEPTIDE: CPT

## 2023-07-11 PROCEDURE — 99285 EMERGENCY DEPT VISIT HI MDM: CPT | Performed by: EMERGENCY MEDICINE

## 2023-07-11 RX ORDER — IBUPROFEN 200 MG
200 TABLET ORAL EVERY 6 HOURS PRN
COMMUNITY
End: 2023-07-16

## 2023-07-11 RX ORDER — FUROSEMIDE 10 MG/ML
80 INJECTION INTRAMUSCULAR; INTRAVENOUS ONCE
Status: COMPLETED | OUTPATIENT
Start: 2023-07-11 | End: 2023-07-11

## 2023-07-11 RX ADMIN — IOHEXOL 100 ML: 350 INJECTION, SOLUTION INTRAVENOUS at 20:15

## 2023-07-11 RX ADMIN — NITROGLYCERIN 0.5 INCH: 20 OINTMENT TOPICAL at 18:14

## 2023-07-11 RX ADMIN — FUROSEMIDE 80 MG: 10 INJECTION, SOLUTION INTRAMUSCULAR; INTRAVENOUS at 17:44

## 2023-07-11 NOTE — ED PROVIDER NOTES
History  Chief Complaint   Patient presents with   • Shortness of Breath     Pt arrives c/o increased SOB for the last few wks. Reports increased edema in legs. Hx of CHF. Denies CP     66year old Female with PMH of CHF and COPD came to the ED for shortness fo breath after visiting her PCP today who instructed her to go to the ED. She states that she has had progressive shortness of breath for the past 2 weeks which prompted her to visit her doctor. Patient admits to only taking half of her prescribed Lasix due to the urinary incontinence that it causes her. Some days, she will miss a dose all together. She states that she is unable to climb more than 4 steps at home and requires her walker to ambulate more often. She can tell that her leg swelling and her weight has also increased. Denies chest pain, dizziness, lightheadedness, abdominal pain, n/v/d, cough, dysuria, hematuria, constipation. Prior to Admission Medications   Prescriptions Last Dose Informant Patient Reported?  Taking?   acetaminophen (TYLENOL) 500 mg tablet  Self Yes No   Sig: Take 500 mg by mouth if needed for mild pain   Patient not taking: Reported on 7/11/2023   albuterol (PROVENTIL HFA,VENTOLIN HFA) 90 mcg/act inhaler  Self No No   Sig: Inhale 2 puffs every 6 (six) hours as needed for wheezing or shortness of breath   Patient taking differently: Inhale 2 puffs if needed for wheezing or shortness of breath   escitalopram (Lexapro) 10 mg tablet  Self No No   Sig: Take 1 tablet (10 mg total) by mouth daily   furosemide (LASIX) 40 mg tablet  Self No No   Sig: Take 1 tablet (40 mg total) by mouth 2 (two) times a day   Patient taking differently: Take 40 mg by mouth 2 (two) times a day Patient takes lasix daily unless she knows she leaving the house and doesn't take   ibuprofen (Advil) 200 mg tablet  Self Yes No   Sig: Take 200 mg by mouth every 6 (six) hours as needed for mild pain   losartan (COZAAR) 25 mg tablet  Self No No   Sig: Take 1 tablet (25 mg total) by mouth daily      Facility-Administered Medications: None       Past Medical History:   Diagnosis Date   • Arthritis    • CHF (congestive heart failure) ([facility])    • COPD (chronic obstructive pulmonary disease) ([facility])    • Depression    • Eating disorder    • High blood pressure    • Hypertension    • Obesity All my life   • Urinary incontinence    • Visual impairment        Past Surgical History:   Procedure Laterality Date   • HYSTERECTOMY     • INCONTINENCE SURGERY     • JOINT REPLACEMENT  2015    Right knee   • OOPHORECTOMY Bilateral    • REPLACEMENT TOTAL KNEE Right 2016   • TOOTH EXTRACTION  02/15/2023    2/15/23,3/16/23,23   • TOTAL ABDOMINAL HYSTERECTOMY W/ BILATERAL SALPINGOOPHORECTOMY         Family History   Problem Relation Age of Onset   • Other Mother    • Aneurysm Mother         aortic   • Dementia Mother    • Heart failure Father    • Diabetes Father         Dad   • Lung cancer Sister    • Cancer Sister    • Dementia Maternal Grandmother    • Dementia Maternal Aunt      I have reviewed and agree with the history as documented. E-Cigarette/Vaping   • E-Cigarette Use Former User    • Start Date 20      E-Cigarette/Vaping Substances   • Nicotine Yes    • THC No    • CBD No    • Flavoring No    • Other No    • Unknown No      Social History     Tobacco Use   • Smoking status: Former     Packs/day: 1.00     Years: 35.00     Total pack years: 35.00     Types: Cigarettes     Start date: 1976     Quit date: 5/15/2021     Years since quittin.1   • Smokeless tobacco: Never   Vaping Use   • Vaping Use: Former   • Start date: 2018   • Substances: Nicotine   Substance Use Topics   • Alcohol use: Yes     Comment: occassional   • Drug use: Not Currently     Types: Marijuana     Comment: monthly at most        Review of Systems   Constitutional: Positive for unexpected weight change. Patient states that she has gained 40 lbs since December. HENT: Negative. Eyes: Negative. Respiratory: Positive for shortness of breath. Cardiovascular: Positive for leg swelling. Gastrointestinal: Negative. Genitourinary: Positive for frequency. Musculoskeletal: Negative. Neurological: Negative. All other systems reviewed and are negative. Physical Exam  ED Triage Vitals   Temperature Pulse Respirations Blood Pressure SpO2   07/11/23 1653 07/11/23 1653 07/11/23 1653 07/11/23 1653 07/11/23 1653   97.5 °F (36.4 °C) 85 (!) 28 (!) 178/82 91 %      Temp Source Heart Rate Source Patient Position - Orthostatic VS BP Location FiO2 (%)   07/11/23 1653 07/11/23 1800 07/11/23 1800 07/11/23 1800 --   Oral Monitor Lying Right arm       Pain Score       07/11/23 1653       No Pain             Orthostatic Vital Signs  Vitals:    07/11/23 1800 07/11/23 1907 07/11/23 2030 07/11/23 2307   BP: (!) 185/79 148/73 131/64 140/63   Pulse: 72 74 69 73   Patient Position - Orthostatic VS: Lying Sitting Lying Lying       Physical Exam  Vitals and nursing note reviewed. Constitutional:       Appearance: Normal appearance. She is obese. HENT:      Right Ear: External ear normal.      Left Ear: External ear normal.      Nose: Nose normal.      Mouth/Throat:      Pharynx: Oropharynx is clear. Eyes:      Conjunctiva/sclera: Conjunctivae normal.   Cardiovascular:      Rate and Rhythm: Normal rate and regular rhythm. Pulses: Normal pulses. Heart sounds: Normal heart sounds. Pulmonary:      Breath sounds: Rales present. Comments: Patient has 91% saturation on RA at bedside. On exam, she was found to have mild rales in BL lower lung fields. Abdominal:      General: Bowel sounds are normal.      Palpations: Abdomen is soft. Musculoskeletal:      Right lower leg: Edema present. Left lower leg: Edema present. Comments: BL lower extremity 2+ pitting edema   Skin:     General: Skin is warm and dry. Neurological:      General: No focal deficit present.       Mental Status: She is alert and oriented to person, place, and time. ED Medications  Medications   furosemide (LASIX) injection 80 mg (80 mg Intravenous Given 7/11/23 1744)   nitroglycerin (NITRO-BID) 2 % TD ointment 0.5 inch (0.5 inches Topical Given 7/11/23 1814)   iohexol (OMNIPAQUE) 350 MG/ML injection (SINGLE-DOSE) 100 mL (100 mL Intravenous Given 7/11/23 2015)       Diagnostic Studies  Results Reviewed     Procedure Component Value Units Date/Time    HS Troponin I 2hr [[id number]]  (Normal) Collected: 07/11/23 2004    Lab Status: Final result Specimen: Blood from Line, Venous Updated: 07/11/23 2034     hs TnI 2hr 7 ng/L      Delta 2hr hsTnI -1 ng/L     D-Dimer [[id number]]  (Abnormal) Collected: 07/11/23 1905    Lab Status: Final result Specimen: Blood from Arm, Left Updated: 07/11/23 1933     D-Dimer, Quant 0.89 ug/ml FEU     Narrative: In the evaluation for possible pulmonary embolism, in the appropriate (Well's Score of 4 or less) patient, the age adjusted d-dimer cutoff for this patient can be calculated as:    Age x 0.01 (in ug/mL) for Age-adjusted D-dimer exclusion threshold for a patient over 50 years.     HS Troponin 0hr (reflex protocol) [[id number]]  (Normal) Collected: 07/11/23 1731    Lab Status: Final result Specimen: Blood from Arm, Left Updated: 07/11/23 1808     hs TnI 0hr 8 ng/L     B-Type Natriuretic Peptide(BNP) [[id number]]  (Normal) Collected: 07/11/23 1731    Lab Status: Final result Specimen: Blood from Arm, Left Updated: 07/11/23 1807     BNP 29 pg/mL     Comprehensive metabolic panel [[id number]]  (Abnormal) Collected: 07/11/23 1731    Lab Status: Final result Specimen: Blood from Arm, Left Updated: 07/11/23 1759     Sodium 139 mmol/L      Potassium 3.7 mmol/L      Chloride 104 mmol/L      CO2 28 mmol/L      ANION GAP 7 mmol/L      BUN 10 mg/dL      Creatinine 0.66 mg/dL      Glucose 101 mg/dL      Calcium 10.3 mg/dL      AST 13 U/L      ALT 13 U/L      Alkaline Phosphatase 77 U/L Total Protein 7.5 g/dL      Albumin 4.2 g/dL      Total Bilirubin 0.53 mg/dL      eGFR 95 ml/min/1.73sq m     Narrative:      National Kidney Disease Foundation guidelines for Chronic Kidney Disease (CKD):   •  Stage 1 with normal or high GFR (GFR > 90 mL/min/1.73 square meters)  •  Stage 2 Mild CKD (GFR = 60-89 mL/min/1.73 square meters)  •  Stage 3A Moderate CKD (GFR = 45-59 mL/min/1.73 square meters)  •  Stage 3B Moderate CKD (GFR = 30-44 mL/min/1.73 square meters)  •  Stage 4 Severe CKD (GFR = 15-29 mL/min/1.73 square meters)  •  Stage 5 End Stage CKD (GFR <15 mL/min/1.73 square meters)  Note: GFR calculation is accurate only with a steady state creatinine    CBC and differential [[id number]]  (Abnormal) Collected: 07/11/23 1731    Lab Status: Final result Specimen: Blood from Arm, Left Updated: 07/11/23 1750     WBC 6.80 Thousand/uL      RBC 4.59 Million/uL      Hemoglobin 13.5 g/dL      Hematocrit 43.8 %      MCV 95 fL      MCH 29.4 pg      MCHC 30.8 g/dL      RDW 15.4 %      MPV 11.0 fL      Platelets 942 Thousands/uL      nRBC 0 /100 WBCs      Neutrophils Relative 70 %      Immat GRANS % 1 %      Lymphocytes Relative 20 %      Monocytes Relative 7 %      Eosinophils Relative 2 %      Basophils Relative 0 %      Neutrophils Absolute 4.71 Thousands/µL      Immature Grans Absolute 0.04 Thousand/uL      Lymphocytes Absolute 1.38 Thousands/µL      Monocytes Absolute 0.48 Thousand/µL      Eosinophils Absolute 0.16 Thousand/µL      Basophils Absolute 0.03 Thousands/µL                  CTA ED chest PE Study   Final Result by Odessa Golden DO (07/11 2230)      No pulmonary embolus or other acute abnormality identified      There is a 3.2 cm right adrenal nodule. Although its imaging features are indeterminate, it does not have suspicious imaging features (heterogeneity, necrosis, irregular margins),  but based on its size, a dedicated adrenal protocol CT is recommended on    an outpatient basis to confirm benignity. Adrenal recommendation based on institutional consensus and Journal of [employer] of Radiology 2017;14:5706-8664         The study was marked in [facility] for immediate notification. Workstation performed: [id number]         XR chest 1 view portable    (Results Pending)         Procedures  ECG 12 Lead Documentation Only    Date/Time: 7/11/2023 6:13 PM    Performed by: Monae Frye MD  Authorized by: Monae Frye MD    ECG reviewed by me, the ED Provider: yes    Patient location:  ED  Previous ECG:     Previous ECG:  Compared to current    Similarity:  No change  Interpretation:     Interpretation: normal    Rate:     ECG rate assessment: normal    Rhythm:     Rhythm: sinus rhythm            ED Course  ED Course as of 07/11/23 2319 Tue Jul 11, 2023   1936 D-Dimer(!)  D-dimer level noted, CTA will be ordered. Medical Decision Making  66year old Female with PMH of CHF and COPD came to the ED today at the suggestion of her PCP for worsening CHF with shortness of breath, decreased ambulation, and BL lower extremity edema. Patient admitted to medication non-compliance due to the urinary incontinence she was experiencing. She has only been taking half of her 80 mg Lasix dose or would miss the dose completely. Patient was saturating 91% on room air during exam and was able to speak in full sentences. Patient labs were obtained and reviewed with no acute concerns. D-dimer was also ordered for suspicion of possible pulmonary embolism which resulted with an elevation and prompted a CTA of the chest to rule out PE. Patient was also placed on O2 via NC for comfort and given 80 mg IV Lasix to start off loading her fluid overload. Patient was admitted to internal medicine for further management. Amount and/or Complexity of Data Reviewed  Labs: ordered. Decision-making details documented in ED Course. Radiology: ordered. Risk  Prescription drug management.   Decision regarding hospitalization. Disposition  Final diagnoses:   SOB (shortness of breath)   CHF (congestive heart failure) ([address])     Time reflects when diagnosis was documented in both MDM as applicable and the Disposition within this note     Time User Action Codes Description Comment    7/11/2023  8:24 PM Alvester Fragmin Add [J44.1] COPD exacerbation ([address])     7/11/2023  8:24 PM Alvester Fragmin Remove [J44.1] COPD exacerbation ([address])     7/11/2023  8:24 PM Alvester Fragmin Add [R06.02] SOB (shortness of breath)     7/11/2023  8:25 PM Alvester Fragmin Add [I50.9] CHF (congestive heart failure) ([address])     7/11/2023  8:30 PM Lincoln Sluder Add [L21.63] Chronic diastolic congestive heart failure ([address])     7/11/2023  8:37 PM Lincoln Sluder Add [E66.01,  Z68.44] Morbid obesity with body mass index (BMI) of 60.0 to 69.9 in adult [facility])       ED Disposition     ED Disposition   Admit    Condition   Stable    Date/Time   Tue Jul 11, 2023  8:24 PM    Comment   Case was discussed with Dr. Deniz Cabral and the patient's admission status was agreed to be Admission Status: inpatient status to the service of Dr. Deniz Cabral . Follow-up Information    None         Patient's Medications   Discharge Prescriptions    No medications on file     No discharge procedures on file. PDMP Review       Value Time User    PDMP Reviewed  Yes 10/8/2020  2:56 PM Viet Isaacs DO           ED Provider  Attending physically available and evaluated Shaquille Armenta. I managed the patient along with the ED Attending.     Electronically Signed by         Emma Figueroa MD  07/11/23 2363

## 2023-07-11 NOTE — ED ATTENDING ATTESTATION
7/11/2023  IElvia DO, saw and evaluated the patient. I have discussed the patient with the resident/non-physician practitioner and agree with the resident's/non-physician practitioner's findings, Plan of Care, and MDM as documented in the resident's/non-physician practitioner's note, except where noted. All available labs and Radiology studies were reviewed. I was present for key portions of any procedure(s) performed by the resident/non-physician practitioner and I was immediately available to provide assistance. At this point I agree with the current assessment done in the Emergency Department. I have conducted an independent evaluation of this patient a history and physical is as follows:    28-year-old female coming into the ED for evaluation of shortness of breath, dyspnea on exertion, weight gain, swollen legs. She states she has chronic incontinence and due to taking Lasix for CHF, she is always urinating, and the bathroom every hour. She is supposed be taking 80 mg of Lasix daily after being diagnosed with CHF 7 months ago but she only takes 40 mg daily most days and occasionally skips days. She states the incontinence affects her quality of life and her ability to do her ADLs. She saw her primary care physician today who sent her into the ED to be admitted for CHF exacerbation. She is very short of breath when she walks a short distance, even to the bathroom. Her room air sats dropped to 85 in the ED when she walked to the bathroom for example. At rest, her oxygen sats are 90 to 92%. She denies a cough, no fevers or chills. PE:  The patient is well appearing, non-toxic, in NAD. Morbidly obese. Head: normocephalic, atraumatic. HEENT: mucous membranes moist.  Lungs:  Mild bilateral crackles/rales, no resp distress. Heart: RRR. No M/R/G. Abdomen: NT, ND, no R/R/G. Neuro: CN2-12 intact, GCS 15. Normal strength and sensation, normal speech and gait.  Cap refill < 2 sec, skin warm and dry. No rashes or lesions. Bilateral pitting edema lower legs. CTA PE study (+ dimer) negative for PE. Admit for CHF exacerbation. Start on IV lasix 80 mg.       Wt Readings from Last 3 Encounters:   07/11/23 (!) 184 kg (404 lb 12.8 oz)   04/03/23 (!) 175 kg (386 lb)   02/27/23 (!) 173 kg (382 lb)             ED Course         Critical Care Time  Procedures

## 2023-07-11 NOTE — PROGRESS NOTES
[address], [address]  Clinic Visit Note  Shaquille Armenta 66 y.o. female   MRN: [id number]    Assessment and Plan    Diagnoses and all orders for this visit:    Acute on chronic congestive heart failure, unspecified heart failure type [facility])  -Patient is 40 pounds up from her dry weight of 360 to 364 pounds. Today she is measuring at 404 pounds,  kg. Patient admits to being noncompliant with her Lasix. She was written for 40 mg twice daily p.o. Lasix. She has instead been taking 40 mg p.o. daily and has skipped her Lasix entirely on several days, as she frequently goes outside the house for extended periods of time and does not wish to be going to the bathroom every 10 to 15 minutes. In the exam room, patient is sitting with pulse ox which is reading between 89 to 93% on room air. Patient is on 1 to 2 L of oxygen at home as PRN for her COPD, but she is not on any baseline level of oxygen. She has not been using her oxygen more than usual.  Patient also appears grossly volume overloaded with 3+ bilateral lower extremity pitting edema, visible JVD at 90 degrees bilaterally  · Recommended that patient go to emergency department. She agreed to go to [address] [place] [address]. I have reached out to ED charge nurse at [address] [place] [address] and admitting hospitalist Dr. Deniz Cabral to inform them of the patient's arrival.  · Anticipate the patient will require several days, if not over a week, to achieve adequate diuresis. · Patient did not have any planned follow-ups with cardiology beyond their last visit in March 2023. I explained the unfortunate but common occurrence of frequent CHF readmissions and the importance of dietary and medication compliance to minimize hospitalizations and one's deteriorating quality of life.  Patient and her family agreeable to see cardiology on a more frequent basis to ensure patient stays well-diuresed from home without need for frequent readmissions. · Patient also requesting disability parking form to be completed. We may complete this at her next visit when she is out of the hospital and based off of her functional status at the time. · Transfer to other facility        Nicotine dependence, cigarettes, in remission  Patient admits to smoking 5 to 10 cigarettes a week, roughly every other week. She states she is a social smoker. Patient is not interested in any pharmacotherapy with nicotine at this time. She states that her cravings are very mild and it would be quite easy for her to stay in remission. Moderate episode of recurrent major depressive disorder ([address])  -Patient admits to skipping medications entirely when she has low mood. This is becoming more of a frequent problem and both of patient's daughters are concerned and worry about her wellbeing. Patient's daughter, at bedside, states that she feels that 10 mg of Lexapro p.o. daily is not enough for the patient. As we are referring patient to the ED, we will not currently make any medication adjustments, but in the future, we may consider uptitrating Lexapro. I have also placed referral for mental health so that patient may establish care with a therapist.  I stressed the importance of combined psychotherapy with pharmacotherapy for lasting neurocognitive improvements with regards to depressive symptoms. Ambulatory Referral to [facility]; Future        Former 35-pack-year smoker. Quit within last 15 years. Eligible for lung cancer screening.   CT screening not addressed today due to acuity of issues    Health Maintenance/Care gaps addressed today:  PHQ-2/9 Depression Screening        The 10-year ASCVD risk score (Clarissa OWEN, et al., 2019) is: 10.6%    Values used to calculate the score:      Age: 66 years      Sex: Female      Is Non- : No      Diabetic: No      Tobacco smoker: No      Systolic Blood Pressure: 982 mmHg      Is BP treated: Yes      HDL Cholesterol: 41 mg/dL      Total Cholesterol: 187 mg/dL  Lab Results   Component Value Date    HGBA1C 6.1 (H) 08/31/2022      Lab Results   Component Value Date    HEPCAB NON-REACTIVE 08/18/2020      Most Recent Immunizations   Administered Date(s) Administered   • COVID-19 PFIZER VACCINE 0.3 ML IM 11/17/2021   • INFLUENZA 12/09/2022   • Influenza, recombinant, quadrivalent,injectable, preservative free 12/09/2022   • Zoster Vaccine Recombinant 07/27/2018    Not on file 08/31/2022 No components found for: "HIV1X2"               Follow up in our clinic after patient is discharged from hospital for TCM, and thereafter to discuss preventative screenings, including HIV, lung cancer, colonoscopy, mammogram.    Subjective   CHIEF COMPLAINT:   Chief Complaint   Patient presents with   • Shortness of Breath     Pt has pulse ox at home; reports to me when she goes up and down stairs O2 drops to 87 - can bring up to 91-92 on her own    • Edema     Bilateral leg edema - mainly L leg / bloating in abdominal areas   Unable to walk   Family has noticed a significant weight gain over the last two weeks        • Decreased Oxygen Level     Reports decreased O2 at home    Pt has worn pulse ox during entire rooming and has maintained level of 94%    • COPD   • Congestive Heart Failure   • HM     Pt due for:  CRC - order placed  Mammo - has order at home will call to schedule   Lung can screen - has order at home will call to schedule       HISTORY OF PRESENT ILLNESS:  Carmen Salas is a 66 y.o. yo female with pmhx of morbid obesity BMI 64, prediabetes, COPD, nonischemic stable angina, BRADY, hypertension, HFpEF, HLD, former 35-pack-year smoker, lymphedema chronic, MDD presenting to clinic today for concerns for volume overload. Was initially dx with CHF in 11/2022 (hospital admission for 9 days for diuresis). Symptoms first started 4 weeks. Progressive shortness of breath.  Patient does not lay down whatsoever so she cannot tell if she is orthopneic. She sleeps with a modified bed with elevated HOB, but even with this has developed SOB. Takes 4 steps and then feels immediately short of breath. No chest pain, tightness, or pressure. Timing is constant. Is short of breath while speaking. Overall it is worsening. There are no other associated cardiac or systemic sx. No recent changes in diet, salt intake. Orders takeout once a week. No recent travel. No heart palpitations. No fever/chill/nausea/vomiting/chest pain/SOB/chest tightness. Cannot gauge ROS orthopnea b/c patient has not layed flat for over 2 years from personal comfort preference. It is worsened by exertion (2-4 steps make patient feel winded and needs to stop). The patient denies any sick contacts. she is fully vaccinated. Denies any recent travel. The symptoms do not awaken the patient from sleep. Patient has tried nothing to alleviate sx. Re: med-rec, patient takes lasix 40 mg po qD instead of prescribed BID. She also skips entirely on days when going outside for prolonged periods of time. Steady weight gain of about 40 lbs x 1 month. No fevers or chills. Drenches sheets on a nightly basis but has been postmenopausal for 17 years since her YUDY surgery. Quit smoking 2 years ago, has been on/off since then. 35 pack year total.   Restarted smoking about 4 weeks ago. Last smoked 1 week ago, about 5-10 cigarettes per week. Cardiac fhx is significant for multiple family members developing what is described as non-ischemic CHF (no history of "heart attacks" and only CHF). Mother had first heart attack age 29, then aortic aneurysm circa age 62s. Patient states when her mood is low, she stops taking all medications. This has been happening more frequently.         Objective     Vitals:    07/11/23 1447   BP: 128/78   BP Location: Left arm   Patient Position: Sitting   Cuff Size: Standard   Pulse: 73   Temp: 98.1 °F (36.7 °C) TempSrc: Temporal   SpO2: 94%   Weight: (!) 184 kg (404 lb 12.8 oz)   Height: 5' 5" (1.651 m)     Physical Exam  Vitals reviewed. Constitutional:       General: She is not in acute distress. Appearance: She is obese. She is ill-appearing. She is not diaphoretic. HENT:      Head: Normocephalic and atraumatic. Mouth/Throat:      Mouth: Mucous membranes are moist.      Pharynx: Oropharynx is clear. Eyes:      General: No scleral icterus. Extraocular Movements: Extraocular movements intact. Pupils: Pupils are equal, round, and reactive to light. Cardiovascular:      Rate and Rhythm: Normal rate and regular rhythm. Heart sounds: No murmur heard. No friction rub. No gallop. Comments: No carotid bruits appreciated  Pulmonary:      Effort: Tachypnea (short, shallow breaths RR22-24), accessory muscle usage (using lips to breathe) and respiratory distress present. Breath sounds: No stridor. Decreased breath sounds present. No wheezing or rales. Comments: Sounds short of breath/winded as if she just stopped running when speaking. Able to speak short sentences. Skin is warm and well perfused w/o cyanosis. O2 sat 89-93% on room air. Increased WOB with some accessory muscle use. No signs of impending respiratory failure currently  Abdominal:      General: Bowel sounds are normal. There is distension (central obesity). Palpations: There is no mass. Tenderness: There is no abdominal tenderness. There is no guarding. Musculoskeletal:         General: Normal range of motion. Right lower leg: Edema present. Left lower leg: Edema present. Skin:     General: Skin is warm and dry. Capillary Refill: Capillary refill takes less than 2 seconds. Coloration: Skin is not cyanotic or pale. Findings: No ecchymosis, erythema or rash. Nails: There is no clubbing. Neurological:      Mental Status: She is alert and oriented to person, place, and time. Sensory: No sensory deficit. Psychiatric:         Mood and Affect: Mood normal.         Behavior: Behavior normal.         Thought Content:  Thought content normal.           History     Current Outpatient Medications:   •  albuterol (PROVENTIL HFA,VENTOLIN HFA) 90 mcg/act inhaler, Inhale 2 puffs every 6 (six) hours as needed for wheezing or shortness of breath (Patient taking differently: Inhale 2 puffs if needed for wheezing or shortness of breath), Disp: 18 g, Rfl: 1  •  escitalopram (Lexapro) 10 mg tablet, Take 1 tablet (10 mg total) by mouth daily, Disp: 30 tablet, Rfl: 3  •  furosemide (LASIX) 40 mg tablet, Take 1 tablet (40 mg total) by mouth 2 (two) times a day (Patient taking differently: Take 40 mg by mouth 2 (two) times a day Patient takes lasix daily unless she knows she leaving the house and doesn't take), Disp: 180 tablet, Rfl: 1  •  ibuprofen (Advil) 200 mg tablet, Take 200 mg by mouth every 6 (six) hours as needed for mild pain, Disp: , Rfl:   •  losartan (COZAAR) 25 mg tablet, Take 1 tablet (25 mg total) by mouth daily, Disp: 30 tablet, Rfl: 3  •  acetaminophen (TYLENOL) 500 mg tablet, Take 500 mg by mouth if needed for mild pain (Patient not taking: Reported on 7/11/2023), Disp: , Rfl:   Past Medical History:   Diagnosis Date   • Arthritis    • CHF (congestive heart failure) ([facility])    • COPD (chronic obstructive pulmonary disease) ([facility])    • Depression    • Eating disorder    • High blood pressure    • Hypertension    • Obesity All my life   • Urinary incontinence    • Visual impairment 2018     Past Surgical History:   Procedure Laterality Date   • HYSTERECTOMY     • INCONTINENCE SURGERY     • JOINT REPLACEMENT  2015    Right knee   • OOPHORECTOMY Bilateral    • REPLACEMENT TOTAL KNEE Right 2016   • TOOTH EXTRACTION  02/15/2023    2/15/23,3/16/23,4/27/23   • TOTAL ABDOMINAL HYSTERECTOMY W/ BILATERAL SALPINGOOPHORECTOMY  2006     Social History     Socioeconomic History   • Marital status:      Spouse name: Not on file   • Number of children: Not on file   • Years of education: Not on file   • Highest education level: Not on file   Occupational History   • Not on file   Tobacco Use   • Smoking status: Former     Packs/day: 1.00     Years: 35.00     Total pack years: 35.00     Types: Cigarettes     Start date: 1976     Quit date: 5/15/2021     Years since quittin.1   • Smokeless tobacco: Never   Vaping Use   • Vaping Use: Former   • Start date: 2018   • Substances: Nicotine   Substance and Sexual Activity   • Alcohol use: Yes     Comment: occassional   • Drug use: Not Currently     Types: Marijuana     Comment: monthly at most   • Sexual activity: Not Currently   Other Topics Concern   • Not on file   Social History Narrative   • Not on file     Social Determinants of Health     Financial Resource Strain: Medium Risk (2022)    Overall Financial Resource Strain (CARDIA)    • Difficulty of Paying Living Expenses: Somewhat hard   Food Insecurity: No Food Insecurity (2022)    Hunger Vital Sign    • Worried About Running Out of Food in the Last Year: Never true    • Ran Out of Food in the Last Year: Never true   Transportation Needs: Unmet Transportation Needs (2022)    PRAPARE - Transportation    • Lack of Transportation (Medical): Yes    • Lack of Transportation (Non-Medical):  No   Physical Activity: Not on file   Stress: Not on file   Social Connections: Not on file   Intimate Partner Violence: Not on file   Housing Stability: Unknown (2022)    Housing Stability Vital Sign    • Unable to Pay for Housing in the Last Year: No    • Number of Places Lived in the Last Year: Not on file    • Unstable Housing in the Last Year: No     Family History   Problem Relation Age of Onset   • Other Mother    • Aneurysm Mother         aortic   • Dementia Mother    • Heart failure Father    • Diabetes Father         Dad   • Lung cancer Sister    • Cancer Sister    • Dementia Maternal Grandmother    • Dementia Maternal Aunt          Adriane Tovar MD  [facility] Internal Medicine PGY-3  [facility] Office  [address] [facility] [address], [employer] [facility],  [address] [address]         PLEASE NOTE:  This encounter was completed utilizing the [employer] [address] Direct Speech Voice Recognition Software. Grammatical errors, random word insertions, pronoun errors and incomplete sentences are occasional consequences of the system due to software limitations, ambient noise and hardware issues. These may be missed by proof reading prior to affixing electronic signature. Any questions or concerns about the content, text or information contained within the body of this dictation should be directly addressed to the physician for clarification. Please do not hesitate to call me directly if you have any any questions or concerns.

## 2023-07-12 PROBLEM — E27.9 ADRENAL NODULE (HCC): Status: ACTIVE | Noted: 2023-07-12

## 2023-07-12 PROBLEM — R32 URINARY INCONTINENCE: Status: ACTIVE | Noted: 2023-07-12

## 2023-07-12 PROBLEM — E27.8 ADRENAL NODULE (HCC): Status: ACTIVE | Noted: 2023-07-12

## 2023-07-12 LAB
ANION GAP SERPL CALCULATED.3IONS-SCNC: 5 MMOL/L
BUN SERPL-MCNC: 10 MG/DL (ref 5–25)
CALCIUM SERPL-MCNC: 9.6 MG/DL (ref 8.4–10.2)
CHLORIDE SERPL-SCNC: 103 MMOL/L (ref 96–108)
CO2 SERPL-SCNC: 32 MMOL/L (ref 21–32)
CREAT SERPL-MCNC: 0.63 MG/DL (ref 0.6–1.3)
ERYTHROCYTE [DISTWIDTH] IN BLOOD BY AUTOMATED COUNT: 15.5 % (ref 11.6–15.1)
GFR SERPL CREATININE-BSD FRML MDRD: 97 ML/MIN/1.73SQ M
GLUCOSE SERPL-MCNC: 120 MG/DL (ref 65–140)
HCT VFR BLD AUTO: 42.3 % (ref 34.8–46.1)
HGB BLD-MCNC: 13 G/DL (ref 11.5–15.4)
MAGNESIUM SERPL-MCNC: 2.1 MG/DL (ref 1.9–2.7)
MCH RBC QN AUTO: 29.3 PG (ref 26.8–34.3)
MCHC RBC AUTO-ENTMCNC: 30.7 G/DL (ref 31.4–37.4)
MCV RBC AUTO: 95 FL (ref 82–98)
PLATELET # BLD AUTO: 159 THOUSANDS/UL (ref 149–390)
PMV BLD AUTO: 10.5 FL (ref 8.9–12.7)
POTASSIUM SERPL-SCNC: 4.1 MMOL/L (ref 3.5–5.3)
RBC # BLD AUTO: 4.44 MILLION/UL (ref 3.81–5.12)
SODIUM SERPL-SCNC: 140 MMOL/L (ref 135–147)
WBC # BLD AUTO: 5.46 THOUSAND/UL (ref 4.31–10.16)

## 2023-07-12 PROCEDURE — 80048 BASIC METABOLIC PNL TOTAL CA: CPT | Performed by: NURSE PRACTITIONER

## 2023-07-12 PROCEDURE — 83735 ASSAY OF MAGNESIUM: CPT | Performed by: NURSE PRACTITIONER

## 2023-07-12 PROCEDURE — 85027 COMPLETE CBC AUTOMATED: CPT | Performed by: NURSE PRACTITIONER

## 2023-07-12 PROCEDURE — 99223 1ST HOSP IP/OBS HIGH 75: CPT | Performed by: INTERNAL MEDICINE

## 2023-07-12 PROCEDURE — 99232 SBSQ HOSP IP/OBS MODERATE 35: CPT | Performed by: INTERNAL MEDICINE

## 2023-07-12 RX ORDER — FUROSEMIDE 10 MG/ML
80 INJECTION INTRAMUSCULAR; INTRAVENOUS
Status: DISCONTINUED | OUTPATIENT
Start: 2023-07-12 | End: 2023-07-16

## 2023-07-12 RX ORDER — LANOLIN ALCOHOL/MO/W.PET/CERES
3 CREAM (GRAM) TOPICAL
Status: DISCONTINUED | OUTPATIENT
Start: 2023-07-12 | End: 2023-07-16 | Stop reason: HOSPADM

## 2023-07-12 RX ORDER — ALBUTEROL SULFATE 90 UG/1
2 AEROSOL, METERED RESPIRATORY (INHALATION) EVERY 6 HOURS PRN
Status: DISCONTINUED | OUTPATIENT
Start: 2023-07-12 | End: 2023-07-16 | Stop reason: HOSPADM

## 2023-07-12 RX ORDER — ACETAMINOPHEN 325 MG/1
650 TABLET ORAL EVERY 6 HOURS PRN
Status: DISCONTINUED | OUTPATIENT
Start: 2023-07-12 | End: 2023-07-16 | Stop reason: HOSPADM

## 2023-07-12 RX ORDER — ESCITALOPRAM OXALATE 10 MG/1
10 TABLET ORAL DAILY
Status: DISCONTINUED | OUTPATIENT
Start: 2023-07-12 | End: 2023-07-16 | Stop reason: HOSPADM

## 2023-07-12 RX ORDER — OXYBUTYNIN CHLORIDE 5 MG/1
5 TABLET ORAL 3 TIMES DAILY
Status: DISCONTINUED | OUTPATIENT
Start: 2023-07-12 | End: 2023-07-16 | Stop reason: HOSPADM

## 2023-07-12 RX ORDER — HEPARIN SODIUM 5000 [USP'U]/ML
7500 INJECTION, SOLUTION INTRAVENOUS; SUBCUTANEOUS EVERY 8 HOURS SCHEDULED
Status: DISCONTINUED | OUTPATIENT
Start: 2023-07-12 | End: 2023-07-16 | Stop reason: HOSPADM

## 2023-07-12 RX ORDER — LOSARTAN POTASSIUM 25 MG/1
25 TABLET ORAL DAILY
Status: DISCONTINUED | OUTPATIENT
Start: 2023-07-12 | End: 2023-07-16 | Stop reason: HOSPADM

## 2023-07-12 RX ORDER — POTASSIUM CHLORIDE 20 MEQ/1
20 TABLET, EXTENDED RELEASE ORAL ONCE
Status: COMPLETED | OUTPATIENT
Start: 2023-07-12 | End: 2023-07-12

## 2023-07-12 RX ORDER — AMOXICILLIN 250 MG
2 CAPSULE ORAL
Status: DISCONTINUED | OUTPATIENT
Start: 2023-07-12 | End: 2023-07-16 | Stop reason: HOSPADM

## 2023-07-12 RX ADMIN — LOSARTAN POTASSIUM 25 MG: 25 TABLET, FILM COATED ORAL at 09:13

## 2023-07-12 RX ADMIN — HEPARIN SODIUM 7500 UNITS: 5000 INJECTION INTRAVENOUS; SUBCUTANEOUS at 22:40

## 2023-07-12 RX ADMIN — OXYBUTYNIN CHLORIDE 5 MG: 5 TABLET ORAL at 16:20

## 2023-07-12 RX ADMIN — FUROSEMIDE 80 MG: 10 INJECTION, SOLUTION INTRAMUSCULAR; INTRAVENOUS at 17:11

## 2023-07-12 RX ADMIN — OXYBUTYNIN CHLORIDE 5 MG: 5 TABLET ORAL at 12:29

## 2023-07-12 RX ADMIN — ESCITALOPRAM 10 MG: 10 TABLET, FILM COATED ORAL at 09:13

## 2023-07-12 RX ADMIN — FUROSEMIDE 80 MG: 10 INJECTION, SOLUTION INTRAMUSCULAR; INTRAVENOUS at 09:20

## 2023-07-12 RX ADMIN — HEPARIN SODIUM 7500 UNITS: 5000 INJECTION INTRAVENOUS; SUBCUTANEOUS at 05:25

## 2023-07-12 RX ADMIN — OXYBUTYNIN CHLORIDE 5 MG: 5 TABLET ORAL at 21:41

## 2023-07-12 RX ADMIN — POTASSIUM CHLORIDE 20 MEQ: 1500 TABLET, EXTENDED RELEASE ORAL at 00:59

## 2023-07-12 RX ADMIN — HEPARIN SODIUM 7500 UNITS: 5000 INJECTION INTRAVENOUS; SUBCUTANEOUS at 16:19

## 2023-07-12 NOTE — ASSESSMENT & PLAN NOTE
· Ambulates with walker at baseline  · Likely due to acute CHF exacerbation, deconditioning, obesity  · May need PT OT eval

## 2023-07-12 NOTE — UTILIZATION REVIEW
Initial Clinical Review    Admission: Date/Time/Statement:   Admission Orders (From admission, onward)     Ordered        07/11/23 2027  INPATIENT ADMISSION  Once                      Orders Placed This Encounter   Procedures   • INPATIENT ADMISSION     Standing Status:   Standing     Number of Occurrences:   1     Order Specific Question:   Level of Care     Answer:   Med Surg [16]     Order Specific Question:   Estimated length of stay     Answer:   More than 2 Midnights     Order Specific Question:   Certification     Answer:   I certify that inpatient services are medically necessary for this patient for a duration of greater than two midnights. See H&P and MD Progress Notes for additional information about the patient's course of treatment. ED Arrival Information     Expected   7/11/2023     Arrival   7/11/2023 16:43    Acuity   Emergent            Means of arrival   Walk-In    Escorted by   Family Member    Service   Hospitalist    Admission type   Emergency            Arrival complaint   CHF           Chief Complaint   Patient presents with   • Shortness of Breath     Pt arrives c/o increased SOB for the last few wks. Reports increased edema in legs. Hx of CHF. Denies CP       Initial Presentation: 66 y.o. female with a PMH of HFpEF, COPD, chronic respiratory failure on 1-2 L PRN, obesity, HTN, prediabetes, lymphedema, hysterectomy who presents with worsening short of breath for the past few weeks. She also notes weight gain and leg swelling. Has chronic lymphedema which is worse on left. She had an appointment with her PCP today who recommended ED evaluation. Since her office visit with cardiology in February, she is +22 pounds. Medication adherence is an issue. She is prescribed lasix 40 mg BID but only takes daily or skips at times due to urinary incontinence. She missed about 3 days worth this past week. She reports incontinence began following hysterectomy with bladder sling.  She also reports onset of lymphedema after hysterectomy Patient denies adding salt to her food. She reports she does not eat pre packed foods. She reports difficulty with portion control. She wishes to undergo bariatric surgery but first must lose 40 lbs. Plan: Inpatient admission for evaluation and treatment of acute on chronic CHF, ambulatory dysfunction,  MDD, COPD, HTN: IV lasix 80 mg, continue losartan, Cardiology consult, 2 gm Na diet with fluid restriction, continue lexapro. Date: 7/12   Day 2:     Internal medicine: continue IV lasix 80 mg, continue losartan, 2 gm Na diet with fluid restriction, awaiting Cardiology consult, trial Ditropan. Cardiology consult: continue IV lasix 80 mg bid, BMP in am, 2 gm Na diet with 1500 ml fluid restriction, can d/c telemetry. Date: 7/13    Day 3: Has surpassed a 2nd midnight with active treatments and services, which include IV diuretics.       ED Triage Vitals   Temperature Pulse Respirations Blood Pressure SpO2   07/11/23 1653 07/11/23 1653 07/11/23 1653 07/11/23 1653 07/11/23 1653   97.5 °F (36.4 °C) 85 (!) 28 (!) 178/82 91 %      Temp Source Heart Rate Source Patient Position - Orthostatic VS BP Location FiO2 (%)   07/11/23 1653 07/11/23 1800 07/11/23 1800 07/11/23 1800 --   Oral Monitor Lying Right arm       Pain Score       07/11/23 1653       No Pain          Wt Readings from Last 1 Encounters:   07/12/23 (!) 179 kg (395 lb 4.6 oz)     Additional Vital Signs:     Date/Time Temp Pulse Resp BP MAP (mmHg) SpO2 Calculated FIO2 (%) - Nasal Cannula Nasal Cannula O2 Flow Rate (L/min) O2 Device   07/12/23 0807 97.9 °F (36.6 °C) 79 18 145/75 98 94 % 28 2 L/min Nasal cannula   07/12/23 0100 -- -- -- -- -- -- 28 2 L/min Nasal cannula   07/12/23 0007 -- -- -- -- -- -- 28 2 L/min Nasal cannula   07/12/23 0000 98.1 °F (36.7 °C) 69 18 139/60 87 93 % 28 2 L/min Nasal cannula   07/11/23 2307 -- 73 18 140/63 90 94 % 28 2 L/min Nasal cannula   07/11/23 2300 -- -- -- -- -- -- -- -- Nasal cannula 07/11/23 2030 -- 69 18 131/64 92 -- 28 2 L/min Nasal cannula   07/11/23 1907 -- 74 20 148/73 105 96 % 28 2 L/min Nasal cannula   07/11/23 1800 -- 72 20 185/79 Abnormal  113 93 % 28 2 L/min Nasal cannula     Pertinent Labs/Diagnostic Test Results:   CTA ED chest PE Study   Final Result by Ewelina Scott DO (07/11 2230)      No pulmonary embolus or other acute abnormality identified      There is a 3.2 cm right adrenal nodule. Although its imaging features are indeterminate, it does not have suspicious imaging features (heterogeneity, necrosis, irregular margins),  but based on its size, a dedicated adrenal protocol CT is recommended on    an outpatient basis to confirm benignity. Adrenal recommendation based on institutional consensus and Journal of [employer] of Radiology 2017;14:4582-5446         The study was marked in [facility] for immediate notification. Workstation performed: [id number]         XR chest 1 view portable   Final Result by Imani Bryan MD ([id number]/[id number])      Cardiomegaly with pulmonary vascular congestion.                   Workstation performed: [id number]               Results from last 7 days   Lab Units 07/12/23  0637 07/11/23  1731   WBC Thousand/uL 5.46 6.80   HEMOGLOBIN g/dL 13.0 13.5   HEMATOCRIT % 42.3 43.8   PLATELETS Thousands/uL 159 190   NEUTROS ABS Thousands/µL  --  4.71         Results from last 7 days   Lab Units 07/12/23  0637 07/11/23  1731   SODIUM mmol/L 140 139   POTASSIUM mmol/L 4.1 3.7   CHLORIDE mmol/L 103 104   CO2 mmol/L 32 28   ANION GAP mmol/L 5 7   BUN mg/dL 10 10   CREATININE mg/dL 0.63 0.66   EGFR ml/min/1.73sq m 97 95   CALCIUM mg/dL 9.6 10.3*   MAGNESIUM mg/dL 2.1  --      Results from last 7 days   Lab Units 07/11/23  1731   AST U/L 13   ALT U/L 13   ALK PHOS U/L 77   TOTAL PROTEIN g/dL 7.5   ALBUMIN g/dL 4.2   TOTAL BILIRUBIN mg/dL 0.53         Results from last 7 days   Lab Units 07/12/23  0637 07/11/23  1731   GLUCOSE RANDOM mg/dL 120 101 Results from last 7 days   Lab Units 07/11/23 2004 07/11/23  1731   HS TNI 0HR ng/L  --  8   HS TNI 2HR ng/L 7  --    HSTNI D2 ng/L -1  --      Results from last 7 days   Lab Units 07/11/23  1905   D-DIMER QUANTITATIVE ug/ml FEU 0.89*           Results from last 7 days   Lab Units 07/11/23  1731   BNP pg/mL 29         ED Treatment:   Medication Administration from 07/11/2023 1530 to 07/11/2023 2344       Date/Time Order Dose Route Action     07/11/2023 1744 EDT furosemide (LASIX) injection 80 mg 80 mg Intravenous Given     07/11/2023 1814 EDT nitroglycerin (NITRO-BID) 2 % TD ointment 0.5 inch 0.5 inch Topical Given     07/11/2023 2015 EDT iohexol (OMNIPAQUE) 350 MG/ML injection (SINGLE-DOSE) 100 mL 100 mL Intravenous Given        Past Medical History:   Diagnosis Date   • Arthritis    • CHF (congestive heart failure) ([facility])    • COPD (chronic obstructive pulmonary disease) ([address])    • Depression    • Eating disorder    • High blood pressure    • Hypertension    • Obesity All my life   • Urinary incontinence    • Visual impairment 2018     Present on Admission:  • COPD (chronic obstructive pulmonary disease) ([facility])  • Essential hypertension      Admitting Diagnosis: CHF (congestive heart failure) ([facility]) [I50.9]  SOB (shortness of breath) [J49.12]  Chronic diastolic congestive heart failure ([facility]) [I50.32]  Acute on chronic combined systolic and diastolic CHF (congestive heart failure) ([facility]) [I50.43]  Morbid obesity with body mass index (BMI) of 60.0 to 69.9 in adult ([address]) [E66.01, Z68.44]  Age/Sex: 66 y.o. female  Admission Orders:  Scheduled Medications:  escitalopram, 10 mg, Oral, Daily  furosemide, 80 mg, Intravenous, BID (diuretic)  heparin (porcine), 7,500 Units, Subcutaneous, Q8H SYED  losartan, 25 mg, Oral, Daily  oxybutynin, 5 mg, Oral, TID      Continuous IV Infusions:     PRN Meds:  acetaminophen, 650 mg, Oral, Q6H PRN  albuterol, 2 puff, Inhalation, Q6H PRN  melatonin, 3 mg, Oral, HS PRN  senna-docusate sodium, 2 tablet, Oral, HS PRN        IP CONSULT TO NUTRITION SERVICES  IP CONSULT TO CARDIOLOGY    Network Utilization Review Department  ATTENTION: Please call with any questions or concerns to [phone] and carefully listen to the prompts so that you are directed to the right person. All voicemails are confidential.  Braxton Munoz all requests for admission clinical reviews, approved or denied determinations and any other requests to dedicated fax number below belonging to the campus where the patient is receiving treatment.  List of dedicated fax numbers for the Facilities:  [place] DENIALS (Administrative/Medical Necessity) [phone] [address] [facility] (Maternity/NICU/Pediatrics) [phone]   [address] [place] [address] [phone]   [facility] [address] [facility] [phone]   [address] [place] [address] [address] [place] [address] [phone]   [address] [address] [phone]

## 2023-07-12 NOTE — DISCHARGE INSTR - AVS FIRST PAGE
Take your medications as directed, and keep your follow up appointments. Adhere to a heart healthy lifestyle, maintaining a low sodium diet. Daily weight and record. If your weight increases 2-3 lbs in one day, or 5 lbs in 2 days, you are short of breath or have lower extremity swelling, please call the heart failure team at  [facility] at [phone]. Dear Dontrell Kaiser,     It was our pleasure to care for you here at [facility]. It is our hope that we were always able to exceed the expected standards for your care during your stay. You were hospitalized due to congestive heart failure. You were cared for on the 3rd floor by Marcia King PA-C under the service of Fred Lincoln MD with the [facility]ist [facility] who covers for your primary care physician (PCP), Randolph Jones DO, while you were hospitalized. If you have any questions or concerns related to this hospitalization, you may contact us at [phone]. For follow up as well as any medication refills, we recommend that you follow up with your primary care physician. A registered nurse will reach out to you by phone within a few days after your discharge to answer any additional questions that you may have after going home. However, at this time we provide for you here, the most important instructions / recommendations at discharge:     Notable Medication Adjustments -   Change how you take your Lasix from 40 mg twice daily to 80 mg once daily. If you have extra 40 mg tablets left over you can take 2 once a day   Start taking Oxybutynin 5 mg three times daily   Testing Required after Discharge -   None  ** Please contact your PCP to request testing orders for any of the testing recommended here **  Important follow up information -   Follow up with family doctor in 1-2 weeks.  Call Dr. Tea Curiel, your cardiologist, and follow up with him too   Other Instructions - You need to weigh yourself daily. If you gain 3 pounds in 1 day 5 pounds in 3 days call your cardiologist.   Avoid salt, fluid restriction of 2 liters per day  Please review this entire after visit summary as additional general instructions including medication list, appointments, activity, diet, any pertinent wound care, and other additional recommendations from your care team that may be provided for you.       Sincerely,     Yosvany Sosa PA-C

## 2023-07-12 NOTE — CASE MANAGEMENT
Case Management Assessment    Patient name Manjinder Ponce S /S -60 MRN [id number]  : 1960 Date 2023       Current Admission Date: 2023  Current Admission Diagnosis:Acute on chronic heart failure with preserved ejection fraction (HFpEF) [facility])   Patient Active Problem List    Diagnosis Date Noted   • Adrenal nodule ([address]) 2023   • Urinary incontinence 2023   • Ambulatory dysfunction 2023   • Moderate episode of recurrent major depressive disorder ([address]) 2023   • COVID-19 virus infection 2023   • Hypercalcemia 2023   • Hypoxia 2022   • Acute on chronic heart failure with preserved ejection fraction (HFpEF) ([address]) 2022   • Pre-diabetes 2022   • Lymphedema of both lower extremities 2021   • Chronic diastolic congestive heart failure ([address]) 2021   • Insomnia due to medical condition 10/08/2020   • Obstructive sleep apnea syndrome 2020   • Cigarette nicotine dependence without complication    • Morbid obesity with body mass index (BMI) of 60.0 to 69.9 in adult [facility]) 2020   • Essential hypertension 07/10/2019   • Mixed stress and urge urinary incontinence 07/10/2019   • COPD (chronic obstructive pulmonary disease) ([address]) 07/10/2019   • Mixed hyperlipidemia 07/10/2019      LOS (days): 1  Geometric Mean LOS (GMLOS) (days): 3.90  Days to GMLOS:3.1     OBJECTIVE:    Risk of Unplanned Readmission Score: 6.71         Current admission status: Inpatient       Preferred Pharmacy:   [address]  [address] [place] [address] [address] [place] [address]  Phone: [phone] Fax: [phone]    Primary Care Provider: Kenny Woodward DO    Primary Insurance: [facility] REP  Secondary Insurance: [address]  [address]:  [place] Proxies    There are no active Health Care Proxies on file.                       Patient Information  Admitted from[de-identified] Home  Mental Status: Alert  During Assessment patient was accompanied by: Not accompanied during assessment  Assessment information provided by[de-identified] Patient  Primary Caregiver: Self  Support Systems: Family members  [place] of Residence: [facility] [address] [place] [address] do you live in?: [address] entry access options.  Select all that apply.: Stairs  Number of steps to enter home.: 4  Type of Current Residence: 2 story home  Upon entering residence, is there a bedroom on the main floor (no further steps)?: No  A bedroom is located on the following floor levels of residence (select all that apply):: 2nd Floor  Upon entering residence, is there a bathroom on the main floor (no further steps)?: Yes  Indicate which floors of current residence have a bathroom (select all the apply):: 2nd Floor (half bath on first floor)  Number of steps to 2nd floor from main floor: One Flight  In the last 12 months, was there a time when you were not able to pay the mortgage or rent on time?: No  In the last 12 months, was there a time when you did not have a steady place to sleep or slept in a shelter (including now)?: No  Homeless/housing insecurity resource given?: N/A  Living Arrangements: Lives w/ Daughter, Lives w/ Extended Family    Activities of Daily Living Prior to Admission  Functional Status: Independent  Completes ADLs independently?: Yes  Ambulates independently?: Yes  Does patient use assisted devices?: Yes  Assisted Devices (DME) used: [employer]  Does patient currently own DME?: Yes  What DME does the patient currently own?: [employer]  Does patient have a history of Outpatient Therapy (PT/OT)?: No  Does the patient have a history of Short-Term Rehab?: No  Does patient have a history of HHC?: Yes  Does patient currently have [facility]?: No         Patient Information Continued  Within the past 12 months, you worried that your food would run out before you got the money to buy more.: Never true  Within the past 12 months, the food you bought just didn't last and you didn't have money to get more.: Never true  Food insecurity resource given?: N/A         Means of Transportation  Means of Transport to Appts[de-identified] Family transport  In the past 12 months, has lack of transportation kept you from medical appointments or from getting medications?: No  In the past 12 months, has lack of transportation kept you from meetings, work, or from getting things needed for daily living?: No  Was application for public transport provided?: N/A    CM met with patient re: assessment. Patient lives with dtr, son-in-law, grandchildren, and brother-in-law in two story home, [address], second floor bed/bath. Patient independent with ADLS, utilizes RW in community, has hx of [address] and family provides transport to appointments. Confirmed PCP Dee Rapp) and preferred pharmacy [facility]). Patient does not currently have healthcare POA, declined further advance directive info when offered. No CM d/c needs currently identified, CM remains available.

## 2023-07-12 NOTE — PROGRESS NOTES
[facility]  Progress Note  Name: Michael Clement  MRN: [id number]  Unit/Bed#: S -82 I Date of Admission: 7/11/2023   Date of Service: 7/12/2023 I Hospital Day: 1    Assessment/Plan   * Acute on chronic heart failure with preserved ejection fraction (HFpEF) ([address])  Assessment & Plan  · Patient presented with increased shortness of breath; +22 lb since cardiology appt in Feb  Echo 12/2022: EF 70%  BNP 29   Diuresis:   Prescribed home regimen: lasix 40 mg BID. Has been taking only 40 mg daily due to incontinence. Skips doses at times. Continue lasix 80 mg IV  Beta-blocker: none  ARB: losartan 25 mg qd  Monitor intake and output, daily weights. Diet: Fluid restriction and 2 g Sodium. Has O2 at home to use as needed  Consult from Cardiology appreciated    Morbid obesity with body mass index (BMI) of 60.0 to 69.9 in [facility])  Assessment & Plan  · BMI 65.78  · Likely BRADY, OHS. Has sleep study scheduled for the fall  · Interested in bariatric surgery but must first lose 40 lb    COPD (chronic obstructive pulmonary disease) ([address])  Assessment & Plan  · Without exacerbation   · Baseline oxygen is 1-2 liters PRN   · Albuterol PRN    Urinary incontinence  Assessment & Plan  · Patient requesting urology consult for urinary incontinence which is longstanding. · Outpatient office visit with appropriate. However we can do a trial of Ditropan while she is here    Adrenal nodule ([address])  Assessment & Plan  · CT "There is a 3.2 cm right adrenal nodule. Although its imaging features are indeterminate, it does not have suspicious imaging features (heterogeneity, necrosis, irregular margins),  but based on its size, a dedicated adrenal protocol CT is recommended on an outpatient basis to confirm benignity."         VTE Pharmacologic Prophylaxis: VTE Score: 6 sc heparin    Patient Centered Rounds: I performed bedside rounds with nursing staff today.   Discussions with Specialists or Other Care Team Provider: cardiology, case mgmt    Education and Discussions with Family / Patient: Updated  (daughter) via phone. Total Time Spent on Date of Encounter in care of patient: 30 min This time was spent on one or more of the following: performing physical exam; counseling and coordination of care; obtaining or reviewing history; documenting in the medical record; reviewing/ordering tests, medications or procedures; communicating with other healthcare professionals and discussing with patient's family/caregivers. Current Length of Stay: 1 day(s)  Current Patient Status: Inpatient   Certification Statement: The patient will continue to require additional inpatient hospital stay due to ongoing IV diuresis  Discharge Plan: Anticipate discharge in >72 hrs to home. Code Status: Level 1 - Full Code    Subjective:   Patient reports she feels better today than yesterday and had a good amount of urine output saying that she filled the canister 3 times but unfortunately just had an episode of incontinence with a lot of urine output that when in the bed. She is standing for weights. Objective:     Vitals:   Temp (24hrs), Av.9 °F (36.6 °C), Min:97.5 °F (36.4 °C), Max:98.1 °F (36.7 °C)    Temp:  [97.5 °F (36.4 °C)-98.1 °F (36.7 °C)] 97.9 °F (36.6 °C)  HR:  [69-85] 79  Resp:  [18-28] 18  BP: (128-185)/(60-82) 145/75  SpO2:  [91 %-96 %] 94 %  Body mass index is 65.78 kg/m². Input and Output Summary (last 24 hours): Intake/Output Summary (Last 24 hours) at 2023 1152  Last data filed at 2023 0901  Gross per 24 hour   Intake 480 ml   Output 3850 ml   Net -3370 ml       Physical Exam:   Physical Exam  Vitals reviewed. Constitutional:       General: She is not in acute distress. Appearance: She is obese. She is not ill-appearing, toxic-appearing or diaphoretic. Eyes:      General: No scleral icterus. Right eye: No discharge. Left eye: No discharge. Conjunctiva/sclera: Conjunctivae normal.   Cardiovascular:      Rate and Rhythm: Normal rate and regular rhythm. Heart sounds: No murmur heard. Pulmonary:      Effort: No respiratory distress. Breath sounds: No stridor. Rales present. No wheezing or rhonchi. Comments: 2L O2 in place. No evidence of dyspnea tachypnea or respiratory distress. Decreased BS with mild rales at bases  Abdominal:      General: There is no distension. Tenderness: There is no abdominal tenderness. There is no guarding. Musculoskeletal:      Comments: Difficult to assess leg edema due to morbid obesity   Skin:     General: Skin is warm and dry. Coloration: Skin is not jaundiced or pale. Findings: No bruising, erythema, lesion or rash. Neurological:      General: No focal deficit present. Mental Status: She is alert. Mental status is at baseline. Comments: Awake alert interactive   Psychiatric:         Mood and Affect: Mood normal.         Thought Content:  Thought content normal.          Additional Data:     Labs:  Results from last 7 days   Lab Units 07/12/23  0637 07/11/23  1731   WBC Thousand/uL 5.46 6.80   HEMOGLOBIN g/dL 13.0 13.5   HEMATOCRIT % 42.3 43.8   PLATELETS Thousands/uL 159 190   NEUTROS PCT %  --  70   LYMPHS PCT %  --  20   MONOS PCT %  --  7   EOS PCT %  --  2     Results from last 7 days   Lab Units 07/12/23  0637 07/11/23  1731   SODIUM mmol/L 140 139   POTASSIUM mmol/L 4.1 3.7   CHLORIDE mmol/L 103 104   CO2 mmol/L 32 28   BUN mg/dL 10 10   CREATININE mg/dL 0.63 0.66   ANION GAP mmol/L 5 7   CALCIUM mg/dL 9.6 10.3*   ALBUMIN g/dL  --  4.2   TOTAL BILIRUBIN mg/dL  --  0.53   ALK PHOS U/L  --  77   ALT U/L  --  13   AST U/L  --  13   GLUCOSE RANDOM mg/dL 120 101                       Lines/Drains:  Invasive Devices     Peripheral Intravenous Line  Duration           Peripheral IV 07/11/23 Left;Proximal;Ventral (anterior) Forearm <1 day    Peripheral IV 07/11/23 Right Antecubital <1 day              Imaging:    Recent Cultures (last 7 days):         Last 24 Hours Medication List:   Current Facility-Administered Medications   Medication Dose Route Frequency Provider Last Rate   • acetaminophen  650 mg Oral Q6H PRN RONALD Forbes     • albuterol  2 puff Inhalation Q6H PRN RONALD Forbes     • escitalopram  10 mg Oral Daily RONALD Forbes     • furosemide  80 mg Intravenous BID (diuretic) RONALD Forbes     • heparin (porcine)  7,500 Units Subcutaneous Count includes the [facility] RONALD Forbes     • losartan  25 mg Oral Daily RONALD Forbes     • melatonin  3 mg Oral HS PRN RONALD Forbes     • oxybutynin  5 mg Oral TID Ha Wynn PA-C     • senna-docusate sodium  2 tablet Oral HS PRN RONALD Forbes          Today, Patient Was Seen By: Ha Wynn PA-C    **Please Note: This note may have been constructed using a voice recognition system. **

## 2023-07-12 NOTE — ASSESSMENT & PLAN NOTE
· BMI 65.78  · Likely BRADY, OHS.  Has sleep study scheduled for the fall  · Interested in bariatric surgery but must first lose 40 lb

## 2023-07-12 NOTE — ASSESSMENT & PLAN NOTE
· CT "There is a 3.2 cm right adrenal nodule.  Although its imaging features are indeterminate, it does not have suspicious imaging features (heterogeneity, necrosis, irregular margins),  but based on its size, a dedicated adrenal protocol CT is recommended on an outpatient basis to confirm benignity."

## 2023-07-12 NOTE — ASSESSMENT & PLAN NOTE
· Likely BRADY, OHS  · Has sleep study scheduled for the fall  · Interested in bariatric surgery but must first lose 40 lb

## 2023-07-12 NOTE — ASSESSMENT & PLAN NOTE
· Patient requesting urology consult for urinary incontinence which is longstanding. · Outpatient office visit with appropriate.   However we can do a trial of Ditropan while she is here

## 2023-07-12 NOTE — H&P
[address] [facility]  H&P  Name: Caio Pimentel 66 y.o. female I MRN: [id number]  Unit/Bed#: S -01 I Date of Admission: 7/11/2023   Date of Service: 7/12/2023 I Hospital Day: 1      Assessment/Plan   * Acute on chronic heart failure with preserved ejection fraction (HFpEF) ([facility])  Assessment & Plan  · +22 lb since cardiology appt in Feb  Echo 12/2022: EF 70%  Initial troponin: 8 to 7  BNP 29   Diuresis:   Prescribed home regimen: lasix 40 mg BID. Has been taking only 40 mg daily due to incontinence. Skips doses at times. Continue lasix 80 mg IV  Beta-blocker: none  ARB: losartan 25 mg qd  Monitor intake and output, daily weights. Diet: Fluid restriction and 2 g Sodium. Consult Cardiology. Ambulatory dysfunction  Assessment & Plan  · Ambulates with walker at baseline  · Likely due to acute CHF exacerbation, deconditioning, obesity  · May need PT OT eval    Moderate episode of recurrent major depressive disorder ([address])  Assessment & Plan  · Continue lexapro    Morbid obesity with body mass index (BMI) of 60.0 to 69.9 in adult [facility])  Assessment & Plan  · Likely BRADY, [facility]  · Has sleep study scheduled for the fall  · Interested in bariatric surgery but must first lose 40 lb    COPD (chronic obstructive pulmonary disease) ([address])  Assessment & Plan  · Without exacerbation   · Baseline oxygen is 1-2 liters PRN   · Albuterol PRN    Essential hypertension  Assessment & Plan  · BP elevated on arrival.  Given nitro paste and IV lasix with improvement  · Continue losartan   · Diurese as above       VTE Pharmacologic Prophylaxis: VTE Score: 6 High Risk (Score >/= 5) - Pharmacological DVT Prophylaxis Ordered: heparin. Sequential Compression Devices Ordered. Code Status: Prior full  Discussion with family: Patient declined call to .      Anticipated Length of Stay: Patient will be admitted on an inpatient basis with an anticipated length of stay of greater than 2 midnights secondary to IV diuresis. Total Time Spent on Date of Encounter in care of patient: 75 minutes This time was spent on one or more of the following: performing physical exam; counseling and coordination of care; obtaining or reviewing history; documenting in the medical record; reviewing/ordering tests, medications or procedures; communicating with other healthcare professionals and discussing with patient's family/caregivers. Chief Complaint: shortness of breath    History of Present Illness:  Serg Anaya is a 66 y.o. female with a PMH of HFpEF, COPD, chronic respiratory failure on 1-2 L PRN, obesity, HTN, prediabetes, lymphedema, hysterectomy who presents with worsening short of breath for the past few weeks. She also notes weight gain and leg swelling. Has chronic lymphedema which is worse on left. She had an appointment with her PCP today who recommended ED evaluation. Since her office visit with cardiology in February, she is +22 pounds. Denies fever, chills, chest pain, palpitation, n/v/d abdominal pain, calf tenderness. Medication adherence is an issue. She is prescribed lasix 40 mg BID but only takes daily or skips at times due to urinary incontinence. She missed about 3 days worth this past week. She reports incontinence began following hysterectomy with bladder sling. She also reports onset of lymphedema after hysterectomy. Patient denies adding salt to her food. She reports she does not eat pre packed foods. She reports difficulty with portion control. She wishes to undergo bariatric surgery but first must lose 40 lbs. On arrival to ED, ddimer was sent and elevated. She underwent CTA which was negative for PE. Start lasix 80 mg IV BID and consult cardiology. Review of Systems:  Review of Systems   Constitutional: Positive for unexpected weight change. Respiratory: Positive for shortness of breath. Cardiovascular: Positive for leg swelling.    All other systems reviewed and are negative. Past Medical and Surgical History:   Past Medical History:   Diagnosis Date   • Arthritis    • CHF (congestive heart failure) ([address])    • COPD (chronic obstructive pulmonary disease) ([facility])    • Depression    • Eating disorder    • High blood pressure    • Hypertension    • Obesity All my life   • Urinary incontinence    • Visual impairment 2018       Past Surgical History:   Procedure Laterality Date   • HYSTERECTOMY     • INCONTINENCE SURGERY     • JOINT REPLACEMENT  2015    Right knee   • OOPHORECTOMY Bilateral    • REPLACEMENT TOTAL KNEE Right 2016   • TOOTH EXTRACTION  02/15/2023    2/15/23,3/16/23,4/27/23   • TOTAL ABDOMINAL HYSTERECTOMY W/ BILATERAL SALPINGOOPHORECTOMY  2006       Meds/Allergies:  Prior to Admission medications    Medication Sig Start Date End Date Taking?  Authorizing Provider   acetaminophen (TYLENOL) 500 mg tablet Take 500 mg by mouth if needed for mild pain  Patient not taking: Reported on 7/11/2023    Historical Provider, MD   albuterol (PROVENTIL HFA,VENTOLIN HFA) 90 mcg/act inhaler Inhale 2 puffs every 6 (six) hours as needed for wheezing or shortness of breath  Patient taking differently: Inhale 2 puffs if needed for wheezing or shortness of breath 11/3/21   Laurel Lyons DO   escitalopram (Lexapro) 10 mg tablet Take 1 tablet (10 mg total) by mouth daily 5/31/23   Laurel Lyons DO   furosemide (LASIX) 40 mg tablet Take 1 tablet (40 mg total) by mouth 2 (two) times a day  Patient taking differently: Take 40 mg by mouth 2 (two) times a day Patient takes lasix daily unless she knows she leaving the house and doesn't take 4/3/23 7/11/23  Mikel Holder PA-C   ibuprofen (Advil) 200 mg tablet Take 200 mg by mouth every 6 (six) hours as needed for mild pain    Historical Provider, MD   losartan (COZAAR) 25 mg tablet Take 1 tablet (25 mg total) by mouth daily 5/31/23   Laurel Lyons DO     I have reviewed home medications with a medical source (PCP, Pharmacy, other). Allergies: Allergies   Allergen Reactions   • Lisinopril Cough       Social History:  Marital Status:    Occupation:   Patient Pre-hospital Living Situation: Home  Patient Pre-hospital Level of Mobility: walks with walker  Patient Pre-hospital Diet Restrictions:   Substance Use History:   Social History     Substance and Sexual Activity   Alcohol Use Never    Comment: occassional     Social History     Tobacco Use   Smoking Status Former   • Packs/day: 0.50   • Years: 35.00   • Total pack years: 17.50   • Types: Cigarettes   • Start date: 1976   • Quit date: 2023   • Years since quittin.0   Smokeless Tobacco Never     Social History     Substance and Sexual Activity   Drug Use Not Currently   • Types: Marijuana    Comment: monthly at most       Family History:  Family History   Problem Relation Age of Onset   • Other Mother    • Aneurysm Mother         aortic   • Dementia Mother    • Heart failure Father    • Diabetes Father         Dad   • Lung cancer Sister    • Cancer Sister    • Dementia Maternal Grandmother    • Dementia Maternal Aunt        Physical Exam:     Vitals:   Blood Pressure: 140/63 (23)  Pulse: 73 (23)  Temperature: 97.5 °F (36.4 °C) (23 1653)  Temp Source: Oral (23)  Respirations: 18 (23)  SpO2: 94 % (23)    Physical Exam  Constitutional:       General: She is not in acute distress. Appearance: Normal appearance. She is not ill-appearing, toxic-appearing or diaphoretic. HENT:      Head: Normocephalic and atraumatic. Right Ear: External ear normal.      Left Ear: External ear normal.      Nose: Nose normal.      Mouth/Throat:      Pharynx: Oropharynx is clear. Eyes:      General: No scleral icterus. Extraocular Movements: Extraocular movements intact. Conjunctiva/sclera: Conjunctivae normal.   Cardiovascular:      Rate and Rhythm: Normal rate and regular rhythm.       Pulses: Normal pulses. Heart sounds: Normal heart sounds. Pulmonary:      Effort: Pulmonary effort is normal.      Breath sounds: Normal breath sounds. No rales. Comments: 3 liters nasal cannula  Mild conversational dyspnea  Abdominal:      General: Bowel sounds are normal.      Palpations: Abdomen is soft. Musculoskeletal:         General: Normal range of motion. Cervical back: Normal range of motion. Right lower leg: Edema present. Left lower leg: Edema present. Skin:     General: Skin is warm and dry. Capillary Refill: Capillary refill takes less than 2 seconds. Neurological:      General: No focal deficit present. Mental Status: She is alert and oriented to person, place, and time. Psychiatric:         Mood and Affect: Mood normal.         Behavior: Behavior normal.          Additional Data:     Lab Results:  Results from last 7 days   Lab Units 07/11/23  1731   WBC Thousand/uL 6.80   HEMOGLOBIN g/dL 13.5   HEMATOCRIT % 43.8   PLATELETS Thousands/uL 190   NEUTROS PCT % 70   LYMPHS PCT % 20   MONOS PCT % 7   EOS PCT % 2     Results from last 7 days   Lab Units 07/11/23  1731   SODIUM mmol/L 139   POTASSIUM mmol/L 3.7   CHLORIDE mmol/L 104   CO2 mmol/L 28   BUN mg/dL 10   CREATININE mg/dL 0.66   ANION GAP mmol/L 7   CALCIUM mg/dL 10.3*   ALBUMIN g/dL 4.2   TOTAL BILIRUBIN mg/dL 0.53   ALK PHOS U/L 77   ALT U/L 13   AST U/L 13   GLUCOSE RANDOM mg/dL 101                       Lines/Drains:  Invasive Devices     Peripheral Intravenous Line  Duration           Peripheral IV 07/11/23 Left;Proximal;Ventral (anterior) Forearm <1 day    Peripheral IV 07/11/23 Right Antecubital <1 day                    Imaging: PE study pending  CTA ED chest PE Study   Final Result by Carlos Enrique Roberson DO (07/11 2230)      No pulmonary embolus or other acute abnormality identified      There is a 3.2 cm right adrenal nodule.  Although its imaging features are indeterminate, it does not have suspicious imaging features (heterogeneity, necrosis, irregular margins),  but based on its size, a dedicated adrenal protocol CT is recommended on    an outpatient basis to confirm benignity. Adrenal recommendation based on institutional consensus and Journal of [facility],  of Radiology 2017;14:[id number]         The study was marked in [facility] for immediate notification. Workstation performed: [id number]         XR chest 1 view portable    (Results Pending)       EKG and Other Studies Reviewed on Admission:   · EKG: NSR. HR 73.    ** Please Note: This note has been constructed using a voice recognition system.  **

## 2023-07-12 NOTE — ASSESSMENT & PLAN NOTE
· BP elevated on arrival.  Given nitro paste and IV lasix with improvement  · Continue losartan   · Diurese as above

## 2023-07-12 NOTE — ASSESSMENT & PLAN NOTE
· Patient presented with increased shortness of breath; +22 lb since cardiology appt in Feb  Echo 12/2022: EF 70%  BNP 29   Diuresis:   Prescribed home regimen: lasix 40 mg BID. Has been taking only 40 mg daily due to incontinence. Skips doses at times. Continue lasix 80 mg IV  Beta-blocker: none  ARB: losartan 25 mg qd  Monitor intake and output, daily weights. Diet: Fluid restriction and 2 g Sodium.   Has O2 at home to use as needed  Consult from Cardiology appreciated

## 2023-07-12 NOTE — CONSULTS
Consultation - Cardiology Team One  Fabrizio Maciel 66 y.o. female MRN: [id number]  Unit/Bed#: S -29 Encounter: [id number]    Inpatient consult to Cardiology  Consult performed by: RONALD Theodore  Consult ordered by: Benjamin Schwab, CRNP      Physician Requesting Consult: Dom Pradhan MD  Reason for Consult / Principal Problem: CHF    Assessment    1. Acute on chronic diastolic CHF  Presented with several weeks of worsening shortness of breath and weight gain  This is in the setting of diuretic noncompliance, only taking Lasix daily instead of twice daily and occasionally skipping days altogether  BNP 29, less reliable in morbidly obese patients  CXR with significant vascular congestion and she is overloaded on exam  Home diuretic: Lasix 40 mg twice daily but only taking daily  Dry weight: 368 pounds as of December 2022, had been 382 pounds in office in February some of which was thought to be caloric  Current weight: 395 pounds by standing scale  Responding well to Lasix 80 mg IV twice daily with 3.7 L of urine output yesterday; renal function stable. 2.  Acute on chronic hypoxic respiratory failure  Wears 1-2 L PRN at home, has not needed over past 3-4 months until recently  Currently maintaining adequate O2 saturations on 2 L nasal cannula    3. HTN-elevated on admission but improving with diuresis, most recently 145/75. She is on IV diuretics and losartan 25 mg daily    4. COPD-Per primary team    5. Prediabetes-A1c 6.1%    6. Morbid obesity with probable BRADY and OHS    7. Urinary incontinence    Plan  · Continue Lasix 80 mg IV twice daily  · Follow strict I/Os, daily standing weights   · Check a.m.  BMP  · 2 g sodium diet with 1500 mL fluid restriction  · No need for telemetry monitoring or repeat echocardiogram at this time  · Once euvolemic/ready for discharge planning, will plan to use Lasix 80 mg PO daily to aid compliance with appropriate diuretic dosing  · Defer management of urinary incontinence to SLIM    History of Present Illness   HPI: Shawn Waldrop is a 66y.o. year old female with chronic diastolic CHF, chronic hypoxic respiratory failure, probable BRADY and OHS, COPD, HTN, prediabetes, and morbid obesity. She follows with cardiologist Dr. Luis Enrique Hernandez and was last seen in the office in February 2023 at which time no medication changes were made. Echocardiogram in December 2022 with LVEF 93%, grade 1 diastolic dysfunction, mild biatrial enlargement, and mild TR. her discharge weight in December was 368 pounds. She was up to 382 pounds in the office in February on Lasix 40 mg twice daily. She presented to the ED on 7/11/2023 with complaints of several weeks of worsening shortness of breath as well as weight gain and lower extremity swelling. She saw her PCP on the day of admission who recommended she go to the ED for further evaluation. Weight is up significantly from her last office visit. She has been noncompliant with diuretic, only taking Lasix daily or skipping at times due to urinary incontinence. She missed 3 days worth of diuretics in the past week. D-dimer was elevated and she underwent CTA that was negative for PE.  CXR shows pulmonary vascular congestion. High-sensitivity troponins are negative and BNP is only 29 however this is less reliable in a morbidly obese patient. Hypertensive on arrival, 170s/80s. Cardiology consulted for further evaluation and management of her diastolic CHF exacerbation. She has been started on Lasix 80 mg IV twice daily with over 3 L of urine output documented yesterday. EKG reviewed personally: NSR    Telemetry reviewed personally: N/A    Review of Systems   Constitutional: Positive for weight gain. Negative for chills and malaise/fatigue. Cardiovascular: Positive for dyspnea on exertion and leg swelling. Negative for chest pain, orthopnea, palpitations and syncope. Respiratory: Positive for shortness of breath. Negative for cough, sleep disturbances due to breathing, sputum production and wheezing. Gastrointestinal: Negative for bloating, nausea and vomiting. Genitourinary: Positive for bladder incontinence. Neurological: Positive for dizziness (if changes positions too quickly). Negative for light-headedness and weakness. Psychiatric/Behavioral: Negative for altered mental status. All other systems reviewed and are negative.     Historical Information   Past Medical History:   Diagnosis Date   • Arthritis    • CHF (congestive heart failure) ([facility])    • COPD (chronic obstructive pulmonary disease) ([facility])    • Depression    • Eating disorder    • High blood pressure    • Hypertension    • Obesity All my life   • Urinary incontinence    • Visual impairment      Past Surgical History:   Procedure Laterality Date   • HYSTERECTOMY     • INCONTINENCE SURGERY     • JOINT REPLACEMENT      Right knee   • OOPHORECTOMY Bilateral    • REPLACEMENT TOTAL KNEE Right 2016   • TOOTH EXTRACTION  02/15/2023    2/15/23,3/16/23,23   • TOTAL ABDOMINAL HYSTERECTOMY W/ BILATERAL SALPINGOOPHORECTOMY  2006     Social History     Substance and Sexual Activity   Alcohol Use Never    Comment: occassional     Social History     Substance and Sexual Activity   Drug Use Not Currently   • Types: Marijuana    Comment: monthly at most     Social History     Tobacco Use   Smoking Status Former   • Packs/day: 0.50   • Years: 35.00   • Total pack years: 17.50   • Types: Cigarettes   • Start date: 1976   • Quit date: 2023   • Years since quittin.0   Smokeless Tobacco Never     Family History:   Family History   Problem Relation Age of Onset   • Other Mother    • Aneurysm Mother         aortic   • Dementia Mother    • Heart failure Father    • Diabetes Father         Dad   • Lung cancer Sister    • Cancer Sister    • Dementia Maternal Grandmother    • Dementia Maternal Aunt        Meds/Allergies   all current active meds have been reviewed and current meds:   Current Facility-Administered Medications   Medication Dose Route Frequency   • acetaminophen (TYLENOL) tablet 650 mg  650 mg Oral Q6H PRN   • albuterol (PROVENTIL HFA,VENTOLIN HFA) inhaler 2 puff  2 puff Inhalation Q6H PRN   • escitalopram (LEXAPRO) tablet 10 mg  10 mg Oral Daily   • furosemide (LASIX) injection 80 mg  80 mg Intravenous BID (diuretic)   • heparin (porcine) subcutaneous injection 7,500 Units  7,500 Units Subcutaneous Q8H [address]   • losartan (COZAAR) tablet 25 mg  25 mg Oral Daily   • melatonin tablet 3 mg  3 mg Oral HS PRN   • senna-docusate sodium (SENOKOT S) 8.6-50 mg per tablet 2 tablet  2 tablet Oral HS PRN          Allergies   Allergen Reactions   • Lisinopril Cough     Objective   Vitals: Blood pressure 145/75, pulse 79, temperature 97.9 °F (36.6 °C), temperature source Oral, resp. rate 18, weight (!) 179 kg (395 lb 4.6 oz), SpO2 94 %. , Body mass index is 65.78 kg/m². ,     Systolic (96CLT), VHE:446 , Min:128 , VTQ:466     Diastolic (69NEZ), NUJ:06, Min:60, Max:82    Intake/Output Summary (Last 24 hours) at 7/12/2023 0940  Last data filed at 7/12/2023 0901  Gross per 24 hour   Intake 480 ml   Output 3850 ml   Net -3370 ml     Wt Readings from Last 3 Encounters:   07/12/23 (!) 179 kg (395 lb 4.6 oz)   07/11/23 (!) 184 kg (404 lb 12.8 oz)   04/03/23 (!) 175 kg (386 lb)     Invasive Devices     Peripheral Intravenous Line  Duration           Peripheral IV 07/11/23 Left;Proximal;Ventral (anterior) Forearm <1 day    Peripheral IV 07/11/23 Right Antecubital <1 day              Physical Exam  Vitals reviewed. Constitutional:       General: She is not in acute distress. Appearance: She is obese. Neck:      Comments: Unable to assess JVD due to body habitus  Cardiovascular:      Rate and Rhythm: Normal rate and regular rhythm. Pulses: Normal pulses. Heart sounds: Normal heart sounds. No murmur heard. No friction rub. No gallop.    Pulmonary: Effort: No respiratory distress. Breath sounds: Rales present. No wheezing. Comments: Dyspneic with conversation, on 2 L nasal cannula  Abdominal:      General: Bowel sounds are normal. There is distension. Palpations: Abdomen is soft. Tenderness: There is no abdominal tenderness. Musculoskeletal:         General: No tenderness. Normal range of motion. Cervical back: Neck supple. Right lower leg: Edema present. Left lower leg: Edema present. Skin:     General: Skin is warm and dry. Findings: No erythema. Neurological:      Mental Status: She is alert and oriented to person, place, and time.    Psychiatric:         Mood and Affect: Mood normal.         LABORATORY RESULTS:      CBC with diff:   Results from last 7 days   Lab Units 07/12/23  0637 07/11/23  1731   WBC Thousand/uL 5.46 6.80   HEMOGLOBIN g/dL 13.0 13.5   HEMATOCRIT % 42.3 43.8   MCV fL 95 95   PLATELETS Thousands/uL 159 190   RBC Million/uL 4.44 4.59   MCH pg 29.3 29.4   MCHC g/dL 30.7* 30.8*   RDW % 15.5* 15.4*   MPV fL 10.5 11.0   NRBC AUTO /100 WBCs  --  0     CMP:  Results from last 7 days   Lab Units 07/12/23  0637 07/11/23  1731   POTASSIUM mmol/L 4.1 3.7   CHLORIDE mmol/L 103 104   CO2 mmol/L 32 28   BUN mg/dL 10 10   CREATININE mg/dL 0.63 0.66   CALCIUM mg/dL 9.6 10.3*   AST U/L  --  13   ALT U/L  --  13   ALK PHOS U/L  --  77   EGFR ml/min/1.73sq m 97 95     BMP:  Results from last 7 days   Lab Units 07/12/23  0637 07/11/23  1731   POTASSIUM mmol/L 4.1 3.7   CHLORIDE mmol/L 103 104   CO2 mmol/L 32 28   BUN mg/dL 10 10   CREATININE mg/dL 0.63 0.66   CALCIUM mg/dL 9.6 10.3*     Lab Results   Component Value Date    CREATININE 0.63 07/12/2023    CREATININE 0.66 07/11/2023    CREATININE 0.89 12/16/2022     Lab Results   Component Value Date    NTBNP 23 08/31/2022    NTBNP 27 11/04/2019    NTBNP 65 07/03/2019      Results from last 7 days   Lab Units 07/12/23  0637   MAGNESIUM mg/dL 2.1       Lipid Profile: Lab Results   Component Value Date    CHOL 206 2015     Lab Results   Component Value Date    HDL 41 (L) 2022    HDL 42 (L) 2020    HDL 45 2015     Lab Results   Component Value Date    LDLCALC 123 (H) 2022    LDLCALC 131 (H) 2020    LDLCALC 136 (H) 2015     Lab Results   Component Value Date    TRIG 115 2022    TRIG 108 2020    TRIG 126 2015       Cardiac testing:   Results for orders placed during the hospital encounter of 02/10/21    Echo complete with contrast if indicated    Narrative  [address]  [address] [address]  [address]  [phone]    Transthoracic Echocardiogram  2D, M-mode, Doppler, and Color Doppler    Study date:  10-Feb-2021    Patient: Emma Brunson  MR number: [id number]  Account number: [de-identified]  : 1960  Age: 63 years  Gender: Female  Status: Outpatient  Location: [address] [place] [address] [facility] [place]  Height: 65 in  Weight: 386.1 lb  BP: 110/ 74 mmHg    Indications: Pedal edema;SOB on exertion;CHF. Diagnoses: R06.02 - Shortness of breath, R60.9 - Edema, unspecified    Sonographer:  DARYL Lozano  Primary Physician:  Svetlana Wooten DO  Referring Physician:  Svetlana Wooten DO  Group:  [facility]  Cardiology Fellow: Mike Wade MD  Interpreting Physician:  Gibson Feldman MD    SUMMARY    LEFT VENTRICLE:  Systolic function was normal. Ejection fraction was estimated to be 65 %. There were no regional wall motion abnormalities. Doppler parameters were consistent with abnormal left ventricular relaxation (grade 1 diastolic dysfunction). TRICUSPID VALVE:  There was trace regurgitation. IVC, HEPATIC VEINS:  The inferior vena cava was dilated. Respirophasic changes were blunted (less than 50% variation). HISTORY: PRIOR HISTORY: BRADY;Smoker; Morbid obesity;HLD;HTN;COPD. PROCEDURE: The study was performed in the [address] [facility] [place]. This was a routine study. The transthoracic approach was used. The study included complete 2D imaging, M-mode, complete spectral Doppler, and color Doppler. The  heart rate was 80 bpm, at the start of the study. Images were obtained from the parasternal, apical, subcostal, and suprasternal notch acoustic windows. Echocardiographic views were limited due to poor acoustic window availability,  decreased penetration, and lung interference. This was a technically difficult study. LEFT VENTRICLE: Size was normal. Systolic function was normal. Ejection fraction was estimated to be 65 %. There were no regional wall motion abnormalities. Wall thickness was normal. DOPPLER: Doppler parameters were consistent with  abnormal left ventricular relaxation (grade 1 diastolic dysfunction). RIGHT VENTRICLE: The size was normal. Systolic function was normal. Wall thickness was normal.    LEFT ATRIUM: Size was normal.    RIGHT ATRIUM: Size was normal.    MITRAL VALVE: Valve structure was normal. There was normal leaflet separation. DOPPLER: The transmitral velocity was within the normal range. There was no evidence for stenosis. There was no regurgitation. AORTIC VALVE: The valve was trileaflet. Leaflets exhibited normal thickness and normal cuspal separation. DOPPLER: Transaortic velocity was within the normal range. There was no evidence for stenosis. There was no regurgitation. TRICUSPID VALVE: The valve structure was normal. There was normal leaflet separation. DOPPLER: The transtricuspid velocity was within the normal range. There was no evidence for stenosis. There was trace regurgitation. PULMONIC VALVE: Leaflets exhibited normal thickness, no calcification, and normal cuspal separation. DOPPLER: The transpulmonic velocity was within the normal range. There was no regurgitation. PERICARDIUM: There was no pericardial effusion. The pericardium was normal in appearance.     AORTA: The root exhibited normal size.    SYSTEMIC VEINS: IVC: The inferior vena cava was dilated. Respirophasic changes were blunted (less than 50% variation). SYSTEM MEASUREMENT TABLES    2D  %FS: 27.94 %  Ao Diam: 3.29 cm  EDV(Teich): 171.86 ml  EF(Teich): 53.32 %  ESV(Teich): 80.23 ml  IVSd: 1.21 cm  LA Area: 23.96 cm2  LA Diam: 4.68 cm  LVEDV MOD A4C: 109.9 ml  LVEF MOD A4C: 64.35 %  LVESV MOD A4C: 39.18 ml  LVIDd: 5.88 cm  LVIDs: 4.24 cm  LVLd A4C: 8.59 cm  LVLs A4C: 6.49 cm  LVPWd: 1.21 cm  RA Area: 17.2 cm2  RVIDd: 4.01 cm  SV MOD A4C: 70.72 ml  SV(Teich): 91.63 ml    CW  AV Vmax: 1.57 m/s  AV maxP.92 mmHg    MM  TAPSE: 2.7 cm    PW  E' Sept: 0.04 m/s  E/E' Sept: 22.85  MV A Vito: 1.05 m/s  MV [place]: 3.42 m/s2  MV DecT: 258.2 ms  MV E Vito: 0.88 m/s  MV E/A Ratio: 0.84  MV PHT: 74.88 ms  MVA By PHT: 2.94 cm2    [employer] Accredited Echocardiography Laboratory    Prepared and electronically signed by    Adela Cortez MD  Signed 10-Feb-2021 13:57:17    Imaging: I have personally reviewed pertinent reports. and I have personally reviewed pertinent films in PACS  XR chest 1 view portable    Result Date: 2023  Narrative: CHEST INDICATION:   CHF. COMPARISON: 2022 EXAM PERFORMED/VIEWS:  XR CHEST PORTABLE FINDINGS: Cardiomediastinal silhouette appears enlarged. Pulmonary gastric congestion without pleural effusion or pneumothorax. Osseous structures appear within normal limits for patient age. Impression: Cardiomegaly with pulmonary vascular congestion. Workstation performed: [id number]     CTA ED chest PE Study    Result Date: 2023  Narrative: CTA - CHEST WITH IV CONTRAST - PULMONARY ANGIOGRAM INDICATION:   sob, + dimer. COMPARISON: Multiple priors most recently same day chest radiograph TECHNIQUE: CTA examination of the chest was performed using angiographic technique according to a protocol specifically tailored to evaluate for pulmonary embolism.   Multiplanar 2D reformatted images were created from the source data. In addition, coronal 3D MIP postprocessing was performed on the acquisition scanner. Radiation dose length product (DLP) for this visit:  865 mGy-cm . This examination, like all CT scans performed in the [facility], was performed utilizing techniques to minimize radiation dose exposure, including the use of iterative reconstruction and automated exposure control. IV Contrast:  100 mL of iohexol (OMNIPAQUE) FINDINGS: PULMONARY ARTERIAL TREE:  No pulmonary embolus is seen. LUNGS: Right basilar subsegmental atelectasis. There is no tracheal or endobronchial lesion. PLEURA:  Unremarkable. HEART/GREAT VESSELS:  Unremarkable for patient's age. No thoracic aortic aneurysm. MEDIASTINUM AND KWAN:  Unremarkable. CHEST WALL AND LOWER NECK:   Unremarkable. VISUALIZED STRUCTURES IN THE UPPER ABDOMEN: 3.2 cm right adrenal nodule OSSEOUS STRUCTURES:  Spinal degenerative changes are noted. No acute fracture or destructive osseous lesion. Impression: No pulmonary embolus or other acute abnormality identified There is a 3.2 cm right adrenal nodule. Although its imaging features are indeterminate, it does not have suspicious imaging features (heterogeneity, necrosis, irregular margins),  but based on its size, a dedicated adrenal protocol CT is recommended on an outpatient basis to confirm benignity. Adrenal recommendation based on institutional consensus and Journal of [employer] of Radiology 2017;14:[id number] The study was marked in [facility] for immediate notification. Workstation performed: [id number]     Thank you for allowing us to participate in this patient's care. This pt will follow up with Dr. Margo Shepherd once discharged. Counseling / Coordination of Care  Total floor / unit time spent today 45 minutes. Greater than 50% of total time was spent with the patient and / or family counseling and / or coordination of care.   A description of the counseling / coordination of care: Review of history, current assessment, development of a plan. Code Status: Level 1 - Full Code    ** Please Note: Dragon 360 Dictation voice to text software may have been used in the creation of this document.  **

## 2023-07-12 NOTE — PLAN OF CARE
Problem: MOBILITY - ADULT  Goal: Maintain or return to baseline ADL function  Description: INTERVENTIONS:  -  Assess patient's ability to carry out ADLs; assess patient's baseline for ADL function and identify physical deficits which impact ability to perform ADLs (bathing, care of mouth/teeth, toileting, grooming, dressing, etc.)  - Assess/evaluate cause of self-care deficits   - Assess range of motion  - Assess patient's mobility; develop plan if impaired  - Assess patient's need for assistive devices and provide as appropriate  - Encourage maximum independence but intervene and supervise when necessary  - Involve family in performance of ADLs  - Assess for home care needs following discharge   - Consider OT consult to assist with ADL evaluation and planning for discharge  - Provide patient education as appropriate  Outcome: Progressing  Goal: Maintains/Returns to pre admission functional level  Description: INTERVENTIONS:  - Perform BMAT or MOVE assessment daily.   - Set and communicate daily mobility goal to care team and patient/family/caregiver. - Collaborate with rehabilitation services on mobility goals if consulted  - Perform Range of Motion times a day. - Reposition patient every  hours.   - Dangle patient  times a day  - Stand patient  times a day  - Ambulate patient  times a day  - Out of bed to chair  times a day   - Out of bed for meals times a day  - Out of bed for toileting  - Record patient progress and toleration of activity level   Outcome: Progressing

## 2023-07-12 NOTE — ASSESSMENT & PLAN NOTE
· +22 lb since cardiology appt in Feb  Echo 12/2022: EF 70%  Initial troponin: 8 to 7  BNP 29   Diuresis:   Prescribed home regimen: lasix 40 mg BID. Has been taking only 40 mg daily due to incontinence. Skips doses at times. Continue lasix 80 mg IV  Beta-blocker: none  ARB: losartan 25 mg qd  Monitor intake and output, daily weights. Diet: Fluid restriction and 2 g Sodium. Consult Cardiology.

## 2023-07-13 LAB
ANION GAP SERPL CALCULATED.3IONS-SCNC: 1 MMOL/L
BUN SERPL-MCNC: 14 MG/DL (ref 5–25)
CALCIUM SERPL-MCNC: 9.6 MG/DL (ref 8.4–10.2)
CHLORIDE SERPL-SCNC: 101 MMOL/L (ref 96–108)
CO2 SERPL-SCNC: 34 MMOL/L (ref 21–32)
CREAT SERPL-MCNC: 0.63 MG/DL (ref 0.6–1.3)
GFR SERPL CREATININE-BSD FRML MDRD: 97 ML/MIN/1.73SQ M
GLUCOSE SERPL-MCNC: 119 MG/DL (ref 65–140)
POTASSIUM SERPL-SCNC: 3.7 MMOL/L (ref 3.5–5.3)
SODIUM SERPL-SCNC: 136 MMOL/L (ref 135–147)

## 2023-07-13 PROCEDURE — 80048 BASIC METABOLIC PNL TOTAL CA: CPT | Performed by: PHYSICIAN ASSISTANT

## 2023-07-13 PROCEDURE — 99232 SBSQ HOSP IP/OBS MODERATE 35: CPT | Performed by: INTERNAL MEDICINE

## 2023-07-13 PROCEDURE — 99232 SBSQ HOSP IP/OBS MODERATE 35: CPT | Performed by: PHYSICIAN ASSISTANT

## 2023-07-13 RX ADMIN — HEPARIN SODIUM 7500 UNITS: 5000 INJECTION INTRAVENOUS; SUBCUTANEOUS at 21:28

## 2023-07-13 RX ADMIN — OXYBUTYNIN CHLORIDE 5 MG: 5 TABLET ORAL at 09:28

## 2023-07-13 RX ADMIN — FUROSEMIDE 80 MG: 10 INJECTION, SOLUTION INTRAMUSCULAR; INTRAVENOUS at 16:48

## 2023-07-13 RX ADMIN — LOSARTAN POTASSIUM 25 MG: 25 TABLET, FILM COATED ORAL at 09:27

## 2023-07-13 RX ADMIN — FUROSEMIDE 80 MG: 10 INJECTION, SOLUTION INTRAMUSCULAR; INTRAVENOUS at 09:27

## 2023-07-13 RX ADMIN — OXYBUTYNIN CHLORIDE 5 MG: 5 TABLET ORAL at 15:25

## 2023-07-13 RX ADMIN — HEPARIN SODIUM 7500 UNITS: 5000 INJECTION INTRAVENOUS; SUBCUTANEOUS at 05:45

## 2023-07-13 RX ADMIN — HEPARIN SODIUM 7500 UNITS: 5000 INJECTION INTRAVENOUS; SUBCUTANEOUS at 15:00

## 2023-07-13 RX ADMIN — ESCITALOPRAM 10 MG: 10 TABLET, FILM COATED ORAL at 09:28

## 2023-07-13 RX ADMIN — OXYBUTYNIN CHLORIDE 5 MG: 5 TABLET ORAL at 21:28

## 2023-07-13 NOTE — PROGRESS NOTES
[id number]  Progress Note  Name: Manjinder Mijares  MRN: [id number]  Unit/Bed#: S -75 I Date of Admission: 7/11/2023   Date of Service: 7/13/2023 I Hospital Day: 2    Assessment/Plan   * Acute on chronic heart failure with preserved ejection fraction (HFpEF) ([address])  Assessment & Plan  · Patient presented with increased shortness of breath; +22 lb since cardiology appt in Feb  Echo 12/2022: EF 70%  Diuresis:   Prescribed home regimen: lasix 40 mg BID. Has been taking only 40 mg daily due to incontinence. Skips doses at times. Continue lasix 80 mg IV day #3  Beta-blocker: none  ARB: losartan 25 mg qd  Monitor intake and output, daily weights. Notable drop in weights as of today >10 lbs  Diet: Fluid restriction and 2 g Sodium. Has O2 at home to use as needed  followup from Cardiology appreciated    Morbid obesity with body mass index (BMI) of 60.0 to 69.9 in adult [facility])  Assessment & Plan  · BMI 65.78  · Likely BRADY, O[facility]. Has sleep study scheduled for the fall  · Interested in bariatric surgery but must first lose 40 lb. patient inquired about whether we could initiate Ozempic while here. Explained that this needs to be managed by the weight management team.  Referral was placed    Urinary incontinence  Assessment & Plan  · Patient requesting urology consult for urinary incontinence which is longstanding. · Outpatient office visit with appropriate. I initiated Ditropan on July 12 which she reports seems to be helping    Adrenal nodule ([address])  Assessment & Plan  · CT "There is a 3.2 cm right adrenal nodule.  Although its imaging features are indeterminate, it does not have suspicious imaging features (heterogeneity, necrosis, irregular margins),  but based on its size, a dedicated adrenal protocol CT is recommended on an outpatient basis to confirm benignity."  · Daughter and patient aware      Ambulatory dysfunction  Assessment & Plan  · Ambulates with walker at baseline  · Encouraged patient to get out of bed to urinate rather than relying on the pure wick; she is agreeable       VTE Pharmacologic Prophylaxis: VTE Score: 6 sc heparin    Patient Centered Rounds: I performed bedside rounds with nursing staff today. Discussions with Specialists or Other Care Team Provider: cardiology, case mgmt    Education and Discussions with Family / Patient: Updated  (daughter) via phone. Total Time Spent on Date of Encounter in care of patient: 25 minutes This time was spent on one or more of the following: performing physical exam; counseling and coordination of care; obtaining or reviewing history; documenting in the medical record; reviewing/ordering tests, medications or procedures; communicating with other healthcare professionals and discussing with patient's family/caregivers. Current Length of Stay: 2 day(s)  Current Patient Status: Inpatient   Certification Statement: The patient will continue to require additional inpatient hospital stay due to ongoing IV diuresis  Discharge Plan: Anticipate discharge in 48-72 hrs to home. Code Status: Level 1 - Full Code    Subjective:   Patient reports that she wants to get out of the bed and go to the chair today. She was initially staying in bed all the time so that she could utilize the pure wick due to urinary incontinence but today is interested in trying to get up which nursing says they have been encouraging all along. She does feel that the Ditropan seems to be helping a little bit with her urinary incontinence. She denies any chest pain and has noted improvement in her breathing and swelling    Objective:     Vitals:   Temp (24hrs), Av.4 °F (36.9 °C), Min:98.4 °F (36.9 °C), Max:98.4 °F (36.9 °C)    Temp:  [98.4 °F (36.9 °C)] 98.4 °F (36.9 °C)  HR:  [66-68] 68  Resp:  [18-20] 20  BP: (127-138)/(62-65) 137/62  SpO2:  [89 %-91 %] 89 %  Body mass index is 64.05 kg/m².      Input and Output Summary (last 24 hours): Intake/Output Summary (Last 24 hours) at 7/13/2023 1144  Last data filed at 7/13/2023 1028  Gross per 24 hour   Intake 360 ml   Output 4348 ml   Net -3988 ml       Physical Exam:   Physical Exam  Vitals reviewed. Constitutional:       General: She is not in acute distress. Appearance: She is obese. She is not ill-appearing, toxic-appearing or diaphoretic. HENT:      Head: Normocephalic. Nose: No congestion. Eyes:      General: No scleral icterus. Right eye: No discharge. Left eye: No discharge. Conjunctiva/sclera: Conjunctivae normal.   Cardiovascular:      Rate and Rhythm: Normal rate and regular rhythm. Heart sounds: No murmur heard. Pulmonary:      Effort: No respiratory distress. Breath sounds: No stridor. No wheezing or rhonchi. Comments: Overall sounds clear  Abdominal:      General: There is no distension. Tenderness: There is no guarding. Musculoskeletal:      Right lower leg: No edema. Left lower leg: No edema. Skin:     General: Skin is warm and dry. Coloration: Skin is not jaundiced or pale. Findings: No bruising, erythema, lesion or rash. Neurological:      General: No focal deficit present. Mental Status: She is alert. Mental status is at baseline. Psychiatric:         Mood and Affect: Mood normal.         Thought Content:  Thought content normal.          Additional Data:     Labs:  Results from last 7 days   Lab Units 07/12/23  0637 07/11/23  1731   WBC Thousand/uL 5.46 6.80   HEMOGLOBIN g/dL 13.0 13.5   HEMATOCRIT % 42.3 43.8   PLATELETS Thousands/uL 159 190   NEUTROS PCT %  --  70   LYMPHS PCT %  --  20   MONOS PCT %  --  7   EOS PCT %  --  2     Results from last 7 days   Lab Units 07/13/23  0458 07/12/23  0637 07/11/23  1731   SODIUM mmol/L 136   < > 139   POTASSIUM mmol/L 3.7   < > 3.7   CHLORIDE mmol/L 101   < > 104   CO2 mmol/L 34*   < > 28   BUN mg/dL 14   < > 10   CREATININE mg/dL 0.63   < > 0.66 ANION GAP mmol/L 1   < > 7   CALCIUM mg/dL 9.6   < > 10.3*   ALBUMIN g/dL  --   --  4.2   TOTAL BILIRUBIN mg/dL  --   --  0.53   ALK PHOS U/L  --   --  77   ALT U/L  --   --  13   AST U/L  --   --  13   GLUCOSE RANDOM mg/dL 119   < > 101    < > = values in this interval not displayed. Lines/Drains:  Invasive Devices     Peripheral Intravenous Line  Duration           Peripheral IV 07/11/23 Left;Proximal;Ventral (anterior) Forearm 1 day    Peripheral IV 07/11/23 Right Antecubital 1 day              Imaging:     Recent Cultures (last 7 days):         Last 24 Hours Medication List:   Current Facility-Administered Medications   Medication Dose Route Frequency Provider Last Rate   • acetaminophen  650 mg Oral Q6H PRN Aydee Asha, CRNP     • albuterol  2 puff Inhalation Q6H PRN Aydee Asha, CRNP     • escitalopram  10 mg Oral Daily Aydee Asha, CRNP     • furosemide  80 mg Intravenous BID (diuretic) Aydee Asha, CRNP     • heparin (porcine)  7,500 Units Subcutaneous [facility] Aydee Asha, CRNP     • losartan  25 mg Oral Daily Aydee Asha, CRNP     • melatonin  3 mg Oral HS PRN Aydee Asha, CRNP     • oxybutynin  5 mg Oral TID Lisa Ardon PA-C     • senna-docusate sodium  2 tablet Oral HS PRN Aydee Asha, CRNP          Today, Patient Was Seen By: Lisa Ardon PA-C    **Please Note: This note may have been constructed using a voice recognition system. **

## 2023-07-13 NOTE — ASSESSMENT & PLAN NOTE
· Patient presented with increased shortness of breath; +22 lb since cardiology appt in Feb  Echo 12/2022: EF 70%  Diuresis:   Prescribed home regimen: lasix 40 mg BID. Has been taking only 40 mg daily due to incontinence. Skips doses at times. Continue lasix 80 mg IV day #3  Beta-blocker: none  ARB: losartan 25 mg qd  Monitor intake and output, daily weights. Notable drop in weights as of today >10 lbs  Diet: Fluid restriction and 2 g Sodium.   Has O2 at home to use as needed  followup from Cardiology appreciated

## 2023-07-13 NOTE — PLAN OF CARE
Problem: MOBILITY - ADULT  Goal: Maintain or return to baseline ADL function  Description: INTERVENTIONS:  -  Assess patient's ability to carry out ADLs; assess patient's baseline for ADL function and identify physical deficits which impact ability to perform ADLs (bathing, care of mouth/teeth, toileting, grooming, dressing, etc.)  - Assess/evaluate cause of self-care deficits   - Assess range of motion  - Assess patient's mobility; develop plan if impaired  - Assess patient's need for assistive devices and provide as appropriate  - Encourage maximum independence but intervene and supervise when necessary  - Involve family in performance of ADLs  - Assess for home care needs following discharge   - Consider OT consult to assist with ADL evaluation and planning for discharge  - Provide patient education as appropriate  Outcome: Progressing  Goal: Maintains/Returns to pre admission functional level  Description: INTERVENTIONS:  - Perform BMAT or MOVE assessment daily.   - Set and communicate daily mobility goal to care team and patient/family/caregiver. - Collaborate with rehabilitation services on mobility goals if consulted  - Perform Range of Motion  times a day. - Reposition patient every  hours.   - Dangle patient  times a day  - Stand patient  times a day  - Ambulate patient  times a day  - Out of bed to chair  times a day   - Out of bed for meals  times a day  - Out of bed for toileting  - Record patient progress and toleration of activity level   Outcome: Progressing     Problem: PAIN - ADULT  Goal: Verbalizes/displays adequate comfort level or baseline comfort level  Description: Interventions:  - Encourage patient to monitor pain and request assistance  - Assess pain using appropriate pain scale  - Administer analgesics based on type and severity of pain and evaluate response  - Implement non-pharmacological measures as appropriate and evaluate response  - Consider cultural and social influences on pain and pain management  - Notify physician/advanced practitioner if interventions unsuccessful or patient reports new pain  Outcome: Progressing     Problem: INFECTION - ADULT  Goal: Absence or prevention of progression during hospitalization  Description: INTERVENTIONS:  - Assess and monitor for signs and symptoms of infection  - Monitor lab/diagnostic results  - Monitor all insertion sites, i.e. indwelling lines, tubes, and drains  - Monitor endotracheal if appropriate and nasal secretions for changes in amount and color  - [place] appropriate cooling/warming therapies per order  - Administer medications as ordered  - Instruct and encourage patient and family to use good hand hygiene technique  - Identify and instruct in appropriate isolation precautions for identified infection/condition  Outcome: Progressing  Goal: Absence of fever/infection during neutropenic period  Description: INTERVENTIONS:  - Monitor WBC    Outcome: Progressing     Problem: SAFETY ADULT  Goal: Maintain or return to baseline ADL function  Description: INTERVENTIONS:  -  Assess patient's ability to carry out ADLs; assess patient's baseline for ADL function and identify physical deficits which impact ability to perform ADLs (bathing, care of mouth/teeth, toileting, grooming, dressing, etc.)  - Assess/evaluate cause of self-care deficits   - Assess range of motion  - Assess patient's mobility; develop plan if impaired  - Assess patient's need for assistive devices and provide as appropriate  - Encourage maximum independence but intervene and supervise when necessary  - Involve family in performance of ADLs  - Assess for home care needs following discharge   - Consider OT consult to assist with ADL evaluation and planning for discharge  - Provide patient education as appropriate  Outcome: Progressing  Goal: Maintains/Returns to pre admission functional level  Description: INTERVENTIONS:  - Perform BMAT or MOVE assessment daily.   - Set and communicate daily mobility goal to care team and patient/family/caregiver. - Collaborate with rehabilitation services on mobility goals if consulted  - Perform Range of Motion  times a day. - Reposition patient every  hours.   - Dangle patient  times a day  - Stand patient  times a day  - Ambulate patient  times a day  - Out of bed to chair  times a day   - Out of bed for meals  times a day  - Out of bed for toileting  - Record patient progress and toleration of activity level   Outcome: Progressing  Goal: Patient will remain free of falls  Description: INTERVENTIONS:  - Educate patient/family on patient safety including physical limitations  - Instruct patient to call for assistance with activity   - Consult OT/PT to assist with strengthening/mobility   - Keep Call bell within reach  - Keep bed low and locked with side rails adjusted as appropriate  - Keep care items and personal belongings within reach  - Initiate and maintain comfort rounds  - Make Fall Risk Sign visible to staff  - Offer Toileting every  Hours, in advance of need  - Initiate/Maintain alarm  - Obtain necessary fall risk management equipment:   - Apply yellow socks and bracelet for high fall risk patients  - Consider moving patient to room near nurses station  Outcome: Progressing     Problem: DISCHARGE PLANNING  Goal: Discharge to home or other facility with appropriate resources  Description: INTERVENTIONS:  - Identify barriers to discharge w/patient and caregiver  - Arrange for needed discharge resources and transportation as appropriate  - Identify discharge learning needs (meds, wound care, etc.)  - Arrange for interpretive services to assist at discharge as needed  - Refer to Case Management Department for coordinating discharge planning if the patient needs post-hospital services based on physician/advanced practitioner order or complex needs related to functional status, cognitive ability, or social support system  Outcome: Progressing Problem: Knowledge Deficit  Goal: Patient/family/caregiver demonstrates understanding of disease process, treatment plan, medications, and discharge instructions  Description: Complete learning assessment and assess knowledge base.   Interventions:  - Provide teaching at level of understanding  - Provide teaching via preferred learning methods  Outcome: Progressing     Problem: Prexisting or High Potential for Compromised Skin Integrity  Goal: Skin integrity is maintained or improved  Description: INTERVENTIONS:  - Identify patients at risk for skin breakdown  - Assess and monitor skin integrity  - Assess and monitor nutrition and hydration status  - Monitor labs   - Assess for incontinence   - Turn and reposition patient  - Assist with mobility/ambulation  - Relieve pressure over bony prominences  - Avoid friction and shearing  - Provide appropriate hygiene as needed including keeping skin clean and dry  - Evaluate need for skin moisturizer/barrier cream  - Collaborate with interdisciplinary team   - Patient/family teaching  - Consider wound care consult   Outcome: Progressing

## 2023-07-13 NOTE — ASSESSMENT & PLAN NOTE
· CT "There is a 3.2 cm right adrenal nodule.  Although its imaging features are indeterminate, it does not have suspicious imaging features (heterogeneity, necrosis, irregular margins),  but based on its size, a dedicated adrenal protocol CT is recommended on an outpatient basis to confirm benignity."  · Daughter and patient aware

## 2023-07-13 NOTE — ASSESSMENT & PLAN NOTE
· Ambulates with walker at baseline  · Encouraged patient to get out of bed to urinate rather than relying on the pure wick; she is agreeable

## 2023-07-13 NOTE — INCIDENTAL FINDINGS
The following findings require follow up:  Radiographic finding   Finding: Adrenal mass   Follow up required: CT adrenal gland outpatient after discharge    Please notify the following clinician to assist with the follow up:    Your family physician

## 2023-07-13 NOTE — ASSESSMENT & PLAN NOTE
· BMI 65.78  · Likely BRADY, [facility]. Has sleep study scheduled for the fall  · Interested in bariatric surgery but must first lose 40 lb. patient inquired about whether we could initiate Ozempic while here.   Explained that this needs to be managed by the weight management team.  Referral was placed

## 2023-07-13 NOTE — PLAN OF CARE
Problem: MOBILITY - ADULT  Goal: Maintain or return to baseline ADL function  Description: INTERVENTIONS:  -  Assess patient's ability to carry out ADLs; assess patient's baseline for ADL function and identify physical deficits which impact ability to perform ADLs (bathing, care of mouth/teeth, toileting, grooming, dressing, etc.)  - Assess/evaluate cause of self-care deficits   - Assess range of motion  - Assess patient's mobility; develop plan if impaired  - Assess patient's need for assistive devices and provide as appropriate  - Encourage maximum independence but intervene and supervise when necessary  - Involve family in performance of ADLs  - Assess for home care needs following discharge   - Consider OT consult to assist with ADL evaluation and planning for discharge  - Provide patient education as appropriate  Outcome: Progressing  Goal: Maintains/Returns to pre admission functional level  Description: INTERVENTIONS:  - Perform BMAT or MOVE assessment daily.   - Set and communicate daily mobility goal to care team and patient/family/caregiver. - Collaborate with rehabilitation services on mobility goals if consulted  - Perform Range of Motion 2 times a day. - Reposition patient every 2 hours.   - Dangle patient 2 times a day  - Stand patient 2 times a day  - Ambulate patient 3 times a day  - Out of bed to chair 3 times a day   - Out of bed for meals 3 times a day  - Out of bed for toileting  - Record patient progress and toleration of activity level   Outcome: Progressing     Problem: PAIN - ADULT  Goal: Verbalizes/displays adequate comfort level or baseline comfort level  Description: Interventions:  - Encourage patient to monitor pain and request assistance  - Assess pain using appropriate pain scale  - Administer analgesics based on type and severity of pain and evaluate response  - Implement non-pharmacological measures as appropriate and evaluate response  - Consider cultural and social influences on pain and pain management  - Notify physician/advanced practitioner if interventions unsuccessful or patient reports new pain  Outcome: Progressing     Problem: INFECTION - ADULT  Goal: Absence or prevention of progression during hospitalization  Description: INTERVENTIONS:  - Assess and monitor for signs and symptoms of infection  - Monitor lab/diagnostic results  - Monitor all insertion sites, i.e. indwelling lines, tubes, and drains  - Monitor endotracheal if appropriate and nasal secretions for changes in amount and color  - [place] appropriate cooling/warming therapies per order  - Administer medications as ordered  - Instruct and encourage patient and family to use good hand hygiene technique  - Identify and instruct in appropriate isolation precautions for identified infection/condition  Outcome: Progressing  Goal: Absence of fever/infection during neutropenic period  Description: INTERVENTIONS:  - Monitor WBC    Outcome: Progressing     Problem: SAFETY ADULT  Goal: Maintain or return to baseline ADL function  Description: INTERVENTIONS:  -  Assess patient's ability to carry out ADLs; assess patient's baseline for ADL function and identify physical deficits which impact ability to perform ADLs (bathing, care of mouth/teeth, toileting, grooming, dressing, etc.)  - Assess/evaluate cause of self-care deficits   - Assess range of motion  - Assess patient's mobility; develop plan if impaired  - Assess patient's need for assistive devices and provide as appropriate  - Encourage maximum independence but intervene and supervise when necessary  - Involve family in performance of ADLs  - Assess for home care needs following discharge   - Consider OT consult to assist with ADL evaluation and planning for discharge  - Provide patient education as appropriate  Outcome: Progressing  Goal: Maintains/Returns to pre admission functional level  Description: INTERVENTIONS:  - Perform BMAT or MOVE assessment daily.   - Set and communicate daily mobility goal to care team and patient/family/caregiver. - Collaborate with rehabilitation services on mobility goals if consulted  - Perform Range of Motion 2 times a day. - Reposition patient every 2 hours.   - Dangle patient 2 times a day  - Stand patient 3 times a day  - Ambulate patient 3 times a day  - Out of bed to chair 3 times a day   - Out of bed for meals 3 times a day  - Out of bed for toileting  - Record patient progress and toleration of activity level   Outcome: Progressing  Goal: Patient will remain free of falls  Description: INTERVENTIONS:  - Educate patient/family on patient safety including physical limitations  - Instruct patient to call for assistance with activity   - Consult OT/PT to assist with strengthening/mobility   - Keep Call bell within reach  - Keep bed low and locked with side rails adjusted as appropriate  - Keep care items and personal belongings within reach  - Initiate and maintain comfort rounds  - Make Fall Risk Sign visible to staff  - Offer Toileting every 2 Hours, in advance of need  - Initiate/Maintain bed alarm  - Obtain necessary fall risk management equipment  - Apply yellow socks and bracelet for high fall risk patients  - Consider moving patient to room near nurses station  Outcome: Progressing     Problem: DISCHARGE PLANNING  Goal: Discharge to home or other facility with appropriate resources  Description: INTERVENTIONS:  - Identify barriers to discharge w/patient and caregiver  - Arrange for needed discharge resources and transportation as appropriate  - Identify discharge learning needs (meds, wound care, etc.)  - Arrange for interpretive services to assist at discharge as needed  - Refer to Case Management Department for coordinating discharge planning if the patient needs post-hospital services based on physician/advanced practitioner order or complex needs related to functional status, cognitive ability, or social support system  Outcome: Progressing     Problem: Knowledge Deficit  Goal: Patient/family/caregiver demonstrates understanding of disease process, treatment plan, medications, and discharge instructions  Description: Complete learning assessment and assess knowledge base.   Interventions:  - Provide teaching at level of understanding  - Provide teaching via preferred learning methods  Outcome: Progressing     Problem: Prexisting or High Potential for Compromised Skin Integrity  Goal: Skin integrity is maintained or improved  Description: INTERVENTIONS:  - Identify patients at risk for skin breakdown  - Assess and monitor skin integrity  - Assess and monitor nutrition and hydration status  - Monitor labs   - Assess for incontinence   - Turn and reposition patient  - Assist with mobility/ambulation  - Relieve pressure over bony prominences  - Avoid friction and shearing  - Provide appropriate hygiene as needed including keeping skin clean and dry  - Evaluate need for skin moisturizer/barrier cream  - Collaborate with interdisciplinary team   - Patient/family teaching  - Consider wound care consult   Outcome: Progressing

## 2023-07-13 NOTE — PROGRESS NOTES
General Cardiology   Progress Note -  Team One   Kayli Sessions 66 y.o. female MRN: [id number]  Unit/Bed#: S -01 Encounter: [id number]    Assessment     1. Acute on chronic diastolic CHF  Presented with several weeks of worsening shortness of breath and weight gain  This is in the setting of diuretic noncompliance, only taking Lasix daily instead of twice daily and occasionally skipping days altogether  Home diuretic: Lasix 40 mg twice daily but only taking daily  Dry weight: 368 pounds as of December 2022, had been 382 pounds in office in February some of which was thought to be caloric  Current weight: 384 pounds by standing scale  Responding well to Lasix 80 mg IV twice daily with 3.4 L of urine output yesterday; renal function stable. Net negative 7 L  Remains overloaded on exam but improving     2. Acute on chronic hypoxic respiratory failure  Wears 1-2 L PRN at home, has not needed over past 3-4 months until recently  Currently maintaining adequate O2 saturations on 2 L nasal cannula     3. HTN-elevated on admission but improved with diuresis, most recently 137/62. She is on IV diuretics and losartan 25 mg daily     4. COPD-Per primary team     5. Prediabetes-A1c 6.1%     6. Morbid obesity with probable BRADY and OHS- pt interested in referral to medical weight loss program, has already seen bariatric surgery and been told she needed to lose 40 lb prior to surgical intervention     7.  Urinary incontinence- started on ditropan yesterday by SL[facility]     Plan  • Continue Lasix 80 mg IV twice daily  • Follow strict I/Os, daily standing weights   • Check a.m.  BMP  • 2 g sodium diet with 1500 mL fluid restriction  • No need for telemetry monitoring or repeat echocardiogram at this time  • Once euvolemic/ready for discharge planning, will plan to use Lasix 80 mg PO daily to aid compliance with appropriate diuretic dosing    Subjective  Review of Systems   Constitutional: Negative for chills, malaise/fatigue and weight gain. Cardiovascular: Positive for dyspnea on exertion and leg swelling. Negative for chest pain, orthopnea, palpitations and syncope. Respiratory: Negative for cough, shortness of breath, sleep disturbances due to breathing and sputum production. Gastrointestinal: Negative for bloating, nausea and vomiting. Neurological: Negative for dizziness, light-headedness and weakness. Psychiatric/Behavioral: Negative for altered mental status. All other systems reviewed and are negative. Objective:   Vitals: Blood pressure 137/62, pulse 68, temperature 98.4 °F (36.9 °C), temperature source Oral, resp. rate 20, weight (!) 175 kg (384 lb 14.8 oz), SpO2 (!) 89 %. , Body mass index is 64.05 kg/m². ,     Systolic (81FFQ), AZK:656 , Min:127 , NMR:396     Diastolic (59MVP), KFW:28, Min:62, Max:65        Intake/Output Summary (Last 24 hours) at 7/13/2023 1122  Last data filed at 7/13/2023 1028  Gross per 24 hour   Intake 360 ml   Output 4348 ml   Net -3988 ml     Wt Readings from Last 3 Encounters:   07/13/23 (!) 175 kg (384 lb 14.8 oz)   07/11/23 (!) 184 kg (404 lb 12.8 oz)   04/03/23 (!) 175 kg (386 lb)     Telemetry Review: n/a    Physical Exam  Vitals reviewed. Constitutional:       General: She is not in acute distress. Appearance: She is obese. Neck:      Comments: Unable to assess JVD due to body habitus  Cardiovascular:      Rate and Rhythm: Normal rate and regular rhythm. Heart sounds: Normal heart sounds. No murmur heard. No friction rub. No gallop. Pulmonary:      Effort: No respiratory distress. Breath sounds: Rales (bibasilar, improved from yesterday) present. Comments: Dyspneic with conversation  2LNC  Abdominal:      General: Bowel sounds are normal. There is no distension. Palpations: Abdomen is soft. Tenderness: There is no abdominal tenderness. Musculoskeletal:         General: No tenderness. Normal range of motion.       Cervical back: Neck supple. Right lower leg: Edema present. Left lower leg: Edema present. Skin:     General: Skin is warm and dry. Capillary Refill: Capillary refill takes less than 2 seconds. Findings: No erythema. Neurological:      Mental Status: She is alert and oriented to person, place, and time.    Psychiatric:         Mood and Affect: Mood normal.         LABORATORY RESULTS      CBC with diff:   Results from last 7 days   Lab Units 07/12/23  0637 07/11/23  1731   WBC Thousand/uL 5.46 6.80   HEMOGLOBIN g/dL 13.0 13.5   HEMATOCRIT % 42.3 43.8   MCV fL 95 95   PLATELETS Thousands/uL 159 190   RBC Million/uL 4.44 4.59   MCH pg 29.3 29.4   MCHC g/dL 30.7* 30.8*   RDW % 15.5* 15.4*   MPV fL 10.5 11.0   NRBC AUTO /100 WBCs  --  0     CMP:  Results from last 7 days   Lab Units 07/13/23  0458 07/12/23 0637 07/11/23  1731   POTASSIUM mmol/L 3.7 4.1 3.7   CHLORIDE mmol/L 101 103 104   CO2 mmol/L 34* 32 28   BUN mg/dL 14 10 10   CREATININE mg/dL 0.63 0.63 0.66   CALCIUM mg/dL 9.6 9.6 10.3*   AST U/L  --   --  13   ALT U/L  --   --  13   ALK PHOS U/L  --   --  77   EGFR ml/min/1.73sq m 97 97 95     BMP:  Results from last 7 days   Lab Units 07/13/23 0458 07/12/23 0637 07/11/23  1731   POTASSIUM mmol/L 3.7 4.1 3.7   CHLORIDE mmol/L 101 103 104   CO2 mmol/L 34* 32 28   BUN mg/dL 14 10 10   CREATININE mg/dL 0.63 0.63 0.66   CALCIUM mg/dL 9.6 9.6 10.3*     Lab Results   Component Value Date    CREATININE 0.63 07/13/2023    CREATININE 0.63 07/12/2023    CREATININE 0.66 07/11/2023     Lab Results   Component Value Date    NTBNP 23 08/31/2022    NTBNP 27 11/04/2019    NTBNP 65 07/03/2019      Results from last 7 days   Lab Units 07/12/23  0637   MAGNESIUM mg/dL 2.1     Lipid Profile:   Lab Results   Component Value Date    CHOL 206 07/30/2015     Lab Results   Component Value Date    HDL 41 (L) 08/31/2022    HDL 42 (L) 08/18/2020    HDL 45 07/30/2015     Lab Results   Component Value Date    LDLCALC 123 (H) 2022    LDLCALC 131 (H) 2020    LDLCALC 136 (H) 2015     Lab Results   Component Value Date    TRIG 115 2022    TRIG 108 2020    TRIG 126 2015     Cardiac testing:   Results for orders placed during the hospital encounter of 02/10/21    Echo complete with contrast if indicated    Narrative  [id number] [facility]  [address] [facility]  [address]  [phone]    Transthoracic Echocardiogram  2D, M-mode, Doppler, and Color Doppler    Study date:  10-Feb-2021    Patient: Estephanie Kirby  MR number: [id number]  Account number: [de-identified]  : 1960  Age: 63 years  Gender: Female  Status: Outpatient  Location: [address] [place] [address]  Height: 65 in  Weight: 386.1 lb  BP: 110/ 74 mmHg    Indications: Pedal edema;SOB on exertion;CHF. Diagnoses: R06.02 - Shortness of breath, R60.9 - Edema, unspecified    Sonographer:  DARYL Adams  Primary Physician:  Usha Carter DO  Referring Physician:  Usha Carter DO  Group:  [facility]  Cardiology Fellow: Jaime Dutton MD  Interpreting Physician:  Amanda Shoemaker MD    SUMMARY    LEFT VENTRICLE:  Systolic function was normal. Ejection fraction was estimated to be 65 %. There were no regional wall motion abnormalities. Doppler parameters were consistent with abnormal left ventricular relaxation (grade 1 diastolic dysfunction). TRICUSPID VALVE:  There was trace regurgitation. IVC, HEPATIC VEINS:  The inferior vena cava was dilated. Respirophasic changes were blunted (less than 50% variation). HISTORY: PRIOR HISTORY: BRADY;Smoker; Morbid obesity;HLD;HTN;COPD. PROCEDURE: The study was performed in the [address] [facility]. This was a routine study. The transthoracic approach was used. The study included complete 2D imaging, M-mode, complete spectral Doppler, and color Doppler. The  heart rate was 80 bpm, at the start of the study.  Images were obtained from the parasternal, apical, subcostal, and suprasternal notch acoustic windows. Echocardiographic views were limited due to poor acoustic window availability,  decreased penetration, and lung interference. This was a technically difficult study. LEFT VENTRICLE: Size was normal. Systolic function was normal. Ejection fraction was estimated to be 65 %. There were no regional wall motion abnormalities. Wall thickness was normal. DOPPLER: Doppler parameters were consistent with  abnormal left ventricular relaxation (grade 1 diastolic dysfunction). RIGHT VENTRICLE: The size was normal. Systolic function was normal. Wall thickness was normal.    LEFT ATRIUM: Size was normal.    RIGHT ATRIUM: Size was normal.    MITRAL VALVE: Valve structure was normal. There was normal leaflet separation. DOPPLER: The transmitral velocity was within the normal range. There was no evidence for stenosis. There was no regurgitation. AORTIC VALVE: The valve was trileaflet. Leaflets exhibited normal thickness and normal cuspal separation. DOPPLER: Transaortic velocity was within the normal range. There was no evidence for stenosis. There was no regurgitation. TRICUSPID VALVE: The valve structure was normal. There was normal leaflet separation. DOPPLER: The transtricuspid velocity was within the normal range. There was no evidence for stenosis. There was trace regurgitation. PULMONIC VALVE: Leaflets exhibited normal thickness, no calcification, and normal cuspal separation. DOPPLER: The transpulmonic velocity was within the normal range. There was no regurgitation. PERICARDIUM: There was no pericardial effusion. The pericardium was normal in appearance. AORTA: The root exhibited normal size. SYSTEMIC VEINS: IVC: The inferior vena cava was dilated. Respirophasic changes were blunted (less than 50% variation).     SYSTEM MEASUREMENT TABLES    2D  %FS: 27.94 %  Ao Diam: 3.29 cm  EDV(Teich): 171.86 ml  EF(Teich): 53.32 %  ESV(Teich): 80.23 ml  IVSd: 1.21 cm  LA Area: 23.96 cm2  LA Diam: 4.68 cm  LVEDV MOD A4C: 109.9 ml  LVEF MOD A4C: 64.35 %  LVESV MOD A4C: 39.18 ml  LVIDd: 5.88 cm  LVIDs: 4.24 cm  LVLd A4C: 8.59 cm  LVLs A4C: 6.49 cm  LVPWd: 1.21 cm  RA Area: 17.2 cm2  RVIDd: 4.01 cm  SV MOD A4C: 70.72 ml  SV(Teich): 91.63 ml    CW  AV Vmax: 1.57 m/s  AV maxP.92 mmHg    MM  TAPSE: 2.7 cm    PW  E' Sept: 0.04 m/s  E/E' Sept: 22.85  MV A Vito: 1.05 m/s  MV Dec [place]: 3.42 m/s2  MV DecT: 258.2 ms  MV E Vito: 0.88 m/s  MV E/A Ratio: 0.84  MV PHT: 74.88 ms  MVA By PHT: 2.94 cm2    [facility] Accredited Echocardiography Laboratory    Prepared and electronically signed by    Christiana Nuñez MD  Signed 10-Feb-2021 13:57:17    Meds/Allergies   all current active meds have been reviewed and current meds:   Current Facility-Administered Medications   Medication Dose Route Frequency   • acetaminophen (TYLENOL) tablet 650 mg  650 mg Oral Q6H PRN   • albuterol (PROVENTIL HFA,VENTOLIN HFA) inhaler 2 puff  2 puff Inhalation Q6H PRN   • escitalopram (LEXAPRO) tablet 10 mg  10 mg Oral Daily   • furosemide (LASIX) injection 80 mg  80 mg Intravenous BID (diuretic)   • heparin (porcine) subcutaneous injection 7,500 Units  7,500 Units Subcutaneous Q8H [facility]   • losartan (COZAAR) tablet 25 mg  25 mg Oral Daily   • melatonin tablet 3 mg  3 mg Oral HS PRN   • oxybutynin (DITROPAN) tablet 5 mg  5 mg Oral TID   • senna-docusate sodium (SENOKOT S) 8.6-50 mg per tablet 2 tablet  2 tablet Oral HS PRN     Medications Prior to Admission   Medication   • acetaminophen (TYLENOL) 500 mg tablet   • albuterol (PROVENTIL HFA,VENTOLIN HFA) 90 mcg/act inhaler   • escitalopram (Lexapro) 10 mg tablet   • furosemide (LASIX) 40 mg tablet   • ibuprofen (Advil) 200 mg tablet   • losartan (COZAAR) 25 mg tablet     Counseling / Coordination of Care  Total floor / unit time spent today 20 minutes.   Greater than 50% of total time was spent with the patient and / or family counseling and / or coordination of care. ** Please Note: Dragon 360 Dictation voice to text software may have been used in the creation of this document.  **

## 2023-07-13 NOTE — ASSESSMENT & PLAN NOTE
· Patient requesting urology consult for urinary incontinence which is longstanding. · Outpatient office visit with appropriate.   I initiated Ditropan on July 12 which she reports seems to be helping

## 2023-07-14 LAB
ANION GAP SERPL CALCULATED.3IONS-SCNC: 7 MMOL/L
BUN SERPL-MCNC: 16 MG/DL (ref 5–25)
CALCIUM SERPL-MCNC: 10.1 MG/DL (ref 8.4–10.2)
CHLORIDE SERPL-SCNC: 99 MMOL/L (ref 96–108)
CO2 SERPL-SCNC: 30 MMOL/L (ref 21–32)
CREAT SERPL-MCNC: 0.61 MG/DL (ref 0.6–1.3)
GFR SERPL CREATININE-BSD FRML MDRD: 98 ML/MIN/1.73SQ M
GLUCOSE SERPL-MCNC: 122 MG/DL (ref 65–140)
MAGNESIUM SERPL-MCNC: 2.2 MG/DL (ref 1.9–2.7)
POTASSIUM SERPL-SCNC: 3.8 MMOL/L (ref 3.5–5.3)
SODIUM SERPL-SCNC: 136 MMOL/L (ref 135–147)

## 2023-07-14 PROCEDURE — 99232 SBSQ HOSP IP/OBS MODERATE 35: CPT | Performed by: PHYSICIAN ASSISTANT

## 2023-07-14 PROCEDURE — 83735 ASSAY OF MAGNESIUM: CPT | Performed by: PHYSICIAN ASSISTANT

## 2023-07-14 PROCEDURE — 99232 SBSQ HOSP IP/OBS MODERATE 35: CPT | Performed by: INTERNAL MEDICINE

## 2023-07-14 PROCEDURE — 80048 BASIC METABOLIC PNL TOTAL CA: CPT | Performed by: PHYSICIAN ASSISTANT

## 2023-07-14 RX ORDER — NYSTATIN 100000 [USP'U]/G
POWDER TOPICAL 2 TIMES DAILY
Status: DISCONTINUED | OUTPATIENT
Start: 2023-07-14 | End: 2023-07-16 | Stop reason: HOSPADM

## 2023-07-14 RX ADMIN — LOSARTAN POTASSIUM 25 MG: 25 TABLET, FILM COATED ORAL at 09:02

## 2023-07-14 RX ADMIN — HEPARIN SODIUM 7500 UNITS: 5000 INJECTION INTRAVENOUS; SUBCUTANEOUS at 13:55

## 2023-07-14 RX ADMIN — OXYBUTYNIN CHLORIDE 5 MG: 5 TABLET ORAL at 09:02

## 2023-07-14 RX ADMIN — OXYBUTYNIN CHLORIDE 5 MG: 5 TABLET ORAL at 16:15

## 2023-07-14 RX ADMIN — FUROSEMIDE 80 MG: 10 INJECTION, SOLUTION INTRAMUSCULAR; INTRAVENOUS at 09:17

## 2023-07-14 RX ADMIN — HEPARIN SODIUM 7500 UNITS: 5000 INJECTION INTRAVENOUS; SUBCUTANEOUS at 21:05

## 2023-07-14 RX ADMIN — ESCITALOPRAM 10 MG: 10 TABLET, FILM COATED ORAL at 09:02

## 2023-07-14 RX ADMIN — FUROSEMIDE 80 MG: 10 INJECTION, SOLUTION INTRAMUSCULAR; INTRAVENOUS at 15:58

## 2023-07-14 RX ADMIN — OXYBUTYNIN CHLORIDE 5 MG: 5 TABLET ORAL at 21:05

## 2023-07-14 RX ADMIN — NYSTATIN: 100000 POWDER TOPICAL at 18:50

## 2023-07-14 RX ADMIN — HEPARIN SODIUM 7500 UNITS: 5000 INJECTION INTRAVENOUS; SUBCUTANEOUS at 05:18

## 2023-07-14 NOTE — ASSESSMENT & PLAN NOTE
· Ambulates with walker at baseline  · Encouraged patient to get out of bed to urinate rather than relying on the pure wick; she is agreeable  · She is ambulating well with her walker

## 2023-07-14 NOTE — ASSESSMENT & PLAN NOTE
· Patient requesting urology consult for urinary incontinence which is longstanding. · Outpatient office visit with appropriate.   · Continue Oxybutynin

## 2023-07-14 NOTE — PROGRESS NOTES
General Cardiology   Progress Note -  Team One   Ricco Carpenter 66 y.o. female MRN: [id number]  Unit/Bed#: S -01 Encounter: [id number]    Assessment     1.  Acute on chronic diastolic CHF  Presented with several weeks of worsening shortness of breath and weight gain  This is in the setting of diuretic noncompliance  Home diuretic: Lasix 40 mg twice daily but only taking daily  Dry weight: 368 pounds as of December 2022  Current weight: 380 pounds by standing scale  Responding well to Lasix 80 mg IV twice daily with 2.7 L of urine output yesterday; renal function stable. Net negative 8 L  Remains overloaded on exam but improving     2.  Acute on chronic hypoxic respiratory failure  Wears 1-2 L PRN at home, has not needed over past 3-4 months until recently  Weaned to room air this morning     3.  HTN-elevated on admission but improved with diuresis, most recently 132/68.  She is on IV diuretics and losartan 25 mg daily     4.  COPD-Per primary team     5.  Prediabetes-A1c 6.1%     6.  Morbid obesity with probable BRADY and OHS- pt interested in referral to medical weight loss program, has already seen bariatric surgery and been told she needed to lose 40 lb prior to surgical intervention     7.  Urinary incontinence- started on ditropan by SL[facility]     Plan  • Continue Lasix 80 mg IV twice daily  • Follow strict I/Os, daily standing weights   • Check a.m. BMP  • 2 g sodium diet with 1500 mL fluid restriction  • Once euvolemic/ready for discharge planning, will plan to use Lasix 80 mg PO daily to aid compliance with appropriate diuretic dosing    Subjective  Continues to improve symptomatically but was disappointed she didn't lose as much weight today as she did yesterday. Review of Systems   Constitutional: Negative for chills and malaise/fatigue. Cardiovascular: Positive for dyspnea on exertion and leg swelling. Negative for chest pain, orthopnea, palpitations and syncope.    Respiratory: Negative for cough, shortness of breath, sleep disturbances due to breathing and sputum production. Gastrointestinal: Negative for bloating and nausea. Neurological: Negative for dizziness, light-headedness and weakness. Psychiatric/Behavioral: Negative for altered mental status. All other systems reviewed and are negative. Objective:   Vitals: Blood pressure 133/67, pulse 66, temperature 97.9 °F (36.6 °C), temperature source Oral, resp. rate 18, weight (!) 173 kg (380 lb 15.3 oz), SpO2 93 %. , Body mass index is 63.39 kg/m². ,     Systolic (87MXN), QZY:995 , Min:124 , DUP:421     Diastolic (89MZS), LHV:69, Min:65, Max:71    Intake/Output Summary (Last 24 hours) at 7/14/2023 0829  Last data filed at 7/14/2023 0515  Gross per 24 hour   Intake 1100 ml   Output 2720 ml   Net -1620 ml     Wt Readings from Last 3 Encounters:   07/14/23 (!) 173 kg (380 lb 15.3 oz)   07/11/23 (!) 184 kg (404 lb 12.8 oz)   04/03/23 (!) 175 kg (386 lb)     Telemetry Review: N/a    Physical Exam  Vitals reviewed. Constitutional:       General: She is not in acute distress. Appearance: She is obese. Neck:      Comments: Unable to assess JVD due to body habitus  Cardiovascular:      Rate and Rhythm: Normal rate and regular rhythm. Pulses: Normal pulses. Heart sounds: Normal heart sounds. No murmur heard. No friction rub. No gallop. Pulmonary:      Effort: Pulmonary effort is normal. No respiratory distress. Breath sounds: Rales (bibasilar) present. Abdominal:      General: Bowel sounds are normal. There is no distension. Palpations: Abdomen is soft. Tenderness: There is no abdominal tenderness. Musculoskeletal:         General: No tenderness. Normal range of motion. Cervical back: Neck supple. Right lower leg: Edema present. Left lower leg: Edema present. Skin:     General: Skin is warm and dry. Findings: No erythema.    Neurological:      Mental Status: She is alert and oriented to person, place, and time.    Psychiatric:         Mood and Affect: Mood normal.         LABORATORY RESULTS      CBC with diff:   Results from last 7 days   Lab Units 07/12/23  0637 07/11/23  1731   WBC Thousand/uL 5.46 6.80   HEMOGLOBIN g/dL 13.0 13.5   HEMATOCRIT % 42.3 43.8   MCV fL 95 95   PLATELETS Thousands/uL 159 190   RBC Million/uL 4.44 4.59   MCH pg 29.3 29.4   MCHC g/dL 30.7* 30.8*   RDW % 15.5* 15.4*   MPV fL 10.5 11.0   NRBC AUTO /100 WBCs  --  0     CMP:  Results from last 7 days   Lab Units 07/14/23  0529 07/13/23  0458 07/12/23 0637 07/11/23  1731   POTASSIUM mmol/L 3.8 3.7 4.1 3.7   CHLORIDE mmol/L 99 101 103 104   CO2 mmol/L 30 34* 32 28   BUN mg/dL 16 14 10 10   CREATININE mg/dL 0.61 0.63 0.63 0.66   CALCIUM mg/dL 10.1 9.6 9.6 10.3*   AST U/L  --   --   --  13   ALT U/L  --   --   --  13   ALK PHOS U/L  --   --   --  77   EGFR ml/min/1.73sq m 98 97 97 95     BMP:  Results from last 7 days   Lab Units 07/14/23  0529 07/13/23 0458 07/12/23 0637 07/11/23  1731   POTASSIUM mmol/L 3.8 3.7 4.1 3.7   CHLORIDE mmol/L 99 101 103 104   CO2 mmol/L 30 34* 32 28   BUN mg/dL 16 14 10 10   CREATININE mg/dL 0.61 0.63 0.63 0.66   CALCIUM mg/dL 10.1 9.6 9.6 10.3*     Lab Results   Component Value Date    CREATININE 0.61 07/14/2023    CREATININE 0.63 07/13/2023    CREATININE 0.63 07/12/2023     Lab Results   Component Value Date    NTBNP 23 08/31/2022    NTBNP 27 11/04/2019    NTBNP 65 07/03/2019     Results from last 7 days   Lab Units 07/14/23 0529 07/12/23  0637   MAGNESIUM mg/dL 2.2 2.1     Lipid Profile:   Lab Results   Component Value Date    CHOL 206 07/30/2015     Lab Results   Component Value Date    HDL 41 (L) 08/31/2022    HDL 42 (L) 08/18/2020    HDL 45 07/30/2015     Lab Results   Component Value Date    LDLCALC 123 (H) 08/31/2022    LDLCALC 131 (H) 08/18/2020    LDLCALC 136 (H) 07/30/2015     Lab Results   Component Value Date    TRIG 115 08/31/2022    TRIG 108 08/18/2020    TRIG 126 07/30/2015     Cardiac testing:   Results for orders placed during the hospital encounter of 02/10/21    Echo complete with contrast if indicated    Narrative  [address] [address]  [phone]    Transthoracic Echocardiogram  2D, M-mode, Doppler, and Color Doppler    Study date:  10-Feb-2021    Patient: Tonia Crump  MR number: [id number]  Account number: [de-identified]  : 1960  Age: 63 years  Gender: Female  Status: Outpatient  Location: [address] [place] [address] [facility]  Height: 65 in  Weight: 386.1 lb  BP: 110/ 74 mmHg    Indications: Pedal edema;SOB on exertion;CHF. Diagnoses: R06.02 - Shortness of breath, R60.9 - Edema, unspecified    Sonographer:  DARYL Brewer  Primary Physician:  Nabila Shearer DO  Referring Physician:  Nabila Shearer DO  Group:  [facility]  Cardiology Fellow: Tom Cazares MD  Interpreting Physician:  Jackie Davis MD    SUMMARY    LEFT VENTRICLE:  Systolic function was normal. Ejection fraction was estimated to be 65 %. There were no regional wall motion abnormalities. Doppler parameters were consistent with abnormal left ventricular relaxation (grade 1 diastolic dysfunction). TRICUSPID VALVE:  There was trace regurgitation. IVC, HEPATIC VEINS:  The inferior vena cava was dilated. Respirophasic changes were blunted (less than 50% variation). HISTORY: PRIOR HISTORY: BRADY;Smoker; Morbid obesity;HLD;HTN;COPD. PROCEDURE: The study was performed in the [facility]. This was a routine study. The transthoracic approach was used. The study included complete 2D imaging, M-mode, complete spectral Doppler, and color Doppler. The  heart rate was 80 bpm, at the start of the study. Images were obtained from the parasternal, apical, subcostal, and suprasternal notch acoustic windows.  Echocardiographic views were limited due to poor acoustic window availability,  decreased penetration, and lung interference. This was a technically difficult study. LEFT VENTRICLE: Size was normal. Systolic function was normal. Ejection fraction was estimated to be 65 %. There were no regional wall motion abnormalities. Wall thickness was normal. DOPPLER: Doppler parameters were consistent with  abnormal left ventricular relaxation (grade 1 diastolic dysfunction). RIGHT VENTRICLE: The size was normal. Systolic function was normal. Wall thickness was normal.    LEFT ATRIUM: Size was normal.    RIGHT ATRIUM: Size was normal.    MITRAL VALVE: Valve structure was normal. There was normal leaflet separation. DOPPLER: The transmitral velocity was within the normal range. There was no evidence for stenosis. There was no regurgitation. AORTIC VALVE: The valve was trileaflet. Leaflets exhibited normal thickness and normal cuspal separation. DOPPLER: Transaortic velocity was within the normal range. There was no evidence for stenosis. There was no regurgitation. TRICUSPID VALVE: The valve structure was normal. There was normal leaflet separation. DOPPLER: The transtricuspid velocity was within the normal range. There was no evidence for stenosis. There was trace regurgitation. PULMONIC VALVE: Leaflets exhibited normal thickness, no calcification, and normal cuspal separation. DOPPLER: The transpulmonic velocity was within the normal range. There was no regurgitation. PERICARDIUM: There was no pericardial effusion. The pericardium was normal in appearance. AORTA: The root exhibited normal size. SYSTEMIC VEINS: IVC: The inferior vena cava was dilated. Respirophasic changes were blunted (less than 50% variation).     SYSTEM MEASUREMENT TABLES    2D  %FS: 27.94 %  Ao Diam: 3.29 cm  EDV(Teich): 171.86 ml  EF(Teich): 53.32 %  ESV(Teich): 80.23 ml  IVSd: 1.21 cm  LA Area: 23.96 cm2  LA Diam: 4.68 cm  LVEDV MOD A4C: 109.9 ml  LVEF MOD A4C: 64.35 %  LVESV MOD A4C: 39.18 ml  LVIDd: 5.88 cm  LVIDs: 4.24 cm  LVLd A4C: 8.59 cm  LVLs A4C: 6.49 cm  LVPWd: 1.21 cm  RA Area: 17.2 cm2  RVIDd: 4.01 cm  SV MOD A4C: 70.72 ml  SV(Teich): 91.63 ml    CW  AV Vmax: 1.57 m/s  AV maxP.92 mmHg    MM  TAPSE: 2.7 cm    PW  E' Sept: 0.04 m/s  E/E' Sept: 22.85  MV A Vito: 1.05 m/s  MV Dec [place]: 3.42 m/s2  MV DecT: 258.2 ms  MV E Vito: 0.88 m/s  MV E/A Ratio: 0.84  MV PHT: 74.88 ms  MVA By PHT: 2.94 cm2    [facility] Accredited Echocardiography Laboratory    Prepared and electronically signed by    Neville Boles MD  Signed 10-Feb-2021 13:57:17    Meds/Allergies   all current active meds have been reviewed and current meds:   Current Facility-Administered Medications   Medication Dose Route Frequency   • acetaminophen (TYLENOL) tablet 650 mg  650 mg Oral Q6H PRN   • albuterol (PROVENTIL HFA,VENTOLIN HFA) inhaler 2 puff  2 puff Inhalation Q6H PRN   • escitalopram (LEXAPRO) tablet 10 mg  10 mg Oral Daily   • furosemide (LASIX) injection 80 mg  80 mg Intravenous BID (diuretic)   • heparin (porcine) subcutaneous injection 7,500 Units  7,500 Units Subcutaneous Q8H [address]   • losartan (COZAAR) tablet 25 mg  25 mg Oral Daily   • melatonin tablet 3 mg  3 mg Oral HS PRN   • oxybutynin (DITROPAN) tablet 5 mg  5 mg Oral TID   • senna-docusate sodium (SENOKOT S) 8.6-50 mg per tablet 2 tablet  2 tablet Oral HS PRN     Medications Prior to Admission   Medication   • acetaminophen (TYLENOL) 500 mg tablet   • albuterol (PROVENTIL HFA,VENTOLIN HFA) 90 mcg/act inhaler   • escitalopram (Lexapro) 10 mg tablet   • furosemide (LASIX) 40 mg tablet   • ibuprofen (Advil) 200 mg tablet   • losartan (COZAAR) 25 mg tablet     Counseling / Coordination of Care  Total floor / unit time spent today 20 minutes. Greater than 50% of total time was spent with the patient and / or family counseling and / or coordination of care. ** Please Note: Dragon 360 Dictation voice to text software may have been used in the creation of this document.  **

## 2023-07-14 NOTE — CASE MANAGEMENT
Case Management Discharge Planning Note    Patient name Ana Ponce S /S -13 MRN [id number]  : 1960 Date 2023       Current Admission Date: 2023  Current Admission Diagnosis:Acute on chronic heart failure with preserved ejection fraction (HFpEF) [facility])   Patient Active Problem List    Diagnosis Date Noted   • Adrenal nodule ([address]) 2023   • Urinary incontinence 2023   • Ambulatory dysfunction 2023   • Moderate episode of recurrent major depressive disorder ([address]) 2023   • COVID-19 virus infection 2023   • Hypercalcemia 2023   • Hypoxia 2022   • Acute on chronic heart failure with preserved ejection fraction (HFpEF) ([address]) 2022   • Pre-diabetes 2022   • Lymphedema of both lower extremities 2021   • Chronic diastolic congestive heart failure ([address]) 2021   • Insomnia due to medical condition 10/08/2020   • Obstructive sleep apnea syndrome 2020   • Cigarette nicotine dependence without complication    • Morbid obesity with body mass index (BMI) of 60.0 to 69.9 in adult [facility]) 2020   • Essential hypertension 07/10/2019   • Mixed stress and urge urinary incontinence 07/10/2019   • COPD (chronic obstructive pulmonary disease) ([address]) 07/10/2019   • Mixed hyperlipidemia 07/10/2019      LOS (days): 3  Geometric Mean LOS (GMLOS) (days): 3.90  Days to GMLOS:1.3     OBJECTIVE:  Risk of Unplanned Readmission Score: 6.6         Current admission status: Inpatient   [facility]   [facility] [address]  [address] [facility] [address] [address]  Phone: [phone] Fax: [phone]    Primary Care Provider: Aury Hamilton DO    Primary Insurance: [employer] [facility] [employer]  Secondary Insurance: [id number]:                                                                                     IMM reviewed with patient, patient agrees with discharge determination.   IMM Given (Date):: 07/14/23  IMM Given to[de-identified] Patient

## 2023-07-14 NOTE — ASSESSMENT & PLAN NOTE
· Patient presented with increased shortness of breath; +22 lb since cardiology appt in Feb  · Echo 12/2022: EF 70%  · She is supposed to be on Lasix 40 mg BID at home, but has only been taking it QD due to incontinence  · Net negative -7998 mL  · Weight down 4 lbs last 24 hours   · Continue lasix 80 mg IV day #3  · Beta-blocker: none  · ARB: Losartan 25 mg QD  · Monitor intake and output, daily weights. · Diet: Fluid restriction and 2 g Sodium.   · Has O2 at home to use as needed  · Follow up from Cardiology appreciated

## 2023-07-14 NOTE — PROGRESS NOTES
[facility]  Progress Note  Name: Shannan Law  MRN: [id number]  Unit/Bed#: S -76 I Date of Admission: 7/11/2023   Date of Service: 7/14/2023 I Hospital Day: 3    Assessment/Plan   * Acute on chronic heart failure with preserved ejection fraction (HFpEF) ([address])  Assessment & Plan  · Patient presented with increased shortness of breath; +22 lb since cardiology appt in Feb  · Echo 12/2022: EF 70%  · She is supposed to be on Lasix 40 mg BID at home, but has only been taking it QD due to incontinence  · Net negative -7998 mL  · Weight down 4 lbs last 24 hours   · Continue lasix 80 mg IV day #3  · Beta-blocker: none  · ARB: Losartan 25 mg QD  · Monitor intake and output, daily weights. · Diet: Fluid restriction and 2 g Sodium. · Has O2 at home to use as needed  · Follow up from Cardiology appreciated    COPD (chronic obstructive pulmonary disease) ([address])  Assessment & Plan  · Without exacerbation   · Baseline oxygen is 1-2 liters PRN   · Albuterol PRN    Urinary incontinence  Assessment & Plan  · Patient requesting urology consult for urinary incontinence which is longstanding. · Outpatient office visit with appropriate. · Continue Oxybutynin     Morbid obesity with body mass index (BMI) of 60.0 to 69.9 in [facility])  Assessment & Plan  · BMI 65.78  · Likely BRADY, OHS. Has sleep study scheduled for the fall  · Interested in bariatric surgery but must first lose 40 lb. patient inquired about whether we could initiate Ozempic while here. Explained that this needs to be managed by the weight management team.  Referral was placed    Adrenal nodule ([address])  Assessment & Plan  · CT "There is a 3.2 cm right adrenal nodule.  Although its imaging features are indeterminate, it does not have suspicious imaging features (heterogeneity, necrosis, irregular margins),  but based on its size, a dedicated adrenal protocol CT is recommended on an outpatient basis to confirm benignity."  · Daughter and patient aware      Essential hypertension  Assessment & Plan  · BP elevated on arrival.  Given nitro paste and IV lasix with improvement  · Continue losartan   · Diurese as above    Ambulatory dysfunction  Assessment & Plan  · Ambulates with walker at baseline  · Encouraged patient to get out of bed to urinate rather than relying on the pure wick; she is agreeable  · She is ambulating well with her walker              VTE Pharmacologic Prophylaxis: VTE Score: 6 High Risk (Score >/= 5) - Pharmacological DVT Prophylaxis Ordered: heparin. Sequential Compression Devices Ordered. Patient Centered Rounds: I performed bedside rounds with nursing staff today. Discussions with Specialists or Other Care Team Provider: Discussed with RN, DEJAH    Education and Discussions with Family / Patient: Attempted to update  (daughter) via phone. Left voicemail. Total Time Spent on Date of Encounter in care of patient: 35 minutes This time was spent on one or more of the following: performing physical exam; counseling and coordination of care; obtaining or reviewing history; documenting in the medical record; reviewing/ordering tests, medications or procedures; communicating with other healthcare professionals and discussing with patient's family/caregivers. Current Length of Stay: 3 day(s)  Current Patient Status: Inpatient   Certification Statement: The patient will continue to require additional inpatient hospital stay due to further IV diuresis   Discharge Plan: Anticipate discharge in 24-48 hrs to home. Code Status: Level 1 - Full Code    Subjective:   Patient reports her breathing is improved, but still not at her baseline. Reports getting around well with her walker.      Objective:     Vitals:   Temp (24hrs), Av.9 °F (36.6 °C), Min:97.5 °F (36.4 °C), Max:98.4 °F (36.9 °C)    Temp:  [97.5 °F (36.4 °C)-98.4 °F (36.9 °C)] 97.9 °F (36.6 °C)  HR:  [65-75] 66  Resp:  [18] 18  BP: (124-139)/(65-71) 133/67  SpO2:  [90 %-93 %] 93 %  Body mass index is 63.39 kg/m². Input and Output Summary (last 24 hours): Intake/Output Summary (Last 24 hours) at 7/14/2023 0942  Last data filed at 7/14/2023 0848  Gross per 24 hour   Intake 1187 ml   Output 2720 ml   Net -1533 ml       Physical Exam:   Physical Exam  Constitutional:       General: She is not in acute distress. Appearance: Normal appearance. She is morbidly obese. She is not ill-appearing or diaphoretic. HENT:      Head: Normocephalic and atraumatic. Mouth/Throat:      Mouth: Mucous membranes are moist.   Eyes:      General: No scleral icterus. Pupils: Pupils are equal, round, and reactive to light. Cardiovascular:      Rate and Rhythm: Normal rate and regular rhythm. Pulses: Normal pulses. Heart sounds: Normal heart sounds, S1 normal and S2 normal. No murmur heard. No systolic murmur is present. No diastolic murmur is present. No gallop. No S3 or S4 sounds. Pulmonary:      Effort: Pulmonary effort is normal. No accessory muscle usage or respiratory distress. Breath sounds: Normal breath sounds. No stridor. No decreased breath sounds, wheezing, rhonchi or rales. Chest:      Chest wall: No tenderness. Abdominal:      General: Bowel sounds are normal. There is no distension. Palpations: Abdomen is soft. Tenderness: There is no abdominal tenderness. There is no guarding. Musculoskeletal:      Right lower leg: No edema. Left lower leg: No edema. Skin:     General: Skin is warm and dry. Coloration: Skin is not jaundiced. Neurological:      General: No focal deficit present. Mental Status: She is alert. Mental status is at baseline. Motor: No tremor or seizure activity. Psychiatric:         Behavior: Behavior is cooperative.           Additional Data:     Labs:  Results from last 7 days   Lab Units 07/12/23  0637 07/11/23  1731   WBC Thousand/uL 5.46 6.80 HEMOGLOBIN g/dL 13.0 13.5   HEMATOCRIT % 42.3 43.8   PLATELETS Thousands/uL 159 190   NEUTROS PCT %  --  70   LYMPHS PCT %  --  20   MONOS PCT %  --  7   EOS PCT %  --  2     Results from last 7 days   Lab Units 07/14/23  0529 07/12/23  0637 07/11/23  1731   SODIUM mmol/L 136   < > 139   POTASSIUM mmol/L 3.8   < > 3.7   CHLORIDE mmol/L 99   < > 104   CO2 mmol/L 30   < > 28   BUN mg/dL 16   < > 10   CREATININE mg/dL 0.61   < > 0.66   ANION GAP mmol/L 7   < > 7   CALCIUM mg/dL 10.1   < > 10.3*   ALBUMIN g/dL  --   --  4.2   TOTAL BILIRUBIN mg/dL  --   --  0.53   ALK PHOS U/L  --   --  77   ALT U/L  --   --  13   AST U/L  --   --  13   GLUCOSE RANDOM mg/dL 122   < > 101    < > = values in this interval not displayed. Lines/Drains:  Invasive Devices     Peripheral Intravenous Line  Duration           Peripheral IV 07/11/23 Right Antecubital 2 days                      Imaging: No pertinent imaging reviewed. Recent Cultures (last 7 days):         Last 24 Hours Medication List:   Current Facility-Administered Medications   Medication Dose Route Frequency Provider Last Rate   • acetaminophen  650 mg Oral Q6H PRN RONALD Alatorre     • albuterol  2 puff Inhalation Q6H PRN RONALD Alatorre     • escitalopram  10 mg Oral Daily RONALD Alatorre     • furosemide  80 mg Intravenous BID (diuretic) RONALD Alatorre     • heparin (porcine)  7,500 Units Subcutaneous On license of [facility] RONALD Alatorre     • losartan  25 mg Oral Daily RONALD Alatorre     • melatonin  3 mg Oral HS PRN RONALD Alatorre     • oxybutynin  5 mg Oral TID Blank Mujica PA-C     • senna-docusate sodium  2 tablet Oral HS PRN RONALD Alatorre          Today, Patient Was Seen By: Riya Odell PA-C    **Please Note: This note may have been constructed using a voice recognition system. **

## 2023-07-14 NOTE — PLAN OF CARE
Problem: MOBILITY - ADULT  Goal: Maintain or return to baseline ADL function  Description: INTERVENTIONS:  -  Assess patient's ability to carry out ADLs; assess patient's baseline for ADL function and identify physical deficits which impact ability to perform ADLs (bathing, care of mouth/teeth, toileting, grooming, dressing, etc.)  - Assess/evaluate cause of self-care deficits   - Assess range of motion  - Assess patient's mobility; develop plan if impaired  - Assess patient's need for assistive devices and provide as appropriate  - Encourage maximum independence but intervene and supervise when necessary  - Involve family in performance of ADLs  - Assess for home care needs following discharge   - Consider OT consult to assist with ADL evaluation and planning for discharge  - Provide patient education as appropriate  Outcome: Progressing  Goal: Maintains/Returns to pre admission functional level  Description: INTERVENTIONS:  - Perform BMAT or MOVE assessment daily.   - Set and communicate daily mobility goal to care team and patient/family/caregiver. - Collaborate with rehabilitation services on mobility goals if consulted  - Perform Range of Motion 2 times a day. - Reposition patient every 2 hours.   - Dangle patient 2 times a day  - Stand patient 2 times a day  - Ambulate patient 2 times a day  - Out of bed to chair 2 times a day   - Out of bed for meals 2 times a day  - Out of bed for toileting  - Record patient progress and toleration of activity level   Outcome: Progressing     Problem: PAIN - ADULT  Goal: Verbalizes/displays adequate comfort level or baseline comfort level  Description: Interventions:  - Encourage patient to monitor pain and request assistance  - Assess pain using appropriate pain scale  - Administer analgesics based on type and severity of pain and evaluate response  - Implement non-pharmacological measures as appropriate and evaluate response  - Consider cultural and social influences on pain and pain management  - Notify physician/advanced practitioner if interventions unsuccessful or patient reports new pain  Outcome: Progressing     Problem: INFECTION - ADULT  Goal: Absence or prevention of progression during hospitalization  Description: INTERVENTIONS:  - Assess and monitor for signs and symptoms of infection  - Monitor lab/diagnostic results  - Monitor all insertion sites, i.e. indwelling lines, tubes, and drains  - Monitor endotracheal if appropriate and nasal secretions for changes in amount and color  - Reedy appropriate cooling/warming therapies per order  - Administer medications as ordered  - Instruct and encourage patient and family to use good hand hygiene technique  - Identify and instruct in appropriate isolation precautions for identified infection/condition  Outcome: Progressing  Goal: Absence of fever/infection during neutropenic period  Description: INTERVENTIONS:  - Monitor WBC    Outcome: Progressing     Problem: SAFETY ADULT  Goal: Maintain or return to baseline ADL function  Description: INTERVENTIONS:  -  Assess patient's ability to carry out ADLs; assess patient's baseline for ADL function and identify physical deficits which impact ability to perform ADLs (bathing, care of mouth/teeth, toileting, grooming, dressing, etc.)  - Assess/evaluate cause of self-care deficits   - Assess range of motion  - Assess patient's mobility; develop plan if impaired  - Assess patient's need for assistive devices and provide as appropriate  - Encourage maximum independence but intervene and supervise when necessary  - Involve family in performance of ADLs  - Assess for home care needs following discharge   - Consider OT consult to assist with ADL evaluation and planning for discharge  - Provide patient education as appropriate  Outcome: Progressing  Goal: Maintains/Returns to pre admission functional level  Description: INTERVENTIONS:  - Perform BMAT or MOVE assessment daily.   - Set and communicate daily mobility goal to care team and patient/family/caregiver. - Collaborate with rehabilitation services on mobility goals if consulted  - Perform Range of Motion 2 times a day. - Reposition patient every 2 hours.   - Dangle patient 2 times a day  - Stand patient 2 times a day  - Ambulate patient 2 times a day  - Out of bed to chair 2 times a day   - Out of bed for meals 2 times a day  - Out of bed for toileting  - Record patient progress and toleration of activity level   Outcome: Progressing  Goal: Patient will remain free of falls  Description: INTERVENTIONS:  - Educate patient/family on patient safety including physical limitations  - Instruct patient to call for assistance with activity   - Consult OT/PT to assist with strengthening/mobility   - Keep Call bell within reach  - Keep bed low and locked with side rails adjusted as appropriate  - Keep care items and personal belongings within reach  - Initiate and maintain comfort rounds  - Make Fall Risk Sign visible to staff  - Offer Toileting every 2 Hours, in advance of need  - Initiate/Maintain bed alarm  - Obtain necessary fall risk management equipment:   - Apply yellow socks and bracelet for high fall risk patients  - Consider moving patient to room near nurses station  Outcome: Progressing     Problem: DISCHARGE PLANNING  Goal: Discharge to home or other facility with appropriate resources  Description: INTERVENTIONS:  - Identify barriers to discharge w/patient and caregiver  - Arrange for needed discharge resources and transportation as appropriate  - Identify discharge learning needs (meds, wound care, etc.)  - Arrange for interpretive services to assist at discharge as needed  - Refer to Case Management Department for coordinating discharge planning if the patient needs post-hospital services based on physician/advanced practitioner order or complex needs related to functional status, cognitive ability, or social support system  Outcome: Progressing     Problem: Knowledge Deficit  Goal: Patient/family/caregiver demonstrates understanding of disease process, treatment plan, medications, and discharge instructions  Description: Complete learning assessment and assess knowledge base.   Interventions:  - Provide teaching at level of understanding  - Provide teaching via preferred learning methods  Outcome: Progressing     Problem: Prexisting or High Potential for Compromised Skin Integrity  Goal: Skin integrity is maintained or improved  Description: INTERVENTIONS:  - Identify patients at risk for skin breakdown  - Assess and monitor skin integrity  - Assess and monitor nutrition and hydration status  - Monitor labs   - Assess for incontinence   - Turn and reposition patient  - Assist with mobility/ambulation  - Relieve pressure over bony prominences  - Avoid friction and shearing  - Provide appropriate hygiene as needed including keeping skin clean and dry  - Evaluate need for skin moisturizer/barrier cream  - Collaborate with interdisciplinary team   - Patient/family teaching  - Consider wound care consult   Outcome: Progressing

## 2023-07-15 LAB
ANION GAP SERPL CALCULATED.3IONS-SCNC: 7 MMOL/L
BUN SERPL-MCNC: 18 MG/DL (ref 5–25)
CALCIUM SERPL-MCNC: 10 MG/DL (ref 8.4–10.2)
CHLORIDE SERPL-SCNC: 97 MMOL/L (ref 96–108)
CO2 SERPL-SCNC: 32 MMOL/L (ref 21–32)
CREAT SERPL-MCNC: 0.68 MG/DL (ref 0.6–1.3)
GFR SERPL CREATININE-BSD FRML MDRD: 94 ML/MIN/1.73SQ M
GLUCOSE SERPL-MCNC: 121 MG/DL (ref 65–140)
POTASSIUM SERPL-SCNC: 3.8 MMOL/L (ref 3.5–5.3)
SODIUM SERPL-SCNC: 136 MMOL/L (ref 135–147)

## 2023-07-15 PROCEDURE — 99232 SBSQ HOSP IP/OBS MODERATE 35: CPT | Performed by: PHYSICIAN ASSISTANT

## 2023-07-15 PROCEDURE — 80048 BASIC METABOLIC PNL TOTAL CA: CPT | Performed by: PHYSICIAN ASSISTANT

## 2023-07-15 PROCEDURE — 99232 SBSQ HOSP IP/OBS MODERATE 35: CPT | Performed by: INTERNAL MEDICINE

## 2023-07-15 RX ADMIN — NYSTATIN: 100000 POWDER TOPICAL at 08:19

## 2023-07-15 RX ADMIN — LOSARTAN POTASSIUM 25 MG: 25 TABLET, FILM COATED ORAL at 08:17

## 2023-07-15 RX ADMIN — OXYBUTYNIN CHLORIDE 5 MG: 5 TABLET ORAL at 21:11

## 2023-07-15 RX ADMIN — FUROSEMIDE 80 MG: 10 INJECTION, SOLUTION INTRAMUSCULAR; INTRAVENOUS at 17:31

## 2023-07-15 RX ADMIN — OXYBUTYNIN CHLORIDE 5 MG: 5 TABLET ORAL at 08:17

## 2023-07-15 RX ADMIN — FUROSEMIDE 80 MG: 10 INJECTION, SOLUTION INTRAMUSCULAR; INTRAVENOUS at 08:16

## 2023-07-15 RX ADMIN — OXYBUTYNIN CHLORIDE 5 MG: 5 TABLET ORAL at 17:31

## 2023-07-15 RX ADMIN — HEPARIN SODIUM 7500 UNITS: 5000 INJECTION INTRAVENOUS; SUBCUTANEOUS at 05:27

## 2023-07-15 RX ADMIN — HEPARIN SODIUM 7500 UNITS: 5000 INJECTION INTRAVENOUS; SUBCUTANEOUS at 21:11

## 2023-07-15 RX ADMIN — NYSTATIN: 100000 POWDER TOPICAL at 17:31

## 2023-07-15 RX ADMIN — ESCITALOPRAM 10 MG: 10 TABLET, FILM COATED ORAL at 08:17

## 2023-07-15 RX ADMIN — HEPARIN SODIUM 7500 UNITS: 5000 INJECTION INTRAVENOUS; SUBCUTANEOUS at 13:45

## 2023-07-15 NOTE — ASSESSMENT & PLAN NOTE
· Patient presented with increased shortness of breath; +22 lb since cardiology appt in Feb  · Echo 12/2022: EF 70%  · She is supposed to be on Lasix 40 mg BID at home, but has only been taking it QD due to incontinence  · Net negative -69054 mL  · Weight down 4 lbs last 24 hours   · Continue lasix 80 mg IV BID  · Beta-blocker: none  · ARB: Losartan 25 mg QD  · Monitor intake and output, daily weights. · Diet: Fluid restriction and 2 g Sodium.   · Has O2 at home to use as needed  · Follow up from Cardiology appreciated

## 2023-07-15 NOTE — PROGRESS NOTES
[facility]  Progress Note  Name: Welford Shone  MRN: [id number]  Unit/Bed#: S -55 I Date of Admission: 7/11/2023   Date of Service: 7/15/2023 I Hospital Day: 4    Assessment/Plan   * Acute on chronic heart failure with preserved ejection fraction (HFpEF) ([address])  Assessment & Plan  · Patient presented with increased shortness of breath; +22 lb since cardiology appt in Feb  · Echo 12/2022: EF 70%  · She is supposed to be on Lasix 40 mg BID at home, but has only been taking it QD due to incontinence  · Net negative -39004 mL  · Weight down 4 lbs last 24 hours   · Continue lasix 80 mg IV BID  · Beta-blocker: none  · ARB: Losartan 25 mg QD  · Monitor intake and output, daily weights. · Diet: Fluid restriction and 2 g Sodium. · Has O2 at home to use as needed  · Follow up from Cardiology appreciated    COPD (chronic obstructive pulmonary disease) ([address])  Assessment & Plan  · Without exacerbation   · Baseline oxygen is 1-2 liters PRN   · Albuterol PRN    Urinary incontinence  Assessment & Plan  · Patient requesting urology consult for urinary incontinence which is longstanding. · Outpatient office visit with appropriate. · Continue Oxybutynin     Morbid obesity with body mass index (BMI) of 60.0 to 69.9 in adult [facility])  Assessment & Plan  · BMI 65.78  · Likely BRADY, OHS. Has sleep study scheduled for the fall  · Interested in bariatric surgery but must first lose 40 lb. patient inquired about whether we could initiate Ozempic while here. Explained that this needs to be managed by the weight management team.  Referral was placed    Adrenal nodule ([address])  Assessment & Plan  · CT "There is a 3.2 cm right adrenal nodule.  Although its imaging features are indeterminate, it does not have suspicious imaging features (heterogeneity, necrosis, irregular margins),  but based on its size, a dedicated adrenal protocol CT is recommended on an outpatient basis to confirm benignity."  · Daughter and patient aware      Essential hypertension  Assessment & Plan  · BP elevated on arrival.  Given nitro paste and IV lasix with improvement  · Continue losartan   · Diurese as above    Ambulatory dysfunction  Assessment & Plan  · Ambulates with walker at baseline  · Encouraged patient to get out of bed to urinate rather than relying on the pure wick; she is agreeable  · She is ambulating well with her walker              VTE Pharmacologic Prophylaxis: VTE Score: 6 High Risk (Score >/= 5) - Pharmacological DVT Prophylaxis Ordered: heparin. Sequential Compression Devices Ordered. Patient Centered Rounds: I performed bedside rounds with nursing staff today. Discussions with Specialists or Other Care Team Provider: Discussed with RN, DEJAH    Education and Discussions with Family / Patient: Updated  (daughter) via phone. Total Time Spent on Date of Encounter in care of patient: 35 minutes This time was spent on one or more of the following: performing physical exam; counseling and coordination of care; obtaining or reviewing history; documenting in the medical record; reviewing/ordering tests, medications or procedures; communicating with other healthcare professionals and discussing with patient's family/caregivers. Current Length of Stay: 4 day(s)  Current Patient Status: Inpatient   Certification Statement: The patient will continue to require additional inpatient hospital stay due to further IV diuresis   Discharge Plan: Anticipate discharge in 24-48 hrs to home. Code Status: Level 1 - Full Code    Subjective:   Patient reports feeling better today. Denies SOB or chest pain. Objective:     Vitals:   Temp (24hrs), Av.3 °F (36.8 °C), Min:97.6 °F (36.4 °C), Max:98.7 °F (37.1 °C)    Temp:  [97.6 °F (36.4 °C)-98.7 °F (37.1 °C)] 98.5 °F (36.9 °C)  HR:  [65-70] 69  Resp:  [18] 18  BP: (131-151)/(67-80) 144/80  SpO2:  [90 %-92 %] 90 %  Body mass index is 62.7 kg/m².      Input and Output Summary (last 24 hours): Intake/Output Summary (Last 24 hours) at 7/15/2023 1123  Last data filed at 7/15/2023 0477  Gross per 24 hour   Intake 1040 ml   Output 3600 ml   Net -2560 ml       Physical Exam:   Physical Exam  Constitutional:       General: She is not in acute distress. Appearance: Normal appearance. She is obese. She is not ill-appearing or diaphoretic. HENT:      Head: Normocephalic and atraumatic. Mouth/Throat:      Mouth: Mucous membranes are moist.   Eyes:      General: No scleral icterus. Pupils: Pupils are equal, round, and reactive to light. Cardiovascular:      Rate and Rhythm: Normal rate and regular rhythm. Pulses: Normal pulses. Heart sounds: Normal heart sounds, S1 normal and S2 normal. No murmur heard. No systolic murmur is present. No diastolic murmur is present. No gallop. No S3 or S4 sounds. Pulmonary:      Effort: Pulmonary effort is normal. No accessory muscle usage or respiratory distress. Breath sounds: Normal breath sounds. No stridor. No decreased breath sounds, wheezing, rhonchi or rales. Chest:      Chest wall: No tenderness. Abdominal:      General: Bowel sounds are normal. There is no distension. Palpations: Abdomen is soft. Tenderness: There is no abdominal tenderness. There is no guarding. Musculoskeletal:      Right lower leg: No edema. Left lower leg: No edema. Skin:     General: Skin is warm and dry. Coloration: Skin is not jaundiced. Neurological:      General: No focal deficit present. Mental Status: She is alert. Mental status is at baseline. Motor: No tremor or seizure activity. Psychiatric:         Behavior: Behavior is cooperative.           Additional Data:     Labs:  Results from last 7 days   Lab Units 07/12/23  0637 07/11/23  1731   WBC Thousand/uL 5.46 6.80   HEMOGLOBIN g/dL 13.0 13.5   HEMATOCRIT % 42.3 43.8   PLATELETS Thousands/uL 159 190   NEUTROS PCT %  --  70 LYMPHS PCT %  --  20   MONOS PCT %  --  7   EOS PCT %  --  2     Results from last 7 days   Lab Units 07/15/23  0451 07/12/23  0637 07/11/23  1731   SODIUM mmol/L 136   < > 139   POTASSIUM mmol/L 3.8   < > 3.7   CHLORIDE mmol/L 97   < > 104   CO2 mmol/L 32   < > 28   BUN mg/dL 18   < > 10   CREATININE mg/dL 0.68   < > 0.66   ANION GAP mmol/L 7   < > 7   CALCIUM mg/dL 10.0   < > 10.3*   ALBUMIN g/dL  --   --  4.2   TOTAL BILIRUBIN mg/dL  --   --  0.53   ALK PHOS U/L  --   --  77   ALT U/L  --   --  13   AST U/L  --   --  13   GLUCOSE RANDOM mg/dL 121   < > 101    < > = values in this interval not displayed. Lines/Drains:  Invasive Devices     Peripheral Intravenous Line  Duration           Peripheral IV 07/15/23 Left;Ventral (anterior) Forearm <1 day                      Imaging: No pertinent imaging reviewed. Recent Cultures (last 7 days):         Last 24 Hours Medication List:   Current Facility-Administered Medications   Medication Dose Route Frequency Provider Last Rate   • acetaminophen  650 mg Oral Q6H PRN Nilsa Renuka CRNP     • albuterol  2 puff Inhalation Q6H PRN Nilsa RenukaCYNTHIA savageNP     • escitalopram  10 mg Oral Daily Nilsa RenukaCYNTHIA savageNP     • furosemide  80 mg Intravenous BID (diuretic) NilsaCYNTHIA MadsenNP     • heparin (porcine)  7,500 Units Subcutaneous [facility] Nilsa RenukaCYNTHIA savageNP     • losartan  25 mg Oral Daily Nilsa CYNTHIA GraysonNP     • melatonin  3 mg Oral HS PRN Nilsa Renuka CRNP     • nystatin   Topical BID Marcia King PA-C     • oxybutynin  5 mg Oral TID Ying Griffin PA-C     • senna-docusate sodium  2 tablet Oral HS PRN Nilsa RONALD Grayson          Today, Patient Was Seen By: Marcia King PA-C    **Please Note: This note may have been constructed using a voice recognition system. **

## 2023-07-15 NOTE — PLAN OF CARE
Problem: MOBILITY - ADULT  Goal: Maintain or return to baseline ADL function  Description: INTERVENTIONS:  -  Assess patient's ability to carry out ADLs; assess patient's baseline for ADL function and identify physical deficits which impact ability to perform ADLs (bathing, care of mouth/teeth, toileting, grooming, dressing, etc.)  - Assess/evaluate cause of self-care deficits   - Assess range of motion  - Assess patient's mobility; develop plan if impaired  - Assess patient's need for assistive devices and provide as appropriate  - Encourage maximum independence but intervene and supervise when necessary  - Involve family in performance of ADLs  - Assess for home care needs following discharge   - Consider OT consult to assist with ADL evaluation and planning for discharge  - Provide patient education as appropriate  Outcome: Progressing  Goal: Maintains/Returns to pre admission functional level  Description: INTERVENTIONS:  - Perform BMAT or MOVE assessment daily.   - Set and communicate daily mobility goal to care team and patient/family/caregiver. - Collaborate with rehabilitation services on mobility goals if consulted  - Perform Range of Motion 3 times a day. - Reposition patient every 2 hours.   - Dangle patient 3 times a day  - Stand patient 3 times a day  - Ambulate patient 3 times a day  - Out of bed to chair 3 times a day   - Out of bed for meals 3 times a day  - Out of bed for toileting  - Record patient progress and toleration of activity level   Outcome: Progressing     Problem: PAIN - ADULT  Goal: Verbalizes/displays adequate comfort level or baseline comfort level  Description: Interventions:  - Encourage patient to monitor pain and request assistance  - Assess pain using appropriate pain scale  - Administer analgesics based on type and severity of pain and evaluate response  - Implement non-pharmacological measures as appropriate and evaluate response  - Consider cultural and social influences on pain and pain management  - Notify physician/advanced practitioner if interventions unsuccessful or patient reports new pain  Outcome: Progressing     Problem: INFECTION - ADULT  Goal: Absence or prevention of progression during hospitalization  Description: INTERVENTIONS:  - Assess and monitor for signs and symptoms of infection  - Monitor lab/diagnostic results  - Monitor all insertion sites, i.e. indwelling lines, tubes, and drains  - Monitor endotracheal if appropriate and nasal secretions for changes in amount and color  - Mayer appropriate cooling/warming therapies per order  - Administer medications as ordered  - Instruct and encourage patient and family to use good hand hygiene technique  - Identify and instruct in appropriate isolation precautions for identified infection/condition  Outcome: Progressing  Goal: Absence of fever/infection during neutropenic period  Description: INTERVENTIONS:  - Monitor WBC    Outcome: Progressing     Problem: SAFETY ADULT  Goal: Maintain or return to baseline ADL function  Description: INTERVENTIONS:  -  Assess patient's ability to carry out ADLs; assess patient's baseline for ADL function and identify physical deficits which impact ability to perform ADLs (bathing, care of mouth/teeth, toileting, grooming, dressing, etc.)  - Assess/evaluate cause of self-care deficits   - Assess range of motion  - Assess patient's mobility; develop plan if impaired  - Assess patient's need for assistive devices and provide as appropriate  - Encourage maximum independence but intervene and supervise when necessary  - Involve family in performance of ADLs  - Assess for home care needs following discharge   - Consider OT consult to assist with ADL evaluation and planning for discharge  - Provide patient education as appropriate  Outcome: Progressing  Goal: Maintains/Returns to pre admission functional level  Description: INTERVENTIONS:  - Perform BMAT or MOVE assessment daily.   - Set and communicate daily mobility goal to care team and patient/family/caregiver. - Collaborate with rehabilitation services on mobility goals if consulted  - Perform Range of Motion 3 times a day. - Reposition patient every 2 hours.   - Dangle patient 3 times a day  - Stand patient 3 times a day  - Ambulate patient 3 times a day  - Out of bed to chair 3 times a day   - Out of bed for meals 3 times a day  - Out of bed for toileting  - Record patient progress and toleration of activity level   Outcome: Progressing  Goal: Patient will remain free of falls  Description: INTERVENTIONS:  - Educate patient/family on patient safety including physical limitations  - Instruct patient to call for assistance with activity   - Consult OT/PT to assist with strengthening/mobility   - Keep Call bell within reach  - Keep bed low and locked with side rails adjusted as appropriate  - Keep care items and personal belongings within reach  - Initiate and maintain comfort rounds  - Make Fall Risk Sign visible to staff  - Offer Toileting every 2 Hours, in advance of need  - Initiate/Maintain bed alarm  - Obtain necessary fall risk management equipment: bed alarm  - Apply yellow socks and bracelet for high fall risk patients  - Consider moving patient to room near nurses station  Outcome: Progressing     Problem: DISCHARGE PLANNING  Goal: Discharge to home or other facility with appropriate resources  Description: INTERVENTIONS:  - Identify barriers to discharge w/patient and caregiver  - Arrange for needed discharge resources and transportation as appropriate  - Identify discharge learning needs (meds, wound care, etc.)  - Arrange for interpretive services to assist at discharge as needed  - Refer to Case Management Department for coordinating discharge planning if the patient needs post-hospital services based on physician/advanced practitioner order or complex needs related to functional status, cognitive ability, or social support system  Outcome: Progressing     Problem: Knowledge Deficit  Goal: Patient/family/caregiver demonstrates understanding of disease process, treatment plan, medications, and discharge instructions  Description: Complete learning assessment and assess knowledge base.   Interventions:  - Provide teaching at level of understanding  - Provide teaching via preferred learning methods  Outcome: Progressing     Problem: Prexisting or High Potential for Compromised Skin Integrity  Goal: Skin integrity is maintained or improved  Description: INTERVENTIONS:  - Identify patients at risk for skin breakdown  - Assess and monitor skin integrity  - Assess and monitor nutrition and hydration status  - Monitor labs   - Assess for incontinence   - Turn and reposition patient  - Assist with mobility/ambulation  - Relieve pressure over bony prominences  - Avoid friction and shearing  - Provide appropriate hygiene as needed including keeping skin clean and dry  - Evaluate need for skin moisturizer/barrier cream  - Collaborate with interdisciplinary team   - Patient/family teaching  - Consider wound care consult   Outcome: Progressing

## 2023-07-15 NOTE — PROGRESS NOTES
Cardiology Progress Note - Maddy Gunderson 66 y.o. female MRN: [id number]    Unit/Bed#: S -01 Encounter: [id number]      Impression:  1. Acute on chronic diastolic heart failure - in setting of diuretic non-adherence.  Will continue IV diuretics for now. Dry weight is 368 lbs. 2. HTN - improved control. 3. COPD  4. Morbid obesity.     Recommendations:  1. Continue IV diuretics for another 24-48 hrs. 2. Consider furosemide 80mg daily on discharge instead of BID dosing. As volume overload was partially due to non-adherence, does not need to evaluate response to oral diuretics prior to discharge. Subjective:    No significant events overnight. Review of Systems   Cardiovascular: Negative for chest pain, leg swelling and palpitations. Respiratory: Positive for shortness of breath. Objective:   Vitals: Blood pressure 144/80, pulse 69, temperature 98.5 °F (36.9 °C), temperature source Oral, resp. rate 18, weight (!) 171 kg (376 lb 12.8 oz), SpO2 90 %. , Body mass index is 62.7 kg/m².,   Orthostatic Blood Pressures    Flowsheet Row Most Recent Value   Blood Pressure 144/80 filed at 07/15/2023 0700   Patient Position - Orthostatic VS Lying filed at 07/15/2023 3922         Systolic (31CLE), BCC:428 , Min:131 , EMS:532     Diastolic (14QAV), ETU:82, Min:67, Max:80      Intake/Output Summary (Last 24 hours) at 7/15/2023 0846  Last data filed at 7/15/2023 0528  Gross per 24 hour   Intake 1247 ml   Output 3600 ml   Net -2353 ml     Weight (last 2 days)     Date/Time Weight    07/15/23 0546 171 (376.8)    07/14/23 0538 173 (380.96)    07/14/23 0518 173 (380.96)    07/13/23 0600 175 (384.92)              Telemetry Review: Off    Physical Exam  Cardiovascular:      Rate and Rhythm: Normal rate and regular rhythm. Heart sounds: Normal heart sounds. No murmur heard. No friction rub. No gallop. Pulmonary:      Breath sounds: Normal breath sounds. No wheezing or rales.            Laboratory Results:        CBC with diff:   Results from last 7 days   Lab Units 23  0637 23  1731   WBC Thousand/uL 5.46 6.80   HEMOGLOBIN g/dL 13.0 13.5   HEMATOCRIT % 42.3 43.8   MCV fL 95 95   PLATELETS Thousands/uL 159 190   RBC Million/uL 4.44 4.59   MCH pg 29.3 29.4   MCHC g/dL 30.7* 30.8*   RDW % 15.5* 15.4*   MPV fL 10.5 11.0   NRBC AUTO /100 WBCs  --  0         CMP:  Results from last 7 days   Lab Units 07/15/23  04523  0523  0458 2337 23  1731   POTASSIUM mmol/L 3.8 3.8 3.7 4.1 3.7   CHLORIDE mmol/L 97 99 101 103 104   CO2 mmol/L 32 30 34* 32 28   BUN mg/dL 18 16 14 10 10   CREATININE mg/dL 0.68 0.61 0.63 0.63 0.66   CALCIUM mg/dL 10.0 10.1 9.6 9.6 10.3*   AST U/L  --   --   --   --  13   ALT U/L  --   --   --   --  13   ALK PHOS U/L  --   --   --   --  77   EGFR ml/min/1.73sq m 94 98 97 97 95         BMP:  Results from last 7 days   Lab Units 07/15/23  04523  1731   POTASSIUM mmol/L 3.8 3.8 3.7 4.1 3.7   CHLORIDE mmol/L 97 99 101 103 104   CO2 mmol/L 32 30 34* 32 28   BUN mg/dL 18 16 14 10 10   CREATININE mg/dL 0.68 0.61 0.63 0.63 0.66   CALCIUM mg/dL 10.0 10.1 9.6 9.6 10.3*       BNP:     Magnesium:   Results from last 7 days   Lab Units 23  0523  06   MAGNESIUM mg/dL 2.2 2.1       Coags:       TSH:       Lipid Profile:             Cardiac testing:   Results for orders placed during the hospital encounter of 02/10/21    Echo complete with contrast if indicated    Narrative  [address]  [address] [address]  [phone]    Transthoracic Echocardiogram  2D, M-mode, Doppler, and Color Doppler    Study date:  10-Feb-2021    Patient: Tanna Reid  MR number: [id number]  Account number: [de-identified]  : 1960  Age: 63 years  Gender: Female  Status: Outpatient  Location: [address] [place] [address] [facility]  Height: 65 in  Weight: 386.1 lb  BP: 110/ 74 mmHg    Indications: Pedal edema;SOB on exertion;CHF. Diagnoses: R06.02 - Shortness of breath, R60.9 - Edema, unspecified    Sonographer:  DARYL St  Primary Physician:  Dee Magaña DO  Referring Physician:  Dee Magaña DO  Group:  [facility]  Cardiology Fellow: Qiana Panchal MD  Interpreting Physician:  Kylie Mayer MD    SUMMARY    LEFT VENTRICLE:  Systolic function was normal. Ejection fraction was estimated to be 65 %. There were no regional wall motion abnormalities. Doppler parameters were consistent with abnormal left ventricular relaxation (grade 1 diastolic dysfunction). TRICUSPID VALVE:  There was trace regurgitation. IVC, HEPATIC VEINS:  The inferior vena cava was dilated. Respirophasic changes were blunted (less than 50% variation). HISTORY: PRIOR HISTORY: BRADY;Smoker; Morbid obesity;HLD;HTN;COPD. PROCEDURE: The study was performed in the [address] [facility] [place]. This was a routine study. The transthoracic approach was used. The study included complete 2D imaging, M-mode, complete spectral Doppler, and color Doppler. The  heart rate was 80 bpm, at the start of the study. Images were obtained from the parasternal, apical, subcostal, and suprasternal notch acoustic windows. Echocardiographic views were limited due to poor acoustic window availability,  decreased penetration, and lung interference. This was a technically difficult study. LEFT VENTRICLE: Size was normal. Systolic function was normal. Ejection fraction was estimated to be 65 %. There were no regional wall motion abnormalities. Wall thickness was normal. DOPPLER: Doppler parameters were consistent with  abnormal left ventricular relaxation (grade 1 diastolic dysfunction).     RIGHT VENTRICLE: The size was normal. Systolic function was normal. Wall thickness was normal.    LEFT ATRIUM: Size was normal.    RIGHT ATRIUM: Size was normal.    MITRAL VALVE: Valve structure was normal. There was normal leaflet separation. DOPPLER: The transmitral velocity was within the normal range. There was no evidence for stenosis. There was no regurgitation. AORTIC VALVE: The valve was trileaflet. Leaflets exhibited normal thickness and normal cuspal separation. DOPPLER: Transaortic velocity was within the normal range. There was no evidence for stenosis. There was no regurgitation. TRICUSPID VALVE: The valve structure was normal. There was normal leaflet separation. DOPPLER: The transtricuspid velocity was within the normal range. There was no evidence for stenosis. There was trace regurgitation. PULMONIC VALVE: Leaflets exhibited normal thickness, no calcification, and normal cuspal separation. DOPPLER: The transpulmonic velocity was within the normal range. There was no regurgitation. PERICARDIUM: There was no pericardial effusion. The pericardium was normal in appearance. AORTA: The root exhibited normal size. SYSTEMIC VEINS: IVC: The inferior vena cava was dilated. Respirophasic changes were blunted (less than 50% variation).     SYSTEM MEASUREMENT TABLES    2D  %FS: 27.94 %  Ao Diam: 3.29 cm  EDV(Teich): 171.86 ml  EF(Teich): 53.32 %  ESV(Teich): 80.23 ml  IVSd: 1.21 cm  LA Area: 23.96 cm2  LA Diam: 4.68 cm  LVEDV MOD A4C: 109.9 ml  LVEF MOD A4C: 64.35 %  LVESV MOD A4C: 39.18 ml  LVIDd: 5.88 cm  LVIDs: 4.24 cm  LVLd A4C: 8.59 cm  LVLs A4C: 6.49 cm  LVPWd: 1.21 cm  RA Area: 17.2 cm2  RVIDd: 4.01 cm  SV MOD A4C: 70.72 ml  SV(Teich): 91.63 ml    CW  AV Vmax: 1.57 m/s  AV maxP.92 mmHg    MM  TAPSE: 2.7 cm    PW  E' Sept: 0.04 m/s  E/E' Sept: 22.85  MV A Vito: 1.05 m/s  MV Dec [place]: 3.42 m/s2  MV DecT: 258.2 ms  MV E Vito: 0.88 m/s  MV E/A Ratio: 0.84  MV PHT: 74.88 ms  MVA By PHT: 2.94 cm2    In[employer]ocietal Commission Accredited Echocardiography Laboratory    Prepared and electronically signed by    Robert Salas MD  Signed 10-Feb-2021 13:57:17    No results found for this or any previous visit. No results found for this or any previous visit. No results found for this or any previous visit.       Meds/Allergies   Current Facility-Administered Medications   Medication Dose Route Frequency Provider Last Rate   • acetaminophen  650 mg Oral Q6H PRN RONALD Small     • albuterol  2 puff Inhalation Q6H PRN RONALD Small     • escitalopram  10 mg Oral Daily RONALD Small     • furosemide  80 mg Intravenous BID (diuretic) RONALD Small     • heparin (porcine)  7,500 Units Subcutaneous [facility] RONALD Small     • losartan  25 mg Oral Daily RONALD Small     • melatonin  3 mg Oral HS PRN RONALD Small     • nystatin   Topical BID Yosvany Mantilla PA-C     • oxybutynin  5 mg Oral TID Haider Chaudhari PA-C     • senna-docusate sodium  2 tablet Oral HS PRN RONALD Small          Medications Prior to Admission   Medication   • acetaminophen (TYLENOL) 500 mg tablet   • albuterol (PROVENTIL HFA,VENTOLIN HFA) 90 mcg/act inhaler   • escitalopram (Lexapro) 10 mg tablet   • furosemide (LASIX) 40 mg tablet   • ibuprofen (Advil) 200 mg tablet   • losartan (COZAAR) 25 mg tablet       Assessment:  Principal Problem:    Acute on chronic heart failure with preserved ejection fraction (HFpEF) ([facility])  Active Problems:    Essential hypertension    COPD (chronic obstructive pulmonary disease) ([facility])    Morbid obesity with body mass index (BMI) of 60.0 to 69.9 in adult [facility])    Ambulatory dysfunction    Adrenal nodule (HCC)    Urinary incontinence

## 2023-07-16 VITALS
BODY MASS INDEX: 62.2 KG/M2 | OXYGEN SATURATION: 91 % | SYSTOLIC BLOOD PRESSURE: 133 MMHG | DIASTOLIC BLOOD PRESSURE: 78 MMHG | WEIGHT: 293 LBS | TEMPERATURE: 97.4 F | HEART RATE: 82 BPM | RESPIRATION RATE: 18 BRPM

## 2023-07-16 LAB
ANION GAP SERPL CALCULATED.3IONS-SCNC: 8 MMOL/L
ATRIAL RATE: 73 BPM
BUN SERPL-MCNC: 18 MG/DL (ref 5–25)
CALCIUM SERPL-MCNC: 10.3 MG/DL (ref 8.4–10.2)
CHLORIDE SERPL-SCNC: 96 MMOL/L (ref 96–108)
CO2 SERPL-SCNC: 30 MMOL/L (ref 21–32)
CREAT SERPL-MCNC: 0.64 MG/DL (ref 0.6–1.3)
GFR SERPL CREATININE-BSD FRML MDRD: 96 ML/MIN/1.73SQ M
GLUCOSE SERPL-MCNC: 128 MG/DL (ref 65–140)
POTASSIUM SERPL-SCNC: 3.6 MMOL/L (ref 3.5–5.3)
PR INTERVAL: 156 MS
QRS AXIS: 74 DEGREES
QRSD INTERVAL: 100 MS
QT INTERVAL: 418 MS
QTC INTERVAL: 460 MS
SODIUM SERPL-SCNC: 134 MMOL/L (ref 135–147)
T WAVE AXIS: 70 DEGREES
VENTRICULAR RATE: 73 BPM

## 2023-07-16 PROCEDURE — 93010 ELECTROCARDIOGRAM REPORT: CPT | Performed by: INTERNAL MEDICINE

## 2023-07-16 PROCEDURE — 80048 BASIC METABOLIC PNL TOTAL CA: CPT | Performed by: INTERNAL MEDICINE

## 2023-07-16 PROCEDURE — 99239 HOSP IP/OBS DSCHRG MGMT >30: CPT | Performed by: PHYSICIAN ASSISTANT

## 2023-07-16 PROCEDURE — 99232 SBSQ HOSP IP/OBS MODERATE 35: CPT | Performed by: INTERNAL MEDICINE

## 2023-07-16 RX ORDER — OXYBUTYNIN CHLORIDE 5 MG/1
5 TABLET ORAL 3 TIMES DAILY
Qty: 90 TABLET | Refills: 0 | Status: SHIPPED | OUTPATIENT
Start: 2023-07-16 | End: 2023-07-16 | Stop reason: SDUPTHER

## 2023-07-16 RX ORDER — OXYBUTYNIN CHLORIDE 5 MG/1
5 TABLET ORAL 3 TIMES DAILY
Qty: 90 TABLET | Refills: 0 | Status: SHIPPED | OUTPATIENT
Start: 2023-07-16

## 2023-07-16 RX ORDER — FUROSEMIDE 80 MG
80 TABLET ORAL DAILY
Qty: 30 TABLET | Refills: 0 | Status: SHIPPED | OUTPATIENT
Start: 2023-07-16 | End: 2023-08-15

## 2023-07-16 RX ORDER — FUROSEMIDE 80 MG
80 TABLET ORAL DAILY
Status: DISCONTINUED | OUTPATIENT
Start: 2023-07-16 | End: 2023-07-16 | Stop reason: HOSPADM

## 2023-07-16 RX ORDER — FUROSEMIDE 80 MG
80 TABLET ORAL DAILY
Qty: 30 TABLET | Refills: 0 | Status: SHIPPED | OUTPATIENT
Start: 2023-07-16 | End: 2023-07-16 | Stop reason: SDUPTHER

## 2023-07-16 RX ADMIN — OXYBUTYNIN CHLORIDE 5 MG: 5 TABLET ORAL at 08:45

## 2023-07-16 RX ADMIN — FUROSEMIDE 80 MG: 80 TABLET ORAL at 10:41

## 2023-07-16 RX ADMIN — NYSTATIN: 100000 POWDER TOPICAL at 08:47

## 2023-07-16 RX ADMIN — HEPARIN SODIUM 7500 UNITS: 5000 INJECTION INTRAVENOUS; SUBCUTANEOUS at 06:24

## 2023-07-16 RX ADMIN — ESCITALOPRAM 10 MG: 10 TABLET, FILM COATED ORAL at 08:45

## 2023-07-16 RX ADMIN — LOSARTAN POTASSIUM 25 MG: 25 TABLET, FILM COATED ORAL at 08:45

## 2023-07-16 NOTE — PROGRESS NOTES
Cardiology Progress Note - Yumiko Mata 66 y.o. female MRN: [id number]    Unit/Bed#: S -01 Encounter: [id number]      Impression:  1. Acute on chronic diastolic heart failure - in setting of diuretic non-adherence.  Will switch to oral diuretics. Dry weight is 368 lbs. 2. HTN - improved control. 3. COPD  4. Morbid obesity.     Recommendations:  1. Switch to furosemide 80mg daily. 2. Continue remainder of medications. 3. Stable for discharge from cardiac standpoint. Will sign off for now. Please call with questions. Subjective:    No significant events overnight. Review of Systems   Cardiovascular: Negative for chest pain, leg swelling and palpitations. Respiratory: Positive for shortness of breath. Objective:   Vitals: Blood pressure 133/78, pulse 82, temperature (!) 97.4 °F (36.3 °C), temperature source Oral, resp. rate 18, weight (!) 170 kg (373 lb 12.8 oz), SpO2 91 %., Body mass index is 62.2 kg/m².,   Orthostatic Blood Pressures    Flowsheet Row Most Recent Value   Blood Pressure 133/78 filed at 07/16/2023 0806   Patient Position - Orthostatic VS Lying filed at 07/16/2023 6099         Systolic (31DGP), WWA:996 , Min:133 , NYB:885     Diastolic (45OTM), HFW:73, Min:66, Max:78      Intake/Output Summary (Last 24 hours) at 7/16/2023 0922  Last data filed at 7/16/2023 8130  Gross per 24 hour   Intake 360 ml   Output 2800 ml   Net -2440 ml     Weight (last 2 days)     Date/Time Weight    07/16/23 0710 170 (373.8)    07/15/23 0546 171 (376.8)    07/14/23 0538 173 (380.96)    07/14/23 0518 173 (380.96)              Telemetry Review: Off    Physical Exam  Cardiovascular:      Rate and Rhythm: Normal rate and regular rhythm. Heart sounds: Normal heart sounds. No murmur heard. No friction rub. No gallop. Pulmonary:      Breath sounds: Normal breath sounds. No wheezing or rales.            Laboratory Results:        CBC with diff:   Results from last 7 days   Lab Units 23  1731   WBC Thousand/uL 5.46 6.80   HEMOGLOBIN g/dL 13.0 13.5   HEMATOCRIT % 42.3 43.8   MCV fL 95 95   PLATELETS Thousands/uL 159 190   RBC Million/uL 4.44 4.59   MCH pg 29.3 29.4   MCHC g/dL 30.7* 30.8*   RDW % 15.5* 15.4*   MPV fL 10.5 11.0   NRBC AUTO /100 WBCs  --  0         CMP:  Results from last 7 days   Lab Units 07/16/23  0522 07/15/23  0451 07/14/23  0529 07/13/23  0458 07/12/23  0637 07/11/23  1731   POTASSIUM mmol/L 3.6 3.8 3.8 3.7 4.1 3.7   CHLORIDE mmol/L 96 97 99 101 103 104   CO2 mmol/L 30 32 30 34* 32 28   BUN mg/dL 18 18 16 14 10 10   CREATININE mg/dL 0.64 0.68 0.61 0.63 0.63 0.66   CALCIUM mg/dL 10.3* 10.0 10.1 9.6 9.6 10.3*   AST U/L  --   --   --   --   --  13   ALT U/L  --   --   --   --   --  13   ALK PHOS U/L  --   --   --   --   --  77   EGFR ml/min/1.73sq m 96 94 98 97 97 95         BMP:  Results from last 7 days   Lab Units 07/16/23  0522 07/15/23  0451 07/14/23  0529 07/13/23  0458 07/12/23  0637 07/11/23  1731   POTASSIUM mmol/L 3.6 3.8 3.8 3.7 4.1 3.7   CHLORIDE mmol/L 96 97 99 101 103 104   CO2 mmol/L 30 32 30 34* 32 28   BUN mg/dL 18 18 16 14 10 10   CREATININE mg/dL 0.64 0.68 0.61 0.63 0.63 0.66   CALCIUM mg/dL 10.3* 10.0 10.1 9.6 9.6 10.3*       BNP:     Magnesium:   Results from last 7 days   Lab Units 23   MAGNESIUM mg/dL 2.2 2.1       Coags:       TSH:       Lipid Profile:             Cardiac testing:   Results for orders placed during the hospital encounter of 02/10/21    Echo complete with contrast if indicated    Narrative  [address] [place] [address], [address] [place] [address]  [phone]    Transthoracic Echocardiogram  2D, M-mode, Doppler, and Color Doppler    Study date:  10-Feb-2021    Patient: Ira Zheng  MR number: [id number]  Account number: [de-identified]  : 1960  Age: 63 years  Gender: Female  Status: Outpatient  Location: [address] [place] [address] [facility]  Height: 65 in  Weight: 386.1 lb  BP: 110/ 74 mmHg    Indications: Pedal edema;SOB on exertion;CHF. Diagnoses: R06.02 - Shortness of breath, R60.9 - Edema, unspecified    Sonographer:  DARYL Zaman  Primary Physician:  Kenny Woodward DO  Referring Physician:  Kenny Woodward DO  Group:  [facility]  Cardiology Fellow: Janice Kim MD  Interpreting Physician:  Rony Schilling MD    SUMMARY    LEFT VENTRICLE:  Systolic function was normal. Ejection fraction was estimated to be 65 %. There were no regional wall motion abnormalities. Doppler parameters were consistent with abnormal left ventricular relaxation (grade 1 diastolic dysfunction). TRICUSPID VALVE:  There was trace regurgitation. IVC, HEPATIC VEINS:  The inferior vena cava was dilated. Respirophasic changes were blunted (less than 50% variation). HISTORY: PRIOR HISTORY: BRADY;Smoker; Morbid obesity;HLD;HTN;COPD. PROCEDURE: The study was performed in the [address] [facility] [place]. This was a routine study. The transthoracic approach was used. The study included complete 2D imaging, M-mode, complete spectral Doppler, and color Doppler. The  heart rate was 80 bpm, at the start of the study. Images were obtained from the parasternal, apical, subcostal, and suprasternal notch acoustic windows. Echocardiographic views were limited due to poor acoustic window availability,  decreased penetration, and lung interference. This was a technically difficult study. LEFT VENTRICLE: Size was normal. Systolic function was normal. Ejection fraction was estimated to be 65 %. There were no regional wall motion abnormalities. Wall thickness was normal. DOPPLER: Doppler parameters were consistent with  abnormal left ventricular relaxation (grade 1 diastolic dysfunction).     RIGHT VENTRICLE: The size was normal. Systolic function was normal. Wall thickness was normal.    LEFT ATRIUM: Size was normal.    RIGHT ATRIUM: Size was normal.    MITRAL VALVE: Valve structure was normal. There was normal leaflet separation. DOPPLER: The transmitral velocity was within the normal range. There was no evidence for stenosis. There was no regurgitation. AORTIC VALVE: The valve was trileaflet. Leaflets exhibited normal thickness and normal cuspal separation. DOPPLER: Transaortic velocity was within the normal range. There was no evidence for stenosis. There was no regurgitation. TRICUSPID VALVE: The valve structure was normal. There was normal leaflet separation. DOPPLER: The transtricuspid velocity was within the normal range. There was no evidence for stenosis. There was trace regurgitation. PULMONIC VALVE: Leaflets exhibited normal thickness, no calcification, and normal cuspal separation. DOPPLER: The transpulmonic velocity was within the normal range. There was no regurgitation. PERICARDIUM: There was no pericardial effusion. The pericardium was normal in appearance. AORTA: The root exhibited normal size. SYSTEMIC VEINS: IVC: The inferior vena cava was dilated. Respirophasic changes were blunted (less than 50% variation).     SYSTEM MEASUREMENT TABLES    2D  %FS: 27.94 %  Ao Diam: 3.29 cm  EDV(Teich): 171.86 ml  EF(Teich): 53.32 %  ESV(Teich): 80.23 ml  IVSd: 1.21 cm  LA Area: 23.96 cm2  LA Diam: 4.68 cm  LVEDV MOD A4C: 109.9 ml  LVEF MOD A4C: 64.35 %  LVESV MOD A4C: 39.18 ml  LVIDd: 5.88 cm  LVIDs: 4.24 cm  LVLd A4C: 8.59 cm  LVLs A4C: 6.49 cm  LVPWd: 1.21 cm  RA Area: 17.2 cm2  RVIDd: 4.01 cm  SV MOD A4C: 70.72 ml  SV(Teich): 91.63 ml    CW  AV Vmax: 1.57 m/s  AV maxP.92 mmHg    MM  TAPSE: 2.7 cm    PW  E' Sept: 0.04 m/s  E/E' Sept: 22.85  MV A Vito: 1.05 m/s  MV Dec [place]: 3.42 m/s2  MV DecT: 258.2 ms  MV E Vito: 0.88 m/s  MV E/A Ratio: 0.84  MV PHT: 74.88 ms  MVA By PHT: 2.94 cm2    [employer] Commission Accredited Echocardiography Laboratory    Prepared and electronically signed by    Hever Clifford MD  Signed 10-Feb-2021 13:57:17    No results found for this or any previous visit. No results found for this or any previous visit. No results found for this or any previous visit.       Meds/Allergies   Current Facility-Administered Medications   Medication Dose Route Frequency Provider Last Rate   • acetaminophen  650 mg Oral Q6H PRN Nilsa CYNTHIA GraysonNP     • albuterol  2 puff Inhalation Q6H PRN Nilsa Renuka, CRNP     • escitalopram  10 mg Oral Daily Nilsa RenukaCYNTHIA savageNP     • furosemide  80 mg Intravenous BID (diuretic) Nilsa Renuka CRNP     • heparin (porcine)  7,500 Units Subcutaneous [facility] Nilsa Renuka CRNP     • losartan  25 mg Oral Daily Nilsa Renuka, CRNP     • melatonin  3 mg Oral HS PRN NilsaRONALD Graves     • nystatin   Topical BID Yosvany Thornton PA-C     • oxybutynin  5 mg Oral TID Ying Griffin PA-C     • senna-docusate sodium  2 tablet Oral HS PRN NilsaRONALD Graves          Medications Prior to Admission   Medication   • acetaminophen (TYLENOL) 500 mg tablet   • albuterol (PROVENTIL HFA,VENTOLIN HFA) 90 mcg/act inhaler   • escitalopram (Lexapro) 10 mg tablet   • furosemide (LASIX) 40 mg tablet   • ibuprofen (Advil) 200 mg tablet   • losartan (COZAAR) 25 mg tablet       Assessment:  Principal Problem:    Acute on chronic heart failure with preserved ejection fraction (HFpEF) ([facility])  Active Problems:    Essential hypertension    COPD (chronic obstructive pulmonary disease) ([facility])    Morbid obesity with body mass index (BMI) of 60.0 to 69.9 in adult [facility])    Ambulatory dysfunction    Adrenal nodule (HCC)    Urinary incontinence

## 2023-07-16 NOTE — PLAN OF CARE
Problem: MOBILITY - ADULT  Goal: Maintain or return to baseline ADL function  Description: INTERVENTIONS:  -  Assess patient's ability to carry out ADLs; assess patient's baseline for ADL function and identify physical deficits which impact ability to perform ADLs (bathing, care of mouth/teeth, toileting, grooming, dressing, etc.)  - Assess/evaluate cause of self-care deficits   - Assess range of motion  - Assess patient's mobility; develop plan if impaired  - Assess patient's need for assistive devices and provide as appropriate  - Encourage maximum independence but intervene and supervise when necessary  - Involve family in performance of ADLs  - Assess for home care needs following discharge   - Consider OT consult to assist with ADL evaluation and planning for discharge  - Provide patient education as appropriate  Outcome: Progressing  Goal: Maintains/Returns to pre admission functional level  Description: INTERVENTIONS:  - Perform BMAT or MOVE assessment daily.   - Set and communicate daily mobility goal to care team and patient/family/caregiver. - Collaborate with rehabilitation services on mobility goals if consulted  - Perform Range of Motion 3 times a day. - Reposition patient every 2 hours.   - Dangle patient 3 times a day  - Stand patient 3 times a day  - Ambulate patient 3 times a day  - Out of bed to chair 3 times a day   - Out of bed for meals 3 times a day  - Out of bed for toileting  - Record patient progress and toleration of activity level   Outcome: Progressing     Problem: PAIN - ADULT  Goal: Verbalizes/displays adequate comfort level or baseline comfort level  Description: Interventions:  - Encourage patient to monitor pain and request assistance  - Assess pain using appropriate pain scale  - Administer analgesics based on type and severity of pain and evaluate response  - Implement non-pharmacological measures as appropriate and evaluate response  - Consider cultural and social influences on pain and pain management  - Notify physician/advanced practitioner if interventions unsuccessful or patient reports new pain  Outcome: Progressing     Problem: INFECTION - ADULT  Goal: Absence or prevention of progression during hospitalization  Description: INTERVENTIONS:  - Assess and monitor for signs and symptoms of infection  - Monitor lab/diagnostic results  - Monitor all insertion sites, i.e. indwelling lines, tubes, and drains  - Monitor endotracheal if appropriate and nasal secretions for changes in amount and color  - [place] appropriate cooling/warming therapies per order  - Administer medications as ordered  - Instruct and encourage patient and family to use good hand hygiene technique  - Identify and instruct in appropriate isolation precautions for identified infection/condition  Outcome: Progressing  Goal: Absence of fever/infection during neutropenic period  Description: INTERVENTIONS:  - Monitor WBC    Outcome: Progressing     Problem: SAFETY ADULT  Goal: Maintain or return to baseline ADL function  Description: INTERVENTIONS:  -  Assess patient's ability to carry out ADLs; assess patient's baseline for ADL function and identify physical deficits which impact ability to perform ADLs (bathing, care of mouth/teeth, toileting, grooming, dressing, etc.)  - Assess/evaluate cause of self-care deficits   - Assess range of motion  - Assess patient's mobility; develop plan if impaired  - Assess patient's need for assistive devices and provide as appropriate  - Encourage maximum independence but intervene and supervise when necessary  - Involve family in performance of ADLs  - Assess for home care needs following discharge   - Consider OT consult to assist with ADL evaluation and planning for discharge  - Provide patient education as appropriate  Outcome: Progressing  Goal: Maintains/Returns to pre admission functional level  Description: INTERVENTIONS:  - Perform BMAT or MOVE assessment daily.   - Set and communicate daily mobility goal to care team and patient/family/caregiver. - Collaborate with rehabilitation services on mobility goals if consulted  - Perform Range of Motion 3 times a day. - Reposition patient every 2 hours.   - Dangle patient 3 times a day  - Stand patient 3 times a day  - Ambulate patient 3 times a day  - Out of bed to chair 3 times a day   - Out of bed for meals 3 times a day  - Out of bed for toileting  - Record patient progress and toleration of activity level   Outcome: Progressing  Goal: Patient will remain free of falls  Description: INTERVENTIONS:  - Educate patient/family on patient safety including physical limitations  - Instruct patient to call for assistance with activity   - Consult OT/PT to assist with strengthening/mobility   - Keep Call bell within reach  - Keep bed low and locked with side rails adjusted as appropriate  - Keep care items and personal belongings within reach  - Initiate and maintain comfort rounds  - Make Fall Risk Sign visible to staff  - Offer Toileting every 2 Hours, in advance of need  - Initiate/Maintain bed alarm  - Obtain necessary fall risk management equipment: bed alarm  - Apply yellow socks and bracelet for high fall risk patients  - Consider moving patient to room near nurses station  Outcome: Progressing     Problem: DISCHARGE PLANNING  Goal: Discharge to home or other facility with appropriate resources  Description: INTERVENTIONS:  - Identify barriers to discharge w/patient and caregiver  - Arrange for needed discharge resources and transportation as appropriate  - Identify discharge learning needs (meds, wound care, etc.)  - Arrange for interpretive services to assist at discharge as needed  - Refer to Case Management Department for coordinating discharge planning if the patient needs post-hospital services based on physician/advanced practitioner order or complex needs related to functional status, cognitive ability, or social support system  Outcome: Progressing     Problem: Knowledge Deficit  Goal: Patient/family/caregiver demonstrates understanding of disease process, treatment plan, medications, and discharge instructions  Description: Complete learning assessment and assess knowledge base.   Interventions:  - Provide teaching at level of understanding  - Provide teaching via preferred learning methods  Outcome: Progressing     Problem: Prexisting or High Potential for Compromised Skin Integrity  Goal: Skin integrity is maintained or improved  Description: INTERVENTIONS:  - Identify patients at risk for skin breakdown  - Assess and monitor skin integrity  - Assess and monitor nutrition and hydration status  - Monitor labs   - Assess for incontinence   - Turn and reposition patient  - Assist with mobility/ambulation  - Relieve pressure over bony prominences  - Avoid friction and shearing  - Provide appropriate hygiene as needed including keeping skin clean and dry  - Evaluate need for skin moisturizer/barrier cream  - Collaborate with interdisciplinary team   - Patient/family teaching  - Consider wound care consult   Outcome: Progressing

## 2023-07-16 NOTE — ASSESSMENT & PLAN NOTE
· Patient presented with increased shortness of breath; +22 lb since cardiology appt in Feb  · Echo 12/2022: EF 70%  · She is supposed to be on Lasix 40 mg BID at home, but has only been taking it QD due to incontinence  · Net negative -16097 mL  · Weight down 3 lbs last 24 hours   · Stable for discharge   · Lasix 80 mg QD   · Beta-blocker: none  · ARB: Losartan 25 mg QD  · Monitor intake and output, daily weights. · Diet: Fluid restriction and 2 g Sodium.   · Has O2 at home to use as needed  · Follow up from Cardiology appreciated

## 2023-07-16 NOTE — PLAN OF CARE
Problem: MOBILITY - ADULT  Goal: Maintain or return to baseline ADL function  Description: INTERVENTIONS:  -  Assess patient's ability to carry out ADLs; assess patient's baseline for ADL function and identify physical deficits which impact ability to perform ADLs (bathing, care of mouth/teeth, toileting, grooming, dressing, etc.)  - Assess/evaluate cause of self-care deficits   - Assess range of motion  - Assess patient's mobility; develop plan if impaired  - Assess patient's need for assistive devices and provide as appropriate  - Encourage maximum independence but intervene and supervise when necessary  - Involve family in performance of ADLs  - Assess for home care needs following discharge   - Consider OT consult to assist with ADL evaluation and planning for discharge  - Provide patient education as appropriate  7/16/2023 1343 by Cheyanne Knox RN  Outcome: Adequate for Discharge  7/16/2023 1144 by Cheyanne Knox RN  Outcome: Progressing  Goal: Maintains/Returns to pre admission functional level  Description: INTERVENTIONS:  - Perform BMAT or MOVE assessment daily.   - Set and communicate daily mobility goal to care team and patient/family/caregiver. - Collaborate with rehabilitation services on mobility goals if consulted  - Perform Range of Motion 3 times a day. - Reposition patient every 2 hours.   - Dangle patient 3 times a day  - Stand patient 3 times a day  - Ambulate patient 3 times a day  - Out of bed to chair 3 times a day   - Out of bed for meals 3 times a day  - Out of bed for toileting  - Record patient progress and toleration of activity level   7/16/2023 1343 by Cheyanne Knox RN  Outcome: Adequate for Discharge  7/16/2023 1144 by Cheyanne Knox RN  Outcome: Progressing     Problem: PAIN - ADULT  Goal: Verbalizes/displays adequate comfort level or baseline comfort level  Description: Interventions:  - Encourage patient to monitor pain and request assistance  - Assess pain using appropriate pain scale  - Administer analgesics based on type and severity of pain and evaluate response  - Implement non-pharmacological measures as appropriate and evaluate response  - Consider cultural and social influences on pain and pain management  - Notify physician/advanced practitioner if interventions unsuccessful or patient reports new pain  7/16/2023 1343 by Janny Hinds RN  Outcome: Adequate for Discharge  7/16/2023 1144 by Janny Hinds RN  Outcome: Progressing     Problem: INFECTION - ADULT  Goal: Absence or prevention of progression during hospitalization  Description: INTERVENTIONS:  - Assess and monitor for signs and symptoms of infection  - Monitor lab/diagnostic results  - Monitor all insertion sites, i.e. indwelling lines, tubes, and drains  - Monitor endotracheal if appropriate and nasal secretions for changes in amount and color  - [place] appropriate cooling/warming therapies per order  - Administer medications as ordered  - Instruct and encourage patient and family to use good hand hygiene technique  - Identify and instruct in appropriate isolation precautions for identified infection/condition  7/16/2023 1343 by Janny Hinds RN  Outcome: Adequate for Discharge  7/16/2023 1144 by Janny Hinds RN  Outcome: Progressing  Goal: Absence of fever/infection during neutropenic period  Description: INTERVENTIONS:  - Monitor WBC    7/16/2023 1343 by Janny Hinds RN  Outcome: Adequate for Discharge  7/16/2023 1144 by Janny Hinds RN  Outcome: Progressing     Problem: SAFETY ADULT  Goal: Maintain or return to baseline ADL function  Description: INTERVENTIONS:  -  Assess patient's ability to carry out ADLs; assess patient's baseline for ADL function and identify physical deficits which impact ability to perform ADLs (bathing, care of mouth/teeth, toileting, grooming, dressing, etc.)  - Assess/evaluate cause of self-care deficits   - Assess range of motion  - Assess patient's mobility; develop plan if impaired  - Assess patient's need for assistive devices and provide as appropriate  - Encourage maximum independence but intervene and supervise when necessary  - Involve family in performance of ADLs  - Assess for home care needs following discharge   - Consider OT consult to assist with ADL evaluation and planning for discharge  - Provide patient education as appropriate  7/16/2023 1343 by Favian Mejia RN  Outcome: Adequate for Discharge  7/16/2023 1144 by Favian Mejia RN  Outcome: Progressing  Goal: Maintains/Returns to pre admission functional level  Description: INTERVENTIONS:  - Perform BMAT or MOVE assessment daily.   - Set and communicate daily mobility goal to care team and patient/family/caregiver. - Collaborate with rehabilitation services on mobility goals if consulted  - Perform Range of Motion 3 times a day. - Reposition patient every 2 hours.   - Dangle patient 3 times a day  - Stand patient 3 times a day  - Ambulate patient 3 times a day  - Out of bed to chair 3 times a day   - Out of bed for meals 3 times a day  - Out of bed for toileting  - Record patient progress and toleration of activity level   7/16/2023 1343 by Favian Mejia RN  Outcome: Adequate for Discharge  7/16/2023 1144 by Favian Mejia RN  Outcome: Progressing  Goal: Patient will remain free of falls  Description: INTERVENTIONS:  - Educate patient/family on patient safety including physical limitations  - Instruct patient to call for assistance with activity   - Consult OT/PT to assist with strengthening/mobility   - Keep Call bell within reach  - Keep bed low and locked with side rails adjusted as appropriate  - Keep care items and personal belongings within reach  - Initiate and maintain comfort rounds  - Make Fall Risk Sign visible to staff  - Offer Toileting every 2 Hours, in advance of need  - Initiate/Maintain bed alarm  - Obtain necessary fall risk management equipment: bed alarm  - Apply yellow socks and bracelet for high fall risk patients  - Consider moving patient to room near nurses station  7/16/2023 1343 by Judit Cummins RN  Outcome: Adequate for Discharge  7/16/2023 1144 by Judit Cummins RN  Outcome: Progressing     Problem: DISCHARGE PLANNING  Goal: Discharge to home or other facility with appropriate resources  Description: INTERVENTIONS:  - Identify barriers to discharge w/patient and caregiver  - Arrange for needed discharge resources and transportation as appropriate  - Identify discharge learning needs (meds, wound care, etc.)  - Arrange for interpretive services to assist at discharge as needed  - Refer to Case Management Department for coordinating discharge planning if the patient needs post-hospital services based on physician/advanced practitioner order or complex needs related to functional status, cognitive ability, or social support system  7/16/2023 1343 by Judit Cummins RN  Outcome: Adequate for Discharge  7/16/2023 1144 by Judit Cummins RN  Outcome: Progressing     Problem: Knowledge Deficit  Goal: Patient/family/caregiver demonstrates understanding of disease process, treatment plan, medications, and discharge instructions  Description: Complete learning assessment and assess knowledge base.   Interventions:  - Provide teaching at level of understanding  - Provide teaching via preferred learning methods  7/16/2023 1343 by Judit Cummins RN  Outcome: Adequate for Discharge  7/16/2023 1144 by Judit Cummins RN  Outcome: Progressing     Problem: Prexisting or High Potential for Compromised Skin Integrity  Goal: Skin integrity is maintained or improved  Description: INTERVENTIONS:  - Identify patients at risk for skin breakdown  - Assess and monitor skin integrity  - Assess and monitor nutrition and hydration status  - Monitor labs   - Assess for incontinence   - Turn and reposition patient  - Assist with mobility/ambulation  - Relieve pressure over bony prominences  - Avoid friction and shearing  - Provide appropriate hygiene as needed including keeping skin clean and dry  - Evaluate need for skin moisturizer/barrier cream  - Collaborate with interdisciplinary team   - Patient/family teaching  - Consider wound care consult   7/16/2023 1343 by Fidencio De Anda RN  Outcome: Adequate for Discharge  7/16/2023 1144 by Fidencio De Anda RN  Outcome: Progressing

## 2023-07-16 NOTE — DISCHARGE SUMMARY
[facility]  Discharge- Deannie Nap 1960, 66 y.o. female MRN: [id number]  Unit/Bed#: S -01 Encounter: [id number]  Primary Care Provider: Svetlana Wooten DO   Date and time admitted to hospital: 7/11/2023  4:48 PM    * Acute on chronic heart failure with preserved ejection fraction (HFpEF) ([address])  Assessment & Plan  · Patient presented with increased shortness of breath; +22 lb since cardiology appt in Feb  · Echo 12/2022: EF 70%  · She is supposed to be on Lasix 40 mg BID at home, but has only been taking it QD due to incontinence  · Net negative -62913 mL  · Weight down 3 lbs last 24 hours   · Stable for discharge   · Lasix 80 mg QD   · Beta-blocker: none  · ARB: Losartan 25 mg QD  · Monitor intake and output, daily weights. · Diet: Fluid restriction and 2 g Sodium. · Has O2 at home to use as needed  · Follow up from Cardiology appreciated    COPD (chronic obstructive pulmonary disease) ([address])  Assessment & Plan  · Without exacerbation   · Baseline oxygen is 1-2 liters PRN   · Albuterol PRN    Urinary incontinence  Assessment & Plan  · Patient requesting urology consult for urinary incontinence which is longstanding. · Outpatient office visit with appropriate. · Continue Oxybutynin     Morbid obesity with body mass index (BMI) of 60.0 to 69.9 in adult [facility])  Assessment & Plan  · BMI 65.78  · Likely BRADY, O[facility]. Has sleep study scheduled for the fall  · Interested in bariatric surgery but must first lose 40 lb. patient inquired about whether we could initiate Ozempic while here. Explained that this needs to be managed by the weight management team.  Referral was placed    Adrenal nodule ([address])  Assessment & Plan  · CT "There is a 3.2 cm right adrenal nodule.  Although its imaging features are indeterminate, it does not have suspicious imaging features (heterogeneity, necrosis, irregular margins),  but based on its size, a dedicated adrenal protocol CT is recommended on an outpatient basis to confirm benignity."  · Daughter and patient aware      Essential hypertension  Assessment & Plan  · BP elevated on arrival.  Given nitro paste and IV lasix with improvement  · Continue losartan   · Diurese as above    Ambulatory dysfunction  Assessment & Plan  · Ambulates with walker at baseline  · Encouraged patient to get out of bed to urinate rather than relying on the pure wick; she is agreeable  · She is ambulating well with her walker       Medical Problems     Resolved Problems  Date Reviewed: 7/16/2023   None       Discharging Physician / Practitioner: Dulce Cody PA-C  PCP: Jorge Motley DO  Admission Date:   Admission Orders (From admission, onward)     Ordered        07/11/23 2027  INPATIENT ADMISSION  Once                      Discharge Date: 07/16/23    Consultations During Hospital Stay:  · Cardiology - Dr. Cheyenne Montes     Procedures Performed:   · CXR  · CTA PE Study     Significant Findings / Test Results:   · CXR: Cardiomegaly with pulmonary vascular congestion   · CTA PE Study: No pulmonary embolus or other acute abnormality identified. There is a 3.2 cm right adrenal nodule    Incidental Findings:   · Adrenal nodule   · I reviewed the above mentioned incidental findings with the patient and/or family and they expressed understanding. Test Results Pending at Discharge (will require follow up): · None     Outpatient Tests Requested:  · None    Complications:  none    Reason for Admission: Congestive heart failure    Hospital Course:   Daiana Boucher is a 66 y.o. female patient who originally presented to the hospital on 7/11/2023 due to congestive heart failure. She had a 22 pound weight gain as outpatient. PE was ruled out in the ED. She was admitted, started on IV diuresis, and cardiology was consulted. She responded to IV Lasix well and her symptoms improved.  She was changed to Lasix 80 mg PO QD for improved compliance and will follow up with cardiology. Due to incontinence she was started on Oxybutynin which helped. She was discharged in stable condition. Of note, incidental adrenal nodule was found on CTA PE Study. This was discussed with the patient by a colleague and she will follow up with her family doctor outpatient. Please see above list of diagnoses and related plan for additional information. Condition at Discharge: stable    Discharge Day Visit / Exam:   Subjective:  Patient reports feeling well today. Denies difficulty breathing. Wants to go home. Vitals: Blood Pressure: 133/78 (07/16/23 0806)  Pulse: 82 (07/16/23 0806)  Temperature: (!) 97.4 °F (36.3 °C) (07/15/23 2127)  Temp Source: Oral (07/15/23 2127)  Respirations: 18 (07/16/23 0806)  Weight - Scale: (!) 170 kg (373 lb 12.8 oz) (07/16/23 0710)  SpO2: 91 % (07/16/23 0806)  Exam:   Physical Exam  Constitutional:       General: She is not in acute distress. Appearance: Normal appearance. She is morbidly obese. She is not ill-appearing or diaphoretic. HENT:      Head: Normocephalic and atraumatic. Mouth/Throat:      Mouth: Mucous membranes are moist.   Eyes:      General: No scleral icterus. Pupils: Pupils are equal, round, and reactive to light. Cardiovascular:      Rate and Rhythm: Normal rate and regular rhythm. Pulses: Normal pulses. Heart sounds: Normal heart sounds, S1 normal and S2 normal. No murmur heard. No systolic murmur is present. No diastolic murmur is present. No gallop. No S3 or S4 sounds. Pulmonary:      Effort: Pulmonary effort is normal. No accessory muscle usage or respiratory distress. Breath sounds: Normal breath sounds. No stridor. No decreased breath sounds, wheezing, rhonchi or rales. Chest:      Chest wall: No tenderness. Abdominal:      General: Bowel sounds are normal. There is no distension. Palpations: Abdomen is soft. Tenderness: There is no abdominal tenderness.  There is no guarding. Musculoskeletal:      Right lower leg: No edema. Left lower leg: No edema. Skin:     General: Skin is warm and dry. Coloration: Skin is not jaundiced. Neurological:      General: No focal deficit present. Mental Status: She is alert. Mental status is at baseline. Motor: No tremor or seizure activity. Psychiatric:         Behavior: Behavior is cooperative. Discussion with Family: Updated  (daughter) via phone. Discharge instructions/Information to patient and family:   See after visit summary for information provided to patient and family. Provisions for Follow-Up Care:  See after visit summary for information related to follow-up care and any pertinent home health orders. Disposition:   Home    Planned Readmission: None     Discharge Statement:  I spent 45 minutes discharging the patient. This time was spent on the day of discharge. I had direct contact with the patient on the day of discharge. Greater than 50% of the total time was spent examining patient, answering all patient questions, arranging and discussing plan of care with patient as well as directly providing post-discharge instructions. Additional time then spent on discharge activities. Discharge Medications:  See after visit summary for reconciled discharge medications provided to patient and/or family.       **Please Note: This note may have been constructed using a voice recognition system**

## 2023-07-17 ENCOUNTER — TRANSITIONAL CARE MANAGEMENT (OUTPATIENT)
Dept: INTERNAL MEDICINE CLINIC | Facility: OTHER | Age: 63
End: 2023-07-17

## 2023-07-17 ENCOUNTER — TELEPHONE (OUTPATIENT)
Dept: INTERNAL MEDICINE CLINIC | Age: 63
End: 2023-07-17

## 2023-07-17 NOTE — UTILIZATION REVIEW
NOTIFICATION OF ADMISSION DISCHARGE   This is a Notification of Discharge from [address] [place] [address]. Please be advised that this patient has been discharge from our facility. Below you will find the admission and discharge date and time including the patient’s disposition. UTILIZATION REVIEW CONTACT:  Kenneth Tong MA  Utilization   Network Utilization Review Department  Phone: [phone] x carefully listen to the prompts. All voicemails are confidential.  Email: [email or web address]     ADMISSION INFORMATION  PRESENTATION DATE: 7/11/2023  4:48 PM  OBERVATION ADMISSION DATE:   INPATIENT ADMISSION DATE: 7/11/23  8:27 PM   DISCHARGE DATE: 7/16/2023  1:44 PM   DISPOSITION:Home/Self Care    IMPORTANT INFORMATION:  Send all requests for admission clinical reviews, approved or denied determinations and any other requests to dedicated fax number below belonging to the campus where the patient is receiving treatment.  List of dedicated fax numbers:  [place] DENIALS (Administrative/Medical Necessity) [phone] [address] [facility] (Maternity/NICU/Pediatrics) [phone]   [facility] [phone]   [facility] [phone] 16[address] [facility] [phone]   [address] [place] [address] [phone]   [facility] [phone]   [address] [place], [place] [address]e [address] [facility] [phone]   [address] [place] [address] [phone]   [address] [facility] [phone]   2720 [facility] [address] [facility] [phone]

## 2023-07-18 ENCOUNTER — PATIENT OUTREACH (OUTPATIENT)
Dept: CASE MANAGEMENT | Facility: HOSPITAL | Age: 63
End: 2023-07-18

## 2023-07-18 DIAGNOSIS — I50.33 ACUTE ON CHRONIC HEART FAILURE WITH PRESERVED EJECTION FRACTION (HFPEF) (HCC): Primary | ICD-10-CM

## 2023-07-18 NOTE — PROGRESS NOTES
Heart Failure Outpatient Care Coordinator Note: Patient identified on hospital discharge HRR report, chart reviewed and referral made for HF outpatient care management.

## 2023-07-19 ENCOUNTER — TELEPHONE (OUTPATIENT)
Dept: PSYCHIATRY | Facility: CLINIC | Age: 63
End: 2023-07-19

## 2023-07-19 NOTE — TELEPHONE ENCOUNTER
Contacted patient in regards to routine referral and placing patient on proper wait list. Patient stated she is not interested at this time, informed patient referral is good for one year.  Closing out referral

## 2023-07-20 ENCOUNTER — PATIENT OUTREACH (OUTPATIENT)
Dept: CASE MANAGEMENT | Facility: HOSPITAL | Age: 63
End: 2023-07-20

## 2023-07-20 PROBLEM — I50.33 ACUTE ON CHRONIC HEART FAILURE WITH PRESERVED EJECTION FRACTION (HFPEF) (HCC): Status: RESOLVED | Noted: 2022-12-02 | Resolved: 2023-07-20

## 2023-07-20 PROBLEM — R09.02 HYPOXIA: Status: RESOLVED | Noted: 2022-12-02 | Resolved: 2023-07-20

## 2023-07-20 PROBLEM — I10 ESSENTIAL HYPERTENSION: Chronic | Status: ACTIVE | Noted: 2019-07-10

## 2023-07-20 PROBLEM — E78.2 MIXED HYPERLIPIDEMIA: Chronic | Status: ACTIVE | Noted: 2019-07-10

## 2023-07-20 PROBLEM — I50.32 CHRONIC DIASTOLIC CONGESTIVE HEART FAILURE (HCC): Chronic | Status: ACTIVE | Noted: 2021-02-12

## 2023-07-20 PROBLEM — G47.33 OBSTRUCTIVE SLEEP APNEA SYNDROME: Chronic | Status: ACTIVE | Noted: 2020-09-02

## 2023-07-20 PROBLEM — J44.9 COPD (CHRONIC OBSTRUCTIVE PULMONARY DISEASE) (HCC): Chronic | Status: ACTIVE | Noted: 2019-07-10

## 2023-07-20 PROBLEM — I89.0 LYMPHEDEMA OF BOTH LOWER EXTREMITIES: Chronic | Status: ACTIVE | Noted: 2021-03-16

## 2023-07-20 PROBLEM — E83.52 HYPERCALCEMIA: Chronic | Status: ACTIVE | Noted: 2023-01-16

## 2023-07-20 PROBLEM — U07.1 COVID-19 VIRUS INFECTION: Status: RESOLVED | Noted: 2023-02-27 | Resolved: 2023-07-20

## 2023-07-20 NOTE — PROGRESS NOTES
Heart Failure Outpatient Care Coordinator Note: Call made to patient, role of HF RN OPCM explained and patient receptive and agreeable to outreach. Self-management/education and teach back:  Primary learner: Self  Following low sodium diet: Admits that has not followed low sodium as well as she should- eating sausage and ham. She states is going shopping today with her daughter with plan to get foods she can prepare low sodium meals with. Reviewed reading food labels  Following fluid restriction: Yes < 2L  Hospital discharge weight: 373.8#  Weighing daily: yes and recordinst home weight: 378# on , 380# on  and weight today 381#  Monitoring symptoms: Yes  Any current symptoms: Denies  Knows when to call provider: reinforced  Medication reviewed and taking all as prescribed: Yes  Knows name of diuretic: Yes  Escalation plan: Call cardiology office    Care Coordination:  Aware of cardiology follow up appointment: Yes, seeing tomorrow  Aware of PCP follow up appointment: Needs to schedule  Transportation: family   Social Support: lives with daughter and grandchildren  Insurance/financial concerns:denies  Home health care: no  Health literacy: Good  Engagement:Good  Personal Goal: Get bariatric surgery and lose weight   Additional comments: Patient states she is now on once daily lasix dose and  medication for her overactive bladder and feels will help her be more adherent to her diuretics.

## 2023-07-21 ENCOUNTER — OFFICE VISIT (OUTPATIENT)
Dept: CARDIOLOGY CLINIC | Facility: CLINIC | Age: 63
End: 2023-07-21
Payer: COMMERCIAL

## 2023-07-21 VITALS
BODY MASS INDEX: 48.82 KG/M2 | SYSTOLIC BLOOD PRESSURE: 146 MMHG | HEIGHT: 65 IN | OXYGEN SATURATION: 92 % | HEART RATE: 83 BPM | DIASTOLIC BLOOD PRESSURE: 90 MMHG | WEIGHT: 293 LBS

## 2023-07-21 DIAGNOSIS — Z91.148 NONCOMPLIANCE WITH MEDICATION TREATMENT DUE TO INTERMITTENT USE OF MEDICATION: ICD-10-CM

## 2023-07-21 DIAGNOSIS — Z09 HOSPITAL DISCHARGE FOLLOW-UP: Primary | ICD-10-CM

## 2023-07-21 DIAGNOSIS — I50.33 ACUTE ON CHRONIC HEART FAILURE WITH PRESERVED EJECTION FRACTION (HCC): ICD-10-CM

## 2023-07-21 DIAGNOSIS — I10 HYPERTENSION, UNSPECIFIED TYPE: ICD-10-CM

## 2023-07-21 PROCEDURE — 99214 OFFICE O/P EST MOD 30 MIN: CPT | Performed by: PHYSICIAN ASSISTANT

## 2023-07-21 RX ORDER — SPIRONOLACTONE 25 MG/1
12.5 TABLET ORAL DAILY
Qty: 15 TABLET | Refills: 2 | Status: SHIPPED | OUTPATIENT
Start: 2023-07-21

## 2023-07-21 NOTE — PROGRESS NOTES
General Cardiology Outpatient Progress Note    Ole Mis 66 y.o. female   MRN: [id number]  Encounter: [id number]    Assessment:  Patient Active Problem List    Diagnosis Date Noted   • Adrenal nodule ([address]) 07/12/2023   • Urinary incontinence 07/12/2023   • Ambulatory dysfunction 07/11/2023   • Moderate episode of recurrent major depressive disorder ([address]) 04/03/2023   • Hypercalcemia 01/16/2023   • Pre-diabetes 08/30/2022   • Lymphedema of both lower extremities 03/16/2021   • Chronic diastolic congestive heart failure ([address]) 02/12/2021   • Insomnia due to medical condition 10/08/2020   • Obstructive sleep apnea syndrome 09/02/2020   • Cigarette nicotine dependence without complication 78/98/0594   • Morbid obesity with body mass index (BMI) of 60.0 to 69.9 in adult [facility]) 03/05/2020   • Essential hypertension 07/10/2019   • Mixed stress and urge urinary incontinence 07/10/2019   • COPD (chronic obstructive pulmonary disease) ([address]) 07/10/2019   • Mixed hyperlipidemia 07/10/2019       Today's Plan:  • Up 9 lbs since discharge. Increase Lasix to 80 mg BID through the weekend. • Start spironolactone 12.5 mg daily. Tentative plan to increase dose at next visit if BP and renal function allow. • Will have office RN contact patient early next week for clinical update +/- diuretic dosing adjustment. • Provided information about setting up [employer]. • Repeat BMP before next visit. Plan:  Acute on chronic HFpEF; LVEF 70%; LVIDd 4.1 cm; NYHA III; ACC/AHA Stage C   TTE 12/03/2022: LVEF 70%. LVIDd 4.1 cm. Grade 1 DD. Normal RV. Mild SHWETA. Mild TR. Dilated IVC. Weight of 373 lbs on 07/16 (day of discharge). Today, weighs 382 lbs. Most recent BMP from 07/16/2023: sodium 134; potassium 3.6; BUN 19; creatinine 0.64; eGFR 96. Pharmacotherapies:  --Beta Blocker: No.   --ARNi / ACEi / ARB: losartan 25 mg daily (Note: allergy to lisinopril). --Aldosterone Antagonist: spironolactone 12.5 mg daily. --SGLT2 Inhibitor: No.  --Diuretic: Lasix 80 mg daily. Hypertension   BP of 146/90 mmHg in office today. Continue on medications as above. Obstructive sleep apnea  Chronic obstructive pulmonary disease  Prediabetes  Urinary incontinence: complicating compliance with diuretic dosing. History of eating disorder  Depression  History of tobacco abuse    HPI:   Dulce Berman is a 62-year-old woman with a PMH as above who presents to the office for hospital follow-up. Follows with Dr. Conor Roa. Admitted to [facility] from 07/11 to 07/16/2023 after presenting with worsening SOB and 20+ lbs weight gain from PCP office. BNP 29. CTA chest with "Right basilar subsegmental atelectasis." Started on IV Lasix, and cardiology consulted. Patient admitted to only taking AM dose of outpatient Lasix (40 mg) due to urinary incontinence. Transitioned to PO Lasix on day of discharge. Started on oxybutynin by urology. Lost 22 lbs and net negative 13.4 L this admission. 07/21/2023: Patient presents with her daughter and twin granddaughters for hospital follow-up. Up 9 lbs since discharge. Denies missing any Lasix doses as addition of oxybutynin has significantly lessened her urinary frequency and incontinence. Is completing daily weights and has also noted 8-10 lbs gain on home scale. Due to this she took an extra 40 mg on 07/19 and 07/20 on her own with minimal improvement in symptoms but with lesser daily weight gain. Does admit to dietary indiscretion since discharge including sausage (her favorite food), stromboli from a [employer], and a [employer].      Past Medical History:   Diagnosis Date   • Acute on chronic heart failure with preserved ejection fraction (HFpEF) ([address]) 12/2/2022   • Arthritis    • CHF (congestive heart failure) (HCC)    • COPD (chronic obstructive pulmonary disease) ([address])    • COVID-19 virus infection 2/27/2023   • Depression    • Eating disorder    • Hypertension    • Hypoxia 12/2/2022   • Obesity    • Urinary incontinence    • Visual impairment 2018       Review of Systems   Constitutional: Positive for unexpected weight change. Negative for activity change, appetite change and fatigue. Eyes: Negative. Respiratory: Positive for shortness of breath. Negative for cough and chest tightness. Cardiovascular: Positive for leg swelling. Negative for chest pain and palpitations. Gastrointestinal: Positive for abdominal distention. Negative for abdominal pain, diarrhea, nausea and vomiting. Endocrine: Negative. Genitourinary: Negative for decreased urine volume, difficulty urinating and dysuria. Musculoskeletal: Negative. Skin: Negative. Allergic/Immunologic: Negative. Neurological: Negative for syncope, weakness and light-headedness. Psychiatric/Behavioral: Negative for confusion. The patient is not nervous/anxious. Allergies   Allergen Reactions   • Lisinopril Cough       Current Outpatient Medications:   •  albuterol (PROVENTIL HFA,VENTOLIN HFA) 90 mcg/act inhaler, Inhale 2 puffs every 6 (six) hours as needed for wheezing or shortness of breath, Disp: 18 g, Rfl: 1  •  escitalopram (Lexapro) 10 mg tablet, Take 1 tablet (10 mg total) by mouth daily, Disp: 30 tablet, Rfl: 3  •  furosemide (LASIX) 80 mg tablet, Take 1 tablet (80 mg total) by mouth daily, Disp: 30 tablet, Rfl: 0  •  losartan (COZAAR) 25 mg tablet, Take 1 tablet (25 mg total) by mouth daily, Disp: 30 tablet, Rfl: 3  •  oxybutynin (DITROPAN) 5 mg tablet, Take 1 tablet (5 mg total) by mouth 3 (three) times a day, Disp: 90 tablet, Rfl: 0  •  spironolactone (ALDACTONE) 25 mg tablet, Take 0.5 tablets (12.5 mg total) by mouth daily, Disp: 15 tablet, Rfl: 2    Social History     Socioeconomic History   • Marital status:       Spouse name: Not on file   • Number of children: Not on file   • Years of education: Not on file   • Highest education level: Not on file   Occupational History   • Not on file   Tobacco Use   • Smoking status: Former     Packs/day: 0.50     Years: 35.00     Total pack years: 17.50     Types: Cigarettes     Start date: 1976     Quit date: 2023     Years since quittin.0   • Smokeless tobacco: Never   Vaping Use   • Vaping Use: Former   • Start date: 2018   • Substances: Nicotine   Substance and Sexual Activity   • Alcohol use: Never     Comment: occassional   • Drug use: Not Currently     Types: Marijuana     Comment: monthly at most   • Sexual activity: Not Currently     Birth control/protection: Surgical   Other Topics Concern   • Not on file   Social History Narrative   • Not on file     Social Determinants of Health     Financial Resource Strain: Medium Risk (2022)    Overall Financial Resource Strain (CARDIA)    • Difficulty of Paying Living Expenses: Somewhat hard   Food Insecurity: No Food Insecurity (2023)    Hunger Vital Sign    • Worried About Running Out of Food in the Last Year: Never true    • Ran Out of Food in the Last Year: Never true   Transportation Needs: No Transportation Needs (2023)    PRAPARE - Transportation    • Lack of Transportation (Medical): No    • Lack of Transportation (Non-Medical):  No   Physical Activity: Not on file   Stress: Not on file   Social Connections: Not on file   Intimate Partner Violence: Not on file   Housing Stability: Unknown (2023)    Housing Stability Vital Sign    • Unable to Pay for Housing in the Last Year: No    • Number of Places Lived in the Last Year: Not on file    • Unstable Housing in the Last Year: No     Family History   Problem Relation Age of Onset   • Other Mother    • Aneurysm Mother         aortic   • Dementia Mother    • Heart failure Father    • Diabetes Father         Dad   • Lung cancer Sister    • Cancer Sister    • Dementia Maternal Grandmother    • Dementia Maternal Aunt        Vitals:   Blood pressure 146/90, pulse 83, height 5' 5" (1.651 m), weight (!) 174 kg (382 lb 14.4 oz), SpO2 92 %. Wt Readings from Last 10 Encounters:   23 (!) 174 kg (382 lb 14.4 oz)   23 (!) 170 kg (373 lb 12.8 oz)   23 (!) 184 kg (404 lb 12.8 oz)   23 (!) 175 kg (386 lb)   23 (!) 173 kg (382 lb)   23 (!) 173 kg (382 lb 4.8 oz)   23 (!) 174 kg (383 lb 9.6 oz)   23 (!) 173 kg (382 lb)   22 (!) 170 kg (375 lb)   22 (!) 167 kg (368 lb 9.6 oz)     Vitals:    23 1048   BP: 146/90   BP Location: Left arm   Patient Position: Sitting   Cuff Size: Standard   Pulse: 83   SpO2: 92%   Weight: (!) 174 kg (382 lb 14.4 oz)   Height: 5' 5" (1.651 m)       Physical Exam  Vitals reviewed. Constitutional:       General: She is awake. She is not in acute distress. Appearance: Normal appearance. She is well-developed and overweight. She is not toxic-appearing or diaphoretic. HENT:      Head: Normocephalic. Nose: Nose normal.      Mouth/Throat:      Mouth: Mucous membranes are moist.   Eyes:      General: No scleral icterus. Conjunctiva/sclera: Conjunctivae normal.   Neck:      Vascular: JVD present. Trachea: No tracheal deviation. Cardiovascular:      Rate and Rhythm: Normal rate and regular rhythm. Heart sounds: No murmur heard. Pulmonary:      Effort: Pulmonary effort is normal. No tachypnea, bradypnea or respiratory distress. Breath sounds: Decreased air movement present. Decreased breath sounds (distant) present. No wheezing or rhonchi. Abdominal:      General: There is distension. Tenderness: There is no abdominal tenderness. Musculoskeletal:      Cervical back: Neck supple. Right lower le+ Pitting Edema present. Left lower le+ Pitting Edema present. Skin:     General: Skin is warm and dry. Coloration: Skin is not jaundiced or pale. Neurological:      General: No focal deficit present. Mental Status: She is alert and oriented to person, place, and time.    Psychiatric: Attention and Perception: Attention normal.         Mood and Affect: Mood and affect normal.         Speech: Speech normal.         Behavior: Behavior normal. Behavior is cooperative. Thought Content:  Thought content normal.       Labs & Results:  Lab Results   Component Value Date    WBC 5.46 07/12/2023    HGB 13.0 07/12/2023    HCT 42.3 07/12/2023    MCV 95 07/12/2023     07/12/2023     Lab Results   Component Value Date    SODIUM 134 (L) 07/16/2023    K 3.6 07/16/2023    CL 96 07/16/2023    CO2 30 07/16/2023    BUN 18 07/16/2023    CREATININE 0.64 07/16/2023    GLUC 128 07/16/2023    CALCIUM 10.3 (H) 07/16/2023     No results found for: "INR", "PROTIME"     Lab Results   Component Value Date    NTBNP 23 08/31/2022      Lab Results   Component Value Date    BNP 29 07/11/2023      Lianet Arteaga PA-C

## 2023-07-21 NOTE — PATIENT INSTRUCTIONS
Increase Lasix to 80 mg twice a day through the weekend. Our office nurse will contact you next week for update. Start spironolactone 12.5 mg (1/2 tablet) daily. Complete blood work before next visit. No need to fast.     Please weigh yourself every day (after emptying your bladder) and keep a detailed log of weights. Contact the Heart Failure program at [phone] if you gain 3+ lbs overnight or 5+ lbs in 5-7 days. Limit daily sodium/salt intake to 2000 mg daily to prevent fluid retention. Avoid canned foods, fast food/Chinese food, and processed meats (hot dogs, lunch meat, and sausage etc.). Caution with condiments. Limit fluid intake to 2000 mL or 2 liters (about 60-65 ounces) daily. Avoid electrolyte replacement drinks (such as Gatorade, Pedialyte, Propel, Liquid IV, etc.). Bring complete list of medications and log of daily weights to your follow-up appointment.

## 2023-07-25 ENCOUNTER — TELEPHONE (OUTPATIENT)
Dept: CARDIOLOGY CLINIC | Facility: CLINIC | Age: 63
End: 2023-07-25

## 2023-07-25 NOTE — TELEPHONE ENCOUNTER
Called pt, pt stated she has been being a lot more careful with her sodium intake and fluid as well. Pt stated she is down 2lbs, current weight 383lbs. Stated she feels a little dizzy after she takes her pill but it quickly goes away. No SOB and stated she has some swelling but nothing out of the normal for her. Pt stated she has a follow-up with her PCP tomorrow. Should she continue current meds the same?

## 2023-07-25 NOTE — TELEPHONE ENCOUNTER
----- Message from Elisabeth Seip sent at 7/25/2023  3:34 PM EDT -----  Regarding: FW: Clinical update    ----- Message -----  From: Jackson Rankni PA-C  Sent: 7/24/2023   9:00 AM EDT  To: Cardiology [place] [facility]  Subject: Clinical update                                  Please contact patient for update on symptoms and weights. Doubles Lasix through the weekend and added on spironolactone to regimen. Can we do a deep dive into in their daily fluid and sodium intake, and provide education on this as appropriate? Thanks!   Beck Brady

## 2023-07-27 ENCOUNTER — OFFICE VISIT (OUTPATIENT)
Dept: INTERNAL MEDICINE CLINIC | Age: 63
End: 2023-07-27
Payer: COMMERCIAL

## 2023-07-27 VITALS
TEMPERATURE: 98.3 F | HEIGHT: 65 IN | OXYGEN SATURATION: 95 % | BODY MASS INDEX: 48.82 KG/M2 | SYSTOLIC BLOOD PRESSURE: 128 MMHG | HEART RATE: 77 BPM | DIASTOLIC BLOOD PRESSURE: 74 MMHG | WEIGHT: 293 LBS

## 2023-07-27 DIAGNOSIS — E27.8 ADRENAL NODULE (HCC): ICD-10-CM

## 2023-07-27 DIAGNOSIS — J44.9 CHRONIC OBSTRUCTIVE PULMONARY DISEASE, UNSPECIFIED COPD TYPE (HCC): Chronic | ICD-10-CM

## 2023-07-27 DIAGNOSIS — I50.32 CHRONIC DIASTOLIC CONGESTIVE HEART FAILURE (HCC): Chronic | ICD-10-CM

## 2023-07-27 DIAGNOSIS — F33.1 MODERATE EPISODE OF RECURRENT MAJOR DEPRESSIVE DISORDER (HCC): Primary | ICD-10-CM

## 2023-07-27 PROCEDURE — 99495 TRANSJ CARE MGMT MOD F2F 14D: CPT | Performed by: INTERNAL MEDICINE

## 2023-07-27 RX ORDER — METOPROLOL SUCCINATE 25 MG/1
25 TABLET, EXTENDED RELEASE ORAL
Qty: 30 TABLET | Refills: 5 | Status: SHIPPED | OUTPATIENT
Start: 2023-07-27

## 2023-07-27 RX ORDER — ESCITALOPRAM OXALATE 10 MG/1
10 TABLET ORAL DAILY
Qty: 90 TABLET | Refills: 3 | Status: SHIPPED | OUTPATIENT
Start: 2023-07-27

## 2023-07-27 NOTE — PROGRESS NOTES
Assessment & Plan     1. Moderate episode of recurrent major depressive disorder ([address])    2. Adrenal nodule ([address])    3. Chronic obstructive pulmonary disease, unspecified COPD type ([address])    4. Chronic diastolic congestive heart failure [facility])         Subjective     Transitional Care Management Review:   Marcel Davis is a 66 y.o. female here for TCM follow up. During the TCM phone call patient stated:  TCM Call     Date and time call was made  7/17/2023  3:28 PM    Hospital care reviewed  Records reviewed    Patient was hospitialized at  [address] [place] [address]    Date of Admission  07/11/23    Date of discharge  07/16/23    Diagnosis  acute on chronic HFpEF    Disposition  Home    Current Symptoms  Cough    Cough Severity  Mild    Dizziness severity  Mild      TCM Call     Post hospital issues  None    Scheduled for follow up? Yes    Did you obtain your prescribed medications  Yes    Do you need help managing your prescriptions or medications  No    Is transportation to your appointment needed  No    I have advised the patient to call PCP with any new or worsening symptoms  Marta Walter CMA        65 y/o female with history of HFpEF 70%, BRADY, HTN, COPD on 1-2L PRN, depression here for TCM. Patient was admitted from 7/11-7/16 for acute on chronic heart failure. Patient was taking Lasix 40 QD instead of BID and had weight gain of 22 pounds since February. Received IV diuretics and los 30 pounds. Also found to have incidental right adrenal nodule 3.2 cm with recommendation for outpatient renal protocol CT. Started on oxybutynin for overactive bladder. Discharged on Lasix 80 QD. Saw cardiology on 7/21 and noted to have 8 pound weight gain. Lasix increased to 80 BID. Today, patient has no complaints. Compliant with all medications, salt intake, and 2 L fluid restriction. States swelling in her legs has improved. Weighs herself every day. Weight is 383 pounds. Dry weight 373 in February. Patient states urine output is adequate. Hasn't needed oxygen or albuterol inhaler since discharge. Sleeps with bed at 30 degrees which is her baseline. Accompanied by her daughter. Review of Systems   Constitutional: Negative for appetite change, chills, fatigue and unexpected weight change. Respiratory: Negative for cough and shortness of breath. Cardiovascular: Negative for chest pain and palpitations. Gastrointestinal: Negative for abdominal pain, constipation, diarrhea, nausea and vomiting. Genitourinary: Negative for difficulty urinating and dysuria. Neurological: Negative for weakness, light-headedness, numbness and headaches. Objective     /74 (BP Location: Left arm, Patient Position: Sitting, Cuff Size: Large)   Pulse 77   Temp 98.3 °F (36.8 °C) (Temporal)   Ht 5' 5" (1.651 m)   Wt (!) 174 kg (383 lb)   SpO2 95%   BMI 63.73 kg/m²      Physical Exam  Vitals reviewed. Constitutional:       General: She is not in acute distress. Appearance: She is obese. HENT:      Head: Normocephalic and atraumatic. Nose: No rhinorrhea. Eyes:      General: No scleral icterus. Cardiovascular:      Rate and Rhythm: Normal rate and regular rhythm. Pulses: Normal pulses. Heart sounds: Normal heart sounds. No murmur heard. No friction rub. No gallop. Pulmonary:      Effort: Pulmonary effort is normal. No respiratory distress. Breath sounds: Normal breath sounds. No wheezing, rhonchi or rales. Abdominal:      General: There is no distension. Palpations: Abdomen is soft. Tenderness: There is no abdominal tenderness. There is no guarding or rebound. Musculoskeletal:      Right lower leg: Edema (2+ mid shin) present. Left lower leg: Edema (2+ mid shin) present. Skin:     General: Skin is warm and dry. Capillary Refill: Capillary refill takes less than 2 seconds. Coloration: Skin is not jaundiced.    Neurological:      Mental Status: She is alert. Cranial Nerves: No dysarthria.       Comments: CN 2-12 grossly intact, moves all 4 extremities   Psychiatric:         Mood and Affect: Mood normal.         Behavior: Behavior normal.       Medications have been reviewed by provider in current encounter    Rylan Contreras DO

## 2023-07-28 ENCOUNTER — PATIENT OUTREACH (OUTPATIENT)
Dept: CASE MANAGEMENT | Facility: HOSPITAL | Age: 63
End: 2023-07-28

## 2023-08-03 ENCOUNTER — HOSPITAL ENCOUNTER (OUTPATIENT)
Dept: RADIOLOGY | Age: 63
Discharge: HOME | End: 2023-08-03
Payer: COMMERCIAL

## 2023-08-03 ENCOUNTER — TELEPHONE (OUTPATIENT)
Dept: INTERNAL MEDICINE CLINIC | Age: 63
End: 2023-08-03

## 2023-08-03 DIAGNOSIS — E27.8 ADRENAL NODULE (HCC): ICD-10-CM

## 2023-08-03 DIAGNOSIS — E27.8 ADRENAL NODULE (HCC): Primary | ICD-10-CM

## 2023-08-03 PROCEDURE — 74170 CT ABD WO CNTRST FLWD CNTRST: CPT

## 2023-08-03 PROCEDURE — G1004 CDSM NDSC: HCPCS

## 2023-08-03 RX ADMIN — IOHEXOL 100 ML: 350 INJECTION, SOLUTION INTRAVENOUS at 13:59

## 2023-08-03 NOTE — PROGRESS NOTES
I got a message from radiology to switch pt's order for CT Abdomen and pelvis to a CT Abdomen with and without contrast to evaluate her R adrenal nodule cos the insurance will not cover for the previous order. Ct ordered as requested.

## 2023-08-07 ENCOUNTER — APPOINTMENT (OUTPATIENT)
Dept: LAB | Age: 63
End: 2023-08-07
Payer: COMMERCIAL

## 2023-08-07 DIAGNOSIS — J44.9 CHRONIC OBSTRUCTIVE PULMONARY DISEASE, UNSPECIFIED COPD TYPE (HCC): ICD-10-CM

## 2023-08-07 DIAGNOSIS — Z09 HOSPITAL DISCHARGE FOLLOW-UP: ICD-10-CM

## 2023-08-07 DIAGNOSIS — G47.33 OBSTRUCTIVE SLEEP APNEA SYNDROME: ICD-10-CM

## 2023-08-07 DIAGNOSIS — E78.2 MIXED HYPERLIPIDEMIA: ICD-10-CM

## 2023-08-07 DIAGNOSIS — F17.299 OTHER TOBACCO PRODUCT NICOTINE DEPENDENCE WITH NICOTINE-INDUCED DISORDER: ICD-10-CM

## 2023-08-07 DIAGNOSIS — I50.33 ACUTE ON CHRONIC HEART FAILURE WITH PRESERVED EJECTION FRACTION (HCC): ICD-10-CM

## 2023-08-07 DIAGNOSIS — Z01.818 PREOPERATIVE CLEARANCE: ICD-10-CM

## 2023-08-07 DIAGNOSIS — F33.1 MODERATE EPISODE OF RECURRENT MAJOR DEPRESSIVE DISORDER (HCC): ICD-10-CM

## 2023-08-07 DIAGNOSIS — F17.210 CIGARETTE NICOTINE DEPENDENCE WITHOUT COMPLICATION: ICD-10-CM

## 2023-08-07 DIAGNOSIS — E83.52 HYPERCALCEMIA: ICD-10-CM

## 2023-08-07 DIAGNOSIS — Z01.812 BLOOD TESTS PRIOR TO TREATMENT OR PROCEDURE: ICD-10-CM

## 2023-08-07 DIAGNOSIS — J44.1 CHRONIC OBSTRUCTIVE PULMONARY DISEASE WITH ACUTE EXACERBATION (HCC): ICD-10-CM

## 2023-08-07 DIAGNOSIS — F55.8 ABUSE OF OTHER NON-PSYCHOACTIVE SUBSTANCES: ICD-10-CM

## 2023-08-07 DIAGNOSIS — E66.01 MORBID OBESITY (HCC): ICD-10-CM

## 2023-08-07 LAB
25(OH)D3 SERPL-MCNC: 11.4 NG/ML (ref 30–100)
ALBUMIN SERPL BCP-MCNC: 3.7 G/DL (ref 3.5–5)
ALP SERPL-CCNC: 85 U/L (ref 46–116)
ALT SERPL W P-5'-P-CCNC: 24 U/L (ref 12–78)
ANION GAP SERPL CALCULATED.3IONS-SCNC: 3 MMOL/L
AST SERPL W P-5'-P-CCNC: 17 U/L (ref 5–45)
BILIRUB SERPL-MCNC: 0.53 MG/DL (ref 0.2–1)
BUN SERPL-MCNC: 13 MG/DL (ref 5–25)
CALCIUM SERPL-MCNC: 10.5 MG/DL (ref 8.3–10.1)
CHLORIDE SERPL-SCNC: 105 MMOL/L (ref 96–108)
CO2 SERPL-SCNC: 31 MMOL/L (ref 21–32)
CREAT SERPL-MCNC: 0.98 MG/DL (ref 0.6–1.3)
ERYTHROCYTE [DISTWIDTH] IN BLOOD BY AUTOMATED COUNT: 14.9 % (ref 11.6–15.1)
GFR SERPL CREATININE-BSD FRML MDRD: 62 ML/MIN/1.73SQ M
GLUCOSE SERPL-MCNC: 103 MG/DL (ref 65–140)
HCT VFR BLD AUTO: 45.3 % (ref 34.8–46.1)
HGB BLD-MCNC: 14.2 G/DL (ref 11.5–15.4)
MCH RBC QN AUTO: 29.3 PG (ref 26.8–34.3)
MCHC RBC AUTO-ENTMCNC: 31.3 G/DL (ref 31.4–37.4)
MCV RBC AUTO: 94 FL (ref 82–98)
PLATELET # BLD AUTO: 203 THOUSANDS/UL (ref 149–390)
PMV BLD AUTO: 11.7 FL (ref 8.9–12.7)
POTASSIUM SERPL-SCNC: 4 MMOL/L (ref 3.5–5.3)
PROT SERPL-MCNC: 8 G/DL (ref 6.4–8.4)
PTH-INTACT SERPL-MCNC: 102.4 PG/ML (ref 12–88)
RBC # BLD AUTO: 4.84 MILLION/UL (ref 3.81–5.12)
SODIUM SERPL-SCNC: 139 MMOL/L (ref 135–147)
WBC # BLD AUTO: 5.73 THOUSAND/UL (ref 4.31–10.16)

## 2023-08-07 PROCEDURE — 36415 COLL VENOUS BLD VENIPUNCTURE: CPT

## 2023-08-07 PROCEDURE — 84443 ASSAY THYROID STIM HORMONE: CPT

## 2023-08-07 PROCEDURE — 83970 ASSAY OF PARATHORMONE: CPT

## 2023-08-07 PROCEDURE — 82306 VITAMIN D 25 HYDROXY: CPT

## 2023-08-07 PROCEDURE — 80053 COMPREHEN METABOLIC PANEL: CPT

## 2023-08-07 PROCEDURE — 85027 COMPLETE CBC AUTOMATED: CPT

## 2023-08-07 NOTE — PROGRESS NOTES
Heart Failure Outpatient Visit    Mary Schmid 66 y.o. female   MRN: [id number]  Encounter: [id number]    Assessment:  Patient Active Problem List    Diagnosis Date Noted   • Adrenal nodule ([address]) 07/12/2023   • Urinary incontinence 07/12/2023   • Ambulatory dysfunction 07/11/2023   • Moderate episode of recurrent major depressive disorder ([address]) 04/03/2023   • Hypercalcemia 01/16/2023   • Pre-diabetes 08/30/2022   • Lymphedema of both lower extremities 03/16/2021   • Chronic diastolic congestive heart failure ([address]) 02/12/2021   • Insomnia due to medical condition 10/08/2020   • Obstructive sleep apnea syndrome 09/02/2020   • Cigarette nicotine dependence without complication 46/32/8335   • Morbid obesity with body mass index (BMI) of 60.0 to 69.9 in adult [facility]) 03/05/2020   • Essential hypertension 07/10/2019   • Mixed stress and urge urinary incontinence 07/10/2019   • COPD (chronic obstructive pulmonary disease) ([address]) 07/10/2019   • Mixed hyperlipidemia 07/10/2019       Today's Plan:  • BMP yesterday with stable kidney function. • Warm, still volume up on exam, seems to be improving. Weight is still up. Continue increased Lasix for the next week and continue daily weights. Labs ordered for next week. • HF nurse will check in next week. • Oxybutynin refilled at patient's request today; has been significantly helping her urinary symptoms and making diuretic compliance feasible. • Follow up as scheduled with Dr. Fleeta Kayser in 4 weeks. • Discussed [employer] today and she plans to look further into this. Plan:  Acute on chronic HFpEF; LVEF 70%; LVIDd 4.1 cm; NYHA III; ACC/AHA Stage C              TTE 12/03/2022: LVEF 70%. LVIDd 4.1 cm. Grade 1 DD. Normal RV. Mild SHWETA. Mild TR. Dilated IVC. Weight of 373 lbs on 07/16 (day of discharge), 382 lbs. Today, weighs 377 lbs. Most recent BMP from 08/07/2023: sodium 139; potassium 4.0; BUN 13; creatinine 0.98; eGFR 62.    Pharmacotherapies:  --Beta Blocker: No.   --ARNi / ACEi / ARB: losartan 25 mg daily (Note: allergy to lisinopril). --Aldosterone Antagonist: spironolactone 12.5 mg daily. --SGLT2 Inhibitor: No.  --Diuretic: Lasix 80 mg daily.      Hypertension              BP of 118/72 mmHg in office today. Continue on medications as above.      Obstructive sleep apnea  Chronic obstructive pulmonary disease  Prediabetes  Urinary incontinence: complicating compliance with diuretic dosing. History of eating disorder  Depression  History of tobacco abuse     HPI:   Shawn Waldrop is a 53-year-old woman with a PMH as above who presents to the office for hospital follow-up. Follows with Dr. Luis Enrique Hernandez.      Admitted to [facility] from 07/11 to 07/16/2023 after presenting with worsening SOB and 20+ lbs weight gain from PCP office. BNP 29. CTA chest with "Right basilar subsegmental atelectasis." Started on IV Lasix, and cardiology consulted. Patient admitted to only taking AM dose of outpatient Lasix (40 mg) due to urinary incontinence. Transitioned to PO Lasix on day of discharge. Started on oxybutynin by urology. Lost 22 lbs and net negative 13.4 L this admission.      07/21/2023: Patient presents with her daughter and twin granddaughters for hospital follow-up. Up 9 lbs since discharge. Denies missing any Lasix doses as addition of oxybutynin has significantly lessened her urinary frequency and incontinence. Is completing daily weights and has also noted 8-10 lbs gain on home scale. Due to this she took an extra 40 mg on 07/19 and 07/20 on her own with minimal improvement in symptoms but with lesser daily weight gain. Does admit to dietary indiscretion since discharge including sausage (her favorite food), stromboli from a [employer], and a [employer]. Lasix increased at last visit to 80 mg BID. Spironolactone 12.5 mg daily started. Labs ordered. 8/8/23: Presents today for follow up. Feeling so much better, breathing is improved, much improved swelling in LE and abdominal distension. Weighing herself every day at home; weights have been downtrending slightly since increasing Lasix with slight fluctuations. Has been doing well overall with limiting sodium in diet; received free meals from [employer] and has been eating them for dinner. Interested in [employer]. Does have episodes of intermittent chest tightness with discomfort that radiates up to temples; notices this in times of stress (was late to her appointment today) and has been worked up in the ER previously for this. No change in character of symptoms recently. Advised that if symptoms change or worsen, can consider ischemic eval going forward, amenable to this. Past Medical History:   Diagnosis Date   • Acute on chronic heart failure with preserved ejection fraction (HFpEF) ([address]) 12/2/2022   • Arthritis    • CHF (congestive heart failure) (HCC)    • COPD (chronic obstructive pulmonary disease) ([address])    • COVID-19 virus infection 2/27/2023   • Depression    • Eating disorder    • Hypertension    • Hypoxia 12/2/2022   • Obesity    • Urinary incontinence    • Visual impairment 2018     Review of Systems   Constitutional: Negative for chills and fever. HENT: Negative for ear pain and sore throat. Eyes: Negative for pain and visual disturbance. Respiratory: Positive for shortness of breath (improving ). Negative for cough. Cardiovascular: Positive for chest pain (intermittent tightness, chronic) and leg swelling (improving). Negative for palpitations. Gastrointestinal: Negative for abdominal pain and vomiting. Genitourinary: Negative for dysuria and hematuria. Musculoskeletal: Negative for arthralgias and back pain. Skin: Negative for color change and rash. Neurological: Negative for seizures and syncope. All other systems reviewed and are negative.   14-point ROS completed and negative except as stated above and/or in the HPI.      Allergies   Allergen Reactions   • Lisinopril Cough       Current Outpatient Medications:   •  albuterol (PROVENTIL HFA,VENTOLIN HFA) 90 mcg/act inhaler, Inhale 2 puffs every 6 (six) hours as needed for wheezing or shortness of breath, Disp: 18 g, Rfl: 1  •  escitalopram (Lexapro) 10 mg tablet, Take 1 tablet (10 mg total) by mouth daily, Disp: 90 tablet, Rfl: 3  •  furosemide (LASIX) 80 mg tablet, Take 1 tablet (80 mg total) by mouth daily (Patient taking differently: Take 80 mg by mouth 2 (two) times a day), Disp: 30 tablet, Rfl: 0  •  losartan (COZAAR) 25 mg tablet, Take 1 tablet (25 mg total) by mouth daily, Disp: 30 tablet, Rfl: 3  •  metoprolol succinate (Toprol XL) 25 mg 24 hr tablet, Take 1 tablet (25 mg total) by mouth daily at bedtime, Disp: 30 tablet, Rfl: 5  •  oxybutynin (DITROPAN) 5 mg tablet, Take 1 tablet (5 mg total) by mouth 3 (three) times a day, Disp: 90 tablet, Rfl: 0  •  spironolactone (ALDACTONE) 25 mg tablet, Take 0.5 tablets (12.5 mg total) by mouth daily, Disp: 15 tablet, Rfl: 2    Social History     Socioeconomic History   • Marital status:       Spouse name: Not on file   • Number of children: Not on file   • Years of education: Not on file   • Highest education level: Not on file   Occupational History   • Not on file   Tobacco Use   • Smoking status: Former     Packs/day: 0.50     Years: 35.00     Total pack years: 17.50     Types: Cigarettes     Start date: 1976     Quit date: 2023     Years since quittin.0   • Smokeless tobacco: Never   Vaping Use   • Vaping Use: Former   • Start date: 2018   • Substances: Nicotine   Substance and Sexual Activity   • Alcohol use: Never     Comment: occassional   • Drug use: Not Currently     Types: Marijuana     Comment: monthly at most   • Sexual activity: Not Currently     Birth control/protection: Surgical   Other Topics Concern   • Not on file   Social History Narrative   • Not on file     Social Determinants of Health     Financial Resource Strain: Medium Risk (12/28/2022)    Overall Financial Resource Strain (CARDIA)    • Difficulty of Paying Living Expenses: Somewhat hard   Food Insecurity: No Food Insecurity (7/12/2023)    Hunger Vital Sign    • Worried About Running Out of Food in the Last Year: Never true    • Ran Out of Food in the Last Year: Never true   Transportation Needs: No Transportation Needs (7/12/2023)    PRAPARE - Transportation    • Lack of Transportation (Medical): No    • Lack of Transportation (Non-Medical): No   Physical Activity: Not on file   Stress: Not on file   Social Connections: Not on file   Intimate Partner Violence: Not on file   Housing Stability: Unknown (7/12/2023)    Housing Stability Vital Sign    • Unable to Pay for Housing in the Last Year: No    • Number of Places Lived in the Last Year: Not on file    • Unstable Housing in the Last Year: No     Family History   Problem Relation Age of Onset   • Other Mother    • Aneurysm Mother         aortic   • Dementia Mother    • Heart failure Father    • Diabetes Father         Dad   • Lung cancer Sister    • Cancer Sister    • Dementia Maternal Grandmother    • Dementia Maternal Aunt        Vitals:  Blood pressure 118/72, pulse 76, height 5' 5" (1.651 m), weight (!) 171 kg (377 lb), SpO2 95 %. Body mass index is 62.74 kg/m².   Wt Readings from Last 10 Encounters:   08/08/23 (!) 171 kg (377 lb)   07/27/23 (!) 174 kg (383 lb)   07/21/23 (!) 174 kg (382 lb 14.4 oz)   07/16/23 (!) 170 kg (373 lb 12.8 oz)   07/11/23 (!) 184 kg (404 lb 12.8 oz)   04/03/23 (!) 175 kg (386 lb)   02/27/23 (!) 173 kg (382 lb)   02/02/23 (!) 173 kg (382 lb 4.8 oz)   01/27/23 (!) 174 kg (383 lb 9.6 oz)   01/03/23 (!) 173 kg (382 lb)     Vitals:    08/08/23 1119   BP: 118/72   BP Location: Left arm   Patient Position: Sitting   Cuff Size: Large   Pulse: 76   SpO2: 95%   Weight: (!) 171 kg (377 lb)   Height: 5' 5" (1.651 m)       Physical Exam  Constitutional: Appearance: Normal appearance. She is obese. HENT:      Head: Normocephalic. Nose: Nose normal.      Mouth/Throat:      Mouth: Mucous membranes are moist.   Eyes:      Conjunctiva/sclera: Conjunctivae normal.   Neck:      Comments: JVP mildly elevated   Cardiovascular:      Rate and Rhythm: Normal rate and regular rhythm. Comments: BLLE edema, L>R with known lymphedema. Trace pitting. Pulmonary:      Effort: Pulmonary effort is normal.      Breath sounds: Normal breath sounds. Abdominal:      Palpations: Abdomen is soft. Musculoskeletal:      Cervical back: Neck supple. Skin:     General: Skin is warm and dry. Neurological:      General: No focal deficit present. Mental Status: She is alert and oriented to person, place, and time. Psychiatric:         Mood and Affect: Mood normal.         Behavior: Behavior normal.         Labs & Results:  Lab Results   Component Value Date    WBC 5.73 08/07/2023    HGB 14.2 08/07/2023    HCT 45.3 08/07/2023    MCV 94 08/07/2023     08/07/2023     Lab Results   Component Value Date    SODIUM 139 08/07/2023    K 4.0 08/07/2023     08/07/2023    CO2 31 08/07/2023    BUN 13 08/07/2023    CREATININE 0.98 08/07/2023    GLUC 103 08/07/2023    CALCIUM 10.5 (H) 08/07/2023     No results found for: "INR", "PROTIME"  Lab Results   Component Value Date    BNP 29 07/11/2023      Lab Results   Component Value Date    NTBNP 23 08/31/2022          Thank you for the opportunity to participate in the care of this patient.     Bruce Davis PA-C

## 2023-08-08 ENCOUNTER — OFFICE VISIT (OUTPATIENT)
Dept: CARDIOLOGY CLINIC | Facility: CLINIC | Age: 63
End: 2023-08-08
Payer: COMMERCIAL

## 2023-08-08 VITALS
SYSTOLIC BLOOD PRESSURE: 118 MMHG | WEIGHT: 293 LBS | HEIGHT: 65 IN | HEART RATE: 76 BPM | BODY MASS INDEX: 48.82 KG/M2 | DIASTOLIC BLOOD PRESSURE: 72 MMHG | OXYGEN SATURATION: 95 %

## 2023-08-08 DIAGNOSIS — R32 URINARY INCONTINENCE, UNSPECIFIED TYPE: ICD-10-CM

## 2023-08-08 DIAGNOSIS — I50.33 ACUTE ON CHRONIC HEART FAILURE WITH PRESERVED EJECTION FRACTION (HCC): Primary | ICD-10-CM

## 2023-08-08 DIAGNOSIS — Z09 HOSPITAL DISCHARGE FOLLOW-UP: ICD-10-CM

## 2023-08-08 LAB — TSH SERPL DL<=0.05 MIU/L-ACNC: 2.75 UIU/ML (ref 0.45–4.5)

## 2023-08-08 PROCEDURE — 99214 OFFICE O/P EST MOD 30 MIN: CPT | Performed by: PHYSICIAN ASSISTANT

## 2023-08-08 RX ORDER — OXYBUTYNIN CHLORIDE 5 MG/1
5 TABLET ORAL 3 TIMES DAILY
Qty: 90 TABLET | Refills: 0 | Status: SHIPPED | OUTPATIENT
Start: 2023-08-08

## 2023-08-08 NOTE — PATIENT INSTRUCTIONS
Continue your current dose of Lasix at 80 mg twice daily for the next week. Get labs as ordered. Keep weighing yourself every day. Please weigh yourself every day (after emptying your bladder) and keep a detailed log of weights. Contact the Heart Failure program at [phone] if you gain 3+ lbs overnight or 5+ lbs in 5-7 days. Limit daily sodium/salt intake to 2000 mg daily to prevent fluid retention. Avoid canned foods, fast food/Chinese food, and processed meats (hot dogs, lunch meat, and sausage etc.). Caution with condiments. Limit fluid intake to 2000 mL or 2 liters (about 60-65 ounces) daily. Avoid electrolyte replacement drinks (such as Gatorade, Pedialyte, Propel, Liquid IV, etc.). Bring complete list of medications and log of daily weights to your follow-up appointment.

## 2023-08-10 ENCOUNTER — PATIENT OUTREACH (OUTPATIENT)
Dept: CASE MANAGEMENT | Facility: HOSPITAL | Age: 63
End: 2023-08-10

## 2023-08-10 NOTE — PROGRESS NOTES
Heart Failure Outpatient Care Coordinator Note: Chart notes reviewed and call made to patient who reports is feeling "great". Reports increased energy level and overall feeling of wellness. Weight is down another 2 # today and remains on furosemide 80 mg bid and for follow up lab work next week. She is going to look into [employer] as well as another company who delivered 14 meals after home from the hospital. No concerns. Will follow up next week.

## 2023-08-15 ENCOUNTER — TELEPHONE (OUTPATIENT)
Dept: CARDIOLOGY CLINIC | Facility: CLINIC | Age: 63
End: 2023-08-15

## 2023-08-15 DIAGNOSIS — I50.32 CHRONIC DIASTOLIC CONGESTIVE HEART FAILURE (HCC): ICD-10-CM

## 2023-08-15 RX ORDER — FUROSEMIDE 80 MG
80 TABLET ORAL 2 TIMES DAILY
Qty: 60 TABLET | Refills: 3 | Status: SHIPPED | OUTPATIENT
Start: 2023-08-15

## 2023-08-15 NOTE — TELEPHONE ENCOUNTER
----- Message from Becki Donovan PA-C sent at 8/8/2023 12:02 PM EDT -----  Regarding: check in  Hi! Would you check in on this patient mid next week (8/15 or so) and see how she's doing? Volume improving on increased dose of Lasix, continuing it for the next week. Thanks!

## 2023-08-15 NOTE — TELEPHONE ENCOUNTER
P/C she is asking if can have her Lasix/furosemide 80 mg bid refilled. She feels doing well on that dosage would like to continue    She will be going for the bw this week. She advised is feeling well, no cardiac s/s. Had a wedding this pass weekend, and wt was up about 1.5 but is working on that.      Please advise

## 2023-08-17 ENCOUNTER — APPOINTMENT (OUTPATIENT)
Dept: LAB | Age: 63
End: 2023-08-17
Payer: COMMERCIAL

## 2023-08-17 DIAGNOSIS — I50.33 ACUTE ON CHRONIC HEART FAILURE WITH PRESERVED EJECTION FRACTION (HCC): ICD-10-CM

## 2023-08-17 LAB
ANION GAP SERPL CALCULATED.3IONS-SCNC: 4 MMOL/L
BUN SERPL-MCNC: 11 MG/DL (ref 5–25)
CALCIUM SERPL-MCNC: 10.2 MG/DL (ref 8.3–10.1)
CHLORIDE SERPL-SCNC: 109 MMOL/L (ref 96–108)
CO2 SERPL-SCNC: 27 MMOL/L (ref 21–32)
CREAT SERPL-MCNC: 0.67 MG/DL (ref 0.6–1.3)
GFR SERPL CREATININE-BSD FRML MDRD: 95 ML/MIN/1.73SQ M
GLUCOSE P FAST SERPL-MCNC: 112 MG/DL (ref 65–99)
POTASSIUM SERPL-SCNC: 4.2 MMOL/L (ref 3.5–5.3)
SODIUM SERPL-SCNC: 140 MMOL/L (ref 135–147)

## 2023-08-17 PROCEDURE — 80048 BASIC METABOLIC PNL TOTAL CA: CPT

## 2023-08-17 PROCEDURE — 36415 COLL VENOUS BLD VENIPUNCTURE: CPT

## 2023-08-21 ENCOUNTER — RA CDI HCC (OUTPATIENT)
Dept: OTHER | Facility: HOSPITAL | Age: 63
End: 2023-08-21

## 2023-08-21 ENCOUNTER — TELEPHONE (OUTPATIENT)
Dept: CARDIOLOGY CLINIC | Facility: CLINIC | Age: 63
End: 2023-08-21

## 2023-08-21 NOTE — TELEPHONE ENCOUNTER
Called and spoke to PASCUAL, she feels great, denies any cardiac s/s. Weight today 376 LBS. She is going on vacation next week mon-thurs. Please advise when bw is needed again?

## 2023-08-21 NOTE — PROGRESS NOTES
[address] [place] coding opportunities     I11.0   Chart Reviewed number of suggestions sent to Provider: 1     Patients Insurance     Medicare Insurance: [id number]

## 2023-08-21 NOTE — TELEPHONE ENCOUNTER
----- Message from Robyn Santamaria PA-C sent at 8/21/2023 10:47 AM EDT -----  Please call Janeen Pacheco and let her know her metabolic panel looks good. Please ask how her symptoms/weight have been on the Lasix 80 mg BID since you last touched base with her. Thanks.

## 2023-08-25 DIAGNOSIS — I50.32 CHRONIC DIASTOLIC CONGESTIVE HEART FAILURE (HCC): Primary | Chronic | ICD-10-CM

## 2023-08-31 ENCOUNTER — TELEPHONE (OUTPATIENT)
Dept: INTERNAL MEDICINE CLINIC | Facility: OTHER | Age: 63
End: 2023-08-31

## 2023-08-31 NOTE — TELEPHONE ENCOUNTER
Pt has handicap placard application completed ready to be picked up in [place] office. She now has pending appt with Dr Eleonora Cuadra in [place] office on 9/11/2023. Pt wants to  in [place] office. Will inner office form over to [employer] in [place]. Please let me know when it is received. Also scanned into chart.

## 2023-09-02 NOTE — PROGRESS NOTES
Cardiology Follow Up    Olman Ascencio  1960  [id number]  [facility] [id number]  [phone]    1. Chronic diastolic congestive heart failure (HCC)  NM myocardial perfusion spect (rx stress and/or rest)      2. Hypertension, unspecified type  NM myocardial perfusion spect (rx stress and/or rest)      3. Morbid obesity with body mass index (BMI) of 60.0 to 69.9 in adult [facility])  NM myocardial perfusion spect (rx stress and/or rest)      4. Mixed hyperlipidemia  NM myocardial perfusion spect (rx stress and/or rest)      5. Chest pain, unspecified type  NM myocardial perfusion spect (rx stress and/or rest)          Interval History:  Patient is here for follow-up visit. Patient has HTN, COPD, probable BRADY and morbid obesity. She was admitted to the hospital December 2022 with decompensation. She was treated with diuretic. Echocardiogram done December 2022 demonstrated LVEF of 70% with vigorous LV wall motion noted. There was mild SHWETA. There was no significant valve disease. Patient had seen our CRNP January 2023. EKG done January 3, 2023 demonstrated a normal tracing. Lisinopril was discontinued in the setting of cough. Patient was hospitalized July 2023 at the [facility] in the setting of acute on chronic dCHF. She was treated with diuresis. Her diuretic was switched to furosemide 80 mg/day. She was seen by the HF team.  Compliance with her diet was discussed at a meeting with the CRNP August 2023. In reference to diuretic she is now on furosemide 80 mg twice daily and spironolactone. BMP done on August 17, 2023 looked stable. Patient has been having CP. Discussed with her PCP. Feels like a pressure and radiates to the temples. She has a low-grade tobacco use. We will arrange a pharmacologic stress test.  In reference to her diuretic she does forget to take the evening dose. I told her to set up an alarm on her phone to remind her. A family member is here today who will also remind her. Patient Active Problem List   Diagnosis   • Essential hypertension   • Mixed stress and urge urinary incontinence   • COPD (chronic obstructive pulmonary disease) ([address])   • Mixed hyperlipidemia   • Morbid obesity with body mass index (BMI) of 60.0 to 69.9 in adult [facility])   • Cigarette nicotine dependence without complication   • Obstructive sleep apnea syndrome   • Insomnia due to medical condition   • Chronic diastolic congestive heart failure ([address])   • Lymphedema of both lower extremities   • Pre-diabetes   • Hypercalcemia   • Moderate episode of recurrent major depressive disorder ([address])   • Ambulatory dysfunction   • Adrenal nodule (HCC)   • Urinary incontinence   • Hyperparathyroidism ([address])     Past Medical History:   Diagnosis Date   • Acute on chronic heart failure with preserved ejection fraction (HFpEF) ([address]) 2022   • Arthritis    • CHF (congestive heart failure) (HCC)    • COPD (chronic obstructive pulmonary disease) ([address])    • COVID-19 virus infection 2023   • Depression    • Eating disorder    • Hypertension    • Hypoxia 2022   • Obesity    • Urinary incontinence    • Visual impairment 2018     Social History     Socioeconomic History   • Marital status:       Spouse name: Not on file   • Number of children: Not on file   • Years of education: Not on file   • Highest education level: Not on file   Occupational History   • Not on file   Tobacco Use   • Smoking status: Former     Packs/day: 0.50     Years: 47.00     Total pack years: 23.50     Types: Cigarettes     Start date: 1976     Quit date: 2023     Years since quittin.1   • Smokeless tobacco: Never   • Tobacco comments:     Patient is at 3 cigarettes per week since 2023   Vaping Use   • Vaping Use: Former   • Start date: 2018   • Substances: Nicotine   Substance and Sexual Activity   • Alcohol use: Yes     Comment: occassional- once a year   • Drug use: Not Currently     Types: Marijuana   • Sexual activity: Not Currently     Birth control/protection: Surgical   Other Topics Concern   • Not on file   Social History Narrative   • Not on file     Social Determinants of Health     Financial Resource Strain: Medium Risk (12/28/2022)    Overall Financial Resource Strain (CARDIA)    • Difficulty of Paying Living Expenses: Somewhat hard   Food Insecurity: No Food Insecurity (7/12/2023)    Hunger Vital Sign    • Worried About Running Out of Food in the Last Year: Never true    • Ran Out of Food in the Last Year: Never true   Transportation Needs: No Transportation Needs (7/12/2023)    PRAPARE - Transportation    • Lack of Transportation (Medical): No    • Lack of Transportation (Non-Medical):  No   Physical Activity: Not on file   Stress: Not on file   Social Connections: Not on file   Intimate Partner Violence: Not on file   Housing Stability: Unknown (7/12/2023)    Housing Stability Vital Sign    • Unable to Pay for Housing in the Last Year: No    • Number of Places Lived in the Last Year: Not on file    • Unstable Housing in the Last Year: No      Family History   Problem Relation Age of Onset   • Other Mother    • Aneurysm Mother         aortic   • Dementia Mother    • Heart failure Father    • Diabetes Father         Dad   • Lung cancer Sister    • Cancer Sister    • Dementia Maternal Grandmother    • Dementia Maternal Aunt      Past Surgical History:   Procedure Laterality Date   • HYSTERECTOMY     • INCONTINENCE SURGERY     • JOINT REPLACEMENT  2015    Right knee   • OOPHORECTOMY Bilateral    • REPLACEMENT TOTAL KNEE Right 2016   • TOOTH EXTRACTION  02/15/2023    2/15/23,3/16/23,4/27/23   • TOTAL ABDOMINAL HYSTERECTOMY W/ BILATERAL SALPINGOOPHORECTOMY  2006       Current Outpatient Medications:   •  albuterol (PROVENTIL HFA,VENTOLIN HFA) 90 mcg/act inhaler, Inhale 2 puffs every 6 (six) hours as needed for wheezing or shortness of breath, Disp: 18 g, Rfl: 1  •  escitalopram (Lexapro) 10 mg tablet, Take 1 tablet (10 mg total) by mouth daily, Disp: 90 tablet, Rfl: 3  •  furosemide (LASIX) 80 mg tablet, Take 1 tablet (80 mg total) by mouth 2 (two) times a day, Disp: 60 tablet, Rfl: 3  •  losartan (COZAAR) 25 mg tablet, Take 1 tablet (25 mg total) by mouth daily, Disp: 30 tablet, Rfl: 3  •  metoprolol succinate (Toprol XL) 25 mg 24 hr tablet, Take 1 tablet (25 mg total) by mouth daily at bedtime, Disp: 30 tablet, Rfl: 5  •  oxybutynin (DITROPAN) 5 mg tablet, Take 1 tablet (5 mg total) by mouth 3 (three) times a day, Disp: 90 tablet, Rfl: 0  •  spironolactone (ALDACTONE) 25 mg tablet, Take 0.5 tablets (12.5 mg total) by mouth daily, Disp: 15 tablet, Rfl: 2  Allergies   Allergen Reactions   • Lisinopril Cough       Labs:not applicable  Imaging: CT abdomen w wo contrast    Result Date: 8/11/2023  Narrative: CT - ABDOMEN WITHOUT AND WITH IV CONTRAST INDICATION:   E27.8: Other specified disorders of adrenal gland. COMPARISON: Compared to CT chest of 7/11/2023 TECHNIQUE: Initial CT of the abdomen and pelvis was performed without intravenous contrast.  Subsequent dynamic CT evaluation of the abdomen was performed after the administration of intravenous contrast in both nephrographic and delayed phases after the  administration of intravenous contrast.   Multiplanar 2D reformatted images were created from the source data. This examination, like all CT scans performed in the [facility], was performed utilizing techniques to minimize radiation dose exposure, including the use of iterative reconstruction and automated exposure control. Radiation dose length  product (DLP) for this visit:  2702 mGy-cm IV Contrast:  100 mL of iohexol (OMNIPAQUE) Enteric Contrast:  Enteric contrast was not administered.  FINDINGS: LOWER CHEST:  No clinically significant abnormality identified in the visualized lower chest. LIVER/BILIARY TREE:  Unremarkable. GALLBLADDER:  No calcified gallstones. No pericholecystic inflammatory change. SPLEEN:  Unremarkable. PANCREAS:  Unremarkable. ADRENAL GLANDS: There is a 3.3 x 3.0 x 2.6 cm right and 1.8 x 1.2 x 1.5 cm left adrenal nodule. Both nodules demonstrate less than 10 Hounsfield units on the precontrast images with the arterial phase at 25 Hounsfield units and delayed washout phase at 6  Hounsfield units. . The imaging features are diagnostic of benign adrenal adenoma, and is not worrisome for malignancy. However, please note that for adenomas > 1cm, endocrinology consult/biochemical evaluation is suggested to rule out functioning adenoma. Adrenal recommendation based on institutional consensus and Journal of [employer] of Radiology 2017;14:[id number] KIDNEYS/URETERS:  Unremarkable. No hydronephrosis. VISUALIZED STOMACH AND BOWEL:  Unremarkable. ABDOMINAL CAVITY:  No ascites. No pneumoperitoneum. No lymphadenopathy. VESSELS:  Unremarkable for patient's age. ABDOMINAL WALL:  Unremarkable. OSSEOUS STRUCTURES: Degenerative disc disease at L4-L5 and L5-S1. No acute fracture or destructive osseous lesion. Impression: Bilateral adrenal nodules measuring 3.3 x 3.0 x 2.6 cm on the right and 1.8 x 1.2 x 1.5  cm on the left demonstrate benign adenoma features. However, please note that for adenomas > 1cm, endocrinology consult/biochemical evaluation is suggested to rule out functioning adenoma. Workstation performed: [id number]       Review of Systems:  Review of Systems   All other systems reviewed and are negative. Physical Exam:  /64 (BP Location: Left arm, Patient Position: Sitting, Cuff Size: Extra-Large)   Pulse 78   Ht 5' 5" (1.651 m)   Wt (!) 173 kg (380 lb 14.4 oz)   SpO2 96%   BMI 63.39 kg/m²   Physical Exam  Vitals reviewed. Constitutional:       Appearance: She is well-developed. HENT:      Head: Normocephalic and atraumatic.    Eyes:      Conjunctiva/sclera: Conjunctivae normal.      Pupils: Pupils are equal, round, and reactive to light. Cardiovascular:      Rate and Rhythm: Normal rate. Heart sounds: Normal heart sounds. Pulmonary:      Effort: Pulmonary effort is normal.      Breath sounds: Normal breath sounds. Musculoskeletal:      Cervical back: Normal range of motion and neck supple. Skin:     General: Skin is warm and dry. Neurological:      Mental Status: She is alert and oriented to person, place, and time. Discussion/Summary: We will arrange a pharmacologic nuclear stress test given her chest discomfort. She says she has had this discomfort for a while. I would like to exclude coronary ischemia. She cannot walk on a treadmill. Have asked her to be compliant with taking twice daily diuretic. Asked her to watch her salt intake and continue to monitor her weight. Of asked her to call if there is a problem in the interim otherwise I will see her in follow-up in 6 months time and sooner as is necessary.

## 2023-09-06 NOTE — TELEPHONE ENCOUNTER
Juventino Parker received paperwork yesterday. It is brown folder, will give to patient at next appointment.

## 2023-09-11 ENCOUNTER — OFFICE VISIT (OUTPATIENT)
Dept: INTERNAL MEDICINE CLINIC | Age: 63
End: 2023-09-11
Payer: COMMERCIAL

## 2023-09-11 VITALS
HEIGHT: 65 IN | OXYGEN SATURATION: 94 % | WEIGHT: 293 LBS | BODY MASS INDEX: 48.82 KG/M2 | TEMPERATURE: 98.1 F | HEART RATE: 66 BPM | DIASTOLIC BLOOD PRESSURE: 70 MMHG | SYSTOLIC BLOOD PRESSURE: 124 MMHG

## 2023-09-11 DIAGNOSIS — I50.32 CHRONIC DIASTOLIC CONGESTIVE HEART FAILURE (HCC): Chronic | ICD-10-CM

## 2023-09-11 DIAGNOSIS — I10 ESSENTIAL HYPERTENSION: Chronic | ICD-10-CM

## 2023-09-11 DIAGNOSIS — F33.1 MODERATE EPISODE OF RECURRENT MAJOR DEPRESSIVE DISORDER (HCC): ICD-10-CM

## 2023-09-11 DIAGNOSIS — J44.9 CHRONIC OBSTRUCTIVE PULMONARY DISEASE, UNSPECIFIED COPD TYPE (HCC): Chronic | ICD-10-CM

## 2023-09-11 DIAGNOSIS — E21.3 HYPERPARATHYROIDISM (HCC): ICD-10-CM

## 2023-09-11 DIAGNOSIS — E55.9 VITAMIN D DEFICIENCY: ICD-10-CM

## 2023-09-11 DIAGNOSIS — R07.89 OTHER CHEST PAIN: ICD-10-CM

## 2023-09-11 DIAGNOSIS — F17.210 CIGARETTE NICOTINE DEPENDENCE WITHOUT COMPLICATION: ICD-10-CM

## 2023-09-11 DIAGNOSIS — E27.8 ADRENAL NODULE (HCC): Primary | ICD-10-CM

## 2023-09-11 DIAGNOSIS — E83.52 HYPERCALCEMIA: Chronic | ICD-10-CM

## 2023-09-11 PROCEDURE — 99215 OFFICE O/P EST HI 40 MIN: CPT | Performed by: INTERNAL MEDICINE

## 2023-09-11 NOTE — PROGRESS NOTES
Assessment/Plan:    Bilateral adrenal nodules  -CT abdomen with contrast done on 8/3/2023 showed bilateral adrenal nodules measuring 3.3 x 3.0 x 2.6 on the right and 1.8 x 1.2 x 1.5 cm on the left which demonstrated benign adenoma features but with recommendation for endocrinology consult/biochemical evaluation to rule out functioning adenoma  -We will refer patient to endocrinology as recommended    COPD nicotine dependence  -Stable without acute exacerbation  -Continue with albuterol inhaler  -Patient was counseled to quit smoking    Depression   -Stable  -Continue with Lexapro    Hypercalcemia with hyperparathyroidism  -Patient has hypercalcemia with hypovitaminosis D and hyperparathyroidism  -We will refer patient to endocrinology especially with her adrenal adenomas. She needs endocrinology for multiple reasons. Essential hypertension  -Well-controlled  -Continue with losartan, Lasix and metoprolol as well as a low-salt diet    Chronic diastolic congestive heart failure  -Without acute exacerbation but she has bibasilar rales and shortness of breath and has gained weight  -Continue with Lasix as prescribed as well as spironolactone, metoprolol and losartan  -Continue with a low-salt diet, daily weights and fluid  restriction    Chest pain  -Patient has risk factors for coronary artery disease including hyperlipidemia, essential hypertension, nicotine dependence. It is possible that her chest pain may be secondary to anxiety since it does not occur at times of physical exertion but we will rule out coronary artery disease first.  -Her last stress echo was done in 2017  -She has an appointment with cardiology tomorrow and I have sent a message to her cardiologist  -She was counseled to quit smoking     Diagnoses and all orders for this visit:    Adrenal nodule [facility])  -     Ambulatory Referral to Endocrinology;  Future    Chronic obstructive pulmonary disease, unspecified COPD type ([address])    Essential hypertension    Moderate episode of recurrent major depressive disorder (HCC)    Hypercalcemia  -     Ambulatory Referral to Endocrinology; Future    Hyperparathyroidism [facility])  -     Ambulatory Referral to Endocrinology; Future    Vitamin D deficiency  -     Ambulatory Referral to Endocrinology; Future    Other chest pain    Chronic diastolic congestive heart failure (HCC)    Cigarette nicotine dependence without complication               Subjective:      Patient ID: Ba Gomez is a 66 y.o. female. HPI  Patient presents for a follow-up visit regarding her COPD, CHF, hypercalcemia, htn, depression and vit d def. She states that she has been taking her medications as prescribed. Today pt complains of occasional chest pain located in the middle of her chest, in the retrosternal region and states that it feels like pressure and radiates to both temples. It lasts about 10-15 minutes and sometimes less and goes away if she takes 2 aspirins. She denies any associated palpitations, worsening sob, lightheadedness or presyncope, nausea, vomiting or worsening cough. She feels like the chest pain comes with stress but not with exertion but she denies any anxiety or depression. She states that she has had 3 episodes of chest pains in the past month and had one that lasted for a few minutes while she was in the clinic with me. Of note, the chest pain started while I was discussing her abnormal CT scan results with her. I checked her blood pressure while she had it and it was 130/72. She states that she has an appt with cards tomorrow  Still struggling with her wt and feels like she is retaining fluid  She states that she has been mising her afternoon dose of lasix because she gets busy and forgets  Takes it every other day   Mood is okay and she is taking her Lexapro.    Quit smoking continously 2 months ago and since then has been smoking less, about 3 cig a week  At the max she smoked a pack a day   She rolled her own cig  Breathing is much better and is worse when she gains wt   She states that she has a sweet tooth and it is dfficult to control   + family  Hx of lung ca in sister, was a smoker  Today, she denies any fever, chills, night sweats, headache, dizziness, nasal congestion, runny nose, pnd, sore throat, ear ache, cough,  palpitations, nausea, vomiting, diarrhea, constipation, hematochezia, hematuria, melena stools, feelings of anxiety,depression or insomnia. The following portions of the patient's history were reviewed and updated as appropriate:   She  has a past medical history of Acute on chronic heart failure with preserved ejection fraction (HFpEF) ([address]) (12/2/2022), Arthritis, CHF (congestive heart failure) ([address]), COPD (chronic obstructive pulmonary disease) ([address]), COVID-19 virus infection (2/27/2023), Depression, Eating disorder, Hypertension, Hypoxia (12/2/2022), Obesity, Urinary incontinence, and Visual impairment (2018).   She   Patient Active Problem List    Diagnosis Date Noted   • Hyperparathyroidism ([address]) 09/11/2023   • Adrenal nodule ([address]) 07/12/2023   • Urinary incontinence 07/12/2023   • Ambulatory dysfunction 07/11/2023   • Moderate episode of recurrent major depressive disorder ([address]) 04/03/2023   • Hypercalcemia 01/16/2023   • Pre-diabetes 08/30/2022   • Lymphedema of both lower extremities 03/16/2021   • Chronic diastolic congestive heart failure ([address]) 02/12/2021   • Insomnia due to medical condition 10/08/2020   • Obstructive sleep apnea syndrome 09/02/2020   • Cigarette nicotine dependence without complication 87/33/7100   • Morbid obesity with body mass index (BMI) of 60.0 to 69.9 in adult [facility]) 03/05/2020   • Essential hypertension 07/10/2019   • Mixed stress and urge urinary incontinence 07/10/2019   • COPD (chronic obstructive pulmonary disease) ([address]) 07/10/2019   • Mixed hyperlipidemia 07/10/2019     She  has a past surgical history that includes Hysterectomy; Oophorectomy (Bilateral); Replacement total knee (Right, 2016); Total abdominal hysterectomy w/ bilateral salpingoophorectomy (2006); Incontinence surgery; Joint replacement (2015); and Tooth extraction (02/15/2023). Her family history includes Aneurysm in her mother; Cancer in her sister; Dementia in her maternal aunt, maternal grandmother, and mother; Diabetes in her father; Heart failure in her father; Lung cancer in her sister; Other in her mother. She  reports that she quit smoking about 2 months ago. Her smoking use included cigarettes. She started smoking about 47 years ago. She has a 23.50 pack-year smoking history. She has never used smokeless tobacco. She reports current alcohol use. She reports that she does not currently use drugs after having used the following drugs: Marijuana. Current Outpatient Medications   Medication Sig Dispense Refill   • albuterol (PROVENTIL HFA,VENTOLIN HFA) 90 mcg/act inhaler Inhale 2 puffs every 6 (six) hours as needed for wheezing or shortness of breath 18 g 1   • escitalopram (Lexapro) 10 mg tablet Take 1 tablet (10 mg total) by mouth daily 90 tablet 3   • furosemide (LASIX) 80 mg tablet Take 1 tablet (80 mg total) by mouth 2 (two) times a day 60 tablet 3   • losartan (COZAAR) 25 mg tablet Take 1 tablet (25 mg total) by mouth daily 30 tablet 3   • metoprolol succinate (Toprol XL) 25 mg 24 hr tablet Take 1 tablet (25 mg total) by mouth daily at bedtime 30 tablet 5   • oxybutynin (DITROPAN) 5 mg tablet Take 1 tablet (5 mg total) by mouth 3 (three) times a day 90 tablet 0   • spironolactone (ALDACTONE) 25 mg tablet Take 0.5 tablets (12.5 mg total) by mouth daily 15 tablet 2     No current facility-administered medications for this visit.      Current Outpatient Medications on File Prior to Visit   Medication Sig   • albuterol (PROVENTIL HFA,VENTOLIN HFA) 90 mcg/act inhaler Inhale 2 puffs every 6 (six) hours as needed for wheezing or shortness of breath   • escitalopram (Lexapro) 10 mg tablet Take 1 tablet (10 mg total) by mouth daily   • furosemide (LASIX) 80 mg tablet Take 1 tablet (80 mg total) by mouth 2 (two) times a day   • losartan (COZAAR) 25 mg tablet Take 1 tablet (25 mg total) by mouth daily   • metoprolol succinate (Toprol XL) 25 mg 24 hr tablet Take 1 tablet (25 mg total) by mouth daily at bedtime   • oxybutynin (DITROPAN) 5 mg tablet Take 1 tablet (5 mg total) by mouth 3 (three) times a day   • spironolactone (ALDACTONE) 25 mg tablet Take 0.5 tablets (12.5 mg total) by mouth daily     No current facility-administered medications on file prior to visit. She is allergic to lisinopril. .    Review of Systems   Constitutional: Positive for unexpected weight change. Negative for activity change, chills, fatigue and fever. HENT: Negative for ear pain, postnasal drip, rhinorrhea, sinus pressure and sore throat. Eyes: Negative for pain. Respiratory: Positive for shortness of breath. Negative for cough, choking, chest tightness and wheezing. Cardiovascular: Positive for chest pain (radiates into both temples, lasts 10-15 mins, feels like pressure , does not have it when she exertes herself physically but maybe when she stresses). Negative for palpitations and leg swelling. Gastrointestinal: Negative for abdominal pain, constipation, diarrhea, nausea and vomiting. Genitourinary: Negative for dysuria and hematuria. Musculoskeletal: Positive for arthralgias (has OA of the knee) and myalgias. Negative for back pain, gait problem, joint swelling and neck stiffness. Skin: Negative for pallor and rash. Neurological: Negative for dizziness, tremors, seizures, syncope, light-headedness and headaches. Hematological: Negative for adenopathy. Psychiatric/Behavioral: Negative for behavioral problems and sleep disturbance. The patient is not nervous/anxious.           Objective:      /70 (BP Location: Left arm, Patient Position: Sitting, Cuff Size: Large) Pulse 66   Temp 98.1 °F (36.7 °C) (Temporal)   Ht 5' 5" (1.651 m)   Wt (!) 173 kg (382 lb)   SpO2 94%   BMI 63.57 kg/m²          Physical Exam  Constitutional:       General: She is not in acute distress. Appearance: She is well-developed. She is obese. She is not diaphoretic. Comments: BMI - 63.57   HENT:      Head: Normocephalic and atraumatic. Right Ear: External ear normal.      Left Ear: External ear normal.      Nose: Nose normal.      Mouth/Throat:      Mouth: Mucous membranes are dry. Pharynx: Posterior oropharyngeal erythema present. No oropharyngeal exudate. Eyes:      General: No scleral icterus. Right eye: No discharge. Left eye: No discharge. Conjunctiva/sclera: Conjunctivae normal.      Pupils: Pupils are equal, round, and reactive to light. Neck:      Thyroid: No thyromegaly. Vascular: No JVD. Trachea: No tracheal deviation. Cardiovascular:      Rate and Rhythm: Normal rate and regular rhythm. Heart sounds: Murmur heard. Systolic murmur is present with a grade of 2/6. No friction rub. No gallop. Comments: Recheck of bp was 130/72 when pt was having chest pain   Pulmonary:      Effort: Pulmonary effort is normal. No respiratory distress. Breath sounds: Examination of the right-lower field reveals rales. Examination of the left-lower field reveals rales. Rales (bibasilar rales ) present. No wheezing. Chest:      Chest wall: No tenderness. Abdominal:      General: Bowel sounds are normal. There is no distension. Palpations: Abdomen is soft. There is no mass. Tenderness: There is no abdominal tenderness. There is no guarding or rebound. Musculoskeletal:         General: No tenderness or deformity. Normal range of motion. Cervical back: Normal range of motion and neck supple. Right lower leg: Edema present.       Left lower leg: Edema (non pitting pedal edema b/l with changes concerning for possible lymphedema) present. Lymphadenopathy:      Cervical: No cervical adenopathy. Skin:     General: Skin is warm and dry. Coloration: Skin is not pale. Findings: No erythema or rash. Neurological:      Mental Status: She is alert and oriented to person, place, and time. Cranial Nerves: No cranial nerve deficit. Motor: No abnormal muscle tone. Coordination: Coordination normal.      Gait: Gait abnormal.      Deep Tendon Reflexes: Reflexes are normal and symmetric. Psychiatric:         Behavior: Behavior normal.           Appointment on 08/17/2023   Component Date Value Ref Range Status   • Sodium 08/17/2023 140  135 - 147 mmol/L Final   • Potassium 08/17/2023 4.2  3.5 - 5.3 mmol/L Final   • Chloride 08/17/2023 109 (H)  96 - 108 mmol/L Final   • CO2 08/17/2023 27  21 - 32 mmol/L Final   • ANION GAP 08/17/2023 4  mmol/L Final   • BUN 08/17/2023 11  5 - 25 mg/dL Final   • Creatinine 08/17/2023 0.67  0.60 - 1.30 mg/dL Final    Standardized to IDMS reference method   • Glucose, Fasting 08/17/2023 112 (H)  65 - 99 mg/dL Final    Specimen collection should occur prior to Sulfasalazine administration due to the potential for falsely depressed results. Specimen collection should occur prior to Sulfapyridine administration due to the potential for falsely elevated results.    • Calcium 08/17/2023 10.2 (H)  8.3 - 10.1 mg/dL Final   • eGFR 08/17/2023 95  ml/min/1.73sq m Final   Appointment on 08/07/2023   Component Date Value Ref Range Status   • Sodium 08/07/2023 139  135 - 147 mmol/L Final   • Potassium 08/07/2023 4.0  3.5 - 5.3 mmol/L Final   • Chloride 08/07/2023 105  96 - 108 mmol/L Final   • CO2 08/07/2023 31  21 - 32 mmol/L Final   • ANION GAP 08/07/2023 3  mmol/L Final   • BUN 08/07/2023 13  5 - 25 mg/dL Final   • Creatinine 08/07/2023 0.98  0.60 - 1.30 mg/dL Final    Standardized to IDMS reference method   • Glucose 08/07/2023 103  65 - 140 mg/dL Final    If the patient is fasting, the ADA then defines impaired fasting glucose as > 100 mg/dL and diabetes as > or equal to 123 mg/dL. Specimen collection should occur prior to Sulfasalazine administration due to the potential for falsely depressed results. Specimen collection should occur prior to Sulfapyridine administration due to the potential for falsely elevated results. • Calcium 08/07/2023 10.5 (H)  8.3 - 10.1 mg/dL Final   • AST 08/07/2023 17  5 - 45 U/L Final    Specimen collection should occur prior to Sulfasalazine administration due to the potential for falsely depressed results. • ALT 08/07/2023 24  12 - 78 U/L Final    Specimen collection should occur prior to Sulfasalazine and/or Sulfapyridine administration due to the potential for falsely depressed results. • Alkaline Phosphatase 08/07/2023 85  46 - 116 U/L Final   • Total Protein 08/07/2023 8.0  6.4 - 8.4 g/dL Final   • Albumin 08/07/2023 3.7  3.5 - 5.0 g/dL Final   • Total Bilirubin 08/07/2023 0.53  0.20 - 1.00 mg/dL Final    Use of this assay is not recommended for patients undergoing treatment with eltrombopag due to the potential for falsely elevated results.    • eGFR 08/07/2023 62  ml/min/1.73sq m Final   • WBC 08/07/2023 5.73  4.31 - 10.16 Thousand/uL Final   • RBC 08/07/2023 4.84  3.81 - 5.12 Million/uL Final   • Hemoglobin 08/07/2023 14.2  11.5 - 15.4 g/dL Final   • Hematocrit 08/07/2023 45.3  34.8 - 46.1 % Final   • MCV 08/07/2023 94  82 - 98 fL Final   • MCH 08/07/2023 29.3  26.8 - 34.3 pg Final   • MCHC 08/07/2023 31.3 (L)  31.4 - 37.4 g/dL Final   • RDW 08/07/2023 14.9  11.6 - 15.1 % Final   • Platelets 58/56/9303 203  149 - 390 Thousands/uL Final   • MPV 08/07/2023 11.7  8.9 - 12.7 fL Final   • PTH 08/07/2023 102.4 (H)  12.0 - 88.0 pg/mL Final   • TSH 3RD GENERATON 08/07/2023 2.753  0.450 - 4.500 uIU/mL Final    The recommended reference ranges for TSH during pregnancy are as follows:   First trimester 0.1 to 2.5 uIU/mL   Second trimester  0.2 to 3.0 uIU/mL Third trimester 0.3 to 3.0 uIU/m    Note: Normal ranges may not apply to patients who are transgender, non-binary, or whose legal sex, sex at birth, and gender identity differ. Adult TSH (3rd generation) reference range follows the recommended guidelines of the [employer], January, 2020.    • Vit D, 25-Hydroxy 08/07/2023 11.4 (L)  30.0 - 100.0 ng/mL Final    Vitamin D guidelines established by Clinical Guidelines Subcommittee  of the Endocrine Society Task Force, 2011    Deficiency <20ng/ml   Insufficiency 20-30ng/ml   Sufficient  ng/ml    Admission on 07/11/2023, Discharged on 07/16/2023   Component Date Value Ref Range Status   • WBC 07/11/2023 6.80  4.31 - 10.16 Thousand/uL Final   • RBC 07/11/2023 4.59  3.81 - 5.12 Million/uL Final   • Hemoglobin 07/11/2023 13.5  11.5 - 15.4 g/dL Final   • Hematocrit 07/11/2023 43.8  34.8 - 46.1 % Final   • MCV 07/11/2023 95  82 - 98 fL Final   • MCH 07/11/2023 29.4  26.8 - 34.3 pg Final   • MCHC 07/11/2023 30.8 (L)  31.4 - 37.4 g/dL Final   • RDW 07/11/2023 15.4 (H)  11.6 - 15.1 % Final   • MPV 07/11/2023 11.0  8.9 - 12.7 fL Final   • Platelets 22/49/7339 190  149 - 390 Thousands/uL Final   • nRBC 07/11/2023 0  /100 WBCs Final   • Neutrophils Relative 07/11/2023 70  43 - 75 % Final   • Immat GRANS % 07/11/2023 1  0 - 2 % Final   • Lymphocytes Relative 07/11/2023 20  14 - 44 % Final   • Monocytes Relative 07/11/2023 7  4 - 12 % Final   • Eosinophils Relative 07/11/2023 2  0 - 6 % Final   • Basophils Relative 07/11/2023 0  0 - 1 % Final   • Neutrophils Absolute 07/11/2023 4.71  1.85 - 7.62 Thousands/µL Final   • Immature Grans Absolute 07/11/2023 0.04  0.00 - 0.20 Thousand/uL Final   • Lymphocytes Absolute 07/11/2023 1.38  0.60 - 4.47 Thousands/µL Final   • Monocytes Absolute 07/11/2023 0.48  0.17 - 1.22 Thousand/µL Final   • Eosinophils Absolute 07/11/2023 0.16  0.00 - 0.61 Thousand/µL Final   • Basophils Absolute 07/11/2023 0.03  0.00 - 0.10 Thousands/µL Final   • Sodium 07/11/2023 139  135 - 147 mmol/L Final   • Potassium 07/11/2023 3.7  3.5 - 5.3 mmol/L Final   • Chloride 07/11/2023 104  96 - 108 mmol/L Final   • CO2 07/11/2023 28  21 - 32 mmol/L Final   • ANION GAP 07/11/2023 7  mmol/L Final   • BUN 07/11/2023 10  5 - 25 mg/dL Final   • Creatinine 07/11/2023 0.66  0.60 - 1.30 mg/dL Final    Standardized to IDMS reference method   • Glucose 07/11/2023 101  65 - 140 mg/dL Final    If the patient is fasting, the ADA then defines impaired fasting glucose as > 100 mg/dL and diabetes as > or equal to 123 mg/dL. • Calcium 07/11/2023 10.3 (H)  8.4 - 10.2 mg/dL Final   • AST 07/11/2023 13  13 - 39 U/L Final   • ALT 07/11/2023 13  7 - 52 U/L Final    Specimen collection should occur prior to Sulfasalazine administration due to the potential for falsely depressed results. • Alkaline Phosphatase 07/11/2023 77  34 - 104 U/L Final   • Total Protein 07/11/2023 7.5  6.4 - 8.4 g/dL Final   • Albumin 07/11/2023 4.2  3.5 - 5.0 g/dL Final   • Total Bilirubin 07/11/2023 0.53  0.20 - 1.00 mg/dL Final    Use of this assay is not recommended for patients undergoing treatment with eltrombopag due to the potential for falsely elevated results. N-acetyl-p-benzoquinone imine (metabolite of Acetaminophen) will generate erroneously low results in samples for patients that have taken an overdose of Acetaminophen. • eGFR 07/11/2023 95  ml/min/1.73sq m Final   • hs TnI 0hr 07/11/2023 8  "Refer to ACS Flowchart"- see link ng/L Final    Comment:                                              Initial (time 0) result  If >=50 ng/L, Myocardial injury suggested ;  Type of myocardial injury and treatment strategy  to be determined. If 5-49 ng/L, a delta result at 2 hours and or 4 hours will be needed to further evaluate. If <4 ng/L, and chest pain has been >3 hours since onset, patient may qualify for discharge based on the HEART score in the ED.   If <5 ng/L and <3hours since onset of chest pain, a delta result at 2 hours will be needed to further evaluate. HS Troponin 99th Percentile URL of a Health Population=12 ng/L with a 95% Confidence Interval of 8-18 ng/L. Second Troponin (time 2 hours)  If calculated delta >= 20 ng/L,  Myocardial injury suggested ; Type of myocardial injury and treatment strategy to be determined. If 5-49 ng/L and the calculated delta is 5-19 ng/L, consult medical service for evaluation. Continue evaluation for ischemia on ecg and other possible etiology and repeat hs troponin at 4 hours. If delta                            is <5 ng/L at 2 hours, consider discharge based on risk stratification via the HEART score (if in ED), or CY risk score in IP/Observation. HS Troponin 99th Percentile URL of a Health Population=12 ng/L with a 95% Confidence Interval of 8-18 ng/L. • BNP 07/11/2023 29  0 - 100 pg/mL Final   • Ventricular Rate 07/11/2023 73  BPM Final   • Atrial Rate 07/11/2023 73  BPM Final   • SD Interval 07/11/2023 156  ms Final   • QRSD Interval 07/11/2023 100  ms Final   • QT Interval 07/11/2023 418  ms Final   • QTC Interval 07/11/2023 460  ms Final   • QRS Castine 07/11/2023 74  degrees Final   • T Wave Axis 07/11/2023 70  degrees Final   • hs TnI 2hr 07/11/2023 7  "Refer to ACS Flowchart"- see link ng/L Final    Comment:                                              Initial (time 0) result  If >=50 ng/L, Myocardial injury suggested ;  Type of myocardial injury and treatment strategy  to be determined. If 5-49 ng/L, a delta result at 2 hours and or 4 hours will be needed to further evaluate. If <4 ng/L, and chest pain has been >3 hours since onset, patient may qualify for discharge based on the HEART score in the ED. If <5 ng/L and <3hours since onset of chest pain, a delta result at 2 hours will be needed to further evaluate. HS Troponin 99th Percentile URL of a Health Population=12 ng/L with a 95% Confidence Interval of 8-18 ng/L.     Second Troponin (time 2 hours)  If calculated delta >= 20 ng/L,  Myocardial injury suggested ; Type of myocardial injury and treatment strategy to be determined. If 5-49 ng/L and the calculated delta is 5-19 ng/L, consult medical service for evaluation. Continue evaluation for ischemia on ecg and other possible etiology and repeat hs troponin at 4 hours. If delta                            is <5 ng/L at 2 hours, consider discharge based on risk stratification via the HEART score (if in ED), or CY risk score in IP/Observation. HS Troponin 99th Percentile URL of a Health Population=12 ng/L with a 95% Confidence Interval of 8-18 ng/L. • Delta 2hr hsTnI 07/11/2023 -1  <20 ng/L Final   • D-Dimer, Quant 07/11/2023 0.89 (H)  <0.50 ug/ml FEU Final    Reference and upper limits to exclude DVT and PE are the same. Do not use to exclude if clinical symptoms are present. Pregnant women:  1st trimester:  <0.22 - 1.06 ug/ml FEU  2nd trimester:  <0.22 - 1.88 ug/ml FEU  3rd trimester:   0.24 - 3.28 ug/ml FEU    Note: Normal ranges may not apply to patients who are transgender, non-binary, or whose legal sex, sex at birth, and gender identity differ. • Sodium 07/12/2023 140  135 - 147 mmol/L Final   • Potassium 07/12/2023 4.1  3.5 - 5.3 mmol/L Final   • Chloride 07/12/2023 103  96 - 108 mmol/L Final   • CO2 07/12/2023 32  21 - 32 mmol/L Final   • ANION GAP 07/12/2023 5  mmol/L Final   • BUN 07/12/2023 10  5 - 25 mg/dL Final   • Creatinine 07/12/2023 0.63  0.60 - 1.30 mg/dL Final    Standardized to IDMS reference method   • Glucose 07/12/2023 120  65 - 140 mg/dL Final    If the patient is fasting, the ADA then defines impaired fasting glucose as > 100 mg/dL and diabetes as > or equal to 123 mg/dL.    • Calcium 07/12/2023 9.6  8.4 - 10.2 mg/dL Final   • eGFR 07/12/2023 97  ml/min/1.73sq m Final   • Magnesium 07/12/2023 2.1  1.9 - 2.7 mg/dL Final   • WBC 07/12/2023 5.46  4.31 - 10.16 Thousand/uL Final   • RBC 07/12/2023 4.44 3.81 - 5.12 Million/uL Final   • Hemoglobin 07/12/2023 13.0  11.5 - 15.4 g/dL Final   • Hematocrit 07/12/2023 42.3  34.8 - 46.1 % Final   • MCV 07/12/2023 95  82 - 98 fL Final   • MCH 07/12/2023 29.3  26.8 - 34.3 pg Final   • MCHC 07/12/2023 30.7 (L)  31.4 - 37.4 g/dL Final   • RDW 07/12/2023 15.5 (H)  11.6 - 15.1 % Final   • Platelets 54/93/4836 159  149 - 390 Thousands/uL Final   • MPV 07/12/2023 10.5  8.9 - 12.7 fL Final   • Sodium 07/13/2023 136  135 - 147 mmol/L Final   • Potassium 07/13/2023 3.7  3.5 - 5.3 mmol/L Final   • Chloride 07/13/2023 101  96 - 108 mmol/L Final   • CO2 07/13/2023 34 (H)  21 - 32 mmol/L Final   • ANION GAP 07/13/2023 1  mmol/L Final   • BUN 07/13/2023 14  5 - 25 mg/dL Final   • Creatinine 07/13/2023 0.63  0.60 - 1.30 mg/dL Final    Standardized to IDMS reference method   • Glucose 07/13/2023 119  65 - 140 mg/dL Final    If the patient is fasting, the ADA then defines impaired fasting glucose as > 100 mg/dL and diabetes as > or equal to 123 mg/dL. • Calcium 07/13/2023 9.6  8.4 - 10.2 mg/dL Final   • eGFR 07/13/2023 97  ml/min/1.73sq m Final   • Sodium 07/14/2023 136  135 - 147 mmol/L Final   • Potassium 07/14/2023 3.8  3.5 - 5.3 mmol/L Final   • Chloride 07/14/2023 99  96 - 108 mmol/L Final   • CO2 07/14/2023 30  21 - 32 mmol/L Final   • ANION GAP 07/14/2023 7  mmol/L Final   • BUN 07/14/2023 16  5 - 25 mg/dL Final   • Creatinine 07/14/2023 0.61  0.60 - 1.30 mg/dL Final    Standardized to IDMS reference method   • Glucose 07/14/2023 122  65 - 140 mg/dL Final    If the patient is fasting, the ADA then defines impaired fasting glucose as > 100 mg/dL and diabetes as > or equal to 123 mg/dL.    • Calcium 07/14/2023 10.1  8.4 - 10.2 mg/dL Final   • eGFR 07/14/2023 98  ml/min/1.73sq m Final   • Magnesium 07/14/2023 2.2  1.9 - 2.7 mg/dL Final   • Sodium 07/15/2023 136  135 - 147 mmol/L Final   • Potassium 07/15/2023 3.8  3.5 - 5.3 mmol/L Final   • Chloride 07/15/2023 97  96 - 108 mmol/L Final • CO2 07/15/2023 32  21 - 32 mmol/L Final   • ANION GAP 07/15/2023 7  mmol/L Final   • BUN 07/15/2023 18  5 - 25 mg/dL Final   • Creatinine 07/15/2023 0.68  0.60 - 1.30 mg/dL Final    Standardized to IDMS reference method   • Glucose 07/15/2023 121  65 - 140 mg/dL Final    If the patient is fasting, the ADA then defines impaired fasting glucose as > 100 mg/dL and diabetes as > or equal to 123 mg/dL. • Calcium 07/15/2023 10.0  8.4 - 10.2 mg/dL Final   • eGFR 07/15/2023 94  ml/min/1.73sq m Final   • Sodium 07/16/2023 134 (L)  135 - 147 mmol/L Final   • Potassium 07/16/2023 3.6  3.5 - 5.3 mmol/L Final   • Chloride 07/16/2023 96  96 - 108 mmol/L Final   • CO2 07/16/2023 30  21 - 32 mmol/L Final   • ANION GAP 07/16/2023 8  mmol/L Final   • BUN 07/16/2023 18  5 - 25 mg/dL Final   • Creatinine 07/16/2023 0.64  0.60 - 1.30 mg/dL Final    Standardized to IDMS reference method   • Glucose 07/16/2023 128  65 - 140 mg/dL Final    If the patient is fasting, the ADA then defines impaired fasting glucose as > 100 mg/dL and diabetes as > or equal to 123 mg/dL.    • Calcium 07/16/2023 10.3 (H)  8.4 - 10.2 mg/dL Final   • eGFR 07/16/2023 96  ml/min/1.73sq m Final   Office Visit on 12/28/2022   Component Date Value Ref Range Status   •  COLOR,UA 12/28/2022 yellow   Final   • CLARITY,UA 12/28/2022 clear   Final   • SPECIFIC GRAVITY,UA 12/28/2022 1.030   Final   •  PH,UA 12/28/2022 6.0   Final   • LEUKOCYTE ESTERASE,UA 12/28/2022 neg   Final   • Craige Early 12/28/2022 neg   Final   • GLUCOSE, UA 12/28/2022 neg   Final   • Cierra Caal 12/28/2022 neg   Final   • BILIRUBIN,UA 12/28/2022 small   Final   • BLOOD,UA 12/28/2022 trace-intact   Final   • POCT URINE PROTEIN 12/28/2022 trace   Final   • SL AMB POCT UROBILINOGEN 12/28/2022 1.0   Final   Appointment on 12/16/2022   Component Date Value Ref Range Status   • Sodium 12/16/2022 139  135 - 147 mmol/L Final   • Potassium 12/16/2022 4.3  3.5 - 5.3 mmol/L Final   • Chloride 12/16/2022 104 96 - 108 mmol/L Final   • CO2 12/16/2022 32  21 - 32 mmol/L Final   • ANION GAP 12/16/2022 3 (L)  4 - 13 mmol/L Final   • BUN 12/16/2022 26 (H)  5 - 25 mg/dL Final   • Creatinine 12/16/2022 0.89  0.60 - 1.30 mg/dL Final    Standardized to IDMS reference method   • Glucose, Fasting 12/16/2022 124 (H)  65 - 99 mg/dL Final    Specimen collection should occur prior to Sulfasalazine administration due to the potential for falsely depressed results. Specimen collection should occur prior to Sulfapyridine administration due to the potential for falsely elevated results. • Calcium 12/16/2022 10.5 (H)  8.3 - 10.1 mg/dL Final   • eGFR 12/16/2022 70  ml/min/1.73sq m Final   No results displayed because visit has over 200 results.       Office Visit on 12/02/2022   Component Date Value Ref Range Status   • POCT SARS-CoV-2 Ag 12/02/2022 Negative  Negative Final   • VALID CONTROL 12/02/2022 Valid   Final

## 2023-09-12 ENCOUNTER — OFFICE VISIT (OUTPATIENT)
Dept: CARDIOLOGY CLINIC | Facility: CLINIC | Age: 63
End: 2023-09-12
Payer: COMMERCIAL

## 2023-09-12 VITALS
BODY MASS INDEX: 48.82 KG/M2 | OXYGEN SATURATION: 96 % | DIASTOLIC BLOOD PRESSURE: 64 MMHG | WEIGHT: 293 LBS | HEIGHT: 65 IN | HEART RATE: 78 BPM | SYSTOLIC BLOOD PRESSURE: 118 MMHG

## 2023-09-12 DIAGNOSIS — E66.01 MORBID OBESITY WITH BODY MASS INDEX (BMI) OF 60.0 TO 69.9 IN ADULT (HCC): ICD-10-CM

## 2023-09-12 DIAGNOSIS — I10 HYPERTENSION, UNSPECIFIED TYPE: ICD-10-CM

## 2023-09-12 DIAGNOSIS — I50.32 CHRONIC DIASTOLIC CONGESTIVE HEART FAILURE (HCC): Primary | ICD-10-CM

## 2023-09-12 DIAGNOSIS — E78.2 MIXED HYPERLIPIDEMIA: ICD-10-CM

## 2023-09-12 DIAGNOSIS — R07.9 CHEST PAIN, UNSPECIFIED TYPE: ICD-10-CM

## 2023-09-12 PROCEDURE — 99214 OFFICE O/P EST MOD 30 MIN: CPT | Performed by: INTERNAL MEDICINE

## 2023-09-13 ENCOUNTER — PATIENT OUTREACH (OUTPATIENT)
Dept: CASE MANAGEMENT | Facility: HOSPITAL | Age: 63
End: 2023-09-13

## 2023-09-13 NOTE — PROGRESS NOTES
Heart Failure Outpatient Care Coordinator Note: Chart notes reviewed and call made to patient and left message. Saw cardiology yesterday and NM Stress test ordered.

## 2023-09-20 ENCOUNTER — CONSULT (OUTPATIENT)
Dept: ENDOCRINOLOGY | Facility: CLINIC | Age: 63
End: 2023-09-20
Payer: COMMERCIAL

## 2023-09-20 VITALS
HEIGHT: 65 IN | BODY MASS INDEX: 48.82 KG/M2 | SYSTOLIC BLOOD PRESSURE: 126 MMHG | DIASTOLIC BLOOD PRESSURE: 76 MMHG | HEART RATE: 72 BPM | WEIGHT: 293 LBS

## 2023-09-20 DIAGNOSIS — E21.3 HYPERPARATHYROIDISM (HCC): ICD-10-CM

## 2023-09-20 DIAGNOSIS — E55.9 VITAMIN D DEFICIENCY: ICD-10-CM

## 2023-09-20 DIAGNOSIS — E27.8 ADRENAL NODULE (HCC): ICD-10-CM

## 2023-09-20 DIAGNOSIS — L68.0 HIRSUTISM: Primary | ICD-10-CM

## 2023-09-20 DIAGNOSIS — E83.52 HYPERCALCEMIA: Chronic | ICD-10-CM

## 2023-09-20 PROCEDURE — 99204 OFFICE O/P NEW MOD 45 MIN: CPT | Performed by: INTERNAL MEDICINE

## 2023-09-20 NOTE — PATIENT INSTRUCTIONS
Start taking vitamin D 2000 IU daily  Message has been sent to your cardiologist to check if spironolactone can be held for further testing, continue at this time  Ordered labs to be done now including blood work as well as urine testing.   For urine testing please discard the first urine in the morning and collect all the rest of the urine for the day in the bottle including the first urine the next day and take the bottle to the lab for testing  We will call with results  Some orders have been placed for labs to be done in 3 months  Follow-up in 3 months

## 2023-09-20 NOTE — PROGRESS NOTES
CC: Adrenal nodule      History of presenting illness:      Calin Medina is a 66 y.o. female who is has been referred here for new patient visit of adrenal nodule discovered on CT scan on a hospital visit for congestive heart failure. Patient was also discovered to have hypercalcemia with hypovitaminosis D and hyperparathyroidism This was discovered on 7/11/23 on CTA chest PE study followed up by CT abd study on 8/3/2023. .      CT abdomen with contrast done on 8/3/2023 showed bilateral adrenal nodules measuring 3.3 x 3.0 x 2.6 on the right and 1.8 x 1.2 x 1.5 cm on the left which demonstrated benign adenoma features     Biochemical testing from labs done in July and August show  Hypercalcemia of 10.2, low vitamin D at 11.4 and elevated PTH at 102. 4     Associated signs/symptoms: (can be related to adrenal hormone overproduction)  Has hirsutism, yes  Weight gain                                                    no, gained 100 lbs in a year about a 6 years, attributes to depression            History of impaired glucose tolerance        yes  History of hypertension                                   yes  Purple striae                                                  yes   Easy bruising                                                 no  Depression                                                   yes  Edema                                                             yes  Irregular menses                                            post menopausal  History of osteoporosis/fractures                   no, no DEXA scan  Palpitations                                                     no  Diaphoresis                                                   yes, night sweats  Headaches                                                      no           Muscle cramps                                              occasional  Numbness/tingling                                         yes, in feet  Low potassium                                                no  Acne                                                                no  Increased sex drive                                        no, decreased libido  Deepening of voice                                        no  Increased muscle mass (shoulder girdle)      no  Loss of breast tissue or female body contour no  Malodorous perspiration                                no                   Temporal hair loss or balding                       no     Rest of the review of systems was negative     Patient has a history of hysterectomy for precancerous etiology in 2006. No other personal history of any  cancer or autoimmune disease. No hx of radiation. No steroid use, either oral, dermal or knee injection  Smoker, no marijuana   No family history of autoimmune disease, thyroid disorders or type 1 diabetes. Father and paternal family has a history of type 2 diabetes mellitus.     Physical Examination:   [unfilled]   General appearance - alert, well appearing, and in no distress, obese with cushingoid appearance  Mental status - normal mood, behavior, speech, dress, motor activity, and thought processes  Neck - supple, no significant adenopathy, thyroid exam normal  Chest - clear to auscultation, no wheezes  Heart - normal rate  Abdomen - soft, nontender, obese  Neurological -AAO x3, no focal deficit   Skin - normal coloration, presence of extensive striae on abdomen with purple discoloration        Past medical/surgical history  Past Medical History:   Diagnosis Date   • Acute on chronic heart failure with preserved ejection fraction (HFpEF) ([address]) 12/2/2022   • Arthritis    • CHF (congestive heart failure) (HCC)    • COPD (chronic obstructive pulmonary disease) ([address])    • COVID-19 virus infection 2/27/2023   • Depression    • Eating disorder    • Hypertension    • Hypoxia 12/2/2022   • Obesity    • Urinary incontinence    • [facility] 2018    [email or web address]     Past Surgical History:   Procedure Laterality Date   • HYSTERECTOMY     • INCONTINENCE SURGERY     • JOINT REPLACEMENT  2015    Right knee   • OOPHORECTOMY Bilateral    • REPLACEMENT TOTAL KNEE Right 2016   • TOOTH EXTRACTION  02/15/2023    2/15/23,3/16/23,23   • TOTAL ABDOMINAL HYSTERECTOMY W/ BILATERAL SALPINGOOPHORECTOMY          Family History:  Family History   Problem Relation Age of Onset   • Other Mother    • Aneurysm Mother         aortic   • Dementia Mother    • Heart failure Father    • Diabetes Father         Dad   • Lung cancer Sister    • Cancer Sister    • Dementia Maternal Grandmother    • Dementia Maternal Aunt         Social History:  Social History     Substance and Sexual Activity   Alcohol Use Yes    Comment: occassional- once a year     Social History     Tobacco Use   Smoking Status Former   • Packs/day: 0.50   • Years: 47.00   • Total pack years: 23.50   • Types: Cigarettes   • Start date: 1976   • Quit date: 2023   • Years since quittin.2   Smokeless Tobacco Never   Tobacco Comments    Patient is at 3 cigarettes per week since 2023       Labs and imaging,     Latest Reference Range & Units 22 09:05   CORRECTED CALCIUM 8.3 - 10.1 mg/dL 10.4 (H)   (H): Data is abnormally high   Latest Reference Range & Units 07/30/15 08:35 19 08:28 22 09:05 23 11:53   Hemoglobin A1C Normal 3.8-5.6%; PreDiabetic 5.7-6.4%;  Diabetic >=6.5%; Glycemic control for adults with diabetes <7.0% % 5.8 (H) 5.9 (H) (E) 6.1 (H)    eAG, EST AVG Glucose mg/dl 120 123 (E) 128    PARATHYROID HORMONE 12.0 - 88.0 pg/mL    102.4 (H)   TSH 3RD GENERATON 0.450 - 4.500 uIU/mL 0.782  1.450 2.753   (H): Data is abnormally high  (E): External lab result   Latest Reference Range & Units 07/30/15 08:35 19 12:11 19 17:39 20 08:45 22 09:05 22 13:04 22 06:58 22 04:53 12/05/22 06:45 22 05:31 22 05:50 22 05:29 12/09/22 05:10 12/16/22 13:16 07/11/23 17:31 07/12/23 06:37 07/13/23 04:58 07/14/23 05:29 07/15/23 04:51 07/16/23 05:22 08/07/23 11:53 08/17/23 10:47   Sodium 135 - 147 mmol/L 142 136 140 141 138 140 140 138 138 139 139 139 137 139 139 140 136 136 136 134 (L) 139 140   Potassium 3.5 - 5.3 mmol/L 4.4 3.9 3.9 4.8 4.3 4.9 4.1 4.1 4.1 3.9 4.1 4.1 3.7 4.3 3.7 4.1 3.7 3.8 3.8 3.6 4.0 4.2   Chloride 96 - 108 mmol/L 109 (H) 105 103 104 107 105 104 99 97 97 99 95 (L) 92 (L) 104 104 103 101 99 97 96 105 109 (H)   CO2 21 - 32 mmol/L 29 28 32 27 28 31 33 (H) 38 (H) 38 (H) 39 (H) 38 (H) 39 (H) 37 (H) 32 28 32 34 (H) 30 32 30 31 27   Anion Gap mmol/L 4 3 (L) 5  3 (L) 4 3 (L) 1 (L) 3 (L) 3 (L) 2 (L) 5 8 3 (L) 7 5 1 7 7 8 3 4   BUN 5 - 25 mg/dL 17 16 15 13 13 11 14 15 17 19 21 29 (H) 47 (H) 26 (H) 10 10 14 16 18 18 13 11   Creatinine 0.60 - 1.30 mg/dL 0.70 0.91 1.73 (H) 0.70 0.77 0.59 (L) 0.56 (L) 0.73 0.66 0.70 0.60 0.64 0.95 0.89 0.66 0.63 0.63 0.61 0.68 0.64 0.98 0.67   Glucose, Random 65 - 140 mg/dL 94 86 97 107 (H)  90 125 122 122 132 160 (H) 143 (H) 112  101 120 119 122 121 128 103    GLUCOSE FASTING 65 - 99 mg/dL     113 (H)         124 (H)        112 (H)   Calcium 8.3 - 10.1 mg/dL 9.6 9.9 9.9 9.7 9.8 9.5 9.1 9.0 9.1 9.5 10.1 10.6 (H) 10.4 (H) 10.5 (H) 10.3 (H) 9.6 9.6 10.1 10.0 10.3 (H) 10.5 (H) 10.2 (H)   (L): Data is abnormally low  Calcium 8.4 - 10.2 mg/dL 10.3 High   10.0  10.1  9.6  9.6  10.3 High   10.5 High       Latest Reference Range & Units 08/31/22 09:05 08/07/23 11:53   Vit D, 25-Hydroxy 30.0 - 100.0 ng/mL 9.4 (L) 11.4 (L)   (L): Data is abnormally low     Latest Reference Range & Units 08/07/23 11:53   PARATHYROID HORMONE 12.0 - 88.0 pg/mL 102.4 (H)   (H): Data is abnormally high  (H): Data is abnormally high  CT - ABDOMEN WITHOUT AND WITH IV CONTRAST     INDICATION:   E27.8: Other specified disorders of adrenal gland.     COMPARISON: Compared to CT chest of 7/11/2023     TECHNIQUE: Initial CT of the abdomen and pelvis was performed without intravenous contrast.  Subsequent dynamic CT evaluation of the abdomen was performed after the administration of intravenous contrast in both nephrographic and delayed phases after the   administration of intravenous contrast.   Multiplanar 2D reformatted images were created from the source data.     This examination, like all CT scans performed in the [facility], was performed utilizing techniques to minimize radiation dose exposure, including the use of iterative reconstruction and automated exposure control. Radiation dose length   product (DLP) for this visit:  5251 mGy-cm     IV Contrast:  100 mL of iohexol (OMNIPAQUE)  Enteric Contrast:  Enteric contrast was not administered.     FINDINGS:     LOWER CHEST:  No clinically significant abnormality identified in the visualized lower chest.     LIVER/BILIARY TREE:  Unremarkable.     GALLBLADDER:  No calcified gallstones. No pericholecystic inflammatory change.     SPLEEN:  Unremarkable.     PANCREAS:  Unremarkable.     ADRENAL GLANDS: There is a 3.3 x 3.0 x 2.6 cm right and 1.8 x 1.2 x 1.5 cm left adrenal nodule. Both nodules demonstrate less than 10 Hounsfield units on the precontrast images with the arterial phase at 25 Hounsfield units and delayed washout phase at 6   Hounsfield units. . The imaging features are diagnostic of benign adrenal adenoma, and is not worrisome for malignancy. However, please note that for adenomas > 1cm, endocrinology consult/biochemical evaluation is suggested to rule out functioning   adenoma.     Adrenal recommendation based on institutional consensus and Journal of [employer] of Radiology 2017;14:8945-6662           KIDNEYS/URETERS:  Unremarkable. No hydronephrosis.     VISUALIZED STOMACH AND BOWEL:  Unremarkable.     ABDOMINAL CAVITY:  No ascites. No pneumoperitoneum.  No lymphadenopathy.     VESSELS:  Unremarkable for patient's age.     ABDOMINAL WALL: Unremarkable.     OSSEOUS STRUCTURES: Degenerative disc disease at L4-L5 and L5-S1. No acute fracture or destructive osseous lesion.     IMPRESSION:     Bilateral adrenal nodules measuring 3.3 x 3.0 x 2.6 cm on the right and 1.8 x 1.2 x 1.5  cm on the left demonstrate benign adenoma features. However, please note that for adenomas > 1cm, endocrinology consult/biochemical evaluation is suggested to rule   out functioning adenoma.             ASSESSMENT/PLAN:            Diagnoses and all orders for this visit:    Hirsutism    Adrenal nodule ([address])  -     Ambulatory Referral to Endocrinology  -     Creatinine, urine, 24 hour Lab Collect; Future  -     Cortisol, Free, Urine, 24 Hour; Future  -     Metanephrines Fractionated, urine, 24 hour- Lab Collect; Future  -     PTH, intact- Lab Collect; Future  -     Testosterone, free, total- Lab Collect; Future    Hypercalcemia  -     Ambulatory Referral to Endocrinology  -     Basic metabolic panel Lab Collect; Future  -     Basic metabolic panel Lab Collect; Future    Hyperparathyroidism ([address])  -     Ambulatory Referral to Endocrinology  -     Creatinine, urine, 24 hour Lab Collect; Future  -     Cortisol, Free, Urine, 24 Hour; Future  -     PTH, intact- Lab Collect; Future    Vitamin D deficiency  -     Ambulatory Referral to Endocrinology  -     Vitamin D 25 hydroxy Lab Collect; Future  -     PTH, intact- Lab Collect; Future  -     Testosterone, free, total- Lab Collect;  Future              Bilateral adrenal nodules, likely benign  Rule out hyperfunctioning adrenal adenoma with Cushing's syndrome, primary hyperaldosteronism  Hirsutism with cushingoid features  Reviewed physiology of adrenal gland  Reviewed signs and symptoms of adrenal hormone overproduction, including cushing's, pheochromocytoma, adrenal carcinoma and hyperaldosteronism  Reviewed biochemical testing and its interpretation  A homogeneous adrenal mass <4 cm in diameter, with a smooth border, and an attenuation value <10 HU on unenhanced CT, is very likely to be a benign cortical adenoma. The imaging characteristics that suggest adrenal carcinoma or metastases include: irregular shape, in-homogeneous density, high unenhanced CT attenuation values (>20 HU), diameter >4 cm, and tumor calcification. In her case, CT findings consistent with benign nodules  The three things that need to be excluded are pheochromocytoma, cushing's syndrome and if patient is hypertensive, primary aldosteronism. Ordered 24-hour urinary fractionated metanephrines to rule out pheochromocytoma with 24-hour urinary creatinine which will determine adequate urine collection, no history of chronic kidney disease  Ordered free and total a.m. testosterone in the setting of hirsutism adrenal adenomas check for androgenic hyperactivity  We will also order 24-hour urinary cortisol to assess for possibility of Cushing's syndrome  In the setting of current losartan and spironolactone use, checking renin aldosterone ratio will not be on benefit.   Will send message to cardiologist to determine if Aldactone can be held for this testing.      Hypercalcemia with elevated PTH  In the setting of severe vitamin D deficiency, elevated PTH leading to hypercalcemia is the most probable underlying etiology  Advised to start taking vitamin D supplement 2000 IU daily and will repeat BMP for calcium as well as vitamin D levels before next follow-up in 3 months    Return to the clinic in 3 months with lab work

## 2023-09-25 ENCOUNTER — PATIENT OUTREACH (OUTPATIENT)
Dept: CASE MANAGEMENT | Facility: HOSPITAL | Age: 63
End: 2023-09-25

## 2023-10-23 DIAGNOSIS — I50.33 ACUTE ON CHRONIC HEART FAILURE WITH PRESERVED EJECTION FRACTION (HCC): ICD-10-CM

## 2023-10-23 DIAGNOSIS — R32 URINARY INCONTINENCE, UNSPECIFIED TYPE: ICD-10-CM

## 2023-10-23 DIAGNOSIS — I10 HYPERTENSION, UNSPECIFIED TYPE: ICD-10-CM

## 2023-10-23 DIAGNOSIS — I50.9 ACUTE ON CHRONIC CONGESTIVE HEART FAILURE, UNSPECIFIED HEART FAILURE TYPE (HCC): ICD-10-CM

## 2023-10-23 RX ORDER — OXYBUTYNIN CHLORIDE 5 MG/1
5 TABLET ORAL 3 TIMES DAILY
Qty: 90 TABLET | Refills: 0 | Status: SHIPPED | OUTPATIENT
Start: 2023-10-23

## 2023-10-23 RX ORDER — SPIRONOLACTONE 25 MG/1
12.5 TABLET ORAL DAILY
Qty: 15 TABLET | Refills: 0 | Status: SHIPPED | OUTPATIENT
Start: 2023-10-23

## 2023-10-23 RX ORDER — LOSARTAN POTASSIUM 25 MG/1
25 TABLET ORAL DAILY
Qty: 30 TABLET | Refills: 0 | Status: SHIPPED | OUTPATIENT
Start: 2023-10-23

## 2023-11-07 ENCOUNTER — HOSPITAL ENCOUNTER (OUTPATIENT)
Dept: SLEEP CENTER | Facility: CLINIC | Age: 63
Discharge: HOME | End: 2023-11-07
Payer: COMMERCIAL

## 2023-11-07 DIAGNOSIS — J44.1 CHRONIC OBSTRUCTIVE PULMONARY DISEASE WITH ACUTE EXACERBATION (HCC): ICD-10-CM

## 2023-11-07 DIAGNOSIS — G47.33 OBSTRUCTIVE SLEEP APNEA SYNDROME: ICD-10-CM

## 2023-11-07 DIAGNOSIS — E66.01 MORBID OBESITY (HCC): ICD-10-CM

## 2023-11-07 DIAGNOSIS — J96.21 ACUTE ON CHRONIC RESPIRATORY FAILURE WITH HYPOXIA (HCC): ICD-10-CM

## 2023-11-07 PROCEDURE — 95811 POLYSOM 6/>YRS CPAP 4/> PARM: CPT | Performed by: INTERNAL MEDICINE

## 2023-11-07 PROCEDURE — 95811 POLYSOM 6/>YRS CPAP 4/> PARM: CPT

## 2023-11-07 NOTE — ASSESSMENT & PLAN NOTE
Wt Readings from Last 3 Encounters:   08/30/22 (!) 178 kg (393 lb)   12/02/21 (!) 175 kg (386 lb 12 8 oz)   07/19/21 (!) 171 kg (375 lb 14 4 oz)     Will get BNP  Restart lasix  130.9

## 2023-11-08 NOTE — PROGRESS NOTES
Sleep Study Documentation    Pre-Sleep Study       Sleep testing procedure explained to patient:YES    Patient napped prior to study:YES- less than 30 minutes. Napped after 2PM: no    Caffeine:Dayshift worker after 12PM.  Caffeine use:NO    Alcohol:Dayshift workers after 5PM: Alcohol use:NO    Typical day for patient:YES       Study Documentation    Sleep Study Indications: G47.33, E66.01, J96.21, J44.1    Sleep Study: Split Optimal PAP pressure:   Leak:Medium  Snore:Eliminated  REM Obtained:yes  Supplemental O2: no    Minimum SaO2 69  Baseline SaO2 90  PAP mask tried (list all) Resmed Airtouch F20  PAP mask choice (final) Resmed Airtouch F20  PAP mask type:full face  PAP pressure at which snoring was eliminated 10  Minimum SaO2 at final PAP pressure 82  Mode of Therapy:CPAP  ETCO2:No  CPAP changed to BiPAP:Yes. If yes why To elevate SaO2 levels and higher pressure requirements. Mode of Therapy:BiPAP    EKG abnormalities: no     EEG abnormalities: no    Were abnormal behaviors in sleep observed:NO    Is Total Sleep Study Recording Time < 2 hours: N/A    Is Total Sleep Study Recording Time > 2 hours but study is incomplete: N/A    Is Total Sleep Study Recording Time 6 hours or more but sleep was not obtained: NO    Patient classification:        Post-Sleep Study    Medication used at bedtime or during sleep study:NO    Patient reports time it took to fall asleep:less than 20 minutes    Patient reports waking up during study:1 to 2 times. Patient reports returning to sleep without difficulty. Patient reports sleeping 6 to 8 hours without dreaming. Does the Patient feel this is a typical night of sleep:better than usual    Patient rated sleepiness: Not sleepy or tired    PAP treatment:yes: Post PAP treatment patient reports feeling better and  would wear PAP mask at home.

## 2023-11-09 DIAGNOSIS — I50.32 CHRONIC DIASTOLIC CONGESTIVE HEART FAILURE (HCC): Chronic | ICD-10-CM

## 2023-11-09 DIAGNOSIS — G47.33 OBSTRUCTIVE SLEEP APNEA SYNDROME: Primary | Chronic | ICD-10-CM

## 2023-11-10 ENCOUNTER — TELEPHONE (OUTPATIENT)
Dept: SLEEP CENTER | Facility: CLINIC | Age: 63
End: 2023-11-10

## 2023-11-10 NOTE — TELEPHONE ENCOUNTER
Spoke to the patient advised CPAP study is resulted and MIKA CARDENAS ordered BIPAP with O2. Patient reports that she has O2 at home now. She will call back with the name of her [employer] company.

## 2023-11-10 NOTE — TELEPHONE ENCOUNTER
----- Message from Shonna Proctor, [address] [facility] sent at 11/9/2023 12:07 PM EST -----  A split-night sleep study was completed with confirmation of severe obstructive sleep apnea. PAP titration was initiated with best settings noted in BiPAP. Equipment prescription provided at the recommended setting of min EPAP pressure of 12, pressure support of 5, maximum IPAP of 25, with 2 L of oxygen bled into the system. Patient to be scheduled for set up of equipment, with compliance follow-up.

## 2023-11-13 NOTE — TELEPHONE ENCOUNTER
Patient left message on the nurse line stating she receives her home oxygen from [employer]. Returned call from patient. Left message to call the office to schedule DME setup for BiPAP with oxygen bled-in.

## 2023-11-24 DIAGNOSIS — I10 HYPERTENSION, UNSPECIFIED TYPE: ICD-10-CM

## 2023-11-24 DIAGNOSIS — I50.33 ACUTE ON CHRONIC HEART FAILURE WITH PRESERVED EJECTION FRACTION (HCC): ICD-10-CM

## 2023-11-24 DIAGNOSIS — I50.9 ACUTE ON CHRONIC CONGESTIVE HEART FAILURE, UNSPECIFIED HEART FAILURE TYPE (HCC): ICD-10-CM

## 2023-11-24 RX ORDER — SPIRONOLACTONE 25 MG/1
12.5 TABLET ORAL DAILY
Qty: 15 TABLET | Refills: 5 | Status: SHIPPED | OUTPATIENT
Start: 2023-11-24

## 2023-11-24 RX ORDER — LOSARTAN POTASSIUM 25 MG/1
25 TABLET ORAL DAILY
Qty: 30 TABLET | Refills: 5 | Status: SHIPPED | OUTPATIENT
Start: 2023-11-24

## 2023-11-30 LAB
DME PARACHUTE DELIVERY DATE REQUESTED: NORMAL
DME PARACHUTE DELIVERY DATE REQUESTED: NORMAL
DME PARACHUTE DELIVERY NOTE: NORMAL
DME PARACHUTE DELIVERY NOTE: NORMAL
DME PARACHUTE ITEM DESCRIPTION: NORMAL
DME PARACHUTE ORDER STATUS: NORMAL
DME PARACHUTE ORDER STATUS: NORMAL
DME PARACHUTE SUPPLIER NAME: NORMAL
DME PARACHUTE SUPPLIER NAME: NORMAL
DME PARACHUTE SUPPLIER PHONE: NORMAL
DME PARACHUTE SUPPLIER PHONE: NORMAL

## 2023-12-07 LAB
DME PARACHUTE DELIVERY DATE EXPECTED: NORMAL
DME PARACHUTE DELIVERY DATE REQUESTED: NORMAL
DME PARACHUTE DELIVERY NOTE: NORMAL
DME PARACHUTE ITEM DESCRIPTION: NORMAL
DME PARACHUTE ORDER STATUS: NORMAL
DME PARACHUTE SUPPLIER NAME: NORMAL
DME PARACHUTE SUPPLIER PHONE: NORMAL

## 2023-12-19 LAB
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
## 2024-01-03 DIAGNOSIS — R32 URINARY INCONTINENCE, UNSPECIFIED TYPE: ICD-10-CM

## 2024-01-03 RX ORDER — OXYBUTYNIN CHLORIDE 5 MG/1
5 TABLET ORAL 3 TIMES DAILY
Qty: 90 TABLET | Refills: 0 | Status: SHIPPED | OUTPATIENT
Start: 2024-01-03

## 2024-01-25 DIAGNOSIS — I10 HYPERTENSION, UNSPECIFIED TYPE: ICD-10-CM

## 2024-01-25 DIAGNOSIS — I50.9 ACUTE ON CHRONIC CONGESTIVE HEART FAILURE, UNSPECIFIED HEART FAILURE TYPE (HCC): ICD-10-CM

## 2024-01-25 DIAGNOSIS — R32 URINARY INCONTINENCE, UNSPECIFIED TYPE: ICD-10-CM

## 2024-01-25 DIAGNOSIS — I50.33 ACUTE ON CHRONIC HEART FAILURE WITH PRESERVED EJECTION FRACTION (HCC): ICD-10-CM

## 2024-01-25 RX ORDER — OXYBUTYNIN CHLORIDE 5 MG/1
5 TABLET ORAL 3 TIMES DAILY
Qty: 180 TABLET | Refills: 0 | Status: SHIPPED | OUTPATIENT
Start: 2024-01-25

## 2024-01-25 RX ORDER — LOSARTAN POTASSIUM 25 MG/1
25 TABLET ORAL DAILY
Qty: 90 TABLET | Refills: 0 | Status: SHIPPED | OUTPATIENT
Start: 2024-01-25

## 2024-01-25 RX ORDER — SPIRONOLACTONE 25 MG/1
12.5 TABLET ORAL DAILY
Qty: 45 TABLET | Refills: 0 | Status: SHIPPED | OUTPATIENT
Start: 2024-01-25

## 2024-02-15 ENCOUNTER — TELEMEDICINE (OUTPATIENT)
Dept: INTERNAL MEDICINE CLINIC | Age: 64
End: 2024-02-15
Payer: COMMERCIAL

## 2024-02-15 DIAGNOSIS — J06.9 URTI (ACUTE UPPER RESPIRATORY INFECTION): Primary | ICD-10-CM

## 2024-02-15 DIAGNOSIS — F17.210 CIGARETTE NICOTINE DEPENDENCE WITHOUT COMPLICATION: ICD-10-CM

## 2024-02-15 DIAGNOSIS — Z13.820 OSTEOPOROSIS SCREENING: ICD-10-CM

## 2024-02-15 DIAGNOSIS — J44.9 CHRONIC OBSTRUCTIVE PULMONARY DISEASE, UNSPECIFIED COPD TYPE (HCC): Chronic | ICD-10-CM

## 2024-02-15 DIAGNOSIS — Z12.11 SCREENING FOR MALIGNANT NEOPLASM OF COLON: ICD-10-CM

## 2024-02-15 PROCEDURE — 99214 OFFICE O/P EST MOD 30 MIN: CPT | Performed by: INTERNAL MEDICINE

## 2024-02-15 RX ORDER — GUAIFENESIN 600 MG/1
600 TABLET, EXTENDED RELEASE ORAL EVERY 12 HOURS SCHEDULED
Qty: 20 TABLET | Refills: 0 | Status: SHIPPED | OUTPATIENT
Start: 2024-02-15

## 2024-02-15 RX ORDER — HYDROCODONE POLISTIREX AND CHLORPHENIRAMINE POLISTIREX 10; 8 MG/5ML; MG/5ML
5 SUSPENSION, EXTENDED RELEASE ORAL EVERY 12 HOURS PRN
Qty: 115 ML | Refills: 0 | Status: SHIPPED | OUTPATIENT
Start: 2024-02-15

## 2024-02-15 RX ORDER — AZITHROMYCIN 250 MG/1
TABLET, FILM COATED ORAL
Qty: 6 TABLET | Refills: 0 | Status: SHIPPED | OUTPATIENT
Start: 2024-02-15 | End: 2024-02-19

## 2024-02-15 NOTE — PATIENT INSTRUCTIONS
- Drink plenty of fluids  - Get plenty of rest  - You may use salt water gargles for your sore throat  - You may use over the counter tylenol or Ibuprofen (motrin or Advil) for any headache, muscle aches and fever  - Please take your antibiotics with meals and also take a probiotic like yogurt daily as long as you are on your antibiotics  - Call the office if your symptoms persist beyond a total of 7-10 days

## 2024-02-15 NOTE — PROGRESS NOTES
Virtual Regular Visit    Verification of patient location:    Patient is located at Home in the following state in which I hold an active license [place]      Assessment/Plan:    Upper respiratory tract infection with acute bronchitis  - likely viral with bacterial superinfection vs bacterial  - we will start pt on azithromycin 500 mg today and then 250 mg daily for 4 days, Flonase nasal spray for rhinitis and Mucinex for productive cough  -We will prescribe Tussionex for her insomnia secondary to cough.  The [place] PDMP website was queried and did not show misuse.  - Pt was counseled to use OTC tylenol or ibuprofen with meals for aches and pains, warm salt water gargles for any sore throat and was encouraged to drink lots of fluids, get plenty of rest and wash her hands liberally.  - pt was also counseled to take antibiotics with food and also to use a probiotic like yogurt daily as long as she is taking antibiotics  - She should return to the clinic if her symptoms worsen or do not improve.    COPD  -Without acute exacerbation, although patient is at risk for exacerbation so we will treat her respiratory tract infection to avoid this  -Continue with albuterol inhaler as needed  -She was counseled to quit smoking    Obstructive sleep apnea  -Patient currently has a BiPAP machine which she has not been using regularly  -She was encouraged to use her BiPAP machine every day    Nicotine dependence  -Patient was encouraged on the importance of quitting  -Spent about 3 minutes on smoking cessation counseling    Problem List Items Addressed This Visit        Respiratory    COPD (chronic obstructive pulmonary disease) (HCC) (Chronic)    Relevant Medications    guaiFENesin (MUCINEX) 600 mg 12 hr tablet    Hydrocod Rob-Chlorphe Rob ER (TUSSIONEX) 10-8 mg/5 mL ER suspension    URTI (acute upper respiratory infection) - Primary    Relevant Medications    azithromycin (ZITHROMAX) 250 mg tablet    guaiFENesin (MUCINEX) 600  mg 12 hr tablet    Hydrocod Rob-Chlorphe Rob ER (TUSSIONEX) 10-8 mg/5 mL ER suspension       Other    Cigarette nicotine dependence without complication    Screening for malignant neoplasm of colon    Relevant Orders    Cologuard   Other Visit Diagnoses     Osteoporosis screening                Tobacco Cessation Counseling: Tobacco cessation counseling was provided. The patient is sincerely urged to quit consumption of tobacco. She is ready to quit tobacco. Patient is precontemplative.        Reason for visit is   Chief Complaint   Patient presents with   • Virtual Brief Visit     Virtual [phone] started 2 weeks ago- cough, sinus congestion, cough is very deep -  patient not using anything--tested covid test negative 2/9/24 - patient refused mammogram- ordered cologuard per pt   • Virtual Regular Visit          Encounter provider Laurel Lyons DO    Provider located at [facility]  [address] [place] [address]  [id number]  [facility] [id number]      Recent Visits  No visits were found meeting these conditions.  Showing recent visits within past 7 days and meeting all other requirements  Today's Visits  Date Type Provider Dept   02/15/24 Telemedicine Laurel Lyons DO [facility]   Showing today's visits and meeting all other requirements  Future Appointments  No visits were found meeting these conditions.  Showing future appointments within next 150 days and meeting all other requirements       The patient was identified by name and date of birth. Valerie Mejia was informed that this is a telemedicine visit and that the visit is being conducted through the [employer] platform. She agrees to proceed..  My office door was closed. No one else was in the room.  She acknowledged consent and understanding of privacy and security of the video platform. The patient has agreed to participate and understands they can discontinue the  visit at any time.    Patient is aware this is a billable service.     Subjective  Valerie Mejia is a 64 y.o. female  .      HPI   Patient presents via telemedicine visit with c/o of a cough for the past 2 weeks.  She states that the cough was initially dry and was making her choke and interrupting her sleep.  Now productive of brown phlegm.  She admits to headaches, rhinorrhea, shortness of breath, occasional wheezing, diarrhea, arthralgias.    Of note, she states that the arthralgias are chronic and unchanged and the headaches usually occur when she sleeps without her BiPAP.  . She denies any fever, chills, night sweats, dizziness, nasal congestion, pnd, sore throat, ear ache, sinus pain or pressure, chest pain,  palpitations, nausea, vomiting,  constipation, hematochezia, hematuria, melena stools,  myalgias.  She admits that she is still smoking intermittently - about a pack a week.  She states that she smokes when she is in the company of smokers.  She admits to a history of contact.  She states that last week every one in her house was sick with diarrhea and a granddaugher has the sniffles.     Past Medical History:   Diagnosis Date   • Acute on chronic heart failure with preserved ejection fraction (HFpEF) ([facility]) 12/2/2022   • Arthritis    • CHF (congestive heart failure) ([facility])    • COPD (chronic obstructive pulmonary disease) ([facility])    • COVID-19 virus infection 2/27/2023   • Depression    • Eating disorder    • Hypertension    • Hypoxia 12/2/2022   • Obesity    • Urinary incontinence    • Visual impairment 2018       Past Surgical History:   Procedure Laterality Date   • HYSTERECTOMY     • INCONTINENCE SURGERY     • JOINT REPLACEMENT  2015    Right knee   • OOPHORECTOMY Bilateral    • REPLACEMENT TOTAL KNEE Right 2016   • TOOTH EXTRACTION  02/15/2023    2/15/23,3/16/23,4/27/23   • TOTAL ABDOMINAL HYSTERECTOMY W/ BILATERAL SALPINGOOPHORECTOMY  2006       Current Outpatient Medications   Medication Sig  Dispense Refill   • albuterol (PROVENTIL HFA,VENTOLIN HFA) 90 mcg/act inhaler Inhale 2 puffs every 6 (six) hours as needed for wheezing or shortness of breath 18 g 1   • azithromycin (ZITHROMAX) 250 mg tablet Take 2 tablets today then 1 tablet daily x 4 days 6 tablet 0   • escitalopram (Lexapro) 10 mg tablet Take 1 tablet (10 mg total) by mouth daily 90 tablet 3   • furosemide (LASIX) 80 mg tablet Take 1 tablet (80 mg total) by mouth 2 (two) times a day 60 tablet 3   • guaiFENesin (MUCINEX) 600 mg 12 hr tablet Take 1 tablet (600 mg total) by mouth every 12 (twelve) hours 20 tablet 0   • Hydrocod Rob-Chlorphe Rob ER (TUSSIONEX) 10-8 mg/5 mL ER suspension Take 5 mL by mouth every 12 (twelve) hours as needed for cough Max Daily Amount: 10 mL 115 mL 0   • losartan (COZAAR) 25 mg tablet Take 1 tablet (25 mg total) by mouth daily 90 tablet 0   • metoprolol succinate (Toprol XL) 25 mg 24 hr tablet Take 1 tablet (25 mg total) by mouth daily at bedtime 30 tablet 5   • oxybutynin (DITROPAN) 5 mg tablet Take 1 tablet (5 mg total) by mouth 3 (three) times a day 180 tablet 0   • spironolactone (ALDACTONE) 25 mg tablet Take 0.5 tablets (12.5 mg total) by mouth daily 45 tablet 0     No current facility-administered medications for this visit.        Allergies   Allergen Reactions   • Lisinopril Cough       Review of Systems   Constitutional:  Negative for activity change, chills, fatigue, fever and unexpected weight change.   HENT:  Positive for dental problem (tooth ache) and rhinorrhea. Negative for ear pain, postnasal drip, sinus pressure and sore throat.    Eyes:  Negative for pain.   Respiratory:  Positive for cough (productive of brown phlegm) and shortness of breath (chronic and unchanged). Negative for choking, chest tightness and wheezing (now resolved - had it over the weekend - has not been needing to use her rescue inhaler).    Cardiovascular:  Negative for chest pain, palpitations and leg swelling.    Gastrointestinal:  Positive for diarrhea (over the weekend). Negative for abdominal pain, constipation, nausea and vomiting.   Genitourinary:  Negative for dysuria and hematuria.   Musculoskeletal:  Positive for arthralgias (chronic and unchanged). Negative for back pain, gait problem, joint swelling, myalgias and neck stiffness.   Skin:  Negative for pallor and rash.   Neurological:  Positive for headaches (wakes up with headache, clears up when she uses her bipap machine - does not use her bipap every night cos she sometimes falls asleep while watching tv). Negative for dizziness, tremors, seizures, syncope and light-headedness.   Hematological:  Negative for adenopathy.   Psychiatric/Behavioral:  Positive for sleep disturbance (Insomnia secondary to cough). Negative for behavioral problems.        Video Exam    There were no vitals filed for this visit.    Physical Exam  Constitutional:       General: She is not in acute distress.     Appearance: Normal appearance. She is not ill-appearing or toxic-appearing.   HENT:      Head: Normocephalic and atraumatic.      Mouth/Throat:      Mouth: Mucous membranes are moist.   Eyes:      General: No scleral icterus.        Right eye: No discharge.         Left eye: No discharge.   Pulmonary:      Effort: Pulmonary effort is normal. No respiratory distress (no conversational dypsnea).   Abdominal:      Tenderness: There is no abdominal tenderness (no tenderness on self palpation).   Musculoskeletal:      Cervical back: Normal range of motion.   Skin:     Coloration: Skin is not jaundiced.   Neurological:      Mental Status: She is alert and oriented to person, place, and time.   Psychiatric:         Behavior: Behavior normal.          Visit Time  Total Visit Duration: 15 minutes

## 2024-02-21 PROBLEM — Z12.11 SCREENING FOR MALIGNANT NEOPLASM OF COLON: Status: RESOLVED | Noted: 2021-12-02 | Resolved: 2024-02-21

## 2024-02-21 PROBLEM — J06.9 URTI (ACUTE UPPER RESPIRATORY INFECTION): Status: RESOLVED | Noted: 2020-10-08 | Resolved: 2024-02-21

## 2024-03-10 LAB — COLOGUARD RESULT REPORTABLE: NEGATIVE

## 2024-03-13 ENCOUNTER — OFFICE VISIT (OUTPATIENT)
Dept: INTERNAL MEDICINE CLINIC | Age: 64
End: 2024-03-13
Payer: COMMERCIAL

## 2024-03-13 VITALS
HEIGHT: 65 IN | WEIGHT: 293 LBS | BODY MASS INDEX: 48.82 KG/M2 | DIASTOLIC BLOOD PRESSURE: 82 MMHG | OXYGEN SATURATION: 96 % | SYSTOLIC BLOOD PRESSURE: 132 MMHG | TEMPERATURE: 97.9 F | HEART RATE: 76 BPM

## 2024-03-13 DIAGNOSIS — J43.8 OTHER EMPHYSEMA (HCC): Chronic | ICD-10-CM

## 2024-03-13 DIAGNOSIS — I50.9 ACUTE ON CHRONIC CONGESTIVE HEART FAILURE, UNSPECIFIED HEART FAILURE TYPE (HCC): ICD-10-CM

## 2024-03-13 DIAGNOSIS — M25.512 ACUTE PAIN OF LEFT SHOULDER: ICD-10-CM

## 2024-03-13 DIAGNOSIS — Z78.0 ASYMPTOMATIC POSTMENOPAUSAL STATE: ICD-10-CM

## 2024-03-13 DIAGNOSIS — Z00.00 MEDICARE ANNUAL WELLNESS VISIT, SUBSEQUENT: Primary | ICD-10-CM

## 2024-03-13 DIAGNOSIS — Z12.31 ENCOUNTER FOR SCREENING MAMMOGRAM FOR MALIGNANT NEOPLASM OF BREAST: ICD-10-CM

## 2024-03-13 DIAGNOSIS — R07.89 OTHER CHEST PAIN: ICD-10-CM

## 2024-03-13 DIAGNOSIS — I89.0 LYMPHEDEMA OF BOTH LOWER EXTREMITIES: Chronic | ICD-10-CM

## 2024-03-13 DIAGNOSIS — R06.2 WHEEZING: ICD-10-CM

## 2024-03-13 DIAGNOSIS — J44.0 CHRONIC OBSTRUCTIVE PULMONARY DISEASE WITH ACUTE LOWER RESPIRATORY INFECTION (HCC): ICD-10-CM

## 2024-03-13 DIAGNOSIS — E83.52 HYPERCALCEMIA: Chronic | ICD-10-CM

## 2024-03-13 DIAGNOSIS — Z23 ENCOUNTER FOR IMMUNIZATION: ICD-10-CM

## 2024-03-13 DIAGNOSIS — F33.1 MODERATE EPISODE OF RECURRENT MAJOR DEPRESSIVE DISORDER (HCC): ICD-10-CM

## 2024-03-13 DIAGNOSIS — I50.32 CHRONIC DIASTOLIC CONGESTIVE HEART FAILURE (HCC): Chronic | ICD-10-CM

## 2024-03-13 DIAGNOSIS — E66.01 MORBID OBESITY WITH BODY MASS INDEX (BMI) OF 60.0 TO 69.9 IN ADULT (HCC): ICD-10-CM

## 2024-03-13 DIAGNOSIS — K21.9 GASTROESOPHAGEAL REFLUX DISEASE WITHOUT ESOPHAGITIS: ICD-10-CM

## 2024-03-13 DIAGNOSIS — N39.41 URGE INCONTINENCE OF URINE: ICD-10-CM

## 2024-03-13 DIAGNOSIS — I50.33 ACUTE ON CHRONIC HEART FAILURE WITH PRESERVED EJECTION FRACTION (HCC): ICD-10-CM

## 2024-03-13 DIAGNOSIS — R73.03 PRE-DIABETES: ICD-10-CM

## 2024-03-13 DIAGNOSIS — I10 HYPERTENSION, UNSPECIFIED TYPE: ICD-10-CM

## 2024-03-13 DIAGNOSIS — J20.8 ACUTE BRONCHITIS DUE TO OTHER SPECIFIED ORGANISMS: ICD-10-CM

## 2024-03-13 DIAGNOSIS — Z12.31 ENCOUNTER FOR SCREENING MAMMOGRAM FOR BREAST CANCER: ICD-10-CM

## 2024-03-13 DIAGNOSIS — Z23 NEED FOR INFLUENZA VACCINATION: ICD-10-CM

## 2024-03-13 DIAGNOSIS — I10 ESSENTIAL HYPERTENSION: Chronic | ICD-10-CM

## 2024-03-13 DIAGNOSIS — E78.2 MIXED HYPERLIPIDEMIA: Chronic | ICD-10-CM

## 2024-03-13 DIAGNOSIS — F17.210 CIGARETTE NICOTINE DEPENDENCE WITHOUT COMPLICATION: ICD-10-CM

## 2024-03-13 DIAGNOSIS — Z12.11 SCREENING FOR COLORECTAL CANCER: ICD-10-CM

## 2024-03-13 DIAGNOSIS — R32 URINARY INCONTINENCE, UNSPECIFIED TYPE: ICD-10-CM

## 2024-03-13 DIAGNOSIS — Z53.20 LUNG CANCER SCREENING DECLINED BY PATIENT: ICD-10-CM

## 2024-03-13 DIAGNOSIS — Z12.12 SCREENING FOR COLORECTAL CANCER: ICD-10-CM

## 2024-03-13 DIAGNOSIS — E21.3 HYPERPARATHYROIDISM (HCC): ICD-10-CM

## 2024-03-13 PROCEDURE — G0439 PPPS, SUBSEQ VISIT: HCPCS | Performed by: INTERNAL MEDICINE

## 2024-03-13 PROCEDURE — 90686 IIV4 VACC NO PRSV 0.5 ML IM: CPT

## 2024-03-13 PROCEDURE — 99215 OFFICE O/P EST HI 40 MIN: CPT | Performed by: INTERNAL MEDICINE

## 2024-03-13 PROCEDURE — G0008 ADMIN INFLUENZA VIRUS VAC: HCPCS

## 2024-03-13 RX ORDER — ALBUTEROL SULFATE 90 UG/1
2 AEROSOL, METERED RESPIRATORY (INHALATION) EVERY 6 HOURS PRN
Qty: 18 G | Refills: 1 | Status: SHIPPED | OUTPATIENT
Start: 2024-03-13

## 2024-03-13 RX ORDER — SPIRONOLACTONE 25 MG/1
12.5 TABLET ORAL DAILY
Qty: 90 TABLET | Refills: 1 | Status: SHIPPED | OUTPATIENT
Start: 2024-03-13

## 2024-03-13 RX ORDER — OXYBUTYNIN CHLORIDE 5 MG/1
5 TABLET ORAL 3 TIMES DAILY
Qty: 180 TABLET | Refills: 1 | Status: SHIPPED | OUTPATIENT
Start: 2024-03-13

## 2024-03-13 RX ORDER — LOSARTAN POTASSIUM 25 MG/1
25 TABLET ORAL DAILY
Qty: 90 TABLET | Refills: 1 | Status: SHIPPED | OUTPATIENT
Start: 2024-03-13

## 2024-03-13 RX ORDER — FUROSEMIDE 80 MG
80 TABLET ORAL 2 TIMES DAILY
Qty: 180 TABLET | Refills: 1 | Status: SHIPPED | OUTPATIENT
Start: 2024-03-13

## 2024-03-13 RX ORDER — METOPROLOL SUCCINATE 25 MG/1
25 TABLET, EXTENDED RELEASE ORAL
Qty: 90 TABLET | Refills: 1 | Status: SHIPPED | OUTPATIENT
Start: 2024-03-13

## 2024-03-13 NOTE — PROGRESS NOTES
Assessment/Plan:    Medicare annual wellness visit, subsequent  -Medicare annual wellness visit done   - last Cologuard test was done on 3/4/2024 and was negative.  Patient will be due for repeat Cologuard test in 2027.  -Patient declines a lung cancer screen even though her sister had lung cancer.  She states that she does not want to know if she has lung cancer.  - DEXA scan has been ordered because she history of early menopause secondary to early bilateral oophorectomy.  - last Pap smear is not indicated because patient is status post total hysterectomy.  -last mammogram was years ago.  Will order a  mammogram today.  -She is up-to-date with 2 COVID vaccines,  shingles vaccine, 1 pneumococcal vaccine and would like to get the flu shot today, which we will  to give her.  -follow-up in 6 months    Hyperlipidemia  -Lipids are not well controlled  - last lipid panel done on 8/31/2022, showed a total cholesterol of 187,  A triglyceride of 115, and HDL of 41 and an LDL of 123  -continue with a heart healthy diet  -We will order repeat lipid panel and follow-up with the results    Prediabetes  - stable  - cut down on carbohydrates and simple sugars    Lymphedema  -Stable  -Continue with spironolactone and furosemide    Hypercalcemia with hyperparathyroidism  -Patient is currently following with endocrinology  -Continue to follow with endocrinology and we will follow-up with the results of the recent labs    Essential hypertension   - blood pressure is well controlled   -continue with losartan, spironolactone, metoprolol, furosemide  -continue with a low-salt diet and with exercise    Emphysema  -Stable without acute exacerbation  -Continue with as needed albuterol inhaler    Depression  -Stable  -Continue with Lexapro    Urge incontinence  -Stable  -Continue with oxybutynin    Nicotine dependence  -Patient has reduced the number of cigarettes she takes  -She was counseled to cease smoking altogether  -I offered to  order low-dose chest CT for lung cancer screening but patient declined even though she has a family history of lung cancer in her sister.  She states that she does not want to know if she does have lung cancer because of her sister's experience.    Chest pain  -Intermittent  -We will order stress test because patient has multiple risk factors including nicotine dependence, hyperlipidemia, essential hypertension, CHF  -Continue with a heart healthy diet with exercise and follow-up with cardiology.    GERD  -Intermittent  -We will prescribe esomeprazole as requested by patient  -Continue to avoid diets and behaviors that trigger symptoms    Left shoulder pain  -We will order an x-ray of the left shoulder and follow-up with the results and possibly refer patient to physical therapy orthopedic surgery if necessary  -Continue with over-the-counter analgesics not     Diagnoses and all orders for this visit:    Medicare annual wellness visit, subsequent    Mixed hyperlipidemia  -     Lipid panel; Future    Hypercalcemia    Pre-diabetes    Lymphedema of both lower extremities    Chronic diastolic congestive heart failure (HCC)  -     furosemide (LASIX) 80 mg tablet; Take 1 tablet (80 mg total) by mouth 2 (two) times a day  -     metoprolol succinate (Toprol XL) 25 mg 24 hr tablet; Take 1 tablet (25 mg total) by mouth daily at bedtime    Essential hypertension  -     NM myocardial perfusion spect (rx stress and/or rest); Future    Other emphysema (HCC)    Hyperparathyroidism (HCC)    Cigarette nicotine dependence without complication  -     NM myocardial perfusion spect (rx stress and/or rest); Future    Moderate episode of recurrent major depressive disorder (HCC)    Urge incontinence of urine    Encounter for screening mammogram for malignant neoplasm of breast  -     Mammo screening bilateral w 3d & cad; Future    Acute bronchitis due to other specified organisms  -     albuterol (PROVENTIL HFA,VENTOLIN HFA) 90 mcg/act  inhaler; Inhale 2 puffs every 6 (six) hours as needed for wheezing or shortness of breath    Chronic obstructive pulmonary disease with acute lower respiratory infection (HCC)  -     albuterol (PROVENTIL HFA,VENTOLIN HFA) 90 mcg/act inhaler; Inhale 2 puffs every 6 (six) hours as needed for wheezing or shortness of breath    Wheezing  -     albuterol (PROVENTIL HFA,VENTOLIN HFA) 90 mcg/act inhaler; Inhale 2 puffs every 6 (six) hours as needed for wheezing or shortness of breath    Acute on chronic congestive heart failure, unspecified heart failure type (HCC)  -     losartan (COZAAR) 25 mg tablet; Take 1 tablet (25 mg total) by mouth daily    Urinary incontinence, unspecified type  -     oxybutynin (DITROPAN) 5 mg tablet; Take 1 tablet (5 mg total) by mouth 3 (three) times a day    Acute on chronic heart failure with preserved ejection fraction (HCC)  -     spironolactone (ALDACTONE) 25 mg tablet; Take 0.5 tablets (12.5 mg total) by mouth daily    Hypertension, unspecified type  -     spironolactone (ALDACTONE) 25 mg tablet; Take 0.5 tablets (12.5 mg total) by mouth daily    Encounter for immunization  -     influenza vaccine, quadrivalent, 0.5 mL, preservative-free, for adult and pediatric patients 6 mos+ (AFLURIA, FLUARIX, FLULAVAL, FLUZONE)    Need for influenza vaccination  -     influenza vaccine, quadrivalent, 0.5 mL, preservative-free, for adult and pediatric patients 6 mos+ (AFLURIA, FLUARIX, FLULAVAL, FLUZONE)    Encounter for screening mammogram for breast cancer    Screening for colorectal cancer  Comments:  last cologuard was on 3/4/24 and was negative and she denies any family hx of colon ca    Asymptomatic postmenopausal state  -     DXA bone density spine hip and pelvis; Future    Encounter for immunization  Comments:  she is up to date with 3 covid vaccines, has had one pnemococcal vaccine, shingles and would like to get the flu shot today  Orders:  -     influenza vaccine, quadrivalent, 0.5 mL,  preservative-free, for adult and pediatric patients 6 mos+ (AFLURIA, FLUARIX, FLULAVAL, FLUZONE)    Other chest pain  -     NM myocardial perfusion spect (rx stress and/or rest); Future    Morbid obesity with body mass index (BMI) of 60.0 to 69.9 in adult (HCC)    Acute pain of left shoulder  -     XR shoulder 2+ vw left; Future    Gastroesophageal reflux disease without esophagitis  -     esomeprazole (NexIUM) 20 mg capsule; Take 1 capsule (20 mg total) by mouth daily in the early morning    Lung cancer screening declined by patient            Depression Screening and Follow-up Plan: Patient was screened for depression during today's encounter. They screened negative with a PHQ-9 score of 0.        Subjective:      Patient ID: Valerie Mejia is a 64 y.o. female.    HPI    Patient presents for a subsequent medicare annual wellness visit as well as a follow up visit regarding her htn, hld, copd, chf, prediabetes, lymphedema, nicotine dependence, depression, hypercalcemia and urinary incontinence.  Does not have a healthcare poa but will think about it and got the paperwork today.  Had hysterectomy in her early 40s cos of precancerous ?cervical cells and thnks that she also had oophorectomy at the same time.  Has been smoking 49 years on and off with a max of a pack a day but states that she is down to a few sticks a day.  She does not want to be screened for lung ca cos her sister  from lung ca even after treatment at age 60.  She states that she does not want to know if she has lung cancer.  She complains of a crushing chest pain that radiates into her thoroat and back that feels like pressure on and off.  She states that the last episode was a few days ago.  She believes that the pain is from her acid reflux and would like a prescription for Nexium.  She states that she tried to buy it over-the-counter and it was too expensive for her.  She states that she has tried  tums, peptobismol,  omeprazole and  omeprazole worked a little.  She states that she feels better since she cut out the spicy food.  Also feels worse when she lies flat.  Of note, patient has a cardiologist who ordered a pharmacological stress test for her but she has not yet done it.  She states that she was not told that a stress test had been ordered at that last cardiology visit.  Of note, I had sent a message to her cardiologist the day before her appointment which was when I saw her and she had complained of this same chest pain.  She is using her Bipap machine and is doing well and is finally getting a decent nights sleep  Leg swelling is doing well and she has been taking her Lasix and spironolactone but states that she does not take a full dose because half her dose is working better and she has not been gaining weight.  Drinks 2 cups of coffee in the am - 24 oz  Mood is good   She is taking her oxybutyinin and is helps -she has both stress and urge incontince.  She would like a refill of a lot of her medications.  She complains of pain in her upper left arm with decreased range of motion.  She denies any history of trauma, fall or motor vehicle accident prior to onset of pain.  She also admits to shortness of breath on exertion, back pain and myalgias but denies any fever chills, night sweats, headache, nasal congestion, runny nose, pnd, sore throat, ear ache, sinus pain or pressure, wheezing, cough, chest pain, palpitations, nausea, vomiting, diarrhea, constipation, hematochezia, hematuria, melena stools, depression or insomnia.      The following portions of the patient's history were reviewed and updated as appropriate: She  has a past medical history of Acute on chronic heart failure with preserved ejection fraction (HFpEF) ([facility]) (12/2/2022), Arthritis, CHF (congestive heart failure) ([facility]), COPD (chronic obstructive pulmonary disease) ([facility]), COVID-19 virus infection (2/27/2023), Depression, Eating disorder, Hypertension, Hypoxia  (12/2/2022), Obesity, Urinary incontinence, and Visual impairment (2018).  She   Patient Active Problem List    Diagnosis Date Noted   • Lung cancer screening declined by patient 03/13/2024   • Acute pain of left shoulder 03/13/2024   • Hyperparathyroidism ([facility]) 09/11/2023   • Adrenal nodule ([facility]) 07/12/2023   • Urinary incontinence 07/12/2023   • Ambulatory dysfunction 07/11/2023   • Moderate episode of recurrent major depressive disorder ([facility]) 04/03/2023   • Hypercalcemia 01/16/2023   • Pre-diabetes 08/30/2022   • Lymphedema of both lower extremities 03/16/2021   • Chronic diastolic congestive heart failure ([facility]) 02/12/2021   • Insomnia due to medical condition 10/08/2020   • Obstructive sleep apnea syndrome 09/02/2020   • Cigarette nicotine dependence without complication 03/06/2020   • Morbid obesity with body mass index (BMI) of 60.0 to 69.9 in adult ([facility]) 03/05/2020   • Essential hypertension 07/10/2019   • Mixed stress and urge urinary incontinence 07/10/2019   • COPD (chronic obstructive pulmonary disease) ([facility]) 07/10/2019   • Mixed hyperlipidemia 07/10/2019     She  has a past surgical history that includes Hysterectomy; Oophorectomy (Bilateral); Replacement total knee (Right, 2016); Total abdominal hysterectomy w/ bilateral salpingoophorectomy (2006); Incontinence surgery; Joint replacement (2015); and Tooth extraction (02/15/2023).  Her family history includes Aneurysm in her mother; Cancer in her sister; Dementia in her maternal aunt, maternal grandmother, and mother; Diabetes in her father; Heart failure in her father; Lung cancer in her sister; Other in her mother.  She  reports that she has been smoking cigarettes. She started smoking about 48 years ago. She has a 70 pack-year smoking history. She has never used smokeless tobacco. She reports that she does not currently use alcohol. She reports that she does not currently use drugs after having used the following drugs: Marijuana.  Current Outpatient  Medications   Medication Sig Dispense Refill   • albuterol (PROVENTIL HFA,VENTOLIN HFA) 90 mcg/act inhaler Inhale 2 puffs every 6 (six) hours as needed for wheezing or shortness of breath 18 g 1   • escitalopram (Lexapro) 10 mg tablet Take 1 tablet (10 mg total) by mouth daily 90 tablet 3   • esomeprazole (NexIUM) 20 mg capsule Take 1 capsule (20 mg total) by mouth daily in the early morning 60 capsule 0   • furosemide (LASIX) 80 mg tablet Take 1 tablet (80 mg total) by mouth 2 (two) times a day 180 tablet 1   • losartan (COZAAR) 25 mg tablet Take 1 tablet (25 mg total) by mouth daily 90 tablet 1   • metoprolol succinate (Toprol XL) 25 mg 24 hr tablet Take 1 tablet (25 mg total) by mouth daily at bedtime 90 tablet 1   • oxybutynin (DITROPAN) 5 mg tablet Take 1 tablet (5 mg total) by mouth 3 (three) times a day 180 tablet 1   • spironolactone (ALDACTONE) 25 mg tablet Take 0.5 tablets (12.5 mg total) by mouth daily 90 tablet 1     No current facility-administered medications for this visit.     Current Outpatient Medications on File Prior to Visit   Medication Sig   • escitalopram (Lexapro) 10 mg tablet Take 1 tablet (10 mg total) by mouth daily   • [DISCONTINUED] albuterol (PROVENTIL HFA,VENTOLIN HFA) 90 mcg/act inhaler Inhale 2 puffs every 6 (six) hours as needed for wheezing or shortness of breath   • [DISCONTINUED] furosemide (LASIX) 80 mg tablet Take 1 tablet (80 mg total) by mouth 2 (two) times a day   • [DISCONTINUED] losartan (COZAAR) 25 mg tablet Take 1 tablet (25 mg total) by mouth daily   • [DISCONTINUED] metoprolol succinate (Toprol XL) 25 mg 24 hr tablet Take 1 tablet (25 mg total) by mouth daily at bedtime   • [DISCONTINUED] oxybutynin (DITROPAN) 5 mg tablet Take 1 tablet (5 mg total) by mouth 3 (three) times a day   • [DISCONTINUED] spironolactone (ALDACTONE) 25 mg tablet Take 0.5 tablets (12.5 mg total) by mouth daily   • [DISCONTINUED] guaiFENesin (MUCINEX) 600 mg 12 hr tablet Take 1 tablet (600 mg  "total) by mouth every 12 (twelve) hours   • [DISCONTINUED] Hydrocod Rob-Chlorphe Rob ER (TUSSIONEX) 10-8 mg/5 mL ER suspension Take 5 mL by mouth every 12 (twelve) hours as needed for cough Max Daily Amount: 10 mL     No current facility-administered medications on file prior to visit.     She is allergic to lisinopril..    Review of Systems   Constitutional:  Negative for activity change, chills, fatigue, fever and unexpected weight change.   HENT:  Negative for ear pain, postnasal drip, rhinorrhea, sinus pressure and sore throat.    Eyes:  Negative for pain.   Respiratory:  Positive for shortness of breath. Negative for cough, choking, chest tightness and wheezing.    Cardiovascular:  Negative for chest pain, palpitations and leg swelling.   Gastrointestinal:  Negative for abdominal pain, constipation, diarrhea, nausea and vomiting.   Genitourinary:  Negative for dysuria and hematuria.   Musculoskeletal:  Positive for arthralgias (L shoulder and upper arm pain), back pain, gait problem and myalgias. Negative for joint swelling and neck stiffness.   Skin:  Negative for pallor and rash.   Neurological:  Positive for dizziness (occasionally, especially when she stands up). Negative for tremors, seizures, syncope, light-headedness and headaches.   Hematological:  Negative for adenopathy.   Psychiatric/Behavioral:  Positive for sleep disturbance (Much improved with BiPAP). Negative for behavioral problems.          Objective:      /82 (BP Location: Left arm, Patient Position: Sitting, Cuff Size: Large)   Pulse 76   Temp 97.9 °F (36.6 °C) (Temporal)   Ht 5' 5\" (1.651 m)   Wt (!) 170 kg (374 lb)   SpO2 96% Comment: room air  BMI 62.24 kg/m²          Physical Exam  Constitutional:       General: She is not in acute distress.     Appearance: She is well-developed. She is obese. She is not diaphoretic.      Comments: BMI is 63.24   HENT:      Head: Normocephalic and atraumatic.      Right Ear: External ear " normal.      Left Ear: External ear normal.      Nose: Nose normal.      Mouth/Throat:      Pharynx: No oropharyngeal exudate.   Eyes:      General: No scleral icterus.        Right eye: No discharge.         Left eye: No discharge.      Conjunctiva/sclera: Conjunctivae normal.      Pupils: Pupils are equal, round, and reactive to light.   Neck:      Thyroid: No thyromegaly.      Vascular: No JVD.      Trachea: No tracheal deviation.   Cardiovascular:      Rate and Rhythm: Normal rate and regular rhythm.      Heart sounds: Murmur (2/6 systolic murmur maximal in the aortic valve region) heard.      Systolic murmur is present.      No friction rub. No gallop.   Pulmonary:      Effort: Pulmonary effort is normal. No respiratory distress.      Breath sounds: Decreased breath sounds present. No wheezing or rales.   Chest:      Chest wall: No tenderness.   Abdominal:      General: Bowel sounds are normal. There is no distension.      Palpations: Abdomen is soft. There is no mass.      Tenderness: There is no abdominal tenderness. There is no guarding or rebound.   Musculoskeletal:         General: No deformity. Normal range of motion.      Left shoulder: Tenderness (left shoulder pain near the attachment of the deltoid with decreased range of motion in flexion and adduction mostly) present.      Cervical back: Normal range of motion and neck supple.      Right lower leg: Swelling present. Edema present.      Left lower leg: Swelling present. Edema present.   Lymphadenopathy:      Cervical: No cervical adenopathy.   Skin:     General: Skin is warm and dry.      Coloration: Skin is not pale.      Findings: No erythema or rash.   Neurological:      Mental Status: She is alert and oriented to person, place, and time.      Cranial Nerves: No cranial nerve deficit.      Motor: No abnormal muscle tone.      Coordination: Coordination normal.      Gait: Gait abnormal (walks with a cane).      Deep Tendon Reflexes: Reflexes are  normal and symmetric.   Psychiatric:         Behavior: Behavior normal.           Telemedicine on 02/15/2024   Component Date Value Ref Range Status   • Cologuard Result 03/04/2024 Negative  Negative Final    Comment: NEGATIVE TEST RESULT. A negative Cologuard result indicates a low likelihood that a colorectal cancer (CRC) or advanced adenoma (adenomatous polyps with more advanced pre-malignant features)  is present. The chance that a person with a negative Cologuard test has a colorectal cancer is less than 1 in 1500 (negative predictive value >99.9%) or has an  advanced adenoma is less than  5.3% (negative predictive value 94.7%). These data are based on a prospective cross-sectional study of 10,000 individuals at average risk for colorectal cancer who were screened with both Cologuard and colonoscopy. (Camryn Fallon al, N Engl J Med 2014;370(14):8151-4158) The normal value (reference range) for this assay is negative.  COLOGUARD RE-SCREENING RECOMMENDATION: Periodic colorectal cancer screening is an important part of preventive healthcare for asymptomatic individuals at average risk for colorectal cancer.  Following a negative Cologuard result, the American Cancer Society and U.S. Multi-Societ                           y Task Force screening guidelines recommend a Cologuard re-screening interval of 3 years.   References: American Cancer Society Guideline for Colorectal Cancer Screening: https://www.cancer.org/cancer/colon-rectal-cancer/psqokzpnw-oyytxrgwt-lvwhrnk/acs-recommendations.html.; Babak DK, Petty CR, Bárbara DOS SANTOSK, Colorectal Cancer Screening: Recommendations for Physicians and Patients from the U.S. Multi-Society Task Force on Colorectal Cancer Screening , Am J Gastroenterology 2017; 112:3503-4514.  TEST DESCRIPTION: Composite algorithmic analysis of stool DNA-biomarkers with hemoglobin immunoassay.   Quantitative values of individual biomarkers are not reportable and are not associated with individual  biomarker result reference ranges. Cologuard is intended for colorectal cancer screening of adults of either sex, 45 years or older, who are at average-risk for colorectal cancer (CRC). Cologuard has been approved for use by the U.S. FDA. The performance of Cologuard was established in a cross s                           ectional study of average-risk adults aged 50-84. Cologuard performance in patients ages 45 to 49 years was estimated by sub-group analysis of near-age groups. Colonoscopies performed for a positive result may find as the most clinically significant lesion: colorectal cancer [4.0%], advanced adenoma (including sessile serrated polyps greater than or equal to 1cm diameter) [20%] or non- advanced adenoma [31%]; or no colorectal neoplasia [45%]. These estimates are derived from a prospective cross-sectional screening study of 10,000 individuals at average risk for colorectal cancer who were screened with both Cologuard and colonoscopy. (Camryn RAPHAEL et al, N Engl J Med 2014;370(14):3293-3422.) Cologuard may produce a false negative or false positive result (no colorectal cancer or precancerous polyp present at colonoscopy follow up). A negative Cologuard test result does not guarantee the absence of CRC or advanced adenoma (pre-cancer). The current Cologuard screening interval is every 3                            years. (American Cancer Society and U.S. Multi-Society Task Force). Cologuard performance data in a 10,000 patient pivotal study using colonoscopy as the reference method can be accessed at the following location: www.[employer]/results. Additional description of the Cologuard test process, warnings and precautions can be found at [email or web address].     Telephone on 11/10/2023   Component Date Value Ref Range Status   • Supplier Name 11/21/2023 [employer] [facility]   Final-Edited   • Supplier Phone Number 11/21/2023 [phone]   Final-Edited   • Order Status 11/21/2023  Delivery Successful, Pending Further Review   Final-Edited   • Delivery Note 2023 Patient does not drive and would like BiPAP delivered to her home.  Please contact patient to arrange this.  She already has O2 concentrator from [employer] at home and will only need U-adapt-its.   Final-Edited   • Delivery Request Date 2023   Final-Edited   • Date Delivered  2023   Final-Edited   • Supplier Name 2023   Final-Edited   • Item Description 2023 BiLevel Machine, Resmed S10 Aircurve Auto BiLevel   Final-Edited    Comment: Qty: 1  BiLevel Auto PS: 5 cm Ha‚‚O  Max Inspiratory Settin cm  Min Expiratory Settin cm  Oxygen Bleed-in to BiLevel: 2 LPM nocturnally     • Item Description 2023 PAP Mask, Full Face, Fit Upon Setup, N/A, 1 per 3 months   Final-Edited    Qty: 1   • Item Description 2023 PAP Mask Interface Cushion, Full Face, 1 per 1 month   Final-Edited    Qty: 1   • Item Description 2023 PAP Headgear, 1 per 6 months   Final-Edited    Qty: 1   • Item Description 2023 PAP Humidifier, Heated   Final-Edited    Qty: 1   • Item Description 2023 Disposable PAP Filter, 2 per 1 month   Final-Edited    Qty: 1   • Item Description 2023 Non-Disposable PAP Filter, 1 per 6 months   Final-Edited    Qty: 1   • Item Description 2023 PAP Machine Tubing, Heated, 1 per 3 months   Final-Edited    Qty: 1   • Item Description 2023 PAP Monitoring Modem   Final-Edited    Qty: 1   Appointment on 2023   Component Date Value Ref Range Status   • Sodium 2023 140  135 - 147 mmol/L Final   • Potassium 2023 4.2  3.5 - 5.3 mmol/L Final   • Chloride 2023 109 (H)  96 - 108 mmol/L Final   • CO2 2023 27  21 - 32 mmol/L Final   • ANION GAP 2023 4  mmol/L Final   • BUN 2023 11  5 - 25 mg/dL Final   • Creatinine 2023 0.67  0.60 - 1.30 mg/dL Final    Standardized to IDMS reference method   •  Glucose, Fasting 08/17/2023 112 (H)  65 - 99 mg/dL Final    Specimen collection should occur prior to Sulfasalazine administration due to the potential for falsely depressed results. Specimen collection should occur prior to Sulfapyridine administration due to the potential for falsely elevated results.   • Calcium 08/17/2023 10.2 (H)  8.3 - 10.1 mg/dL Final   • eGFR 08/17/2023 95  ml/min/1.73sq m Final   Appointment on 08/07/2023   Component Date Value Ref Range Status   • Sodium 08/07/2023 139  135 - 147 mmol/L Final   • Potassium 08/07/2023 4.0  3.5 - 5.3 mmol/L Final   • Chloride 08/07/2023 105  96 - 108 mmol/L Final   • CO2 08/07/2023 31  21 - 32 mmol/L Final   • ANION GAP 08/07/2023 3  mmol/L Final   • BUN 08/07/2023 13  5 - 25 mg/dL Final   • Creatinine 08/07/2023 0.98  0.60 - 1.30 mg/dL Final    Standardized to IDMS reference method   • Glucose 08/07/2023 103  65 - 140 mg/dL Final    If the patient is fasting, the ADA then defines impaired fasting glucose as > 100 mg/dL and diabetes as > or equal to 123 mg/dL.  Specimen collection should occur prior to Sulfasalazine administration due to the potential for falsely depressed results. Specimen collection should occur prior to Sulfapyridine administration due to the potential for falsely elevated results.   • Calcium 08/07/2023 10.5 (H)  8.3 - 10.1 mg/dL Final   • AST 08/07/2023 17  5 - 45 U/L Final    Specimen collection should occur prior to Sulfasalazine administration due to the potential for falsely depressed results.    • ALT 08/07/2023 24  12 - 78 U/L Final    Specimen collection should occur prior to Sulfasalazine and/or Sulfapyridine administration due to the potential for falsely depressed results.    • Alkaline Phosphatase 08/07/2023 85  46 - 116 U/L Final   • Total Protein 08/07/2023 8.0  6.4 - 8.4 g/dL Final   • Albumin 08/07/2023 3.7  3.5 - 5.0 g/dL Final   • Total Bilirubin 08/07/2023 0.53  0.20 - 1.00 mg/dL Final    Use of this assay is not  recommended for patients undergoing treatment with eltrombopag due to the potential for falsely elevated results.   • eGFR 08/07/2023 62  ml/min/1.73sq m Final   • WBC 08/07/2023 5.73  4.31 - 10.16 Thousand/uL Final   • RBC 08/07/2023 4.84  3.81 - 5.12 Million/uL Final   • Hemoglobin 08/07/2023 14.2  11.5 - 15.4 g/dL Final   • Hematocrit 08/07/2023 45.3  34.8 - 46.1 % Final   • MCV 08/07/2023 94  82 - 98 fL Final   • MCH 08/07/2023 29.3  26.8 - 34.3 pg Final   • MCHC 08/07/2023 31.3 (L)  31.4 - 37.4 g/dL Final   • RDW 08/07/2023 14.9  11.6 - 15.1 % Final   • Platelets 08/07/2023 203  149 - 390 Thousands/uL Final   • MPV 08/07/2023 11.7  8.9 - 12.7 fL Final   • PTH 08/07/2023 102.4 (H)  12.0 - 88.0 pg/mL Final   • TSH 3RD GENERATON 08/07/2023 2.753  0.450 - 4.500 uIU/mL Final    The recommended reference ranges for TSH during pregnancy are as follows:   First trimester 0.1 to 2.5 uIU/mL   Second trimester  0.2 to 3.0 uIU/mL   Third trimester 0.3 to 3.0 uIU/m    Note: Normal ranges may not apply to patients who are transgender, non-binary, or whose legal sex, sex at birth, and gender identity differ.  Adult TSH (3rd generation) reference range follows the recommended guidelines of the [employer], January, 2020.   • Vit D, 25-Hydroxy 08/07/2023 11.4 (L)  30.0 - 100.0 ng/mL Final    Vitamin D guidelines established by Clinical Guidelines Subcommittee  of the [employer] Task Force, 2011    Deficiency <20ng/ml   Insufficiency 20-30ng/ml   Sufficient  ng/ml    Admission on 07/11/2023, Discharged on 07/16/2023   Component Date Value Ref Range Status   • WBC 07/11/2023 6.80  4.31 - 10.16 Thousand/uL Final   • RBC 07/11/2023 4.59  3.81 - 5.12 Million/uL Final   • Hemoglobin 07/11/2023 13.5  11.5 - 15.4 g/dL Final   • Hematocrit 07/11/2023 43.8  34.8 - 46.1 % Final   • MCV 07/11/2023 95  82 - 98 fL Final   • MCH 07/11/2023 29.4  26.8 - 34.3 pg Final   • MCHC 07/11/2023 30.8 (L)  31.4 - 37.4 g/dL  Final   • RDW 07/11/2023 15.4 (H)  11.6 - 15.1 % Final   • MPV 07/11/2023 11.0  8.9 - 12.7 fL Final   • Platelets 07/11/2023 190  149 - 390 Thousands/uL Final   • nRBC 07/11/2023 0  /100 WBCs Final   • Neutrophils Relative 07/11/2023 70  43 - 75 % Final   • Immature Grans % 07/11/2023 1  0 - 2 % Final   • Lymphocytes Relative 07/11/2023 20  14 - 44 % Final   • Monocytes Relative 07/11/2023 7  4 - 12 % Final   • Eosinophils Relative 07/11/2023 2  0 - 6 % Final   • Basophils Relative 07/11/2023 0  0 - 1 % Final   • Neutrophils Absolute 07/11/2023 4.71  1.85 - 7.62 Thousands/µL Final   • Absolute Immature Grans 07/11/2023 0.04  0.00 - 0.20 Thousand/uL Final   • Absolute Lymphocytes 07/11/2023 1.38  0.60 - 4.47 Thousands/µL Final   • Absolute Monocytes 07/11/2023 0.48  0.17 - 1.22 Thousand/µL Final   • Eosinophils Absolute 07/11/2023 0.16  0.00 - 0.61 Thousand/µL Final   • Basophils Absolute 07/11/2023 0.03  0.00 - 0.10 Thousands/µL Final   • Sodium 07/11/2023 139  135 - 147 mmol/L Final   • Potassium 07/11/2023 3.7  3.5 - 5.3 mmol/L Final   • Chloride 07/11/2023 104  96 - 108 mmol/L Final   • CO2 07/11/2023 28  21 - 32 mmol/L Final   • ANION GAP 07/11/2023 7  mmol/L Final   • BUN 07/11/2023 10  5 - 25 mg/dL Final   • Creatinine 07/11/2023 0.66  0.60 - 1.30 mg/dL Final    Standardized to IDMS reference method   • Glucose 07/11/2023 101  65 - 140 mg/dL Final    If the patient is fasting, the ADA then defines impaired fasting glucose as > 100 mg/dL and diabetes as > or equal to 123 mg/dL.   • Calcium 07/11/2023 10.3 (H)  8.4 - 10.2 mg/dL Final   • AST 07/11/2023 13  13 - 39 U/L Final   • ALT 07/11/2023 13  7 - 52 U/L Final    Specimen collection should occur prior to Sulfasalazine administration due to the potential for falsely depressed results.    • Alkaline Phosphatase 07/11/2023 77  34 - 104 U/L Final   • Total Protein 07/11/2023 7.5  6.4 - 8.4 g/dL Final   • Albumin 07/11/2023 4.2  3.5 - 5.0 g/dL Final   • Total  "Bilirubin 07/11/2023 0.53  0.20 - 1.00 mg/dL Final    Use of this assay is not recommended for patients undergoing treatment with eltrombopag due to the potential for falsely elevated results.  N-acetyl-p-benzoquinone imine (metabolite of Acetaminophen) will generate erroneously low results in samples for patients that have taken an overdose of Acetaminophen.   • eGFR 07/11/2023 95  ml/min/1.73sq m Final   • hs TnI 0hr 07/11/2023 8  \"Refer to ACS Flowchart\"- see link ng/L Final    Comment:                                              Initial (time 0) result  If >=50 ng/L, Myocardial injury suggested ;  Type of myocardial injury and treatment strategy  to be determined.  If 5-49 ng/L, a delta result at 2 hours and or 4 hours will be needed to further evaluate.  If <4 ng/L, and chest pain has been >3 hours since onset, patient may qualify for discharge based on the HEART score in the ED.  If <5 ng/L and <3hours since onset of chest pain, a delta result at 2 hours will be needed to further evaluate.    HS Troponin 99th Percentile URL of a Health Population=12 ng/L with a 95% Confidence Interval of 8-18 ng/L.    Second Troponin (time 2 hours)  If calculated delta >= 20 ng/L,  Myocardial injury suggested ; Type of myocardial injury and treatment strategy to be determined.  If 5-49 ng/L and the calculated delta is 5-19 ng/L, consult medical service for evaluation.  Continue evaluation for ischemia on ecg and other possible etiology and repeat hs troponin at 4 hours.  If delta                            is <5 ng/L at 2 hours, consider discharge based on risk stratification via the HEART score (if in ED), or CY risk score in IP/Observation.    HS Troponin 99th Percentile URL of a Health Population=12 ng/L with a 95% Confidence Interval of 8-18 ng/L.   • BNP 07/11/2023 29  0 - 100 pg/mL Final   • Ventricular Rate 07/11/2023 73  BPM Final   • Atrial Rate 07/11/2023 73  BPM Final   • CO Interval 07/11/2023 156  ms Final   • " "QRSD Interval 07/11/2023 100  ms Final   • QT Interval 07/11/2023 418  ms Final   • QTC Interval 07/11/2023 460  ms Final   • QRS [place] 07/11/2023 74  degrees Final   • T Wave Axis 07/11/2023 70  degrees Final   • hs TnI 2hr 07/11/2023 7  \"Refer to ACS Flowchart\"- see link ng/L Final    Comment:                                              Initial (time 0) result  If >=50 ng/L, Myocardial injury suggested ;  Type of myocardial injury and treatment strategy  to be determined.  If 5-49 ng/L, a delta result at 2 hours and or 4 hours will be needed to further evaluate.  If <4 ng/L, and chest pain has been >3 hours since onset, patient may qualify for discharge based on the HEART score in the ED.  If <5 ng/L and <3hours since onset of chest pain, a delta result at 2 hours will be needed to further evaluate.    HS Troponin 99th Percentile URL of a Health Population=12 ng/L with a 95% Confidence Interval of 8-18 ng/L.    Second Troponin (time 2 hours)  If calculated delta >= 20 ng/L,  Myocardial injury suggested ; Type of myocardial injury and treatment strategy to be determined.  If 5-49 ng/L and the calculated delta is 5-19 ng/L, consult medical service for evaluation.  Continue evaluation for ischemia on ecg and other possible etiology and repeat hs troponin at 4 hours.  If delta                            is <5 ng/L at 2 hours, consider discharge based on risk stratification via the HEART score (if in ED), or CY risk score in IP/Observation.    HS Troponin 99th Percentile URL of a Health Population=12 ng/L with a 95% Confidence Interval of 8-18 ng/L.   • Delta 2hr hsTnI 07/11/2023 -1  <20 ng/L Final   • D-Dimer, Quant 07/11/2023 0.89 (H)  <0.50 ug/ml FEU Final    Reference and upper limits to exclude DVT and PE are the same.  Do not use to exclude if clinical symptoms are present.  Pregnant women:  1st trimester:  <0.22 - 1.06 ug/ml FEU  2nd trimester:  <0.22 - 1.88 ug/ml FEU  3rd trimester:   0.24 - 3.28 ug/ml " FEU    Note: Normal ranges may not apply to patients who are transgender, non-binary, or whose legal sex, sex at birth, and gender identity differ.     • Sodium 07/12/2023 140  135 - 147 mmol/L Final   • Potassium 07/12/2023 4.1  3.5 - 5.3 mmol/L Final   • Chloride 07/12/2023 103  96 - 108 mmol/L Final   • CO2 07/12/2023 32  21 - 32 mmol/L Final   • ANION GAP 07/12/2023 5  mmol/L Final   • BUN 07/12/2023 10  5 - 25 mg/dL Final   • Creatinine 07/12/2023 0.63  0.60 - 1.30 mg/dL Final    Standardized to IDMS reference method   • Glucose 07/12/2023 120  65 - 140 mg/dL Final    If the patient is fasting, the ADA then defines impaired fasting glucose as > 100 mg/dL and diabetes as > or equal to 123 mg/dL.   • Calcium 07/12/2023 9.6  8.4 - 10.2 mg/dL Final   • eGFR 07/12/2023 97  ml/min/1.73sq m Final   • Magnesium 07/12/2023 2.1  1.9 - 2.7 mg/dL Final   • WBC 07/12/2023 5.46  4.31 - 10.16 Thousand/uL Final   • RBC 07/12/2023 4.44  3.81 - 5.12 Million/uL Final   • Hemoglobin 07/12/2023 13.0  11.5 - 15.4 g/dL Final   • Hematocrit 07/12/2023 42.3  34.8 - 46.1 % Final   • MCV 07/12/2023 95  82 - 98 fL Final   • MCH 07/12/2023 29.3  26.8 - 34.3 pg Final   • MCHC 07/12/2023 30.7 (L)  31.4 - 37.4 g/dL Final   • RDW 07/12/2023 15.5 (H)  11.6 - 15.1 % Final   • Platelets 07/12/2023 159  149 - 390 Thousands/uL Final   • MPV 07/12/2023 10.5  8.9 - 12.7 fL Final   • Sodium 07/13/2023 136  135 - 147 mmol/L Final   • Potassium 07/13/2023 3.7  3.5 - 5.3 mmol/L Final   • Chloride 07/13/2023 101  96 - 108 mmol/L Final   • CO2 07/13/2023 34 (H)  21 - 32 mmol/L Final   • ANION GAP 07/13/2023 1  mmol/L Final   • BUN 07/13/2023 14  5 - 25 mg/dL Final   • Creatinine 07/13/2023 0.63  0.60 - 1.30 mg/dL Final    Standardized to IDMS reference method   • Glucose 07/13/2023 119  65 - 140 mg/dL Final    If the patient is fasting, the ADA then defines impaired fasting glucose as > 100 mg/dL and diabetes as > or equal to 123 mg/dL.   • Calcium  07/13/2023 9.6  8.4 - 10.2 mg/dL Final   • eGFR 07/13/2023 97  ml/min/1.73sq m Final   • Sodium 07/14/2023 136  135 - 147 mmol/L Final   • Potassium 07/14/2023 3.8  3.5 - 5.3 mmol/L Final   • Chloride 07/14/2023 99  96 - 108 mmol/L Final   • CO2 07/14/2023 30  21 - 32 mmol/L Final   • ANION GAP 07/14/2023 7  mmol/L Final   • BUN 07/14/2023 16  5 - 25 mg/dL Final   • Creatinine 07/14/2023 0.61  0.60 - 1.30 mg/dL Final    Standardized to IDMS reference method   • Glucose 07/14/2023 122  65 - 140 mg/dL Final    If the patient is fasting, the ADA then defines impaired fasting glucose as > 100 mg/dL and diabetes as > or equal to 123 mg/dL.   • Calcium 07/14/2023 10.1  8.4 - 10.2 mg/dL Final   • eGFR 07/14/2023 98  ml/min/1.73sq m Final   • Magnesium 07/14/2023 2.2  1.9 - 2.7 mg/dL Final   • Sodium 07/15/2023 136  135 - 147 mmol/L Final   • Potassium 07/15/2023 3.8  3.5 - 5.3 mmol/L Final   • Chloride 07/15/2023 97  96 - 108 mmol/L Final   • CO2 07/15/2023 32  21 - 32 mmol/L Final   • ANION GAP 07/15/2023 7  mmol/L Final   • BUN 07/15/2023 18  5 - 25 mg/dL Final   • Creatinine 07/15/2023 0.68  0.60 - 1.30 mg/dL Final    Standardized to IDMS reference method   • Glucose 07/15/2023 121  65 - 140 mg/dL Final    If the patient is fasting, the ADA then defines impaired fasting glucose as > 100 mg/dL and diabetes as > or equal to 123 mg/dL.   • Calcium 07/15/2023 10.0  8.4 - 10.2 mg/dL Final   • eGFR 07/15/2023 94  ml/min/1.73sq m Final   • Sodium 07/16/2023 134 (L)  135 - 147 mmol/L Final   • Potassium 07/16/2023 3.6  3.5 - 5.3 mmol/L Final   • Chloride 07/16/2023 96  96 - 108 mmol/L Final   • CO2 07/16/2023 30  21 - 32 mmol/L Final   • ANION GAP 07/16/2023 8  mmol/L Final   • BUN 07/16/2023 18  5 - 25 mg/dL Final   • Creatinine 07/16/2023 0.64  0.60 - 1.30 mg/dL Final    Standardized to IDMS reference method   • Glucose 07/16/2023 128  65 - 140 mg/dL Final    If the patient is fasting, the ADA then defines impaired fasting  glucose as > 100 mg/dL and diabetes as > or equal to 123 mg/dL.   • Calcium 07/16/2023 10.3 (H)  8.4 - 10.2 mg/dL Final   • eGFR 07/16/2023 96  ml/min/1.73sq m Final

## 2024-03-13 NOTE — PATIENT INSTRUCTIONS
Medicare Preventive Visit Patient Instructions  Thank you for completing your Welcome to Medicare Visit or Medicare Annual Wellness Visit today. Your next wellness visit will be due in one year (3/14/2025).  The screening/preventive services that you may require over the next 5-10 years are detailed below. Some tests may not apply to you based off risk factors and/or age. Screening tests ordered at today's visit but not completed yet may show as past due. Also, please note that scanned in results may not display below.  Preventive Screenings:  Service Recommendations Previous Testing/Comments   Colorectal Cancer Screening  * Colonoscopy    * Fecal Occult Blood Test (FOBT)/Fecal Immunochemical Test (FIT)  * Fecal DNA/Cologuard Test  * Flexible Sigmoidoscopy Age: 45-75 years old   Colonoscopy: every 10 years (may be performed more frequently if at higher risk)  OR  FOBT/FIT: every 1 year  OR  Cologuard: every 3 years  OR  Sigmoidoscopy: every 5 years  Screening may be recommended earlier than age 45 if at higher risk for colorectal cancer. Also, an individualized decision between you and your healthcare provider will decide whether screening between the ages of 76-85 would be appropriate. Colonoscopy: Not on file  FOBT/FIT: Not on file  Cologuard: 03/04/2024  Sigmoidoscopy: Not on file    Screening Current     Breast Cancer Screening Age: 40+ years old  Frequency: every 1-2 years  Not required if history of left and right mastectomy Mammogram: 09/17/2020        Cervical Cancer Screening Between the ages of 21-29, pap smear recommended once every 3 years.   Between the ages of 30-65, can perform pap smear with HPV co-testing every 5 years.   Recommendations may differ for women with a history of total hysterectomy, cervical cancer, or abnormal pap smears in past. Pap Smear: Not on file    Screening Not Indicated   Hepatitis C Screening Once for adults born between 1945 and 1965  More frequently in patients at high risk  for Hepatitis C Hep C Antibody: 08/18/2020    Screening Current   Diabetes Screening 1-2 times per year if you're at risk for diabetes or have pre-diabetes Fasting glucose: 112 mg/dL (8/17/2023)  A1C: 6.1 % (8/31/2022)  Screening Current   Cholesterol Screening Once every 5 years if you don't have a lipid disorder. May order more often based on risk factors. Lipid panel: 08/31/2022    Screening Not Indicated  History Lipid Disorder     Other Preventive Screenings Covered by Medicare:  Abdominal Aortic Aneurysm (AAA) Screening: covered once if your at risk. You're considered to be at risk if you have a family history of AAA.  Lung Cancer Screening: covers low dose CT scan once per year if you meet all of the following conditions: (1) Age 55-77; (2) No signs or symptoms of lung cancer; (3) Current smoker or have quit smoking within the last 15 years; (4) You have a tobacco smoking history of at least 20 pack years (packs per day multiplied by number of years you smoked); (5) You get a written order from a healthcare provider.  Glaucoma Screening: covered annually if you're considered high risk: (1) You have diabetes OR (2) Family history of glaucoma OR (3)  aged 50 and older OR (4)  American aged 65 and older  Osteoporosis Screening: covered every 2 years if you meet one of the following conditions: (1) You're estrogen deficient and at risk for osteoporosis based off medical history and other findings; (2) Have a vertebral abnormality; (3) On glucocorticoid therapy for more than 3 months; (4) Have primary hyperparathyroidism; (5) On osteoporosis medications and need to assess response to drug therapy.   Last bone density test (DXA Scan): Not on file.  HIV Screening: covered annually if you're between the age of 15-65. Also covered annually if you are younger than 15 and older than 65 with risk factors for HIV infection. For pregnant patients, it is covered up to 3 times per  pregnancy.    Immunizations:  Immunization Recommendations   Influenza Vaccine Annual influenza vaccination during flu season is recommended for all persons aged >= 6 months who do not have contraindications   Pneumococcal Vaccine   * Pneumococcal conjugate vaccine = PCV13 (Prevnar 13), PCV15 (Vaxneuvance), PCV20 (Prevnar 20)  * Pneumococcal polysaccharide vaccine = PPSV23 (Pneumovax) Adults 19-63 yo with certain risk factors or if 65+ yo  If never received any pneumonia vaccine: recommend Prevnar 20 (PCV20)  Give PCV20 if previously received 1 dose of PCV13 or PPSV23   Hepatitis B Vaccine 3 dose series if at intermediate or high risk (ex: diabetes, end stage renal disease, liver disease)   Respiratory syncytial virus (RSV) Vaccine - COVERED BY MEDICARE PART D  * RSVPreF3 (Arexvy) CDC recommends that adults 60 years of age and older may receive a single dose of RSV vaccine using shared clinical decision-making (SCDM)   Tetanus (Td) Vaccine - COST NOT COVERED BY MEDICARE PART B Following completion of primary series, a booster dose should be given every 10 years to maintain immunity against tetanus. Td may also be given as tetanus wound prophylaxis.   Tdap Vaccine - COST NOT COVERED BY MEDICARE PART B Recommended at least once for all adults. For pregnant patients, recommended with each pregnancy.   Shingles Vaccine (Shingrix) - COST NOT COVERED BY MEDICARE PART B  2 shot series recommended in those 19 years and older who have or will have weakened immune systems or those 50 years and older     Health Maintenance Due:      Topic Date Due   • HIV Screening  Never done   • Breast Cancer Screening: Mammogram  09/17/2021   • Lung Cancer Screening  07/11/2024   • Colorectal Cancer Screening  03/04/2027   • Hepatitis C Screening  Completed     Immunizations Due:      Topic Date Due   • Pneumococcal Vaccine: Pediatrics (0 to 5 Years) and At-Risk Patients (6 to 64 Years) (1 of 2 - PCV) Never done   • Hepatitis A Vaccine (1  of 2 - Risk 2-dose series) Never done   • Influenza Vaccine (1) 09/01/2023   • COVID-19 Vaccine (4 - 2023-24 season) 09/01/2023     Advance Directives   What are advance directives?  Advance directives are legal documents that state your wishes and plans for medical care. These plans are made ahead of time in case you lose your ability to make decisions for yourself. Advance directives can apply to any medical decision, such as the treatments you want, and if you want to donate organs.   What are the types of advance directives?  There are many types of advance directives, and each state has rules about how to use them. You may choose a combination of any of the following:  Living will:  This is a written record of the treatment you want. You can also choose which treatments you do not want, which to limit, and which to stop at a certain time. This includes surgery, medicine, IV fluid, and tube feedings.   Durable power of  for healthcare (DPAHC):  This is a written record that states who you want to make healthcare choices for you when you are unable to make them for yourself. This person, called a proxy, is usually a family member or a friend. You may choose more than 1 proxy.  Do not resuscitate (DNR) order:  A DNR order is used in case your heart stops beating or you stop breathing. It is a request not to have certain forms of treatment, such as CPR. A DNR order may be included in other types of advance directives.  Medical directive:  This covers the care that you want if you are in a coma, near death, or unable to make decisions for yourself. You can list the treatments you want for each condition. Treatment may include pain medicine, surgery, blood transfusions, dialysis, IV or tube feedings, and a ventilator (breathing machine).  Values history:  This document has questions about your views, beliefs, and how you feel and think about life. This information can help others choose the care that you  would choose.  Why are advance directives important?  An advance directive helps you control your care. Although spoken wishes may be used, it is better to have your wishes written down. Spoken wishes can be misunderstood, or not followed. Treatments may be given even if you do not want them. An advance directive may make it easier for your family to make difficult choices about your care.   Urinary Incontinence   Urinary incontinence (UI)  is when you lose control of your bladder. UI develops because your bladder cannot store or empty urine properly. The 3 most common types of UI are stress incontinence, urge incontinence, or both.  Medicines:   May be given to help strengthen your bladder control. Report any side effects of medication to your healthcare provider.  Do pelvic muscle exercises often:  Your pelvic muscles help you stop urinating. Squeeze these muscles tight for 5 seconds, then relax for 5 seconds. Gradually work up to squeezing for 10 seconds. Do 3 sets of 15 repetitions a day, or as directed. This will help strengthen your pelvic muscles and improve bladder control.  Train your bladder:  Go to the bathroom at set times, such as every 2 hours, even if you do not feel the urge to go. You can also try to hold your urine when you feel the urge to go. For example, hold your urine for 5 minutes when you feel the urge to go. As that becomes easier, hold your urine for 10 minutes.   Self-care:   Keep a UI record.  Write down how often you leak urine and how much you leak. Make a note of what you were doing when you leaked urine.  Drink liquids as directed. You may need to limit the amount of liquid you drink to help control your urine leakage. Do not drink any liquid right before you go to bed. Limit or do not have drinks that contain caffeine or alcohol.   Prevent constipation.  Eat a variety of high-fiber foods. Good examples are high-fiber cereals, beans, vegetables, and whole-grain breads. Walking is the  best way to trigger your intestines to have a bowel movement.  Exercise regularly and maintain a healthy weight.  Weight loss and exercise will decrease pressure on your bladder and help you control your leakage.   Use a catheter as directed  to help empty your bladder. A catheter is a tiny, plastic tube that is put into your bladder to drain your urine.   Go to behavior therapy as directed.  Behavior therapy may be used to help you learn to control your urge to urinate.    Cigarette Smoking and Your Health   Risks to your health if you smoke:  Nicotine and other chemicals found in tobacco damage every cell in your body. Even if you are a light smoker, you have an increased risk for cancer, heart disease, and lung disease. If you are pregnant or have diabetes, smoking increases your risk for complications.   Benefits to your health if you stop smoking:   You decrease respiratory symptoms such as coughing, wheezing, and shortness of breath.   You reduce your risk for cancers of the lung, mouth, throat, kidney, bladder, pancreas, stomach, and cervix. If you already have cancer, you increase the benefits of chemotherapy. You also reduce your risk for cancer returning or a second cancer from developing.   You reduce your risk for heart disease, blood clots, heart attack, and stroke.   You reduce your risk for lung infections, and diseases such as pneumonia, asthma, chronic bronchitis, and emphysema.  Your circulation improves. More oxygen can be delivered to your body. If you have diabetes, you lower your risk for complications, such as kidney, artery, and eye diseases. You also lower your risk for nerve damage. Nerve damage can lead to amputations, poor vision, and blindness.  You improve your body's ability to heal and to fight infections.  For more information and support to stop smoking:   [employer]  Phone: 5- 804 - 358-9242  Web Address: www.[employer].gov  Weight Management   Why it is important to manage your  weight:  Being overweight increases your risk of health conditions such as heart disease, high blood pressure, type 2 diabetes, and certain types of cancer. It can also increase your risk for osteoarthritis, sleep apnea, and other respiratory problems. Aim for a slow, steady weight loss. Even a small amount of weight loss can lower your risk of health problems.  How to lose weight safely:  A safe and healthy way to lose weight is to eat fewer calories and get regular exercise. You can lose up about 1 pound a week by decreasing the number of calories you eat by 500 calories each day.   Healthy meal plan for weight management:  A healthy meal plan includes a variety of foods, contains fewer calories, and helps you stay healthy. A healthy meal plan includes the following:  Eat whole-grain foods more often.  A healthy meal plan should contain fiber. Fiber is the part of grains, fruits, and vegetables that is not broken down by your body. Whole-grain foods are healthy and provide extra fiber in your diet. Some examples of whole-grain foods are whole-wheat breads and pastas, oatmeal, brown rice, and bulgur.  Eat a variety of vegetables every day.  Include dark, leafy greens such as spinach, kale, keiko greens, and mustard greens. Eat yellow and orange vegetables such as carrots, sweet potatoes, and winter squash.   Eat a variety of fruits every day.  Choose fresh or canned fruit (canned in its own juice or light syrup) instead of juice. Fruit juice has very little or no fiber.  Eat low-fat dairy foods.  Drink fat-free (skim) milk or 1% milk. Eat fat-free yogurt and low-fat cottage cheese. Try low-fat cheeses such as mozzarella and other reduced-fat cheeses.  Choose meat and other protein foods that are low in fat.  Choose beans or other legumes such as split peas or lentils. Choose fish, skinless poultry (chicken or turkey), or lean cuts of red meat (beef or pork). Before you cook meat or poultry, cut off any visible  fat.   Use less fat and oil.  Try baking foods instead of frying them. Add less fat, such as margarine, sour cream, regular salad dressing and mayonnaise to foods. Eat fewer high-fat foods. Some examples of high-fat foods include french fries, doughnuts, ice cream, and cakes.  Eat fewer sweets.  Limit foods and drinks that are high in sugar. This includes candy, cookies, regular soda, and sweetened drinks.  Exercise:  Exercise at least 30 minutes per day on most days of the week. Some examples of exercise include walking, biking, dancing, and swimming. You can also fit in more physical activity by taking the stairs instead of the elevator or parking farther away from stores. Ask your healthcare provider about the best exercise plan for you.      © Copyright [employer] 2018 Information is for End User's use only and may not be sold, redistributed or otherwise used for commercial purposes. All illustrations and images included in CareNotes® are the copyrighted property of [employer] or [employer]      Medicare Preventive Visit Patient Instructions  Thank you for completing your Welcome to Medicare Visit or Medicare Annual Wellness Visit today. Your next wellness visit will be due in one year (3/14/2025).  The screening/preventive services that you may require over the next 5-10 years are detailed below. Some tests may not apply to you based off risk factors and/or age. Screening tests ordered at today's visit but not completed yet may show as past due. Also, please note that scanned in results may not display below.  Preventive Screenings:  Service Recommendations Previous Testing/Comments   Colorectal Cancer Screening  * Colonoscopy    * Fecal Occult Blood Test (FOBT)/Fecal Immunochemical Test (FIT)  * Fecal DNA/Cologuard Test  * Flexible Sigmoidoscopy Age: 45-75 years old   Colonoscopy: every 10 years (may be performed more frequently if at higher risk)  OR  FOBT/FIT: every 1 year  OR  Cologuard:  every 3 years  OR  Sigmoidoscopy: every 5 years  Screening may be recommended earlier than age 45 if at higher risk for colorectal cancer. Also, an individualized decision between you and your healthcare provider will decide whether screening between the ages of 76-85 would be appropriate. Colonoscopy: Not on file  FOBT/FIT: Not on file  Cologuard: 03/04/2024  Sigmoidoscopy: Not on file    Screening Current     Breast Cancer Screening Age: 40+ years old  Frequency: every 1-2 years  Not required if history of left and right mastectomy Mammogram: 09/17/2020        Cervical Cancer Screening Between the ages of 21-29, pap smear recommended once every 3 years.   Between the ages of 30-65, can perform pap smear with HPV co-testing every 5 years.   Recommendations may differ for women with a history of total hysterectomy, cervical cancer, or abnormal pap smears in past. Pap Smear: Not on file    Screening Not Indicated   Hepatitis C Screening Once for adults born between 1945 and 1965  More frequently in patients at high risk for Hepatitis C Hep C Antibody: 08/18/2020    Screening Current   Diabetes Screening 1-2 times per year if you're at risk for diabetes or have pre-diabetes Fasting glucose: 112 mg/dL (8/17/2023)  A1C: 6.1 % (8/31/2022)  Screening Current   Cholesterol Screening Once every 5 years if you don't have a lipid disorder. May order more often based on risk factors. Lipid panel: 08/31/2022    Screening Not Indicated  History Lipid Disorder     Other Preventive Screenings Covered by Medicare:  Abdominal Aortic Aneurysm (AAA) Screening: covered once if your at risk. You're considered to be at risk if you have a family history of AAA.  Lung Cancer Screening: covers low dose CT scan once per year if you meet all of the following conditions: (1) Age 55-77; (2) No signs or symptoms of lung cancer; (3) Current smoker or have quit smoking within the last 15 years; (4) You have a tobacco smoking history of at least 20  pack years (packs per day multiplied by number of years you smoked); (5) You get a written order from a healthcare provider.  Glaucoma Screening: covered annually if you're considered high risk: (1) You have diabetes OR (2) Family history of glaucoma OR (3)  aged 50 and older OR (4)  American aged 65 and older  Osteoporosis Screening: covered every 2 years if you meet one of the following conditions: (1) You're estrogen deficient and at risk for osteoporosis based off medical history and other findings; (2) Have a vertebral abnormality; (3) On glucocorticoid therapy for more than 3 months; (4) Have primary hyperparathyroidism; (5) On osteoporosis medications and need to assess response to drug therapy.   Last bone density test (DXA Scan): Not on file.  HIV Screening: covered annually if you're between the age of 15-65. Also covered annually if you are younger than 15 and older than 65 with risk factors for HIV infection. For pregnant patients, it is covered up to 3 times per pregnancy.    Immunizations:  Immunization Recommendations   Influenza Vaccine Annual influenza vaccination during flu season is recommended for all persons aged >= 6 months who do not have contraindications   Pneumococcal Vaccine   * Pneumococcal conjugate vaccine = PCV13 (Prevnar 13), PCV15 (Vaxneuvance), PCV20 (Prevnar 20)  * Pneumococcal polysaccharide vaccine = PPSV23 (Pneumovax) Adults 19-63 yo with certain risk factors or if 65+ yo  If never received any pneumonia vaccine: recommend Prevnar 20 (PCV20)  Give PCV20 if previously received 1 dose of PCV13 or PPSV23   Hepatitis B Vaccine 3 dose series if at intermediate or high risk (ex: diabetes, end stage renal disease, liver disease)   Respiratory syncytial virus (RSV) Vaccine - COVERED BY MEDICARE PART D  * RSVPreF3 (Arexvy) CDC recommends that adults 60 years of age and older may receive a single dose of RSV vaccine using shared clinical decision-making (SCDM)    Tetanus (Td) Vaccine - COST NOT COVERED BY MEDICARE PART B Following completion of primary series, a booster dose should be given every 10 years to maintain immunity against tetanus. Td may also be given as tetanus wound prophylaxis.   Tdap Vaccine - COST NOT COVERED BY MEDICARE PART B Recommended at least once for all adults. For pregnant patients, recommended with each pregnancy.   Shingles Vaccine (Shingrix) - COST NOT COVERED BY MEDICARE PART B  2 shot series recommended in those 19 years and older who have or will have weakened immune systems or those 50 years and older     Health Maintenance Due:      Topic Date Due   • HIV Screening  Never done   • Breast Cancer Screening: Mammogram  09/17/2021   • Lung Cancer Screening  07/11/2024   • Colorectal Cancer Screening  03/04/2027   • Hepatitis C Screening  Completed     Immunizations Due:      Topic Date Due   • Pneumococcal Vaccine: Pediatrics (0 to 5 Years) and At-Risk Patients (6 to 64 Years) (1 of 2 - PCV) Never done   • Hepatitis A Vaccine (1 of 2 - Risk 2-dose series) Never done   • Influenza Vaccine (1) 09/01/2023   • COVID-19 Vaccine (4 - 2023-24 season) 09/01/2023     Advance Directives   What are advance directives?  Advance directives are legal documents that state your wishes and plans for medical care. These plans are made ahead of time in case you lose your ability to make decisions for yourself. Advance directives can apply to any medical decision, such as the treatments you want, and if you want to donate organs.   What are the types of advance directives?  There are many types of advance directives, and each state has rules about how to use them. You may choose a combination of any of the following:  Living will:  This is a written record of the treatment you want. You can also choose which treatments you do not want, which to limit, and which to stop at a certain time. This includes surgery, medicine, IV fluid, and tube feedings.   Durable  power of  for healthcare (DPA):  This is a written record that states who you want to make healthcare choices for you when you are unable to make them for yourself. This person, called a proxy, is usually a family member or a friend. You may choose more than 1 proxy.  Do not resuscitate (DNR) order:  A DNR order is used in case your heart stops beating or you stop breathing. It is a request not to have certain forms of treatment, such as CPR. A DNR order may be included in other types of advance directives.  Medical directive:  This covers the care that you want if you are in a coma, near death, or unable to make decisions for yourself. You can list the treatments you want for each condition. Treatment may include pain medicine, surgery, blood transfusions, dialysis, IV or tube feedings, and a ventilator (breathing machine).  Values history:  This document has questions about your views, beliefs, and how you feel and think about life. This information can help others choose the care that you would choose.  Why are advance directives important?  An advance directive helps you control your care. Although spoken wishes may be used, it is better to have your wishes written down. Spoken wishes can be misunderstood, or not followed. Treatments may be given even if you do not want them. An advance directive may make it easier for your family to make difficult choices about your care.   Urinary Incontinence   Urinary incontinence (UI)  is when you lose control of your bladder. UI develops because your bladder cannot store or empty urine properly. The 3 most common types of UI are stress incontinence, urge incontinence, or both.  Medicines:   May be given to help strengthen your bladder control. Report any side effects of medication to your healthcare provider.  Do pelvic muscle exercises often:  Your pelvic muscles help you stop urinating. Squeeze these muscles tight for 5 seconds, then relax for 5 seconds.  Gradually work up to squeezing for 10 seconds. Do 3 sets of 15 repetitions a day, or as directed. This will help strengthen your pelvic muscles and improve bladder control.  Train your bladder:  Go to the bathroom at set times, such as every 2 hours, even if you do not feel the urge to go. You can also try to hold your urine when you feel the urge to go. For example, hold your urine for 5 minutes when you feel the urge to go. As that becomes easier, hold your urine for 10 minutes.   Self-care:   Keep a UI record.  Write down how often you leak urine and how much you leak. Make a note of what you were doing when you leaked urine.  Drink liquids as directed. You may need to limit the amount of liquid you drink to help control your urine leakage. Do not drink any liquid right before you go to bed. Limit or do not have drinks that contain caffeine or alcohol.   Prevent constipation.  Eat a variety of high-fiber foods. Good examples are high-fiber cereals, beans, vegetables, and whole-grain breads. Walking is the best way to trigger your intestines to have a bowel movement.  Exercise regularly and maintain a healthy weight.  Weight loss and exercise will decrease pressure on your bladder and help you control your leakage.   Use a catheter as directed  to help empty your bladder. A catheter is a tiny, plastic tube that is put into your bladder to drain your urine.   Go to behavior therapy as directed.  Behavior therapy may be used to help you learn to control your urge to urinate.    Cigarette Smoking and Your Health   Risks to your health if you smoke:  Nicotine and other chemicals found in tobacco damage every cell in your body. Even if you are a light smoker, you have an increased risk for cancer, heart disease, and lung disease. If you are pregnant or have diabetes, smoking increases your risk for complications.   Benefits to your health if you stop smoking:   You decrease respiratory symptoms such as coughing,  wheezing, and shortness of breath.   You reduce your risk for cancers of the lung, mouth, throat, kidney, bladder, pancreas, stomach, and cervix. If you already have cancer, you increase the benefits of chemotherapy. You also reduce your risk for cancer returning or a second cancer from developing.   You reduce your risk for heart disease, blood clots, heart attack, and stroke.   You reduce your risk for lung infections, and diseases such as pneumonia, asthma, chronic bronchitis, and emphysema.  Your circulation improves. More oxygen can be delivered to your body. If you have diabetes, you lower your risk for complications, such as kidney, artery, and eye diseases. You also lower your risk for nerve damage. Nerve damage can lead to amputations, poor vision, and blindness.  You improve your body's ability to heal and to fight infections.  For more information and support to stop smoking:   [employer]  Phone: 1- 751 - 652-9388  Web Address: www.[employer]  Weight Management   Why it is important to manage your weight:  Being overweight increases your risk of health conditions such as heart disease, high blood pressure, type 2 diabetes, and certain types of cancer. It can also increase your risk for osteoarthritis, sleep apnea, and other respiratory problems. Aim for a slow, steady weight loss. Even a small amount of weight loss can lower your risk of health problems.  How to lose weight safely:  A safe and healthy way to lose weight is to eat fewer calories and get regular exercise. You can lose up about 1 pound a week by decreasing the number of calories you eat by 500 calories each day.   Healthy meal plan for weight management:  A healthy meal plan includes a variety of foods, contains fewer calories, and helps you stay healthy. A healthy meal plan includes the following:  Eat whole-grain foods more often.  A healthy meal plan should contain fiber. Fiber is the part of grains, fruits, and vegetables that is  not broken down by your body. Whole-grain foods are healthy and provide extra fiber in your diet. Some examples of whole-grain foods are whole-wheat breads and pastas, oatmeal, brown rice, and bulgur.  Eat a variety of vegetables every day.  Include dark, leafy greens such as spinach, kale, keiko greens, and mustard greens. Eat yellow and orange vegetables such as carrots, sweet potatoes, and winter squash.   Eat a variety of fruits every day.  Choose fresh or canned fruit (canned in its own juice or light syrup) instead of juice. Fruit juice has very little or no fiber.  Eat low-fat dairy foods.  Drink fat-free (skim) milk or 1% milk. Eat fat-free yogurt and low-fat cottage cheese. Try low-fat cheeses such as mozzarella and other reduced-fat cheeses.  Choose meat and other protein foods that are low in fat.  Choose beans or other legumes such as split peas or lentils. Choose fish, skinless poultry (chicken or turkey), or lean cuts of red meat (beef or pork). Before you cook meat or poultry, cut off any visible fat.   Use less fat and oil.  Try baking foods instead of frying them. Add less fat, such as margarine, sour cream, regular salad dressing and mayonnaise to foods. Eat fewer high-fat foods. Some examples of high-fat foods include french fries, doughnuts, ice cream, and cakes.  Eat fewer sweets.  Limit foods and drinks that are high in sugar. This includes candy, cookies, regular soda, and sweetened drinks.  Exercise:  Exercise at least 30 minutes per day on most days of the week. Some examples of exercise include walking, biking, dancing, and swimming. You can also fit in more physical activity by taking the stairs instead of the elevator or parking farther away from stores. Ask your healthcare provider about the best exercise plan for you.      © Copyright [employer] 2018 Information is for End User's use only and may not be sold, redistributed or otherwise used for commercial purposes. All  illustrations and images included in CareNotes® are the copyrighted property of [employer] or [employer]

## 2024-03-13 NOTE — PROGRESS NOTES
Assessment and Plan:     Problem List Items Addressed This Visit        Cardiovascular and Mediastinum    Essential hypertension (Chronic)    Relevant Medications    furosemide (LASIX) 80 mg tablet    losartan (COZAAR) 25 mg tablet    metoprolol succinate (Toprol XL) 25 mg 24 hr tablet    spironolactone (ALDACTONE) 25 mg tablet    Other Relevant Orders    NM myocardial perfusion spect (rx stress and/or rest)    Chronic diastolic congestive heart failure (HCC) (Chronic)    Relevant Medications    furosemide (LASIX) 80 mg tablet    metoprolol succinate (Toprol XL) 25 mg 24 hr tablet       Respiratory    COPD (chronic obstructive pulmonary disease) (HCC) (Chronic)    Relevant Medications    albuterol (PROVENTIL HFA,VENTOLIN HFA) 90 mcg/act inhaler       Endocrine    Hyperparathyroidism (HCC)       Urinary    Urinary incontinence    Relevant Medications    oxybutynin (DITROPAN) 5 mg tablet       Behavioral Health    Cigarette nicotine dependence without complication    Relevant Orders    NM myocardial perfusion spect (rx stress and/or rest)    Moderate episode of recurrent major depressive disorder ([facility])       Surgery/Wound/Pain    Lymphedema of both lower extremities (Chronic)    Acute pain of left shoulder    Relevant Orders    XR shoulder 2+ vw left       Other    Mixed hyperlipidemia (Chronic)    Relevant Orders    Lipid panel    Hypercalcemia (Chronic)    Morbid obesity with body mass index (BMI) of 60.0 to 69.9 in adult ([facility])    Pre-diabetes    Lung cancer screening declined by patient   Other Visit Diagnoses     Medicare annual wellness visit, subsequent    -  Primary    Encounter for screening mammogram for malignant neoplasm of breast        Relevant Orders    Mammo screening bilateral w 3d & cad    Acute bronchitis due to other specified organisms        Relevant Medications    albuterol (PROVENTIL HFA,VENTOLIN HFA) 90 mcg/act inhaler    Wheezing        Relevant Medications    albuterol (PROVENTIL  HFA,VENTOLIN HFA) 90 mcg/act inhaler    Acute on chronic congestive heart failure, unspecified heart failure type (HCC)        Relevant Medications    losartan (COZAAR) 25 mg tablet    metoprolol succinate (Toprol XL) 25 mg 24 hr tablet    Acute on chronic heart failure with preserved ejection fraction (HCC)        Relevant Medications    metoprolol succinate (Toprol XL) 25 mg 24 hr tablet    spironolactone (ALDACTONE) 25 mg tablet    Hypertension, unspecified type        Relevant Medications    furosemide (LASIX) 80 mg tablet    losartan (COZAAR) 25 mg tablet    metoprolol succinate (Toprol XL) 25 mg 24 hr tablet    spironolactone (ALDACTONE) 25 mg tablet    Encounter for immunization        Relevant Orders    influenza vaccine, quadrivalent, 0.5 mL, preservative-free, for adult and pediatric patients 6 mos+ (AFLURIA, FLUARIX, FLULAVAL, FLUZONE) (Completed)    Need for influenza vaccination        Relevant Orders    influenza vaccine, quadrivalent, 0.5 mL, preservative-free, for adult and pediatric patients 6 mos+ (AFLURIA, FLUARIX, FLULAVAL, FLUZONE) (Completed)    Encounter for screening mammogram for breast cancer        Screening for colorectal cancer        last cologuard was on 3/4/24 and was negative and she denies any family hx of colon ca    Asymptomatic postmenopausal state        Relevant Orders    DXA bone density spine hip and pelvis    Encounter for immunization        she is up to date with 3 covid vaccines, has had one pnemococcal vaccine, shingles and would like to get the flu shot today    Relevant Orders    influenza vaccine, quadrivalent, 0.5 mL, preservative-free, for adult and pediatric patients 6 mos+ (AFLURIA, FLUARIX, FLULAVAL, FLUZONE) (Completed)    Other chest pain        Relevant Orders    NM myocardial perfusion spect (rx stress and/or rest)    Gastroesophageal reflux disease without esophagitis        Relevant Medications    esomeprazole (NexIUM) 20 mg capsule          Depression  Screening and Follow-up Plan: Patient was screened for depression during today's encounter. They screened negative with a PHQ-9 score of 0.      Preventive health issues were discussed with patient, and age appropriate screening tests were ordered as noted in patient's After Visit Summary.  Personalized health advice and appropriate referrals for health education or preventive services given if needed, as noted in patient's After Visit Summary.     History of Present Illness:     Patient presents for a Medicare Wellness Visit    HPI   Patient Care Team:  Laurel Lyons DO as PCP - General (Internal Medicine)  Bayron Dsouza MD as Cardiologist (Cardiology)     Review of Systems:     Review of Systems     Problem List:     Patient Active Problem List   Diagnosis   • Essential hypertension   • Mixed stress and urge urinary incontinence   • COPD (chronic obstructive pulmonary disease) ([facility])   • Mixed hyperlipidemia   • Morbid obesity with body mass index (BMI) of 60.0 to 69.9 in adult ([facility])   • Cigarette nicotine dependence without complication   • Obstructive sleep apnea syndrome   • Insomnia due to medical condition   • Chronic diastolic congestive heart failure (HCC)   • Lymphedema of both lower extremities   • Pre-diabetes   • Hypercalcemia   • Moderate episode of recurrent major depressive disorder ([facility])   • Ambulatory dysfunction   • Adrenal nodule ([facility])   • Urinary incontinence   • Hyperparathyroidism ([facility])   • Lung cancer screening declined by patient   • Acute pain of left shoulder      Past Medical and Surgical History:     Past Medical History:   Diagnosis Date   • Acute on chronic heart failure with preserved ejection fraction (HFpEF) ([facility]) 12/2/2022   • Arthritis    • CHF (congestive heart failure) ([facility])    • COPD (chronic obstructive pulmonary disease) ([facility])    • COVID-19 virus infection 2/27/2023   • Depression    • Eating disorder    • Hypertension    • Hypoxia 12/2/2022   • Obesity    •  Urinary incontinence    • Visual impairment      Past Surgical History:   Procedure Laterality Date   • HYSTERECTOMY     • INCONTINENCE SURGERY     • JOINT REPLACEMENT  2015    Right knee   • OOPHORECTOMY Bilateral    • REPLACEMENT TOTAL KNEE Right 2016   • TOOTH EXTRACTION  02/15/2023    2/15/23,3/16/23,23   • TOTAL ABDOMINAL HYSTERECTOMY W/ BILATERAL SALPINGOOPHORECTOMY        Family History:     Family History   Problem Relation Age of Onset   • Other Mother    • Aneurysm Mother         aortic   • Dementia Mother    • Heart failure Father    • Diabetes Father         Dad   • Lung cancer Sister    • Cancer Sister    • Dementia Maternal Grandmother    • Dementia Maternal Aunt       Social History:     Social History     Socioeconomic History   • Marital status:      Spouse name: None   • Number of children: None   • Years of education: None   • Highest education level: None   Occupational History   • None   Tobacco Use   • Smoking status: Some Days     Current packs/day: 0.00     Average packs/day: 1 pack/day for 70.6 years (70.0 ttl pk-yrs)     Types: Cigarettes     Start date: 1976     Last attempt to quit: 2023     Years since quittin.6   • Smokeless tobacco: Never   • Tobacco comments:     Patient is at about 3 per day - occasional    Vaping Use   • Vaping status: Former   • Start date: 2018   • Substances: Nicotine   Substance and Sexual Activity   • Alcohol use: Not Currently     Comment: occassional- once a year   • Drug use: Not Currently     Types: Marijuana   • Sexual activity: Not Currently     Birth control/protection: Surgical   Other Topics Concern   • None   Social History Narrative   • None     Social Determinants of Health     Financial Resource Strain: Medium Risk (2022)    Overall Financial Resource Strain (CARDIA)    • Difficulty of Paying Living Expenses: Somewhat hard   Food Insecurity: No Food Insecurity (3/13/2024)    Hunger Vital Sign    • Worried  About Running Out of Food in the Last Year: Never true    • Ran Out of Food in the Last Year: Never true   Transportation Needs: No Transportation Needs (3/13/2024)    PRAPARE - Transportation    • Lack of Transportation (Medical): No    • Lack of Transportation (Non-Medical): No   Physical Activity: Not on file   Stress: Not on file   Social Connections: Not on file   Intimate Partner Violence: Not on file   Housing Stability: Low Risk  (3/13/2024)    Housing Stability Vital Sign    • Unable to Pay for Housing in the Last Year: No    • Number of Places Lived in the Last Year: 1    • Unstable Housing in the Last Year: No      Medications and Allergies:     Current Outpatient Medications   Medication Sig Dispense Refill   • albuterol (PROVENTIL HFA,VENTOLIN HFA) 90 mcg/act inhaler Inhale 2 puffs every 6 (six) hours as needed for wheezing or shortness of breath 18 g 1   • escitalopram (Lexapro) 10 mg tablet Take 1 tablet (10 mg total) by mouth daily 90 tablet 3   • esomeprazole (NexIUM) 20 mg capsule Take 1 capsule (20 mg total) by mouth daily in the early morning 60 capsule 0   • furosemide (LASIX) 80 mg tablet Take 1 tablet (80 mg total) by mouth 2 (two) times a day 180 tablet 1   • losartan (COZAAR) 25 mg tablet Take 1 tablet (25 mg total) by mouth daily 90 tablet 1   • metoprolol succinate (Toprol XL) 25 mg 24 hr tablet Take 1 tablet (25 mg total) by mouth daily at bedtime 90 tablet 1   • oxybutynin (DITROPAN) 5 mg tablet Take 1 tablet (5 mg total) by mouth 3 (three) times a day 180 tablet 1   • spironolactone (ALDACTONE) 25 mg tablet Take 0.5 tablets (12.5 mg total) by mouth daily 90 tablet 1     No current facility-administered medications for this visit.     Allergies   Allergen Reactions   • Lisinopril Cough      Immunizations:     Immunization History   Administered Date(s) Administered   • COVID-19 PFIZER VACCINE 0.3 ML IM 03/15/2021, 04/03/2021, 11/17/2021   • INFLUENZA 12/09/2022   • Influenza, injectable,  quadrivalent, preservative free 0.5 mL 03/13/2024   • Influenza, recombinant, quadrivalent,injectable, preservative free 12/02/2021, 12/09/2022   • Zoster Vaccine Recombinant 04/27/2018, 07/27/2018      Health Maintenance:         Topic Date Due   • HIV Screening  Never done   • Breast Cancer Screening: Mammogram  09/17/2021   • Lung Cancer Screening  07/11/2024   • Colorectal Cancer Screening  03/04/2027   • Hepatitis C Screening  Completed         Topic Date Due   • Pneumococcal Vaccine: Pediatrics (0 to 5 Years) and At-Risk Patients (6 to 64 Years) (1 of 2 - PCV) Never done   • Hepatitis A Vaccine (1 of 2 - Risk 2-dose series) Never done   • COVID-19 Vaccine (4 - 2023-24 season) 09/01/2023      Medicare Screening Tests and Risk Assessments:     Valerie is here for her Subsequent Wellness visit. Last Medicare Wellness visit information reviewed, patient interviewed and updates made to the record today.      Health Risk Assessment:   Patient rates overall health as fair. Patient feels that their physical health rating is same. Patient is satisfied with their life. Eyesight was rated as slightly worse. Hearing was rated as same. Patient feels that their emotional and mental health rating is same. Patients states they are never, rarely angry. Patient states they are sometimes unusually tired/fatigued. Pain experienced in the last 7 days has been some. Patient's pain rating has been 5/10. Patient states that she has experienced no weight loss or gain in last 6 months.     Depression Screening:   PHQ-9 Score: 0      Fall Risk Screening:   In the past year, patient has experienced: no history of falling in past year      Urinary Incontinence Screening:   Patient has leaked urine accidently in the last six months.     Home Safety:  Patient has trouble with stairs inside or outside of their home. Patient has working smoke alarms and has working carbon monoxide detector. Home safety hazards include: none.     Nutrition:    Current diet is Regular.     Medications:   Patient is currently taking over-the-counter supplements. OTC medications include: see medication list. Patient is able to manage medications.     Activities of Daily Living (ADLs)/Instrumental Activities of Daily Living (IADLs):   Walk and transfer into and out of bed and chair?: Yes  Dress and groom yourself?: Yes    Bathe or shower yourself?: Yes    Feed yourself? Yes  Do your laundry/housekeeping?: Yes  Manage your money, pay your bills and track your expenses?: Yes  Make your own meals?: Yes    Do your own shopping?: Yes    Previous Hospitalizations:   Any hospitalizations or ED visits within the last 12 months?: Yes    How many hospitalizations have you had in the last year?: 1-2    Advance Care Planning:   Living will: No    Advanced directive: No      PREVENTIVE SCREENINGS      Cardiovascular Screening:    General: Screening Not Indicated and History Lipid Disorder      Diabetes Screening:     General: Screening Current      Colorectal Cancer Screening:     General: Screening Current      Cervical Cancer Screening:    General: Screening Not Indicated      Abdominal Aortic Aneurysm (AAA) Screening:    Risk factors include: family history of AAA        Lung Cancer Screening:     General: Screening Current      Hepatitis C Screening:    General: Screening Current    Screening, Brief Intervention, and Referral to Treatment (SBIRT)    Screening  Typical number of drinks in a day: 0  Typical number of drinks in a week: 0  Interpretation: Low risk drinking behavior.    Single Item Drug Screening:  How often have you used an illegal drug (including marijuana) or a prescription medication for non-medical reasons in the past year? never    Single Item Drug Screen Score: 0  Interpretation: Negative screen for possible drug use disorder    No results found.     Physical Exam:     /82 (BP Location: Left arm, Patient Position: Sitting, Cuff Size: Large)   Pulse 76    "Temp 97.9 °F (36.6 °C) (Temporal)   Ht 5' 5\" (1.651 m)   Wt (!) 170 kg (374 lb)   SpO2 96% Comment: room air  BMI 62.24 kg/m²     Physical Exam     Laurel Lyons DO  "

## 2024-04-23 ENCOUNTER — OFFICE VISIT (OUTPATIENT)
Dept: SLEEP CENTER | Facility: CLINIC | Age: 64
End: 2024-04-23
Payer: COMMERCIAL

## 2024-04-23 VITALS
HEART RATE: 79 BPM | OXYGEN SATURATION: 97 % | BODY MASS INDEX: 48.82 KG/M2 | WEIGHT: 293 LBS | SYSTOLIC BLOOD PRESSURE: 122 MMHG | DIASTOLIC BLOOD PRESSURE: 80 MMHG | HEIGHT: 65 IN

## 2024-04-23 DIAGNOSIS — G47.33 OBSTRUCTIVE SLEEP APNEA TREATED WITH BILEVEL POSITIVE AIRWAY PRESSURE (BIPAP): Primary | ICD-10-CM

## 2024-04-23 PROCEDURE — 3074F SYST BP LT 130 MM HG: CPT | Performed by: NURSE PRACTITIONER

## 2024-04-23 PROCEDURE — G2211 COMPLEX E/M VISIT ADD ON: HCPCS | Performed by: NURSE PRACTITIONER

## 2024-04-23 PROCEDURE — 3079F DIAST BP 80-89 MM HG: CPT | Performed by: NURSE PRACTITIONER

## 2024-04-23 PROCEDURE — 99214 OFFICE O/P EST MOD 30 MIN: CPT | Performed by: NURSE PRACTITIONER

## 2024-04-23 NOTE — PATIENT INSTRUCTIONS
1.  Continue use of BiPAP equipment nightly  2.  Continue to clean your equipment, as discussed  3.  Contact [facility] with any questions or concerns prior to your next visit, as needed  4.  Schedule visit for follow-up in 6 months  5.  Consider downloading the CRV clyde and call if events are consistently higher than 5  6.  Consider the cushion for the head gear.  If that isn't helping, call [facility] for a mask fitting    Nursing Support:  When: Monday through Friday 7A-5PM except holidays  Where: Our direct line is [phone].    If you are having a true emergency please call 911.  In the event that the line is busy or it is after hours please leave a voice message and we will return your call.  Please speak clearly, leaving your full name, birth date, best number to reach you and the reason for your call.   Medication refills: We will need the name of the medication, the dosage, the ordering provider, whether you get a 30 or 90 day refill, and the pharmacy name and address.  Medications will be ordered by the provider only.  Nurses cannot call in prescriptions.  Please allow 7 days for medication refills.  Physician requested updates: If your provider requested that you call with an update after starting medication, please be ready to provide us the medication and dosage, what time you take your medication, the time you attempt to fall asleep, time you fall asleep, when you wake up, and what time you get out of bed.  Sleep Study Results: We will contact you with sleep study results and/or next steps after the physician has reviewed your testing.

## 2024-04-23 NOTE — PROGRESS NOTES
Progress Note - [facility]   Valerie Mejia 64 y.o. female   :1960, MRN: [id number]  2024      Follow Up Evaluation / Problem:     Severe Obstructive Sleep Apnea      Thank you for the opportunity of participating in the evaluation and care of this patient in the Sleep Clinic at [facility].      HPI: Valerie Mejia is a 64 y.o. year old female.  The patient presents with her sister for follow up of severe obstructive sleep apnea.  She was hospitalized in December with acute exacerbation of CHF with with acute on chronic respiratory failure with hypoxia.     She completed a split night sleep study on 23.  Testing confirmed severe BRADY with AHI of 66.4.  Oxygen rosario was 76% with desat as low as 69% during treatment.  Best setting was BiPAP 17/12cm  She used oxygen while in the hospital and was sent home with O2 recommendation for AutoBiPAP 25/12/5 with 2L of oxygen bled into the system.  She was set up with equipment and returns to review compliance and effectiveness of treatment.    Current Outpatient Medications:     albuterol (PROVENTIL HFA,VENTOLIN HFA) 90 mcg/act inhaler, Inhale 2 puffs every 6 (six) hours as needed for wheezing or shortness of breath, Disp: 18 g, Rfl: 1    escitalopram (Lexapro) 10 mg tablet, Take 1 tablet (10 mg total) by mouth daily, Disp: 90 tablet, Rfl: 3    esomeprazole (NexIUM) 20 mg capsule, Take 1 capsule (20 mg total) by mouth daily in the early morning, Disp: 60 capsule, Rfl: 0    furosemide (LASIX) 80 mg tablet, Take 1 tablet (80 mg total) by mouth 2 (two) times a day, Disp: 180 tablet, Rfl: 1    losartan (COZAAR) 25 mg tablet, Take 1 tablet (25 mg total) by mouth daily, Disp: 90 tablet, Rfl: 1    metoprolol succinate (Toprol XL) 25 mg 24 hr tablet, Take 1 tablet (25 mg total) by mouth daily at bedtime, Disp: 90 tablet, Rfl: 1    oxybutynin (DITROPAN) 5 mg tablet, Take 1 tablet (5 mg total)  "by mouth 3 (three) times a day, Disp: 180 tablet, Rfl: 1    spironolactone (ALDACTONE) 25 mg tablet, Take 0.5 tablets (12.5 mg total) by mouth daily, Disp: 90 tablet, Rfl: 1    How likely are you to doze off or fall asleep in the following situations, in contrast to feeling just tired?  Sitting and reading: Slight chance of dozing  Watching TV: Moderate chance of dozing  Sitting, inactive in a public place (e.g. a theatre or a meeting): Slight chance of dozing  As a passenger in a car for an hour without a break: Slight chance of dozing  Lying down to rest in the afternoon when circumstances permit: Moderate chance of dozing  Sitting and talking to someone: Would never doze  Sitting quietly after a lunch without alcohol: Slight chance of dozing  In a car, while stopped for a few minutes in traffic: Would never doze  Total score: 8              Vitals:    04/23/24 1256   BP: 122/80   BP Location: Left arm   Patient Position: Sitting   Cuff Size: Adult   Pulse: 79   SpO2: 97%   Weight: (!) 171 kg (377 lb)   Height: 5' 5\" (1.651 m)       Body mass index is 62.74 kg/m².            EPWORTH SLEEPINESS SCORE  Total score: 8      Past History Since Last Sleep Center Visit:   She denies any significant changes to her heatlth since her last visit.  She began use of BiPAP equipment.  She reports that when she initially received it, she was sleeping very well.  Since starting use of PAP therapy, she is much more alert and has more energy.  She does not need to sleep all day, as she had in the past.  Lately, she has been having some difficulty using the equipment.  She is having a lot of air leaks from the full face mask, no matter how tight she makes the head gear.  Her head gets sore from the headgear.  She has not been using Oxygen with the equipment, although she has it at home.  She was not provided with an adapter for use with the PAP mask.  The patient reports that she cleans the equipment appropriately, using mild soap " and water.    The review of systems and following portions of the patient's history were reviewed and updated as appropriate: allergies, current medications, past family history, past medical history, past social history, past surgical history, and problem list.        OBJECTIVE  Equipment set up date:  2024  PAP Pressure: Nasal AutoBiPAP, max IPAP 25cm/min EPAP 12cm, PS 5  - pressure change to 21/12/5 - to have 2L oxygen bled in  Type of mask used: full face  DME Provider: [employer]    Physical Exam:     General Appearance:   Alert, cooperative, no distress, appears stated age, obese     Lungs:    Clear to auscultation bilaterally, respirations unlabored     Heart:   Regular rate and rhythm, S1 and S2 normal, no murmur, rub or gallop           ASSESSMENT / PLAN    1. Obstructive sleep apnea treated with bilevel positive airway pressure (BiPAP)  [facility]med DME Pressure Change    PAP DME Resupply/Reorder              Counseling / Coordination of Care  I have spent a total time of 30 minutes on 4/26/24 in caring for this patient including Risks and benefits of tx options, Instructions for management, Patient and family education, Importance of tx compliance, Risk factor reductions, Impressions, Counseling / Coordination of care, Documenting in the medical record, and Reviewing / ordering tests, medicine, procedures  .      Today I reviewed the patient's compliance data.  she has been able to use the equipment 97% of all days recorded.  Average usage was 4 or more hours 90% of all days recorded.  The patient uses the equipment for an average of 7 hours and 7 minutes per night.  The estimated AHI is 1.4 abnormal breathing events per hour.  The patient feels they benefit from the use of PAP equipment and would like to continue PAP therapy.  Response to treatment has been good.  A prescription for supplies has been provided to last for the next year.  She was given an adapter for oxygen in the office today.  She was  advised to use the 2L of O2 with the BiPAP.  She was instructed to call [employer] if she is having any difficulty using the mask with the supplemental oxygen.  We discussed use of the MyAir clyde.  She was advised to call if events are consistently higher than 5.    She will continue using this equipment at the settings noted above for the next 6 months.  At that timeshe will then return for a routine follow-up evaluation. I have asked the patient to contact the Sleep Disorders Center if she encounters any difficulties prior to that time.    The following instructions have been given to the patient today:    Patient Instructions   1.  Continue use of BiPAP equipment nightly  2.  Continue to clean your equipment, as discussed  3.  Contact the Sleep Disorders Center with any questions or concerns prior to your next visit, as needed  4.  Schedule visit for follow-up in 6 months  5.  Consider downloading the MyAir clyde and call if events are consistently higher than 5  6.  Consider the cushion for the head gear.  If that isn't helping, call [employer] for a mask fitting    Nursing Support:  When: Monday through Friday 7A-5PM except holidays  Where: Our direct line is [phone].    If you are having a true emergency please call 911.  In the event that the line is busy or it is after hours please leave a voice message and we will return your call.  Please speak clearly, leaving your full name, birth date, best number to reach you and the reason for your call.   Medication refills: We will need the name of the medication, the dosage, the ordering provider, whether you get a 30 or 90 day refill, and the pharmacy name and address.  Medications will be ordered by the provider only.  Nurses cannot call in prescriptions.  Please allow 7 days for medication refills.  Physician requested updates: If your provider requested that you call with an update after starting medication, please be ready to provide us the medication  "and dosage, what time you take your medication, the time you attempt to fall asleep, time you fall asleep, when you wake up, and what time you get out of bed.  Sleep Study Results: We will contact you with sleep study results and/or next steps after the physician has reviewed your testing.        RONALD Joaquin  [facility]      Portions of the record may have been created with voice recognition software. Occasional wrong word or \"sound a like\" substitutions may have occurred due to the inherent limitations of voice recognition software. Read the chart carefully and recognize, using context, where substitutions have occurred.       "

## 2024-04-24 ENCOUNTER — TELEPHONE (OUTPATIENT)
Dept: SLEEP CENTER | Facility: CLINIC | Age: 64
End: 2024-04-24

## 2024-04-26 LAB

## 2024-05-01 DIAGNOSIS — I50.9 ACUTE ON CHRONIC CONGESTIVE HEART FAILURE, UNSPECIFIED HEART FAILURE TYPE (HCC): ICD-10-CM

## 2024-05-01 DIAGNOSIS — I50.33 ACUTE ON CHRONIC HEART FAILURE WITH PRESERVED EJECTION FRACTION (HCC): ICD-10-CM

## 2024-05-01 DIAGNOSIS — I10 HYPERTENSION, UNSPECIFIED TYPE: ICD-10-CM

## 2024-05-01 RX ORDER — SPIRONOLACTONE 25 MG/1
12.5 TABLET ORAL DAILY
Qty: 90 TABLET | Refills: 0 | Status: SHIPPED | OUTPATIENT
Start: 2024-05-01

## 2024-05-01 RX ORDER — LOSARTAN POTASSIUM 25 MG/1
25 TABLET ORAL DAILY
Qty: 90 TABLET | Refills: 0 | Status: SHIPPED | OUTPATIENT
Start: 2024-05-01

## 2024-05-23 ENCOUNTER — HOSPITAL ENCOUNTER (OUTPATIENT)
Dept: RADIOLOGY | Age: 64
Discharge: HOME | End: 2024-05-23
Payer: COMMERCIAL

## 2024-05-23 VITALS — HEIGHT: 65 IN | WEIGHT: 293 LBS | BODY MASS INDEX: 48.82 KG/M2

## 2024-05-23 DIAGNOSIS — Z12.31 ENCOUNTER FOR SCREENING MAMMOGRAM FOR MALIGNANT NEOPLASM OF BREAST: ICD-10-CM

## 2024-05-23 PROCEDURE — 77067 SCR MAMMO BI INCL CAD: CPT

## 2024-05-23 PROCEDURE — 77063 BREAST TOMOSYNTHESIS BI: CPT

## 2024-05-28 DIAGNOSIS — F33.1 MODERATE EPISODE OF RECURRENT MAJOR DEPRESSIVE DISORDER (HCC): ICD-10-CM

## 2024-05-29 RX ORDER — ESCITALOPRAM OXALATE 10 MG/1
10 TABLET ORAL DAILY
Qty: 90 TABLET | Refills: 1 | Status: SHIPPED | OUTPATIENT
Start: 2024-05-29

## 2024-08-02 DIAGNOSIS — F33.1 MODERATE EPISODE OF RECURRENT MAJOR DEPRESSIVE DISORDER (HCC): ICD-10-CM

## 2024-08-02 DIAGNOSIS — I50.32 CHRONIC DIASTOLIC CONGESTIVE HEART FAILURE (HCC): Chronic | ICD-10-CM

## 2024-08-02 RX ORDER — ESCITALOPRAM OXALATE 10 MG/1
10 TABLET ORAL DAILY
Qty: 90 TABLET | Refills: 1 | Status: SHIPPED | OUTPATIENT
Start: 2024-08-02

## 2024-08-02 RX ORDER — METOPROLOL SUCCINATE 25 MG/1
25 TABLET, EXTENDED RELEASE ORAL
Qty: 30 TABLET | Refills: 5 | Status: SHIPPED | OUTPATIENT
Start: 2024-08-02

## 2024-08-28 DIAGNOSIS — I50.32 CHRONIC DIASTOLIC CONGESTIVE HEART FAILURE (HCC): Chronic | ICD-10-CM

## 2024-08-28 DIAGNOSIS — R32 URINARY INCONTINENCE, UNSPECIFIED TYPE: ICD-10-CM

## 2024-08-28 RX ORDER — OXYBUTYNIN CHLORIDE 5 MG/1
5 TABLET ORAL 3 TIMES DAILY
Qty: 180 TABLET | Refills: 1 | Status: SHIPPED | OUTPATIENT
Start: 2024-08-28

## 2024-08-28 RX ORDER — FUROSEMIDE 80 MG
80 TABLET ORAL 2 TIMES DAILY
Qty: 180 TABLET | Refills: 1 | Status: SHIPPED | OUTPATIENT
Start: 2024-08-28

## 2024-09-04 ENCOUNTER — LAB (OUTPATIENT)
Dept: LAB | Age: 64
End: 2024-09-04
Payer: COMMERCIAL

## 2024-09-04 DIAGNOSIS — E27.8 ADRENAL NODULE (HCC): ICD-10-CM

## 2024-09-04 DIAGNOSIS — E78.2 MIXED HYPERLIPIDEMIA: Chronic | ICD-10-CM

## 2024-09-04 DIAGNOSIS — E83.52 HYPERCALCEMIA: Chronic | ICD-10-CM

## 2024-09-04 DIAGNOSIS — E55.9 VITAMIN D DEFICIENCY: ICD-10-CM

## 2024-09-04 DIAGNOSIS — E21.3 HYPERPARATHYROIDISM (HCC): ICD-10-CM

## 2024-09-04 LAB
25(OH)D3 SERPL-MCNC: 22.3 NG/ML (ref 30–100)
ANION GAP SERPL CALCULATED.3IONS-SCNC: 10 MMOL/L (ref 4–13)
BUN SERPL-MCNC: 15 MG/DL (ref 5–25)
CALCIUM SERPL-MCNC: 10.3 MG/DL (ref 8.4–10.2)
CHLORIDE SERPL-SCNC: 104 MMOL/L (ref 96–108)
CHOLEST SERPL-MCNC: 192 MG/DL
CO2 SERPL-SCNC: 27 MMOL/L (ref 21–32)
CREAT SERPL-MCNC: 0.69 MG/DL (ref 0.6–1.3)
GFR SERPL CREATININE-BSD FRML MDRD: 93 ML/MIN/1.73SQ M
GLUCOSE P FAST SERPL-MCNC: 110 MG/DL (ref 65–99)
HDLC SERPL-MCNC: 38 MG/DL
LDLC SERPL CALC-MCNC: 127 MG/DL (ref 0–100)
NONHDLC SERPL-MCNC: 154 MG/DL
POTASSIUM SERPL-SCNC: 4.2 MMOL/L (ref 3.5–5.3)
PTH-INTACT SERPL-MCNC: 108.4 PG/ML (ref 12–88)
SODIUM SERPL-SCNC: 141 MMOL/L (ref 135–147)
TRIGL SERPL-MCNC: 135 MG/DL

## 2024-09-04 PROCEDURE — 36415 COLL VENOUS BLD VENIPUNCTURE: CPT

## 2024-09-04 PROCEDURE — 84402 ASSAY OF FREE TESTOSTERONE: CPT

## 2024-09-04 PROCEDURE — 83970 ASSAY OF PARATHORMONE: CPT

## 2024-09-04 PROCEDURE — 82306 VITAMIN D 25 HYDROXY: CPT

## 2024-09-04 PROCEDURE — 80061 LIPID PANEL: CPT

## 2024-09-04 PROCEDURE — 84403 ASSAY OF TOTAL TESTOSTERONE: CPT

## 2024-09-05 LAB
TESTOST FREE SERPL-MCNC: 1.6 PG/ML (ref 0–4.2)
TESTOST SERPL-MCNC: 6 NG/DL (ref 3–67)

## 2025-02-19 DIAGNOSIS — R32 URINARY INCONTINENCE, UNSPECIFIED TYPE: ICD-10-CM

## 2025-02-20 RX ORDER — OXYBUTYNIN CHLORIDE 5 MG/1
5 TABLET ORAL 3 TIMES DAILY
Qty: 180 TABLET | Refills: 2 | Status: SHIPPED | OUTPATIENT
Start: 2025-02-20

## 2025-03-14 ENCOUNTER — APPOINTMENT (OUTPATIENT)
Dept: RADIOLOGY | Facility: HOSPITAL | Age: 65
End: 2025-03-14
Payer: COMMERCIAL

## 2025-03-14 ENCOUNTER — HOSPITAL ENCOUNTER (EMERGENCY)
Facility: HOSPITAL | Age: 65
Discharge: HOME | End: 2025-03-14
Payer: COMMERCIAL

## 2025-03-14 ENCOUNTER — OFFICE VISIT (OUTPATIENT)
Dept: INTERNAL MEDICINE CLINIC | Age: 65
End: 2025-03-14
Payer: COMMERCIAL

## 2025-03-14 ENCOUNTER — APPOINTMENT (EMERGENCY)
Dept: CT IMAGING | Facility: HOSPITAL | Age: 65
End: 2025-03-14
Payer: COMMERCIAL

## 2025-03-14 VITALS
BODY MASS INDEX: 48.82 KG/M2 | HEIGHT: 65 IN | HEART RATE: 72 BPM | WEIGHT: 293 LBS | RESPIRATION RATE: 20 BRPM | DIASTOLIC BLOOD PRESSURE: 78 MMHG | OXYGEN SATURATION: 93 % | SYSTOLIC BLOOD PRESSURE: 142 MMHG | TEMPERATURE: 98.9 F

## 2025-03-14 VITALS
SYSTOLIC BLOOD PRESSURE: 110 MMHG | HEART RATE: 69 BPM | OXYGEN SATURATION: 97 % | TEMPERATURE: 97.8 F | DIASTOLIC BLOOD PRESSURE: 58 MMHG | RESPIRATION RATE: 20 BRPM

## 2025-03-14 DIAGNOSIS — Z13.6 SCREENING FOR CARDIOVASCULAR CONDITION: ICD-10-CM

## 2025-03-14 DIAGNOSIS — E78.2 MIXED HYPERLIPIDEMIA: Chronic | ICD-10-CM

## 2025-03-14 DIAGNOSIS — Z00.00 MEDICARE ANNUAL WELLNESS VISIT, SUBSEQUENT: Primary | ICD-10-CM

## 2025-03-14 DIAGNOSIS — Z12.31 ENCOUNTER FOR SCREENING MAMMOGRAM FOR BREAST CANCER: ICD-10-CM

## 2025-03-14 DIAGNOSIS — E66.01 MORBID OBESITY WITH BODY MASS INDEX (BMI) OF 60.0 TO 69.9 IN ADULT (HCC): ICD-10-CM

## 2025-03-14 DIAGNOSIS — I50.32 CHRONIC DIASTOLIC CONGESTIVE HEART FAILURE (HCC): Chronic | ICD-10-CM

## 2025-03-14 DIAGNOSIS — G47.33 OBSTRUCTIVE SLEEP APNEA TREATED WITH BILEVEL POSITIVE AIRWAY PRESSURE (BIPAP): ICD-10-CM

## 2025-03-14 DIAGNOSIS — R73.9 HYPERGLYCEMIA: ICD-10-CM

## 2025-03-14 DIAGNOSIS — Z12.11 SCREENING FOR COLORECTAL CANCER: ICD-10-CM

## 2025-03-14 DIAGNOSIS — Z12.4 SCREENING FOR CERVICAL CANCER: ICD-10-CM

## 2025-03-14 DIAGNOSIS — I89.0 LYMPHEDEMA OF BOTH LOWER EXTREMITIES: Chronic | ICD-10-CM

## 2025-03-14 DIAGNOSIS — Z13.1 SCREENING FOR DIABETES MELLITUS: ICD-10-CM

## 2025-03-14 DIAGNOSIS — R06.09 EXERTIONAL DYSPNEA: Primary | ICD-10-CM

## 2025-03-14 DIAGNOSIS — F17.210 SMOKING GREATER THAN 20 PACK YEARS: ICD-10-CM

## 2025-03-14 DIAGNOSIS — I10 ESSENTIAL HYPERTENSION: Chronic | ICD-10-CM

## 2025-03-14 DIAGNOSIS — Z78.0 ENCOUNTER FOR OSTEOPOROSIS SCREENING IN ASYMPTOMATIC POSTMENOPAUSAL PATIENT: ICD-10-CM

## 2025-03-14 DIAGNOSIS — H91.8X3 OTHER SPECIFIED HEARING LOSS OF BOTH EARS: ICD-10-CM

## 2025-03-14 DIAGNOSIS — Z13.820 ENCOUNTER FOR OSTEOPOROSIS SCREENING IN ASYMPTOMATIC POSTMENOPAUSAL PATIENT: ICD-10-CM

## 2025-03-14 DIAGNOSIS — F33.1 MODERATE EPISODE OF RECURRENT MAJOR DEPRESSIVE DISORDER (HCC): ICD-10-CM

## 2025-03-14 DIAGNOSIS — Z78.0 ASYMPTOMATIC POSTMENOPAUSAL STATE: ICD-10-CM

## 2025-03-14 DIAGNOSIS — Z23 ENCOUNTER FOR IMMUNIZATION: ICD-10-CM

## 2025-03-14 DIAGNOSIS — R42 INTERMITTENT LIGHTHEADEDNESS: ICD-10-CM

## 2025-03-14 DIAGNOSIS — J43.8 OTHER EMPHYSEMA (HCC): Chronic | ICD-10-CM

## 2025-03-14 DIAGNOSIS — J96.21 ACUTE ON CHRONIC HYPOXIC RESPIRATORY FAILURE (HCC): ICD-10-CM

## 2025-03-14 DIAGNOSIS — D35.00 ADRENAL ADENOMA: ICD-10-CM

## 2025-03-14 DIAGNOSIS — E21.3 HYPERPARATHYROIDISM (HCC): ICD-10-CM

## 2025-03-14 DIAGNOSIS — Z12.12 SCREENING FOR COLORECTAL CANCER: ICD-10-CM

## 2025-03-14 DIAGNOSIS — F17.210 CIGARETTE NICOTINE DEPENDENCE WITHOUT COMPLICATION: ICD-10-CM

## 2025-03-14 LAB
2HR DELTA HS TROPONIN: 0 NG/L
ALBUMIN SERPL BCG-MCNC: 4.5 G/DL (ref 3.5–5)
ALP SERPL-CCNC: 78 U/L (ref 34–104)
ALT SERPL W P-5'-P-CCNC: 30 U/L (ref 7–52)
ANION GAP SERPL CALCULATED.3IONS-SCNC: 10 MMOL/L (ref 4–13)
AST SERPL W P-5'-P-CCNC: 25 U/L (ref 13–39)
ATRIAL RATE: 87 BPM
BASE EX.OXY STD BLDV CALC-SCNC: 59.4 % (ref 60–80)
BASE EXCESS BLDV CALC-SCNC: 1.9 MMOL/L
BASOPHILS # BLD AUTO: 0.03 THOUSANDS/ÂΜL (ref 0–0.1)
BASOPHILS NFR BLD AUTO: 0 % (ref 0–1)
BILIRUB SERPL-MCNC: 0.48 MG/DL (ref 0.2–1)
BNP SERPL-MCNC: 23 PG/ML (ref 0–100)
BUN SERPL-MCNC: 13 MG/DL (ref 5–25)
CALCIUM SERPL-MCNC: 10.9 MG/DL (ref 8.4–10.2)
CARDIAC TROPONIN I PNL SERPL HS: 6 NG/L (ref ?–50)
CARDIAC TROPONIN I PNL SERPL HS: 6 NG/L (ref ?–50)
CHLORIDE SERPL-SCNC: 105 MMOL/L (ref 96–108)
CO2 SERPL-SCNC: 26 MMOL/L (ref 21–32)
CREAT SERPL-MCNC: 0.77 MG/DL (ref 0.6–1.3)
D DIMER PPP FEU-MCNC: 0.53 UG/ML FEU
EOSINOPHIL # BLD AUTO: 0.12 THOUSAND/ÂΜL (ref 0–0.61)
EOSINOPHIL NFR BLD AUTO: 2 % (ref 0–6)
ERYTHROCYTE [DISTWIDTH] IN BLOOD BY AUTOMATED COUNT: 13.5 % (ref 11.6–15.1)
GFR SERPL CREATININE-BSD FRML MDRD: 82 ML/MIN/1.73SQ M
GLUCOSE SERPL-MCNC: 137 MG/DL (ref 65–140)
HCO3 BLDV-SCNC: 27.7 MMOL/L (ref 24–30)
HCT VFR BLD AUTO: 40.7 % (ref 34.8–46.1)
HGB BLD-MCNC: 13.4 G/DL (ref 11.5–15.4)
IMM GRANULOCYTES # BLD AUTO: 0.04 THOUSAND/UL (ref 0–0.2)
IMM GRANULOCYTES NFR BLD AUTO: 1 % (ref 0–2)
LYMPHOCYTES # BLD AUTO: 1.72 THOUSANDS/ÂΜL (ref 0.6–4.47)
LYMPHOCYTES NFR BLD AUTO: 21 % (ref 14–44)
MCH RBC QN AUTO: 30.6 PG (ref 26.8–34.3)
MCHC RBC AUTO-ENTMCNC: 32.9 G/DL (ref 31.4–37.4)
MCV RBC AUTO: 93 FL (ref 82–98)
MONOCYTES # BLD AUTO: 0.69 THOUSAND/ÂΜL (ref 0.17–1.22)
MONOCYTES NFR BLD AUTO: 9 % (ref 4–12)
NEUTROPHILS # BLD AUTO: 5.48 THOUSANDS/ÂΜL (ref 1.85–7.62)
NEUTS SEG NFR BLD AUTO: 67 % (ref 43–75)
NRBC BLD AUTO-RTO: 0 /100 WBCS
O2 CT BLDV-SCNC: 11.7 ML/DL
P AXIS: 58 DEGREES
PCO2 BLDV: 48.2 MM HG (ref 42–50)
PH BLDV: 7.38 [PH] (ref 7.3–7.4)
PLATELET # BLD AUTO: 189 THOUSANDS/UL (ref 149–390)
PMV BLD AUTO: 10.8 FL (ref 8.9–12.7)
PO2 BLDV: 31.4 MM HG (ref 35–45)
POTASSIUM SERPL-SCNC: 3.6 MMOL/L (ref 3.5–5.3)
PR INTERVAL: 160 MS
PROT SERPL-MCNC: 7.7 G/DL (ref 6.4–8.4)
QRS AXIS: 55 DEGREES
QRSD INTERVAL: 96 MS
QT INTERVAL: 364 MS
QTC INTERVAL: 438 MS
RBC # BLD AUTO: 4.38 MILLION/UL (ref 3.81–5.12)
SODIUM SERPL-SCNC: 141 MMOL/L (ref 135–147)
T WAVE AXIS: 59 DEGREES
VENTRICULAR RATE: 87 BPM
WBC # BLD AUTO: 8.08 THOUSAND/UL (ref 4.31–10.16)

## 2025-03-14 PROCEDURE — 71250 CT THORAX DX C-: CPT

## 2025-03-14 PROCEDURE — 85379 FIBRIN DEGRADATION QUANT: CPT

## 2025-03-14 PROCEDURE — 84484 ASSAY OF TROPONIN QUANT: CPT

## 2025-03-14 PROCEDURE — 36415 COLL VENOUS BLD VENIPUNCTURE: CPT

## 2025-03-14 PROCEDURE — 80053 COMPREHEN METABOLIC PANEL: CPT

## 2025-03-14 PROCEDURE — G0439 PPPS, SUBSEQ VISIT: HCPCS | Performed by: INTERNAL MEDICINE

## 2025-03-14 PROCEDURE — 71046 X-RAY EXAM CHEST 2 VIEWS: CPT

## 2025-03-14 PROCEDURE — 99285 EMERGENCY DEPT VISIT HI MDM: CPT

## 2025-03-14 PROCEDURE — 82805 BLOOD GASES W/O2 SATURATION: CPT

## 2025-03-14 PROCEDURE — 93010 ELECTROCARDIOGRAM REPORT: CPT | Performed by: INTERNAL MEDICINE

## 2025-03-14 PROCEDURE — 85025 COMPLETE CBC W/AUTO DIFF WBC: CPT

## 2025-03-14 PROCEDURE — 93005 ELECTROCARDIOGRAM TRACING: CPT

## 2025-03-14 PROCEDURE — 83880 ASSAY OF NATRIURETIC PEPTIDE: CPT

## 2025-03-14 PROCEDURE — 99215 OFFICE O/P EST HI 40 MIN: CPT | Performed by: INTERNAL MEDICINE

## 2025-03-14 NOTE — PROGRESS NOTES
Name: Valerie Mejia      : 1960      MRN: [id number]  Encounter Provider: Laurel Lyons DO  Encounter Date: 3/14/2025   Encounter department: [facility]    Assessment & Plan  Medicare annual wellness visit, subsequent    -Medicare annual wellness visit done   - last Cologuard test was done on 3/4/2024 and was negative and she will be due for repeat Cologuard test in .  -  Lung cancer screen is indicated so we will order 1 today.  - DEXA scan is indicated so we will order a DEXA scan today.  - Pap smear is not indicated because patient has a history of total hysterectomy.  -last mammogram was done in May 2024.  Will order mammogram today.  Of note, patient has a family history of breast cancer in her mother.  -She is up-to-date with 3 COVID vaccines, shingles vaccine and states that she got a pneumococcal vaccine at [facility] ago.  She does not want any more vaccines.  -follow-up in 6 months.    Essential hypertension    Orders:  •  CBC and differential; Future  •  Transfer to other facility    Chronic diastolic congestive heart failure (HCC)  Wt Readings from Last 3 Encounters:   25 (!) 171 kg (376 lb 6.4 oz)   24 (!) 171 kg (377 lb)   24 (!) 171 kg (377 lb)             Orders:  •  Transfer to other facility    Obstructive sleep apnea treated with bilevel positive airway pressure (BiPAP)    Orders:  •  Ambulatory Referral to Pulmonology; Future  •  Transfer to other facility    Other emphysema (HCC)    Orders:  •  Ambulatory Referral to Pulmonology; Future  •  Transfer to other facility    Hyperparathyroidism (HCC)         Moderate episode of recurrent major depressive disorder (HCC)  Depression Screening Follow-up Plan: Patient's depression screening was positive with a PHQ-9 score of 7.        Cigarette nicotine dependence without complication    Orders:  •  Transfer to other facility    Lymphedema of both lower extremities    Orders:  •   Ambulatory Referral to Vascular Surgery; Future    Mixed hyperlipidemia    Orders:  •  Lipid panel; Future  •  Ambulatory Referral to Pulmonology; Future  •  Transfer to other facility    Morbid obesity with body mass index (BMI) of 60.0 to 69.9 in adult (HCC)    Orders:  •  Ambulatory Referral to Pulmonology; Future  •  Transfer to other facility    Smoking greater than 20 pack years    Orders:  •  CT lung screening program; Future    Encounter for screening mammogram for breast cancer    Orders:  •  Mammo screening bilateral w 3d and cad; Future    Encounter for osteoporosis screening in asymptomatic postmenopausal patient    Orders:  •  DXA bone density spine hip and pelvis; Future    Hyperglycemia    Orders:  •  Glucose, fasting; Future  •  Hemoglobin A1C; Future    Screening for diabetes mellitus    Orders:  •  Glucose, fasting; Future    Screening for cardiovascular condition    Orders:  •  Lipid panel; Future    Encounter for screening mammogram for breast cancer    Orders:  •  Mammo screening bilateral w 3d and cad; Future    Screening for cervical cancer         Screening for colorectal cancer         Asymptomatic postmenopausal state    Orders:  •  DXA bone density spine hip and pelvis; Future    Encounter for immunization         Acute on chronic hypoxic respiratory failure (HCC)    Orders:  •  Ambulatory Referral to Pulmonology; Future  •  CBC and differential; Future  •  Transfer to other facility    Intermittent lightheadedness    Orders:  •  Ambulatory Referral to Pulmonology; Future  •  CBC and differential; Future  •  Transfer to other facility    Other specified hearing loss of both ears    Orders:  •  Ambulatory Referral to Otolaryngology; Future      Depression Screening and Follow-up Plan: Patient's depression screening was positive with a PHQ-9 score of 7.   Patient with underlying depression and was advised to continue current medications as prescribed. Patient advised to follow-up with PCP  for further management.     Tobacco Cessation Counseling: Tobacco cessation counseling was provided. The patient is sincerely urged to quit consumption of tobacco. She is not ready to quit tobacco. Patient is precontemplative.      Preventive health issues were discussed with patient, and age appropriate screening tests were ordered as noted in patient's After Visit Summary. Personalized health advice and appropriate referrals for health education or preventive services given if needed, as noted in patient's After Visit Summary.    History of Present Illness     HPI   Patient Care Team:  Laurel Lyons DO as PCP - General (Internal Medicine)  Laurel Lyons DO (Internal Medicine)  Bayron Dsouza MD as Cardiologist (Cardiology)    Review of Systems  Medical History Reviewed by provider this encounter:  Tobacco  Allergies  Meds  Problems  Med Hx  Surg Hx  Fam Hx       Annual Wellness Visit Questionnaire   Valerie is here for her Subsequent Wellness visit. Last Medicare Wellness visit information reviewed, patient interviewed and updates made to the record today.      Health Risk Assessment:   Patient rates overall health as fair. Patient feels that their physical health rating is slightly worse. Patient is dissatisfied with their life. Eyesight was rated as slightly worse. Hearing was rated as much worse. Patient feels that their emotional and mental health rating is same. Patients states they are never, rarely angry. Patient states they are sometimes unusually tired/fatigued. Pain experienced in the last 7 days has been some. Patient's pain rating has been 4/10. Patient states that she has experienced no weight loss or gain in last 6 months.     Depression Screening:   PHQ-9 Score: 7      Fall Risk Screening:   In the past year, patient has experienced: no history of falling in past year      Urinary Incontinence Screening:   Patient has leaked urine accidently in the last six months.     Home  Safety:  Patient has trouble with stairs inside or outside of their home. Patient has working smoke alarms and has working carbon monoxide detector. animals    Nutrition:   Current diet is Regular.     Medications:   Patient is currently taking over-the-counter supplements. OTC medications include: see medication list. Patient is able to manage medications.     Activities of Daily Living (ADLs)/Instrumental Activities of Daily Living (IADLs):   Walk and transfer into and out of bed and chair?: Yes  Dress and groom yourself?: Yes    Bathe or shower yourself?: Yes    Feed yourself? Yes  Do your laundry/housekeeping?: Yes  Manage your money, pay your bills and track your expenses?: Yes  Make your own meals?: Yes    Do your own shopping?: Yes    Previous Hospitalizations:   Any hospitalizations or ED visits within the last 12 months?: No      Advance Care Planning:   Living will: No    Durable POA for healthcare: No    Advanced directive: No      PREVENTIVE SCREENINGS      Cardiovascular Screening:    General: Screening Not Indicated and History Lipid Disorder      Diabetes Screening:     General: Screening Current      Colorectal Cancer Screening:     General: Screening Current      Breast Cancer Screening:     General: Screening Current      Cervical Cancer Screening:    General: Screening Not Indicated      Hepatitis C Screening:    General: Screening Current    Screening, Brief Intervention, and Referral to Treatment (SBIRT)     Screening  Typical number of drinks in a day: 0  Typical number of drinks in a week: 0  Interpretation: Low risk drinking behavior.    Single Item Drug Screening:  How often have you used an illegal drug (including marijuana) or a prescription medication for non-medical reasons in the past year? never    Single Item Drug Screen Score: 0  Interpretation: Negative screen for possible drug use disorder    Social Drivers of Health     Financial Resource Strain: Medium Risk (12/28/2022)     "Overall Financial Resource Strain (CARDIA)    • Difficulty of Paying Living Expenses: Somewhat hard   Food Insecurity: No Food Insecurity (3/14/2025)    Hunger Vital Sign    • Worried About Running Out of Food in the Last Year: Never true    • Ran Out of Food in the Last Year: Never true   Transportation Needs: No Transportation Needs (3/14/2025)    PRAPARE - Transportation    • Lack of Transportation (Medical): No    • Lack of Transportation (Non-Medical): No   Housing Stability: Low Risk  (3/14/2025)    Housing Stability Vital Sign    • Unable to Pay for Housing in the Last Year: No    • Number of Times Moved in the Last Year: 1    • Homeless in the Last Year: No   Utilities: Not At Risk (3/14/2025)    [facility] Utilities    • Threatened with loss of utilities: No     No results found.    Objective   /78 (BP Location: Left arm, Patient Position: Sitting, Cuff Size: Large)   Pulse 72   Temp 98.9 °F (37.2 °C) (Temporal)   Resp 20   Ht 5' 5\" (1.651 m)   Wt (!) 171 kg (376 lb 6.4 oz)   SpO2 93%   BMI 62.64 kg/m²     Physical Exam    "

## 2025-03-14 NOTE — ASSESSMENT & PLAN NOTE
Orders:  •  Lipid panel; Future  •  Ambulatory Referral to Pulmonology; Future  •  Transfer to other facility

## 2025-03-14 NOTE — ASSESSMENT & PLAN NOTE
Wt Readings from Last 3 Encounters:   03/14/25 (!) 171 kg (376 lb 6.4 oz)   05/23/24 (!) 171 kg (377 lb)   04/23/24 (!) 171 kg (377 lb)             Orders:  •  Transfer to other facility

## 2025-03-14 NOTE — PROGRESS NOTES
Assessment/Plan:    Acute on chronic hypoxic and hypercapnic respiratory failure  -Patient reports worsening hypoxia into the 80s, low of 85% on exertion at home with worsening  palpitations and lightheadedness  -Symptoms may be secondary to acute CHF versus acute COPD versus multifactorial  -She will need labs including BNP, chest x-ray, CBC, CMP, COVID, flu, RSV and troponin  -We will transfer her to the emergency department for evaluation and treatment.  -Continue with oxygen as needed    Medicare annual wellness visit, subsequent  -Medicare annual wellness visit done   - last Cologuard test was done on 3/4/2024 and was negative and she will be due for repeat Cologuard test in 2027.  -  Lung cancer screen is indicated so we will order 1 today.  - DEXA scan is indicated so we will order a DEXA scan today.  - Pap smear is not indicated because patient has a history of total hysterectomy.  -last mammogram was done in May 2024.  Will order mammogram today.  Of note, patient has a family history of breast cancer in her mother.  -She is up-to-date with 3 COVID vaccines, shingles vaccine and states that she got a pneumococcal vaccine at [facility] years ago.  She does not want any more vaccines.  -follow-up in 6 months.    Hypertension   - blood pressure is mildly elevated  -continue with losartan, metoprolol, spironolactone and furosemide  -continue with a low-salt diet and with exercise      Diastolic CHF  -Patient has worsening hypoxia with oxygen saturation dropping to 85% on exertion  -We will transfer patient to the emergency department for proper evaluation.  -Continue with furosemide and spironolactone    Obstructive sleep apnea  -Well-controlled on BiPAP  -Continue with BiPAP machine    Emphysema  -With worsening hypoxia  -We will transfer patient to the emergency department for evaluation  -Continue oxygen as needed and wean as tolerated  -Will also refer patient to pulmonology  -Continue with albuterol inhaler as  needed    Hyperparathyroidism  -Last PTH done on 9//24 was elevated at 108, up from 102.4 and patient was supposed to follow-up with endocrinology but did not  -Follow-up with endocrinology    Depression  -Stable  -Continue with escitalopram    Nicotine dependence  -Patient was counseled to quit smoking  -I spent about 3 minutes on smoking cessation counseling    Lymphedema  -Worsening  -Will refer patient to vascular surgery again  -Follow-up with vascular surgery    Hyperlipidemia  -Stable  -Will order updated lipid panel  -Continue with a heart healthy diet and exercise    BMI 62.64  -Patient is interested in Wegovy  -She reports tachycardia after exertion and I discussed with her that Wegovy has a side effect of increased heart rate and that we should complete her evaluation and treatment for her current acute cardiopulmonary issues and then discuss a GLP agonist at a later date in order to avoid worsening cardiac function and the possibility of tachycardia mediated cardiomyopathy.  -Continue with diet and exercise    Hearing loss  -Referral to ENT ordered as requested by patient.     Diagnoses and all orders for this visit:    Medicare annual wellness visit, subsequent    Essential hypertension  -     CBC and differential; Future  -     Transfer to other facility    Chronic diastolic congestive heart failure (HCC)  -     Transfer to other facility    Obstructive sleep apnea treated with bilevel positive airway pressure (BiPAP)  -     Ambulatory Referral to Pulmonology; Future  -     Transfer to other facility    Other emphysema (HCC)  -     Ambulatory Referral to Pulmonology; Future  -     Transfer to other facility    Hyperparathyroidism (HCC)    Moderate episode of recurrent major depressive disorder (HCC)    Cigarette nicotine dependence without complication  -     Transfer to other facility    Lymphedema of both lower extremities  -     Ambulatory Referral to Vascular Surgery; Future    Mixed  hyperlipidemia  -     Lipid panel; Future  -     Ambulatory Referral to Pulmonology; Future  -     Transfer to other facility    Morbid obesity with body mass index (BMI) of 60.0 to 69.9 in adult (HCC)  -     Ambulatory Referral to Pulmonology; Future  -     Transfer to other facility    Smoking greater than 20 pack years  -     CT lung screening program; Future    Encounter for screening mammogram for breast cancer  -     Mammo screening bilateral w 3d and cad; Future    Encounter for osteoporosis screening in asymptomatic postmenopausal patient  -     DXA bone density spine hip and pelvis; Future    Hyperglycemia  -     Glucose, fasting; Future  -     Hemoglobin A1C; Future    Screening for diabetes mellitus  -     Glucose, fasting; Future    Screening for cardiovascular condition  -     Lipid panel; Future    Encounter for screening mammogram for breast cancer  Comments:  last mammogram was in May 2024 and she will be due in May 2025, + fam hx of breast ca in mom  Orders:  -     Mammo screening bilateral w 3d and cad; Future    Screening for cervical cancer  Comments:  has a hx of total hysterectomy and does not need any more pap smears - had precancerous cells    Screening for colorectal cancer  Comments:  last cologuard was on 3/4/24 and was neg and she will be due in 2027. no fami hx of colon ca    Asymptomatic postmenopausal state  -     DXA bone density spine hip and pelvis; Future    Encounter for immunization  Comments:  she is up to date with 3 covid shots, shingles vaccine and states that she got the pneumcoccal vaccine at [facility] years ago    Acute on chronic hypoxic respiratory failure (HCC)  -     Ambulatory Referral to Pulmonology; Future  -     CBC and differential; Future  -     Transfer to other facility    Intermittent lightheadedness  -     Ambulatory Referral to Pulmonology; Future  -     CBC and differential; Future  -     Transfer to other facility    Other specified hearing loss of both  ears  -     Ambulatory Referral to Otolaryngology; Future          Subjective:      Patient ID: Valerie Mejia is a 65 y.o. female.    HPI    Patient presents with her daughter and granddaughter for a medicare annual wellness visit as well as a follow up visit regarding her chronic hypoxic respiratory failure, emphysema, nicotine dependence, hypertension, hyperlipidemia, obstructive sleep apnea, hyperparathyroidism, essential hypertension.  She is supposed to be on o2 at home and is not cos she states that her adapter broke and she henao snot been able to find it to replace it.  She has been sob on exdertion   When she goes up the stairs she sometimes has to stop and has to be on o2.  She states that her o2 sat was at 85% and hr was at 110  She still smokes - occasionally     Smokes around daughter who also smokes   Does not have a health care poa and will work on that.  She states that she does not smoke up to a pack every 3 months - she smoked 51years, max of one pack a day and cut down her current smoking quantity about 4-5 years ago   Has been having worsening sob x 2 weeks with lightheadedness, leg swelling, palpitations, chest pain, cough, nasal congestion, postnasal drip, chills and had diarrhea and nausea yesterday.  She also admits to chronic knee pain.  She uses a bipap and uses it every night     The following portions of the patient's history were reviewed and updated as appropriate: She  has a past medical history of Acute on chronic heart failure with preserved ejection fraction (HFpEF) ([facility]) (12/2/2022), Arthritis, CHF (congestive heart failure) ([facility]), COPD (chronic obstructive pulmonary disease) ([facility]), COVID-19 virus infection (2/27/2023), Depression, Eating disorder, Hypertension, Hypoxia (12/2/2022), Obesity, Urinary incontinence, and Visual impairment (2018).  She   Patient Active Problem List    Diagnosis Date Noted   • Lung cancer screening declined by patient 03/13/2024   • Acute pain of left  shoulder 03/13/2024   • Hyperparathyroidism ([facility]) 09/11/2023   • Adrenal nodule ([facility]) 07/12/2023   • Urinary incontinence 07/12/2023   • Ambulatory dysfunction 07/11/2023   • Moderate episode of recurrent major depressive disorder ([facility]) 04/03/2023   • Hypercalcemia 01/16/2023   • Pre-diabetes 08/30/2022   • Lymphedema of both lower extremities 03/16/2021   • Chronic diastolic congestive heart failure ([facility]) 02/12/2021   • Insomnia due to medical condition 10/08/2020   • Obstructive sleep apnea treated with bilevel positive airway pressure (BiPAP) 09/02/2020   • Cigarette nicotine dependence without complication 03/06/2020   • Morbid obesity with body mass index (BMI) of 60.0 to 69.9 in adult ([facility]) 03/05/2020   • Essential hypertension 07/10/2019   • Mixed stress and urge urinary incontinence 07/10/2019   • COPD (chronic obstructive pulmonary disease) ([facility]) 07/10/2019   • Mixed hyperlipidemia 07/10/2019     She  has a past surgical history that includes Hysterectomy; Oophorectomy (Bilateral); Replacement total knee (Right, 2016); Total abdominal hysterectomy w/ bilateral salpingoophorectomy (2006); Incontinence surgery; Joint replacement (2015); and Tooth extraction (02/15/2023).  Her family history includes Aneurysm in her mother; Breast cancer (age of onset: 70) in her mother; Dementia in her maternal aunt, maternal grandmother, and mother; Diabetes in her father; Heart disease in her maternal grandfather, paternal grandfather, and paternal grandmother; Heart failure in her father; Lung cancer in her sister; No Known Problems in her daughter, maternal aunt, paternal aunt, paternal aunt, and son; Other in her mother; Polycystic ovary syndrome in her daughter.  She  reports that she has been smoking cigarettes. She started smoking about 49 years ago. She has a 70 pack-year smoking history. She has never used smokeless tobacco. She reports that she does not currently use alcohol. She reports that she does not currently  use drugs after having used the following drugs: Marijuana.  Current Outpatient Medications   Medication Sig Dispense Refill   • albuterol (PROVENTIL HFA,VENTOLIN HFA) 90 mcg/act inhaler Inhale 2 puffs every 6 (six) hours as needed for wheezing or shortness of breath 18 g 1   • escitalopram (LEXAPRO) 10 mg tablet TAKE 1 TABLET (10 MG TOTAL) BY MOUTH DAILY 90 tablet 1   • furosemide (LASIX) 80 mg tablet Take 1 tablet (80 mg total) by mouth 2 (two) times a day (Patient taking differently: Take 40 mg by mouth daily) 180 tablet 1   • losartan (COZAAR) 25 mg tablet Take 1 tablet (25 mg total) by mouth daily 90 tablet 0   • metoprolol succinate (TOPROL-XL) 25 mg 24 hr tablet TAKE 1 TABLET (25 MG TOTAL) BY MOUTH DAILY AT BEDTIME 30 tablet 5   • oxybutynin (DITROPAN) 5 mg tablet Take 1 tablet (5 mg total) by mouth 3 (three) times a day (Patient taking differently: Take 5 mg by mouth 2 (two) times a day) 180 tablet 2   • spironolactone (ALDACTONE) 25 mg tablet Take 0.5 tablets (12.5 mg total) by mouth daily 90 tablet 0     No current facility-administered medications for this visit.     Current Outpatient Medications on File Prior to Visit   Medication Sig   • albuterol (PROVENTIL HFA,VENTOLIN HFA) 90 mcg/act inhaler Inhale 2 puffs every 6 (six) hours as needed for wheezing or shortness of breath   • escitalopram (LEXAPRO) 10 mg tablet TAKE 1 TABLET (10 MG TOTAL) BY MOUTH DAILY   • furosemide (LASIX) 80 mg tablet Take 1 tablet (80 mg total) by mouth 2 (two) times a day (Patient taking differently: Take 40 mg by mouth daily)   • losartan (COZAAR) 25 mg tablet Take 1 tablet (25 mg total) by mouth daily   • metoprolol succinate (TOPROL-XL) 25 mg 24 hr tablet TAKE 1 TABLET (25 MG TOTAL) BY MOUTH DAILY AT BEDTIME   • oxybutynin (DITROPAN) 5 mg tablet Take 1 tablet (5 mg total) by mouth 3 (three) times a day (Patient taking differently: Take 5 mg by mouth 2 (two) times a day)   • spironolactone (ALDACTONE) 25 mg tablet Take 0.5  "tablets (12.5 mg total) by mouth daily   • [DISCONTINUED] esomeprazole (NexIUM) 20 mg capsule Take 1 capsule (20 mg total) by mouth daily in the early morning     No current facility-administered medications on file prior to visit.     She is allergic to lisinopril..    Review of Systems   Constitutional:  Positive for chills (2/2 weather). Negative for activity change, fatigue, fever and unexpected weight change.   HENT:  Positive for congestion (hammad in the am from her cpap and it clears when she is up for a while), hearing loss and postnasal drip. Negative for ear pain, rhinorrhea, sinus pressure and sore throat.    Eyes:  Negative for pain.   Respiratory:  Positive for cough (mild and chronic) and shortness of breath (on exertion). Negative for choking, chest tightness and wheezing.    Cardiovascular:  Positive for chest pain (lower chest pressure - chronic and intermittent and radiates to her temples - has hd it for years - ? stress related), palpitations and leg swelling (worsening hammad on the left leg).   Gastrointestinal:  Positive for diarrhea (4-5 x yesterday up from about 3 daily) and nausea. Negative for abdominal pain, constipation and vomiting.   Genitourinary:  Negative for dysuria and hematuria.   Musculoskeletal:  Positive for arthralgias (knee pain s/p R  tkr in 2015). Negative for back pain, gait problem, joint swelling, myalgias and neck stiffness.   Skin:  Negative for pallor and rash.   Neurological:  Negative for dizziness, tremors, seizures, syncope, light-headedness and headaches.   Hematological:  Negative for adenopathy.   Psychiatric/Behavioral:  Negative for behavioral problems.          Objective:      /78 (BP Location: Left arm, Patient Position: Sitting, Cuff Size: Large)   Pulse 72   Temp 98.9 °F (37.2 °C) (Temporal)   Resp 20   Ht 5' 5\" (1.651 m)   Wt (!) 171 kg (376 lb 6.4 oz)   SpO2 93%   BMI 62.64 kg/m²          Physical Exam  Constitutional:       General: She is not " in acute distress.     Appearance: She is well-developed. She is obese. She is not diaphoretic.      Comments: BMI 60.64   HENT:      Head: Normocephalic and atraumatic.      Right Ear: External ear normal.      Left Ear: External ear normal.      Nose: Nose normal.      Mouth/Throat:      Mouth: Mucous membranes are dry.      Pharynx: Posterior oropharyngeal erythema present. No oropharyngeal exudate.   Eyes:      General: No scleral icterus.        Right eye: No discharge.         Left eye: No discharge.      Conjunctiva/sclera: Conjunctivae normal.      Pupils: Pupils are equal, round, and reactive to light.   Neck:      Thyroid: No thyromegaly.      Vascular: No JVD.      Trachea: No tracheal deviation.   Cardiovascular:      Rate and Rhythm: Normal rate and regular rhythm.      Heart sounds: Murmur heard.      Systolic murmur is present with a grade of 2/6.      No friction rub. No gallop.   Pulmonary:      Effort: Pulmonary effort is normal. No respiratory distress.      Breath sounds: Normal breath sounds. No wheezing or rales.   Chest:      Chest wall: No tenderness.   Abdominal:      General: Bowel sounds are normal. There is no distension.      Palpations: Abdomen is soft. There is no mass.      Tenderness: There is no abdominal tenderness. There is no guarding or rebound.   Musculoskeletal:         General: No tenderness or deformity. Normal range of motion.      Cervical back: Normal range of motion and neck supple.   Lymphadenopathy:      Cervical: No cervical adenopathy.   Skin:     General: Skin is warm and dry.      Coloration: Skin is not pale.      Findings: No erythema or rash.   Neurological:      Mental Status: She is alert and oriented to person, place, and time.      Cranial Nerves: No cranial nerve deficit.      Motor: No abnormal muscle tone.      Coordination: Coordination normal.      Deep Tendon Reflexes: Reflexes are normal and symmetric.   Psychiatric:         Behavior: Behavior normal.            Lab on 09/04/2024   Component Date Value Ref Range Status   • Vit D, 25-Hydroxy 09/04/2024 22.3 (L)  30.0 - 100.0 ng/mL Final    Vitamin D guidelines established by Clinical Guidelines Subcommittee  of the Endocrine Society Task Force, 2011    Deficiency <20ng/ml   Insufficiency 20-30ng/ml   Sufficient  ng/ml    • PTH 09/04/2024 108.4 (H)  12.0 - 88.0 pg/mL Final   • Testosterone, Free 09/04/2024 1.6  0.0 - 4.2 pg/mL Final   • TESTOSTERONE TOTAL 09/04/2024 6  3 - 67 ng/dL Final    **Verified by repeat analysis**   • Cholesterol 09/04/2024 192  See Comment mg/dL Final    Cholesterol:         Pediatric <18 Years        Desirable          <170 mg/dL      Borderline High    170-199 mg/dL      High               >=200 mg/dL        Adult >=18 Years            Desirable         <200 mg/dL      Borderline High   200-239 mg/dL      High              >239 mg/dL     • Triglycerides 09/04/2024 135  See Comment mg/dL Final    Triglyceride:     0-9Y            <75mg/dL     10Y-17Y         <90 mg/dL       >=18Y     Normal          <150 mg/dL     Borderline High 150-199 mg/dL     High            200-499 mg/dL        Very High       >499 mg/dL    Specimen collection should occur prior to Metamizole administration due to the potential for falsely depressed results.   • HDL, Direct 09/04/2024 38 (L)  >=50 mg/dL Final   • LDL Calculated 09/04/2024 127 (H)  0 - 100 mg/dL Final    LDL Cholesterol:     Optimal           <100 mg/dl     Near Optimal      100-129 mg/dl     Above Optimal       Borderline High 130-159 mg/dl       High            160-189 mg/dl       Very High       >189 mg/dl         This screening LDL is a calculated result.   It does not have the accuracy of the Direct Measured LDL in the monitoring of patients with hyperlipidemia and/or statin therapy.   Direct Measure LDL (XRT376) must be ordered separately in these patients.   • Non-HDL-Chol (CHOL-HDL) 09/04/2024 154  mg/dl Final   Telephone on 04/24/2024    Component Date Value Ref Range Status   • Supplier Name 04/24/2024 [employer] [facility]   Final-Edited   • Supplier Phone Number 04/24/2024 [phone]   Final-Edited   • Order Status 04/24/2024 Completed   Final-Edited   • Delivery Request Date 04/24/2024 04/24/2024   Final-Edited   • Date Delivered  04/24/2024 04/24/2024   Final-Edited   • Item Description 04/24/2024 Pressure Change   Final-Edited    Comment: Qty: 1  Request for: Auto Bilevel PAP  New Settings: Machine Type: BIPAPAircurve 11 VAuto with heated   humidification Auto modeIPAP cm H2O (4-25) 21Min EPAP cm H2O (4-25) 12PS cm   H20 (0-10) 5default settings (IPAP:25, EPAP:4, PS:4) NoRamp Time (select   only one) set to patient comfort YesO2 Needed? YesSettings 2 L/min = 28%Are   any of these items needed Face Mask     • Item Description 04/24/2024 PAP Accessory   Final-Edited    Qty: 1   • Item Description 04/24/2024 PAP Mask, Full Face, Fit Upon Setup, N/A, 1 per 3 months   Final-Edited    Qty: 1   • Item Description 04/24/2024 Humidifier Water Chamber, 1 per 6 months   Final-Edited    Qty: 1   • Item Description 04/24/2024 PAP Headgear, 1 per 6 months   Final-Edited    Qty: 1   • Item Description 04/24/2024 PAP Humidifier, Heated   Final-Edited    Qty: 1   • Item Description 04/24/2024 PAP Monitoring Modem   Final-Edited    Qty: 1   • Item Description 04/24/2024 Heated PAP Tubing, 1 per 3 months   Final-Edited    Qty: 1   • Item Description 04/24/2024 Disposable PAP Filter, 2 per 1 month   Final-Edited    Qty: 1   • Item Description 04/24/2024 Non-Disposable PAP Filter, 1 per 6 months   Final-Edited    Qty: 1   • Item Description 04/24/2024 PAP Mask Interface Cushion, Full Face, 1 per 1 month   Final-Edited    Qty: 1

## 2025-03-14 NOTE — PATIENT INSTRUCTIONS
Medicare Preventive Visit Patient Instructions  Thank you for completing your Welcome to Medicare Visit or Medicare Annual Wellness Visit today. Your next wellness visit will be due in one year (3/15/2026).  The screening/preventive services that you may require over the next 5-10 years are detailed below. Some tests may not apply to you based off risk factors and/or age. Screening tests ordered at today's visit but not completed yet may show as past due. Also, please note that scanned in results may not display below.  Preventive Screenings:  Service Recommendations Previous Testing/Comments   Colorectal Cancer Screening  * Colonoscopy    * Fecal Occult Blood Test (FOBT)/Fecal Immunochemical Test (FIT)  * Fecal DNA/Cologuard Test  * Flexible Sigmoidoscopy Age: 45-75 years old   Colonoscopy: every 10 years (may be performed more frequently if at higher risk)  OR  FOBT/FIT: every 1 year  OR  Cologuard: every 3 years  OR  Sigmoidoscopy: every 5 years  Screening may be recommended earlier than age 45 if at higher risk for colorectal cancer. Also, an individualized decision between you and your healthcare provider will decide whether screening between the ages of 76-85 would be appropriate. Colonoscopy: Not on file  FOBT/FIT: Not on file  Cologuard: 03/04/2024  Sigmoidoscopy: Not on file    Screening Current     Breast Cancer Screening Age: 40+ years old  Frequency: every 1-2 years  Not required if history of left and right mastectomy Mammogram: 05/23/2024    Screening Current   Cervical Cancer Screening Between the ages of 21-29, pap smear recommended once every 3 years.   Between the ages of 30-65, can perform pap smear with HPV co-testing every 5 years.   Recommendations may differ for women with a history of total hysterectomy, cervical cancer, or abnormal pap smears in past. Pap Smear: Not on file    Screening Not Indicated   Hepatitis C Screening Once for adults born between 1945 and 1965  More frequently in  patients at high risk for Hepatitis C Hep C Antibody: 08/18/2020    Screening Current   Diabetes Screening 1-2 times per year if you're at risk for diabetes or have pre-diabetes Fasting glucose: 110 mg/dL (9/4/2024)  A1C: 6.1 % (8/31/2022)  Screening Current   Cholesterol Screening Once every 5 years if you don't have a lipid disorder. May order more often based on risk factors. Lipid panel: 09/04/2024    Screening Not Indicated  History Lipid Disorder     Other Preventive Screenings Covered by Medicare:  Abdominal Aortic Aneurysm (AAA) Screening: covered once if your at risk. You're considered to be at risk if you have a family history of AAA.  Lung Cancer Screening: covers low dose CT scan once per year if you meet all of the following conditions: (1) Age 55-77; (2) No signs or symptoms of lung cancer; (3) Current smoker or have quit smoking within the last 15 years; (4) You have a tobacco smoking history of at least 20 pack years (packs per day multiplied by number of years you smoked); (5) You get a written order from a healthcare provider.  Glaucoma Screening: covered annually if you're considered high risk: (1) You have diabetes OR (2) Family history of glaucoma OR (3)  aged 50 and older OR (4)  American aged 65 and older  Osteoporosis Screening: covered every 2 years if you meet one of the following conditions: (1) You're estrogen deficient and at risk for osteoporosis based off medical history and other findings; (2) Have a vertebral abnormality; (3) On glucocorticoid therapy for more than 3 months; (4) Have primary hyperparathyroidism; (5) On osteoporosis medications and need to assess response to drug therapy.   Last bone density test (DXA Scan): Not on file.  HIV Screening: covered annually if you're between the age of 15-65. Also covered annually if you are younger than 15 and older than 65 with risk factors for HIV infection. For pregnant patients, it is covered up to 3 times per  pregnancy.    Immunizations:  Immunization Recommendations   Influenza Vaccine Annual influenza vaccination during flu season is recommended for all persons aged >= 6 months who do not have contraindications   Pneumococcal Vaccine   * Pneumococcal conjugate vaccine = PCV13 (Prevnar 13), PCV15 (Vaxneuvance), PCV20 (Prevnar 20)  * Pneumococcal polysaccharide vaccine = PPSV23 (Pneumovax) Adults 19-63 yo with certain risk factors or if 65+ yo  If never received any pneumonia vaccine: recommend Prevnar 20 (PCV20)  Give PCV20 if previously received 1 dose of PCV13 or PPSV23   Hepatitis B Vaccine 3 dose series if at intermediate or high risk (ex: diabetes, end stage renal disease, liver disease)   Respiratory syncytial virus (RSV) Vaccine - COVERED BY MEDICARE PART D  * RSVPreF3 (Arexvy) CDC recommends that adults 60 years of age and older may receive a single dose of RSV vaccine using shared clinical decision-making (SCDM)   Tetanus (Td) Vaccine - COST NOT COVERED BY MEDICARE PART B Following completion of primary series, a booster dose should be given every 10 years to maintain immunity against tetanus. Td may also be given as tetanus wound prophylaxis.   Tdap Vaccine - COST NOT COVERED BY MEDICARE PART B Recommended at least once for all adults. For pregnant patients, recommended with each pregnancy.   Shingles Vaccine (Shingrix) - COST NOT COVERED BY MEDICARE PART B  2 shot series recommended in those 19 years and older who have or will have weakened immune systems or those 50 years and older     Health Maintenance Due:      Topic Date Due   • HIV Screening  Never done   • Lung Cancer Screening  07/11/2024   • Breast Cancer Screening: Mammogram  05/23/2025   • Colorectal Cancer Screening  03/04/2027   • Hepatitis C Screening  Completed     Immunizations Due:      Topic Date Due   • Pneumococcal Vaccine: Pediatrics (0 to 5 Years) and At-Risk Patients (6 to 64 Years) (1 of 2 - PCV) Never done   • Hepatitis A Vaccine (1  of 2 - Risk 2-dose series) Never done   • Influenza Vaccine (1) 09/01/2024   • COVID-19 Vaccine (4 - 2024-25 season) 09/01/2024     Advance Directives   What are advance directives?  Advance directives are legal documents that state your wishes and plans for medical care. These plans are made ahead of time in case you lose your ability to make decisions for yourself. Advance directives can apply to any medical decision, such as the treatments you want, and if you want to donate organs.   What are the types of advance directives?  There are many types of advance directives, and each state has rules about how to use them. You may choose a combination of any of the following:  Living will:  This is a written record of the treatment you want. You can also choose which treatments you do not want, which to limit, and which to stop at a certain time. This includes surgery, medicine, IV fluid, and tube feedings.   Durable power of  for healthcare (DPAHC):  This is a written record that states who you want to make healthcare choices for you when you are unable to make them for yourself. This person, called a proxy, is usually a family member or a friend. You may choose more than 1 proxy.  Do not resuscitate (DNR) order:  A DNR order is used in case your heart stops beating or you stop breathing. It is a request not to have certain forms of treatment, such as CPR. A DNR order may be included in other types of advance directives.  Medical directive:  This covers the care that you want if you are in a coma, near death, or unable to make decisions for yourself. You can list the treatments you want for each condition. Treatment may include pain medicine, surgery, blood transfusions, dialysis, IV or tube feedings, and a ventilator (breathing machine).  Values history:  This document has questions about your views, beliefs, and how you feel and think about life. This information can help others choose the care that you  would choose.  Why are advance directives important?  An advance directive helps you control your care. Although spoken wishes may be used, it is better to have your wishes written down. Spoken wishes can be misunderstood, or not followed. Treatments may be given even if you do not want them. An advance directive may make it easier for your family to make difficult choices about your care.   Urinary Incontinence   Urinary incontinence (UI)  is when you lose control of your bladder. UI develops because your bladder cannot store or empty urine properly. The 3 most common types of UI are stress incontinence, urge incontinence, or both.  Medicines:   May be given to help strengthen your bladder control. Report any side effects of medication to your healthcare provider.  Do pelvic muscle exercises often:  Your pelvic muscles help you stop urinating. Squeeze these muscles tight for 5 seconds, then relax for 5 seconds. Gradually work up to squeezing for 10 seconds. Do 3 sets of 15 repetitions a day, or as directed. This will help strengthen your pelvic muscles and improve bladder control.  Train your bladder:  Go to the bathroom at set times, such as every 2 hours, even if you do not feel the urge to go. You can also try to hold your urine when you feel the urge to go. For example, hold your urine for 5 minutes when you feel the urge to go. As that becomes easier, hold your urine for 10 minutes.   Self-care:   Keep a UI record.  Write down how often you leak urine and how much you leak. Make a note of what you were doing when you leaked urine.  Drink liquids as directed. You may need to limit the amount of liquid you drink to help control your urine leakage. Do not drink any liquid right before you go to bed. Limit or do not have drinks that contain caffeine or alcohol.   Prevent constipation.  Eat a variety of high-fiber foods. Good examples are high-fiber cereals, beans, vegetables, and whole-grain breads. Walking is the  best way to trigger your intestines to have a bowel movement.  Exercise regularly and maintain a healthy weight.  Weight loss and exercise will decrease pressure on your bladder and help you control your leakage.   Use a catheter as directed  to help empty your bladder. A catheter is a tiny, plastic tube that is put into your bladder to drain your urine.   Go to behavior therapy as directed.  Behavior therapy may be used to help you learn to control your urge to urinate.    Cigarette Smoking and Your Health   Risks to your health if you smoke:  Nicotine and other chemicals found in tobacco damage every cell in your body. Even if you are a light smoker, you have an increased risk for cancer, heart disease, and lung disease. If you are pregnant or have diabetes, smoking increases your risk for complications.   Benefits to your health if you stop smoking:   You decrease respiratory symptoms such as coughing, wheezing, and shortness of breath.   You reduce your risk for cancers of the lung, mouth, throat, kidney, bladder, pancreas, stomach, and cervix. If you already have cancer, you increase the benefits of chemotherapy. You also reduce your risk for cancer returning or a second cancer from developing.   You reduce your risk for heart disease, blood clots, heart attack, and stroke.   You reduce your risk for lung infections, and diseases such as pneumonia, asthma, chronic bronchitis, and emphysema.  Your circulation improves. More oxygen can be delivered to your body. If you have diabetes, you lower your risk for complications, such as kidney, artery, and eye diseases. You also lower your risk for nerve damage. Nerve damage can lead to amputations, poor vision, and blindness.  You improve your body's ability to heal and to fight infections.  For more information and support to stop smoking:   [employer]  Phone: 5- 654 - 086-0790  Web Address: www.[employer].gov  Weight Management   Why it is important to manage your  weight:  Being overweight increases your risk of health conditions such as heart disease, high blood pressure, type 2 diabetes, and certain types of cancer. It can also increase your risk for osteoarthritis, sleep apnea, and other respiratory problems. Aim for a slow, steady weight loss. Even a small amount of weight loss can lower your risk of health problems.  How to lose weight safely:  A safe and healthy way to lose weight is to eat fewer calories and get regular exercise. You can lose up about 1 pound a week by decreasing the number of calories you eat by 500 calories each day.   Healthy meal plan for weight management:  A healthy meal plan includes a variety of foods, contains fewer calories, and helps you stay healthy. A healthy meal plan includes the following:  Eat whole-grain foods more often.  A healthy meal plan should contain fiber. Fiber is the part of grains, fruits, and vegetables that is not broken down by your body. Whole-grain foods are healthy and provide extra fiber in your diet. Some examples of whole-grain foods are whole-wheat breads and pastas, oatmeal, brown rice, and bulgur.  Eat a variety of vegetables every day.  Include dark, leafy greens such as spinach, kale, keiko greens, and mustard greens. Eat yellow and orange vegetables such as carrots, sweet potatoes, and winter squash.   Eat a variety of fruits every day.  Choose fresh or canned fruit (canned in its own juice or light syrup) instead of juice. Fruit juice has very little or no fiber.  Eat low-fat dairy foods.  Drink fat-free (skim) milk or 1% milk. Eat fat-free yogurt and low-fat cottage cheese. Try low-fat cheeses such as mozzarella and other reduced-fat cheeses.  Choose meat and other protein foods that are low in fat.  Choose beans or other legumes such as split peas or lentils. Choose fish, skinless poultry (chicken or turkey), or lean cuts of red meat (beef or pork). Before you cook meat or poultry, cut off any visible  fat.   Use less fat and oil.  Try baking foods instead of frying them. Add less fat, such as margarine, sour cream, regular salad dressing and mayonnaise to foods. Eat fewer high-fat foods. Some examples of high-fat foods include french fries, doughnuts, ice cream, and cakes.  Eat fewer sweets.  Limit foods and drinks that are high in sugar. This includes candy, cookies, regular soda, and sweetened drinks.  Exercise:  Exercise at least 30 minutes per day on most days of the week. Some examples of exercise include walking, biking, dancing, and swimming. You can also fit in more physical activity by taking the stairs instead of the elevator or parking farther away from stores. Ask your healthcare provider about the best exercise plan for you.      © Copyright [employer] 2018 Information is for End User's use only and may not be sold, redistributed or otherwise used for commercial purposes. All illustrations and images included in CareNotes® are the copyrighted property of [employer] Inc[employer] or [employer]

## 2025-03-15 NOTE — ED NOTES
Pt ambulated with use of pt's cane. Ambulatory pulse ox on 2 L NC maintained at 94-95%.      Kenia Quintero RN  03/14/25 2015

## 2025-03-17 ENCOUNTER — VBI (OUTPATIENT)
Dept: INTERNAL MEDICINE CLINIC | Age: 65
End: 2025-03-17

## 2025-03-17 DIAGNOSIS — I50.9 ACUTE ON CHRONIC CONGESTIVE HEART FAILURE, UNSPECIFIED HEART FAILURE TYPE (HCC): ICD-10-CM

## 2025-03-17 DIAGNOSIS — R06.2 WHEEZING: ICD-10-CM

## 2025-03-17 DIAGNOSIS — I50.32 CHRONIC DIASTOLIC CONGESTIVE HEART FAILURE (HCC): Chronic | ICD-10-CM

## 2025-03-17 DIAGNOSIS — F33.1 MODERATE EPISODE OF RECURRENT MAJOR DEPRESSIVE DISORDER (HCC): ICD-10-CM

## 2025-03-17 DIAGNOSIS — J44.0 CHRONIC OBSTRUCTIVE PULMONARY DISEASE WITH ACUTE LOWER RESPIRATORY INFECTION (HCC): ICD-10-CM

## 2025-03-17 DIAGNOSIS — J20.8 ACUTE BRONCHITIS DUE TO OTHER SPECIFIED ORGANISMS: ICD-10-CM

## 2025-03-17 DIAGNOSIS — I50.33 ACUTE ON CHRONIC HEART FAILURE WITH PRESERVED EJECTION FRACTION (HCC): ICD-10-CM

## 2025-03-17 DIAGNOSIS — I10 HYPERTENSION, UNSPECIFIED TYPE: ICD-10-CM

## 2025-03-17 NOTE — TELEPHONE ENCOUNTER
03/17/25 8:51 AM    Patient contacted post ED visit, first outreach attempt made. Message was left for patient to return a call to the [facility] Department at [facility]: Phone [phone].    Thank you.  [facility] Tello MA  [facility]

## 2025-03-17 NOTE — LETTER
[facility]  [address]  [address]  [facility] [address]    Date: 03/19/25    Valerie Mejia  [address] [facility] [address]    Dear Valerie:                                                                                                                                Thank you for choosing [facility] emergency department for care.  Your primary care provider wants to make sure that your ongoing medical care is being addressed. If you require follow up care as a result of your emergency department visit, there are a few things the practice would like you to know.                As part of the network's continuing commitment to caring for our patients, we have added more same day appointments and have extended office hours to meet your medical needs. After hours, on-call physicians are available via your primary care provider's main office line.               We encourage you to contact our office prior to seeking treatment to discuss your symptoms with the medical staff.  Together, we can determine the correct course of action.  A majority of non-emergent conditions such as: common cold, flu-like symptoms, fevers, strains/sprains, dislocations, minor burns, cuts and animal bites can be treated at [facility] facilities. Diagnostic testing is available at some sites.               Of course, if you are experiencing a life threatening medical emergency call 911 or proceed directly to the nearest emergency room.    Your nearest [facility] facility is conveniently located at:    [address] [address]  [phone]  SKIP THE WAIT  Conveniently offered at most [facility] locations  Mead your spot online at [email or web address] or on the [facility]    Sincerely,    [facility]  Dept: [phone]

## 2025-03-17 NOTE — TELEPHONE ENCOUNTER
Reason for call:   [x] Refill   [] Prior Auth  [] Other:     Office:   [x] PCP/Provider - Laurel Lyons DO   [] Specialty/Provider -     Medication:   - albuterol (PROVENTIL HFA,VENTOLIN HFA) 90 mcg/act inhaler   Inhale 2 puffs every 6 (six) hours as needed for wheezing or shortness of breath   Qty: 18 g    -escitalopram (LEXAPRO) 10 mg tablet   TAKE 1 TABLET (10 MG TOTAL) BY MOUTH DAILY   Qty: 90    - metoprolol succinate (TOPROL-XL) 25 mg 24 hr tablet  TAKE 1 TABLET (25 MG TOTAL) BY MOUTH DAILY AT BEDTIME   Qty: 30    Ph[facility]acy: [facility] [place] [place] [address] [place] [address] Pharmacy   Does the patient have enough for 3 days?   [x] Yes   [] No - Send as HP to POD    Mail Away Pharmacy   Does the patient have enough for 10 days?   [] Yes   [] No - Send as HP to POD

## 2025-03-18 RX ORDER — SPIRONOLACTONE 25 MG/1
12.5 TABLET ORAL DAILY
Qty: 90 TABLET | Refills: 0 | Status: SHIPPED | OUTPATIENT
Start: 2025-03-18

## 2025-03-18 RX ORDER — METOPROLOL SUCCINATE 25 MG/1
25 TABLET, EXTENDED RELEASE ORAL
Qty: 30 TABLET | Refills: 5 | Status: SHIPPED | OUTPATIENT
Start: 2025-03-18

## 2025-03-18 RX ORDER — ALBUTEROL SULFATE 90 UG/1
2 INHALANT RESPIRATORY (INHALATION) EVERY 6 HOURS PRN
Qty: 18 G | Refills: 1 | Status: SHIPPED | OUTPATIENT
Start: 2025-03-18

## 2025-03-18 RX ORDER — ESCITALOPRAM OXALATE 10 MG/1
10 TABLET ORAL DAILY
Qty: 90 TABLET | Refills: 1 | Status: SHIPPED | OUTPATIENT
Start: 2025-03-18

## 2025-03-18 RX ORDER — LOSARTAN POTASSIUM 25 MG/1
25 TABLET ORAL DAILY
Qty: 90 TABLET | Refills: 1 | Status: SHIPPED | OUTPATIENT
Start: 2025-03-18

## 2025-03-18 NOTE — TELEPHONE ENCOUNTER
03/18/25 8:54 AM    Patient contacted post ED visit, second outreach attempt made. Message was left for patient to return a call to the [facility] Department at [facility]: Phone [phone].    Thank you.  [facility] Tello MA  [facility]

## 2025-03-19 NOTE — TELEPHONE ENCOUNTER
03/19/25 8:56 AM    Patient contacted post ED visit, phone outreaches were unsuccessful and a MyChart letter has been sent to the patient as follow-up.    Thank you.  [facility] Tello MA  [facility]

## 2025-03-25 NOTE — ED PROVIDER NOTES
Time reflects when diagnosis was documented in both MDM as applicable and the Disposition within this note       Time User Action Codes Description Comment    3/14/2025  7:59 PM Kelly Wooten Add [R06.09] Exertional dyspnea     3/14/2025  8:08 PM Kelly Wooten Add [D35.00] Adrenal adenoma           ED Disposition       ED Disposition   Discharge    Condition   Stable    Date/Time   Fri Mar 14, 2025  8:09 PM    Comment   Valerie Beyrosette discharge to home/self care.                   Assessment & Plan       Medical Decision Making  Pt is a 65 yof with history of CHF and COPD who presents with increasing exertional dyspnea over the past several weeks.  Is able to walk short distances but gets out of breath over long distances and while climbing stairs, becomes lightheaded and has transient palpitations during exertion as well. Is supposed to be on home oxygen but is non-compliant with use. Has not ever established with pulmonology as recommended by her PCP several years ago.  Also has not had CT lung cancer screening as requested by her PCP in 2021 despite her elevated risk of lung cancer based on her smoking history. Was seen by her PCP today and was advised to go to ED due to exertional dyspnea.    VS with mildly elevated BP, SpO2 between 90-94% on room air at rest without supplemental oxygen. Normal cardiac exam, LS CTA bilaterally, abd soft, non-tender, non-distended. No peripheral edema, no evidence of DVT on exam.    Dyspnea likely 2/2 non-compliance with home O2 regimen, is not on any long term COPD medications but does not examine as if she has an acute COPD exacerbation.  Does not appear fluid overloaded, doubt CHF exacerbation.  No infectious symptoms to suggest pneumonia or URI.  No evidence of DVT on exam but cannot exclude PE. Could also represent end organ dysfunction, anemia, electrolyte abnormality.  Doubt ACS, AAA, dissection, pneumothorax.    Plan for labs and imaging to evaluate further.     See ED course for remainder of discussion.    Of note, pt able to ambulate around ED on her prescribed home oxygen dose without hypoxia, dyspnea, palpitations, or lightheadedness.  Endorses adequate oxygen and oxygen supplies at home, requests discharge with outpatient pulmonology f/u.    Amount and/or Complexity of Data Reviewed  External Data Reviewed: labs, radiology, ECG and notes.  Labs: ordered. Decision-making details documented in ED Course.  Radiology: ordered. Decision-making details documented in ED Course.  ECG/medicine tests: ordered and independent interpretation performed.        ED Course as of 03/25/25 1552   Fri Mar 14, 2025   1717 XR chest 2 views  IMPRESSION:     No acute cardiopulmonary disease.     1741 D-Dimer, Quant(!): 0.53  Age adjusts WNL.   1750 Blood gas, venous(!)  No acidosis, no hypercarbia.     1947 After discussion with patient and SLIM, given patient already has home oxygen but is not compliant, will ambulate pt around ED to establish stable dose of oxygen, have her follow up with pulmonology outpatient.   2007 IMPRESSION:     No acute cardiopulmonary disease.     Borderline cardiomegaly.     3 cm right adrenal adenoma. Please note that for adenomas > 1 cm, biochemical evaluation is suggested to rule out functioning adenoma, if not performed already.     2007 Note, the incidentally noted adrenal adenoma has been previously discussed with this patient, she refused the outpatient chemical evaluation.  Discussed following up for same with patient.       Medications - No data to display    ED Risk Strat Scores                                                History of Present Illness       Chief Complaint   Patient presents with    Shortness of Breath     Pt has hx of copd and chf. Pt has increased SOB for about 2 weeks. Spo2 drops with ambulation. Pt has O2 at home but does not use       Past Medical History:   Diagnosis Date    Acute on chronic heart failure with preserved ejection  fraction (HFpEF) ([facility]) 2022    Arthritis     CHF (congestive heart failure) ([facility])     COPD (chronic obstructive pulmonary disease) ([facility])     COVID-19 virus infection 2023    Depression     Eating disorder     Hypertension     Hypoxia 2022    Obesity     Urinary incontinence     Visual impairment 2018      Past Surgical History:   Procedure Laterality Date    HYSTERECTOMY      INCONTINENCE SURGERY      JOINT REPLACEMENT  2015    Right knee    OOPHORECTOMY Bilateral     REPLACEMENT TOTAL KNEE Right 2016    TOOTH EXTRACTION  02/15/2023    2/15/23,3/16/23,23    TOTAL ABDOMINAL HYSTERECTOMY W/ BILATERAL SALPINGOOPHORECTOMY  2006      Family History   Problem Relation Age of Onset    Breast cancer Mother 70    Other Mother     Aneurysm Mother         aortic    Dementia Mother     Heart failure Father     Diabetes Father         Dad    Lung cancer Sister     No Known Problems Daughter     Polycystic ovary syndrome Daughter     Dementia Maternal Grandmother     Heart disease Maternal Grandfather     Heart disease Paternal Grandmother     Heart disease Paternal Grandfather     No Known Problems Son     Dementia Maternal Aunt     No Known Problems Maternal Aunt     No Known Problems Paternal Aunt     No Known Problems Paternal Aunt       Social History     Tobacco Use    Smoking status: Some Days     Current packs/day: 0.00     Average packs/day: 1 pack/day for 70.6 years (70.0 ttl pk-yrs)     Types: Cigarettes     Start date: 1976     Last attempt to quit: 2023     Years since quittin.7    Smokeless tobacco: Never    Tobacco comments:     Patient is at about 3 per day - occasional    Vaping Use    Vaping status: Former    Start date: 2018    Substances: Nicotine   Substance Use Topics    Alcohol use: Not Currently     Comment: occassional- once a year    Drug use: Not Currently     Types: Marijuana      E-Cigarette/Vaping    E-Cigarette Use Former User     Start Date 20        E-Cigarette/Vaping Substances    Nicotine Yes     THC No     CBD No     Flavoring No     Other No     Unknown No       I have reviewed and agree with the history as documented.     Iris is a 65 yof with history of CHF, COPD, HTN, and former smoker who presents with increasing exertional dyspnea over the past several weeks.  Is able to walk short distances but gets out of breath over long distances and while climbing stairs, becomes lightheaded and has transient palpitations during exertion as well. Is supposed to be on home oxygen but is non-compliant with use, does not have oxygen on when she becomes symptomatic but has resolution of symptoms when she applies the oxygen.  Denies fever, chills, lymphadenopathy, diaphoresis, weight changes, chest pain, cough, abdominal pain, peripheral edema, or unilateral calf pain or swelling. Has not ever established with pulmonology as recommended by her PCP several years ago and is not on any medications for her COPD.        Review of Systems   Constitutional: Negative.  Negative for chills, diaphoresis, fatigue and fever.   HENT: Negative.     Eyes: Negative.    Respiratory:  Positive for shortness of breath. Negative for cough, chest tightness and wheezing.         Dyspnea not currently present.   Cardiovascular:  Positive for palpitations. Negative for chest pain and leg swelling.        Palpitations not currently present.   Gastrointestinal: Negative.  Negative for abdominal distention, abdominal pain, constipation, diarrhea, nausea and vomiting.   Endocrine: Negative.  Negative for cold intolerance, heat intolerance, polydipsia and polyuria.   Genitourinary: Negative.  Negative for difficulty urinating, dysuria and hematuria.   Musculoskeletal: Negative.  Negative for arthralgias and myalgias.   Skin: Negative.  Negative for color change, pallor, rash and wound.   Allergic/Immunologic: Negative.    Neurological:  Positive for light-headedness. Negative for dizziness,  weakness and headaches.        Lightheadedness not currently present.   Hematological: Negative.  Negative for adenopathy. Does not bruise/bleed easily.   Psychiatric/Behavioral: Negative.     All other systems reviewed and are negative.          Objective       ED Triage Vitals [03/14/25 1514]   Temperature Pulse Blood Pressure Respirations SpO2 Patient Position - Orthostatic VS   97.8 °F (36.6 °C) 93 159/72 22 90 % Sitting      Temp Source Heart Rate Source BP Location FiO2 (%) Pain Score    Oral Monitor Right arm -- --      Vitals      Date and Time Temp Pulse SpO2 Resp BP Pain Score FACES Pain Rating User   03/14/25 1800 -- 69 97 % 20 110/58 -- -- KB   03/14/25 1514 97.8 °F (36.6 °C) 93 90 % 22 159/72 -- -- TS            Physical Exam  Vitals and nursing note reviewed.   Constitutional:       General: She is not in acute distress.     Appearance: Normal appearance. She is obese. She is not ill-appearing or diaphoretic.   HENT:      Head: Normocephalic.      Nose: Nose normal.      Mouth/Throat:      Mouth: Mucous membranes are moist.      Pharynx: Oropharynx is clear.   Eyes:      General: No scleral icterus.     Extraocular Movements: Extraocular movements intact.      Conjunctiva/sclera: Conjunctivae normal.   Cardiovascular:      Rate and Rhythm: Normal rate and regular rhythm.      Pulses: Normal pulses.      Heart sounds: Normal heart sounds. No murmur heard.     No friction rub. No gallop.   Pulmonary:      Effort: Pulmonary effort is normal. No respiratory distress.      Breath sounds: Normal breath sounds.   Abdominal:      General: Abdomen is flat. Bowel sounds are normal. There is no distension.      Palpations: Abdomen is soft.      Tenderness: There is no abdominal tenderness.   Musculoskeletal:         General: Normal range of motion.      Cervical back: Normal range of motion and neck supple. No tenderness.      Right lower leg: No edema.      Left lower leg: No edema.      Comments: No calf  erythema, warmth, swelling, or tenderness to palpation bilaterally.    Lymphadenopathy:      Cervical: No cervical adenopathy.   Skin:     General: Skin is warm and dry.      Capillary Refill: Capillary refill takes less than 2 seconds.      Coloration: Skin is not pale.      Findings: No rash.   Neurological:      General: No focal deficit present.      Mental Status: She is alert.   Psychiatric:         Mood and Affect: Mood normal.         Behavior: Behavior normal.         Results Reviewed       Procedure Component Value Units Date/Time    HS Troponin I 2hr [[id number]]  (Normal) Collected: 03/14/25 1735    Lab Status: Final result Specimen: Blood from Arm, Right Updated: 03/14/25 1805     hs TnI 2hr 6 ng/L      Delta 2hr hsTnI 0 ng/L     Blood gas, venous [[id number]]  (Abnormal) Collected: 03/14/25 1735    Lab Status: Final result Specimen: Blood from Arm, Right Updated: 03/14/25 1749     pH, [place] 7.378     pCO2, [place] 48.2 mm Hg      pO2, [place] 31.4 mm Hg      HCO3, [place] 27.7 mmol/L      Base Excess, [place] 1.9 mmol/L      O2 Content, [place] 11.7 ml/dL      O2 HGB, VENOUS 59.4 %     D-dimer, quantitative [[id number]]  (Abnormal) Collected: 03/14/25 1519    Lab Status: Final result Specimen: Blood from Arm, Left Updated: 03/14/25 1736     D-Dimer, Quant 0.53 ug/ml FEU     Narrative:      In the evaluation for possible pulmonary embolism, in the appropriate (Well's Score of 4 or less) patient, the age adjusted d-dimer cutoff for this patient can be calculated as:    Age x 0.01 (in ug/mL) for Age-adjusted D-dimer exclusion threshold for a patient over 50 years.    Comprehensive metabolic panel [[id number]]  (Abnormal) Collected: 03/14/25 1519    Lab Status: Final result Specimen: Blood from Arm, Left Updated: 03/14/25 1556     Sodium 141 mmol/L      Potassium 3.6 mmol/L      Chloride 105 mmol/L      CO2 26 mmol/L      ANION GAP 10 mmol/L      BUN 13 mg/dL      Creatinine 0.77 mg/dL      Glucose 137 mg/dL      Calcium 10.9 mg/dL       AST 25 U/L      ALT 30 U/L      Alkaline Phosphatase 78 U/L      Total Protein 7.7 g/dL      Albumin 4.5 g/dL      Total Bilirubin 0.48 mg/dL      eGFR 82 ml/min/1.73sq m     Narrative:      National Kid[employer] Foundation guidelines for Chronic Kidney Disease (CKD):     Stage 1 with normal or high GFR (GFR > 90 mL/min/1.73 square meters)    Stage 2 Mild CKD (GFR = 60-89 mL/min/1.73 square meters)    Stage 3A Moderate CKD (GFR = 45-59 mL/min/1.73 square meters)    Stage 3B Moderate CKD (GFR = 30-44 mL/min/1.73 square meters)    Stage 4 Severe CKD (GFR = 15-29 mL/min/1.73 square meters)    Stage 5 End Stage CKD (GFR <15 mL/min/1.73 square meters)  Note: GFR calculation is accurate only with a steady state creatinine    B-Type Natriuretic Peptide(BNP) [[id number]]  (Normal) Collected: 03/14/25 1519    Lab Status: Final result Specimen: Blood from Arm, Left Updated: 03/14/25 1549     BNP 23 pg/mL     HS Troponin 0hr (reflex protocol) [[id number]]  (Normal) Collected: 03/14/25 1519    Lab Status: Final result Specimen: Blood from Arm, Left Updated: 03/14/25 1547     hs TnI 0hr 6 ng/L     CBC and differential [[id number]] Collected: 03/14/25 1519    Lab Status: Final result Specimen: Blood from Arm, Left Updated: 03/14/25 1525     WBC 8.08 Thousand/uL      RBC 4.38 Million/uL      Hemoglobin 13.4 g/dL      Hematocrit 40.7 %      MCV 93 fL      MCH 30.6 pg      MCHC 32.9 g/dL      RDW 13.5 %      MPV 10.8 fL      Platelets 189 Thousands/uL      nRBC 0 /100 WBCs      Segmented % 67 %      Immature Grans % 1 %      Lymphocytes % 21 %      Monocytes % 9 %      Eosinophils Relative 2 %      Basophils Relative 0 %      Absolute Neutrophils 5.48 Thousands/µL      Absolute Immature Grans 0.04 Thousand/uL      Absolute Lymphocytes 1.72 Thousands/µL      Absolute Monocytes 0.69 Thousand/µL      Eosinophils Absolute 0.12 Thousand/µL      Basophils Absolute 0.03 Thousands/µL             CT chest without contrast   Final  Interpretation by Dillon Elizabeth DO (03/14 2005)      No acute cardiopulmonary disease.      Borderline cardiomegaly.      3 cm right adrenal adenoma. Please note that for adenomas > 1 cm, biochemical evaluation is suggested to rule out functioning adenoma, if not performed already.      Adrenal recommendation based on institutional consensus and Journal of American College of Radiology 2017; 14:2155-6987.               Workstation performed: [id number]         XR chest 2 views   Final Interpretation by Chemo Noriega MD (03/14 1715)      No acute cardiopulmonary disease.            Workstation performed: [id number]             ECG 12 Lead Documentation Only    Date/Time: 3/15/2025 3:21 PM    Performed by: Kelly Wooten DO  Authorized by: Kelly Wooten DO    Indications / Diagnosis:  Dyspnea on exertion  ECG reviewed by me, the ED Provider: yes    Patient location:  ED  Previous ECG:     Previous ECG:  Compared to current    Similarity:  No change  Interpretation:     Interpretation: normal    Rate:     ECG rate:  87    ECG rate assessment: normal    Rhythm:     Rhythm: sinus rhythm    Ectopy:     Ectopy: none    QRS:     QRS axis:  Normal    QRS intervals:  Normal  Conduction:     Conduction: normal    ST segments:     ST segments:  Normal  T waves:     T waves: normal        ED Medication and Procedure Management   Prior to Admission Medications   Prescriptions Last Dose Informant Patient Reported? Taking?   furosemide (LASIX) 80 mg tablet   No No   Sig: Take 1 tablet (80 mg total) by mouth 2 (two) times a day   Patient taking differently: Take 40 mg by mouth daily   oxybutynin (DITROPAN) 5 mg tablet   No No   Sig: Take 1 tablet (5 mg total) by mouth 3 (three) times a day   Patient taking differently: Take 5 mg by mouth 2 (two) times a day      Facility-Administered Medications: None     Discharge Medication List as of 3/14/2025  8:14 PM        CONTINUE these medications  which have NOT CHANGED    Details   furosemide (LASIX) 80 mg tablet Take 1 tablet (80 mg total) by mouth 2 (two) times a day, Starting Wed 8/28/2024, Normal      oxybutynin (DITROPAN) 5 mg tablet Take 1 tablet (5 mg total) by mouth 3 (three) times a day, Starting u 2/20/2025, Normal      albuterol (PROVENTIL HFA,VENTOLIN HFA) 90 mcg/act inhaler Inhale 2 puffs every 6 (six) hours as needed for wheezing or shortness of breath, Starting Wed 3/13/2024, Normal      escitalopram (LEXAPRO) 10 mg tablet TAKE 1 TABLET (10 MG TOTAL) BY MOUTH DAILY, Starting Fri 8/2/2024, Normal      losartan (COZAAR) 25 mg tablet Take 1 tablet (25 mg total) by mouth daily, Starting Wed 5/1/2024, Normal      metoprolol succinate (TOPROL-XL) 25 mg 24 hr tablet TAKE 1 TABLET (25 MG TOTAL) BY MOUTH DAILY AT BEDTIME, Starting Fri 8/2/2024, Normal      spironolactone (ALDACTONE) 25 mg tablet Take 0.5 tablets (12.5 mg total) by mouth daily, Starting Wed 5/1/2024, Normal             ED SEPSIS DOCUMENTATION   Time reflects when diagnosis was documented in both MDM as applicable and the Disposition within this note       Time User Action Codes Description Comment    3/14/2025  7:59 PM Kelly Wooten [R06.09] Exertional dyspnea     3/14/2025  8:08 PM Kelly Wooten [D35.00] Adrenal adenoma                  Kelly Wooten,   03/25/25 1611

## 2025-03-25 NOTE — PROGRESS NOTES
Consultation - Pulmonary Medicine   Name: Valerie Mejia      : 1960      MRN: [id number]  Encounter Provider: Lottie Deras MD  Encounter Date: 3/26/2025   Encounter department: [facility]    Requesting Provider:   Laurel Lyons Do  [address]  [address]  [place] [address]     Reason for Consult: Dyspnea on Exertion:  Assessment & Plan  BRADY treated with BiPAP  Split study  AHI pretreatment 66.4  BiPAP / noted to be optimal setting  Refer to sleep medicine, consideration for Zepbound.   Orders:    Ambulatory Referral to Sleep Medicine; Future    Tobacco dependence  Current smoker (40 PY smoking history, now cut down to 5 cigarettes/month)   on cessation for > 5 minutes.   Will order CT lung cancer screening for 2026       Encounter for screening for malignant neoplasm of lung in current smoker with 30 pack year history or greater  I discussed with her that she is a candidate for lung cancer CT screening.     The following Shared Decision-Making points were covered:  Benefits of screening were discussed, including the rates of reduction in death from lung cancer and other causes.  Harms of screening were reviewed, including false positive tests, radiation exposure levels, risks of invasive procedures, risks of complications of screening, and risk of overdiagnosis.  I counseled on the importance of adherence to annual lung cancer LDCT screening, impact of co-morbidities, and ability or willingness to undergo diagnosis and treatment.  I counseled on the importance of maintaining abstinence as a former smoker or was counseled on the importance of smoking cessation if a current smoker    Review of Eligibility Criteria: She meets all of the criteria for Lung Cancer Screening.   She is 65 y.o.   She has 20 pack year tobacco history and is a current smoker or has quit within the past 15 years  She presents no signs or symptoms of lung  cancer    After discussion, the patient decided to elect lung cancer screening.    Orders:    CT chest wo contrast; Future    Obstructive sleep apnea treated with bilevel positive airway pressure (BiPAP)  Compliant with BIPAP, compliance reviewed, 100%    Refer to sleep medicine.   Orders:    Ambulatory Referral to Pulmonology    Other emphysema ([facility])  Significant smoking history  No significant emphysematous changes on imaging  Will F/U PFTs.   Orders:    Ambulatory Referral to Pulmonology    Morbid obesity with body mass index (BMI) of 60.0 to 69.9 in adult ([facility])  Counseled on weight modification with det and exercise  Will refer to weight management   Will refer to sleep medicine for consideration of Zepbound.   Orders:    Ambulatory Referral to Pulmonology    Acute on chronic hypoxic respiratory failure (HCC)  Patient 94% on R/A while in office  Will get PFTs with 6MWT  Has oxygen at home, advised to use with exertion.   Orders:    Ambulatory Referral to Pulmonology    Dyspnea on exertion  Multifactorial in setting of morbid obesity, BRADY, Emphysema  Will order PFTs  Will order 2D echo  Weight loss management as above.          Plan  Formal PFTs with 6MWT  Will order F/U 2D echo  Refer to weight management.   Refer to Sleep Medicine  Will give sample of Stiolto, and to continue with Albuterol as needed.    on smoking cessation.     History of Present Illness   HPI:  Valerie Mejia is a 65 y.o. female who presents as a new visit. Current smoker (40 PY smoking history, now cut down to 5 cigarettes/month), chronic hypoxic respiratory failure - noncompliant with home O2, documented COPD (no PFTs on file), Diastolic HF (EF 70%), hypertension, hyperlipidemia, BRADY on BiPAP, hypothyroidism .     Recently seen in PCP office on 3/14 were noted to be hypoxic with c/o or worsening shortness of breath referred to ED concern for COPD versus CHF exacerbation.   In ED, VBG w/o hypercarbia, imaging with CXR, CT Chest  revealed no significant findings. Patient discharged on 2L O2.    Today reports feeling frustrated about her situation.   At baseline she is sedentary, able to ambulate short distances in her home but is limited by severe lymphedema of her legs. States that sob has been progressively worsening over past month to point where she notices limitation with minimal exertion. Denies CP, chest tightness, palpitations, PND. She denies cough, phlegm production, fever, chills.   Was diagnosed with COPD many years ago for which she is on Albuterol which she previously used rarely but now uses up to twice daily for sob. No formal PFTs obtained. Denies h/o childhood asthma or allergies.     For BRADY, reports compliance with bipap & is able to sleep throughout the night. Denies night time awakenings. Is very interested in losing weight but exercise is limited by knee pain, lymphedema. Interested in weight loss medications. Previously evaluated for bariatric surgery however could not lose required amount of weight.     Has 3 pet dogs, no exotic animals or birds. No allergies. Sister  of lung cancer. Does not work currently.     Review of systems:  12 point review of systems was completed and was otherwise negative except as listed in HPI.    Historical Information   Past Medical History:   Diagnosis Date    Acute on chronic heart failure with preserved ejection fraction (HFpEF) ([facility]) 2022    Arthritis     CHF (congestive heart failure) ([facility])     COPD (chronic obstructive pulmonary disease) ([facility])     COVID-19 virus infection 2023    Depression     Eating disorder     Hypertension     Hypoxia 2022    Obesity     Sleep apnea     Urinary incontinence     Visual impairment      Past Surgical History:   Procedure Laterality Date    HYSTERECTOMY      INCONTINENCE SURGERY      JOINT REPLACEMENT  2015    Right knee    OOPHORECTOMY Bilateral     REPLACEMENT TOTAL KNEE Right 2016    TOOTH EXTRACTION  02/15/2023     2/15/23,3/16/23,23    TOTAL ABDOMINAL HYSTERECTOMY W/ BILATERAL SALPINGOOPHORECTOMY       Family History   Problem Relation Age of Onset    Breast cancer Mother 70    Aneurysm Mother         aortic    Dementia Mother     Heart failure Father     Diabetes Father         Dad    Heart disease Father     Lung cancer Sister     No Known Problems Daughter     Polycystic ovary syndrome Daughter     Dementia Maternal Grandmother     Heart disease Maternal Grandfather     Heart disease Paternal Grandmother     Heart disease Paternal Grandfather     No Known Problems Son     Dementia Maternal Aunt     No Known Problems Maternal Aunt     No Known Problems Paternal Aunt     No Known Problems Paternal Aunt     Sleep apnea Sister     Cancer Sister      Occupational History:  unemployed  Social History: as above  Social History     Tobacco Use   Smoking Status Some Days    Current packs/day: 0.00    Average packs/day: 1 pack/day for 70.6 years (70.0 ttl pk-yrs)    Types: Cigarettes    Start date: 1976    Last attempt to quit: 2023    Years since quittin.7   Smokeless Tobacco Never   Tobacco Comments    Patient is at about 3 per day - occasional        Meds/Allergies     Current Outpatient Medications:     albuterol (PROVENTIL HFA,VENTOLIN HFA) 90 mcg/act inhaler, Inhale 2 puffs every 6 (six) hours as needed for wheezing or shortness of breath, Disp: 18 g, Rfl: 1    escitalopram (LEXAPRO) 10 mg tablet, Take 1 tablet (10 mg total) by mouth daily, Disp: 90 tablet, Rfl: 1    furosemide (LASIX) 80 mg tablet, Take 1 tablet (80 mg total) by mouth 2 (two) times a day (Patient taking differently: Take 40 mg by mouth daily), Disp: 180 tablet, Rfl: 1    losartan (COZAAR) 25 mg tablet, Take 1 tablet (25 mg total) by mouth daily, Disp: 90 tablet, Rfl: 1    metoprolol succinate (TOPROL-XL) 25 mg 24 hr tablet, Take 1 tablet (25 mg total) by mouth daily at bedtime, Disp: 30 tablet, Rfl: 5    oxybutynin (DITROPAN) 5 mg tablet,  "Take 1 tablet (5 mg total) by mouth 3 (three) times a day (Patient taking differently: Take 5 mg by mouth 2 (two) times a day), Disp: 180 tablet, Rfl: 2    spironolactone (ALDACTONE) 25 mg tablet, Take 0.5 tablets (12.5 mg total) by mouth daily, Disp: 90 tablet, Rfl: 0  Allergies   Allergen Reactions    Lisinopril Cough       Vitals: Blood pressure 148/82, pulse 70, temperature 98.5 °F (36.9 °C), temperature source Tympanic, height 5' 5\" (1.651 m), weight (!) 173 kg (381 lb), SpO2 94%., Body mass index is 63.4 kg/m². Oxygen Therapy  SpO2: 94 %  Oxygen Therapy: None (Room air)    Physical Exam  General: No acute distress  HENT: Normocephalic, atraumatic.  PERRL, EOMI, Moist mucous membranes.  Neck: Supple  Lungs: Clear to auscultation bilaterally, no wheezes, rales, rhonchi.  On room air  Heart: Regular rate and rhythm, S1-S2 present, no murmurs rubs or gallops  Abdomen: Soft, nontender, nondistended, no organomegaly  Extremities: Warm and well perfused, no cyanosis, clubbing, +++edema  Pulses: 2+ and symmetric  Skin: Warm, dry, no rashes or lesions  Neurologic: No focal neurologic deficits    Labs: I have personally reviewed pertinent lab results.  Lab Results   Component Value Date    WBC 8.08 03/14/2025    HGB 13.4 03/14/2025    HCT 40.7 03/14/2025    MCV 93 03/14/2025     03/14/2025     Lab Results   Component Value Date    GLUCOSE 94 07/30/2015    CALCIUM 10.9 (H) 03/14/2025     07/30/2015    K 3.6 03/14/2025    CO2 26 03/14/2025     03/14/2025    BUN 13 03/14/2025    CREATININE 0.77 03/14/2025     No results found for: \"IGE\"  Lab Results   Component Value Date    ALT 30 03/14/2025    AST 25 03/14/2025    ALKPHOS 78 03/14/2025    BILITOT 0.48 07/30/2015     Preventive   Influenza vaccine: UTD  COVID-19 vaccination: not   Pneumonia vaccine: 1980s  Lung Cancer screening: UTD      Imaging and other studies: Results Review Statement: No pertinent imaging studies reviewed.  CT chest without " "contrast  Result Date: 3/14/2025  Impression: No acute cardiopulmonary disease. Borderline cardiomegaly. 3 cm right adrenal adenoma. Please note that for adenomas > 1 cm, biochemical evaluation is suggested to rule out functioning adenoma, if not performed already. Adrenal recommendation based on institutional consensus and Journal of American College of Radiology 2017; 14:2253-0151. Workstation performed: [id number]     XR chest 2 views  Result Date: 3/14/2025  Impression: No acute cardiopulmonary disease. Workstation performed: [id number]     Pulmonary function testing:  No results found for: \"FEV1\", \"FVC\", \"RFX6WKO\", \"TLC\", \"DLCO\"      EKG, Pathology, and Other Studies: Results Review Statement: No pertinent imaging studies reviewed.    Disclaimer: Portions of the record may have been created with voice recognition software. Occasional wrong word or \"sound a like\" substitutions may have occurred due to the inherent limitations of voice recognition software. Careful consideration should be taken to recognize, using context, where substitutions have occurred.    Lottie Deras MD  Pulmonary & Critical Care Medicine Fellow PGY-IV  [facility]  "

## 2025-03-26 ENCOUNTER — CONSULT (OUTPATIENT)
Dept: PULMONOLOGY | Facility: CLINIC | Age: 65
End: 2025-03-26
Payer: COMMERCIAL

## 2025-03-26 VITALS
BODY MASS INDEX: 48.82 KG/M2 | SYSTOLIC BLOOD PRESSURE: 148 MMHG | OXYGEN SATURATION: 94 % | WEIGHT: 293 LBS | HEART RATE: 70 BPM | HEIGHT: 65 IN | TEMPERATURE: 98.5 F | DIASTOLIC BLOOD PRESSURE: 82 MMHG

## 2025-03-26 DIAGNOSIS — J96.21 ACUTE ON CHRONIC HYPOXIC RESPIRATORY FAILURE (HCC): ICD-10-CM

## 2025-03-26 DIAGNOSIS — Z12.2 ENCOUNTER FOR SCREENING FOR MALIGNANT NEOPLASM OF LUNG IN CURRENT SMOKER WITH 30 PACK YEAR HISTORY OR GREATER: ICD-10-CM

## 2025-03-26 DIAGNOSIS — F17.200 TOBACCO DEPENDENCE: ICD-10-CM

## 2025-03-26 DIAGNOSIS — J43.8 OTHER EMPHYSEMA (HCC): Chronic | ICD-10-CM

## 2025-03-26 DIAGNOSIS — F17.200 ENCOUNTER FOR SCREENING FOR MALIGNANT NEOPLASM OF LUNG IN CURRENT SMOKER WITH 30 PACK YEAR HISTORY OR GREATER: ICD-10-CM

## 2025-03-26 DIAGNOSIS — E66.01 MORBID OBESITY (HCC): ICD-10-CM

## 2025-03-26 DIAGNOSIS — G47.33 OBSTRUCTIVE SLEEP APNEA TREATED WITH BILEVEL POSITIVE AIRWAY PRESSURE (BIPAP): ICD-10-CM

## 2025-03-26 DIAGNOSIS — R42 INTERMITTENT LIGHTHEADEDNESS: ICD-10-CM

## 2025-03-26 DIAGNOSIS — G47.33 OSA TREATED WITH BIPAP: Primary | ICD-10-CM

## 2025-03-26 DIAGNOSIS — J96.11 CHRONIC HYPOXIC RESPIRATORY FAILURE (HCC): ICD-10-CM

## 2025-03-26 DIAGNOSIS — E78.2 MIXED HYPERLIPIDEMIA: Chronic | ICD-10-CM

## 2025-03-26 DIAGNOSIS — R06.09 DYSPNEA ON EXERTION: ICD-10-CM

## 2025-03-26 DIAGNOSIS — E66.01 MORBID OBESITY WITH BODY MASS INDEX (BMI) OF 60.0 TO 69.9 IN ADULT (HCC): ICD-10-CM

## 2025-03-26 PROCEDURE — G2211 COMPLEX E/M VISIT ADD ON: HCPCS | Performed by: INTERNAL MEDICINE

## 2025-03-26 PROCEDURE — 99214 OFFICE O/P EST MOD 30 MIN: CPT | Performed by: INTERNAL MEDICINE

## 2025-03-26 NOTE — ASSESSMENT & PLAN NOTE
Significant smoking history  No significant emphysematous changes on imaging  Will F/U PFTs.   Orders:    Ambulatory Referral to Pulmonology

## 2025-03-26 NOTE — ASSESSMENT & PLAN NOTE
Counseled on weight modification with det and exercise  Will refer to weight management   Will refer to sleep medicine for consideration of Zepbound.   Orders:    Ambulatory Referral to Pulmonology

## 2025-03-26 NOTE — ASSESSMENT & PLAN NOTE
Orders:    Ambulatory Referral to Pulmonology    Complete PFT with post bronchodilator; Future

## 2025-03-26 NOTE — ASSESSMENT & PLAN NOTE
Compliant with BIPAP, compliance reviewed, 100%    Refer to sleep medicine.   Orders:    Ambulatory Referral to Pulmonology

## 2025-03-27 ENCOUNTER — TELEPHONE (OUTPATIENT)
Age: 65
End: 2025-03-27

## 2025-04-04 ENCOUNTER — OFFICE VISIT (OUTPATIENT)
Dept: SLEEP CENTER | Facility: CLINIC | Age: 65
End: 2025-04-04
Payer: COMMERCIAL

## 2025-04-04 VITALS — BODY MASS INDEX: 63.4 KG/M2 | HEART RATE: 73 BPM | HEIGHT: 65 IN | OXYGEN SATURATION: 94 %

## 2025-04-04 DIAGNOSIS — I50.32 CHRONIC DIASTOLIC CONGESTIVE HEART FAILURE (HCC): Chronic | ICD-10-CM

## 2025-04-04 DIAGNOSIS — E83.52 HYPERCALCEMIA: Chronic | ICD-10-CM

## 2025-04-04 DIAGNOSIS — E66.01 MORBID OBESITY WITH BODY MASS INDEX (BMI) OF 60.0 TO 69.9 IN ADULT (HCC): ICD-10-CM

## 2025-04-04 DIAGNOSIS — R73.03 PREDIABETES: ICD-10-CM

## 2025-04-04 DIAGNOSIS — G47.33 OBSTRUCTIVE SLEEP APNEA TREATED WITH BILEVEL POSITIVE AIRWAY PRESSURE (BIPAP): Primary | ICD-10-CM

## 2025-04-04 PROCEDURE — 99214 OFFICE O/P EST MOD 30 MIN: CPT | Performed by: NURSE PRACTITIONER

## 2025-04-04 PROCEDURE — G2211 COMPLEX E/M VISIT ADD ON: HCPCS | Performed by: NURSE PRACTITIONER

## 2025-04-04 NOTE — ASSESSMENT & PLAN NOTE
Wt Readings from Last 3 Encounters:   03/26/25 (!) 173 kg (381 lb)   03/14/25 (!) 171 kg (376 lb 6.4 oz)   05/23/24 (!) 171 kg (377 lb)   We discussed the association between heart failure and sleep apnea.  The importance of having good compliance with use of PAP therapy to prevent worsening of heart failure or associated complications, was discussed.  Patient was advised to contact the sleep disorders center to report any difficulty in using the equipment prior to the next follow-up visit.  The patient will continue their current medical regimen and follow-up with their PCP or cardiologist regarding the heart failure, as usual.

## 2025-04-04 NOTE — PATIENT INSTRUCTIONS
Patient Instructions:    1.  Continue use of BiPAP equipment nightly with supplemental O2  2.  Continue to clean your equipment, as discussed  3.  Contact the Sleep Disorders Center with any questions or concerns prior to your next visit, as needed  4.  Schedule visit for follow-up in 1 year   5.  Work with weight management regarding weight loss.  Use of Zepbound is supported.  6.  Continue to use the [employer] [facility]  7.  Complete lab testing    Thank you for trusting me with your care!    I know we often  cover a lot of information at the visit, so if you have follow-up questions, are unclear about the plan, or feel there were important items that we did not discuss or you did not receive clarity on, please don't hesitate to reach out to me.     avandeo messages are preferred for routine matters.  Please make sure to call for urgent matters as there can be a delay in responding to questions over avandeo.    IMPORTANT- Prior to a sleep study (at home or in the sleep lab), I strongly recommend contacting your medical insurance first to understand your benefits (including deductible if applicable), coverage for this test, and out of pocket costs.  Even if the test is approved by medical insurance, the cost to you is determined by your medical benefits.   If you have concerns about your out of pocket costs for sleep testing, please contact me/the office before you complete the test and we can discuss if there are alternate options.     I recommend following this advice in general before any lab test, imaging test, doctor visit, surgery, or ordering CPAP supplies as it is best to understand your coverage to avoid unexpected bills after the fact.       Nursing Support:  When: Monday through Friday 7:30A-4:30PM except holidays  Where: Our direct line is [phone]  *3  *1.      If you are having a true emergency please call 911.  In the event that the line is busy or it is after hours please leave a voice message and we will  return your call.  Please speak clearly, leaving your full name, birth date, best number to reach you and the reason for your call.   Medication refills: We will need the name of the medication, the dosage, the ordering provider, whether you get a 30 or 90 day refill, and the pharmacy name and address.  Medications will be ordered by the provider only.  Nurses cannot call in prescriptions.  Please allow 7 days for medication refills.  Physician requested updates: If your provider requested that you call with an update after starting medication, please be ready to provide us the medication and dosage, what time you take your medication, the time you attempt to fall asleep, time you fall asleep, when you wake up, and what time you get out of bed.  Sleep Study Results: We will contact you with sleep study results and/or next steps after the physician has reviewed your testing.

## 2025-04-04 NOTE — PROGRESS NOTES
Name: Valerie Mejia      : 1960      MRN: [id number]  Encounter Provider: RONALD Joaquin  Encounter Date: 2025   Encounter department: [facility]  :  Assessment & Plan  Obstructive sleep apnea treated with bilevel positive airway pressure (BiPAP)  She continues to use BiPAP equipment nightly and does not sleep without it.  She feels the current mask and settings are comfortable.  She has not been using the supplemental oxygen with the equipment due to the adapter cracking.  She states that she received a new one, but it is not the correct one to fit her tubing.  She was advised to request that someone come out to check which adapter is a correct fit for all of her equipment or to take all of her tubings to [facility] for assistance, to return to using the supplemental O2.    Current PAP Compliance Data:  Average usage:  She has been able to use the equipment 100% of all days recorded.  Average usage was 4 or more hours 100% of all days recorded.  Average hours used:  8 hours and 32 minutes  Average time in an air leak:  minimal   Average pressure:  19.3/14.3 cm of water pressure  Residual AHI:  0.4/hour, including 0.1 OA, 0.1 CA, 0.2 hypopneas, 0.0 unknown    The patient feels they benefit from the use of PAP equipment and would like to continue PAP therapy.    Response to treatment has been good.    The patient is at goal for hours of PAP use and at goal for effectiveness of treatment.  No changes are needed at this time.  A prescription for supplies has been provided to last for the next year.  The patient was advised to continue to clean the equipment appropriately, as discussed and to change supplies regularly.    She will continue using this equipment at the settings noted above for the next 12 months.  At that timeshe will then return for a routine follow-up evaluation. I have asked the patient to contact the Sleep Disorders Center if she encounters any  difficulties prior to that time.  Orders:    PAP DME Resupply/Reorder    Chronic diastolic congestive heart failure (HCC)  Wt Readings from Last 3 Encounters:   03/26/25 (!) 173 kg (381 lb)   03/14/25 (!) 171 kg (376 lb 6.4 oz)   05/23/24 (!) 171 kg (377 lb)   We discussed the association between heart failure and sleep apnea.  The importance of having good compliance with use of PAP therapy to prevent worsening of heart failure or associated complications, was discussed.  Patient was advised to contact the sleep disorders center to report any difficulty in using the equipment prior to the next follow-up visit.  The patient will continue their current medical regimen and follow-up with their PCP or cardiologist regarding the heart failure, as usual.      Morbid obesity with body mass index (BMI) of 60.0 to 69.9 in adult (HCC)  Obesity - We reviewed the association of obesity on obstructive sleep apnea and sleep disordered breathing. Encouraged patient to follow healthy diet, regular exercise regimen - including walking, and pursue weight loss to manage symptoms.  She would like to use Zepbound, which is indicated for use in the setting of obesity and obstructive sleep apnea.  She will be having a consultation with weight management in the near future. The importance of all components of weight management, which may include medications such as GLP-1s were discussed.  Message will be sent to add her to a cancellation list for a sooner appointment.      Orders:    TSH, 3rd generation with Free T4 reflex; Future    PTH, intact; Future    Prediabetes  HgA1C previously ordered - will be having drawn in the near future.  Orders:    TSH, 3rd generation with Free T4 reflex; Future    Hypercalcemia  Calcium level was elevated in the past, as well as PTH.  Fatigue and weakness may be related - PTH ordered for update as calcium was further increased in March lab work.           History of Present Illness   Valerie Mejia  is a 64 y.o. year old female.  The patient presents with her sister for follow up of severe obstructive sleep apnea.  She was hospitalized in December with acute exacerbation of CHF with with acute on chronic respiratory failure with hypoxia.      She completed a split night sleep study on 11/7/23.  Testing confirmed severe BRADY with AHI of 66.4.  Oxygen rosario was 76% with desat as low as 69% during treatment.  Best setting was BiPAP 17/12cm  She used oxygen while in the hospital and was sent home with O2 recommendation for AutoBiPAP 25/12/5 with 2L of oxygen bled into the system.  She was set up with equipment and returns to review compliance and effectiveness of treatment.         Patient accompanied at this vist by : daughter      Sitting and reading: (Patient-Rptd) (P) Slight chance of dozing  Watching TV: (Patient-Rptd) (P) Slight chance of dozing  Sitting, inactive in a public place (e.g. a theatre or a meeting): (Patient-Rptd) (P) Slight chance of dozing  As a passenger in a car for an hour without a break: (Patient-Rptd) (P) Slight chance of dozing  Lying down to rest in the afternoon when circumstances permit: (Patient-Rptd) (P) Slight chance of dozing  Sitting and talking to someone: (Patient-Rptd) (P) Would never doze  Sitting quietly after a lunch without alcohol: (Patient-Rptd) (P) Would never doze  In a car, while stopped for a few minutes in traffic: (Patient-Rptd) (P) Would never doze  Total score: (Patient-Rptd) (P) 5     Review of Systems   Constitutional:  Positive for fatigue.   Respiratory:  Positive for shortness of breath.         Exertional dyspnea   Psychiatric/Behavioral:  Positive for dysphoric mood.        Medical History Reviewed by provider this encounter:  Tobacco  Allergies  Meds  Problems  Med Hx  Surg Hx  Fam Hx     .  Current Outpatient Medications on File Prior to Visit   Medication Sig Dispense Refill    albuterol (PROVENTIL HFA,VENTOLIN HFA) 90 mcg/act inhaler Inhale 2  "puffs every 6 (six) hours as needed for wheezing or shortness of breath 18 g 1    escitalopram (LEXAPRO) 10 mg tablet Take 1 tablet (10 mg total) by mouth daily 90 tablet 1    furosemide (LASIX) 80 mg tablet Take 1 tablet (80 mg total) by mouth 2 (two) times a day (Patient taking differently: Take 80 mg by mouth 2 (two) times a day Pt is only taking 40 mg) 180 tablet 1    losartan (COZAAR) 25 mg tablet Take 1 tablet (25 mg total) by mouth daily 90 tablet 1    metoprolol succinate (TOPROL-XL) 25 mg 24 hr tablet Take 1 tablet (25 mg total) by mouth daily at bedtime 30 tablet 5    oxybutynin (DITROPAN) 5 mg tablet Take 1 tablet (5 mg total) by mouth 3 (three) times a day (Patient taking differently: Take 5 mg by mouth 2 (two) times a day) 180 tablet 2    spironolactone (ALDACTONE) 25 mg tablet Take 0.5 tablets (12.5 mg total) by mouth daily 90 tablet 0     No current facility-administered medications on file prior to visit.      Objective   Pulse 73   Ht 5' 5\" (1.651 m)   SpO2 94%   BMI 63.40 kg/m²        Physical Exam    Constitutional: Alert, cooperative, no distress, appears stated age, obese  Skin: Warm, dry, no rashes noted  Lungs: Clear to auscultation bilaterally, respirations unlabored  Heart: Normal rate and regular rhythm, S1/2 normal, no murmur noted, no rub or gallop      Data  Lab Results   Component Value Date    HGB 13.4 03/14/2025    HCT 40.7 03/14/2025    MCV 93 03/14/2025      Lab Results   Component Value Date    GLUCOSE 94 07/30/2015    CALCIUM 10.9 (H) 03/14/2025     07/30/2015    K 3.6 03/14/2025    CO2 26 03/14/2025     03/14/2025    BUN 13 03/14/2025    CREATININE 0.77 03/14/2025     No results found for: \"IRON\", \"TIBC\", \"FERRITIN\"  Lab Results   Component Value Date    AST 25 03/14/2025    ALT 30 03/14/2025       Administrative Statements   I have spent a total time of 30 minutes in caring for this patient on the day of the visit/encounter including Risks and benefits of tx " options, Instructions for management, Patient and family education, Importance of tx compliance, Risk factor reductions, Impressions, Counseling / Coordination of care, Documenting in the medical record, and Reviewing/placing orders in the medical record (including tests, medications, and/or procedures).

## 2025-04-04 NOTE — ASSESSMENT & PLAN NOTE
Obesity - We reviewed the association of obesity on obstructive sleep apnea and sleep disordered breathing. Encouraged patient to follow healthy diet, regular exercise regimen - including walking, and pursue weight loss to manage symptoms.  She would like to use Zepbound, which is indicated for use in the setting of obesity and obstructive sleep apnea.  She will be having a consultation with weight management in the near future. The importance of all components of weight management, which may include medications such as GLP-1s were discussed.  Message will be sent to add her to a cancellation list for a sooner appointment.      Orders:    TSH, 3rd generation with Free T4 reflex; Future    PTH, intact; Future

## 2025-04-04 NOTE — ASSESSMENT & PLAN NOTE
She continues to use BiPAP equipment nightly and does not sleep without it.  She feels the current mask and settings are comfortable.  She has not been using the supplemental oxygen with the equipment due to the adapter cracking.  She states that she received a new one, but it is not the correct one to fit her tubing.  She was advised to request that someone come out to check which adapter is a correct fit for all of her equipment or to take all of her tubings to [facility] for assistance, to return to using the supplemental O2.    Current PAP Compliance Data:  Average usage:  She has been able to use the equipment 100% of all days recorded.  Average usage was 4 or more hours 100% of all days recorded.  Average hours used:  8 hours and 32 minutes  Average time in an air leak:  minimal   Average pressure:  19.3/14.3 cm of water pressure  Residual AHI:  0.4/hour, including 0.1 OA, 0.1 CA, 0.2 hypopneas, 0.0 unknown    The patient feels they benefit from the use of PAP equipment and would like to continue PAP therapy.    Response to treatment has been good.    The patient is at goal for hours of PAP use and at goal for effectiveness of treatment.  No changes are needed at this time.  A prescription for supplies has been provided to last for the next year.  The patient was advised to continue to clean the equipment appropriately, as discussed and to change supplies regularly.    She will continue using this equipment at the settings noted above for the next 12 months.  At that timeshe will then return for a routine follow-up evaluation. I have asked the patient to [facility] if she encounters any difficulties prior to that time.  Orders:    PAP DME Resupply/Reorder

## 2025-04-05 ENCOUNTER — TELEPHONE (OUTPATIENT)
Dept: SLEEP CENTER | Facility: CLINIC | Age: 65
End: 2025-04-05

## 2025-04-08 ENCOUNTER — TELEPHONE (OUTPATIENT)
Age: 65
End: 2025-04-08

## 2025-04-08 LAB

## 2025-04-08 NOTE — ASSESSMENT & PLAN NOTE
Calcium level was elevated in the past, as well as PTH.  Fatigue and weakness may be related - PTH ordered for update as calcium was further increased in March lab work.

## 2025-04-08 NOTE — TELEPHONE ENCOUNTER
LVM to see of patient wanted to moved appt up from 6/2 to 4/8 at either 11:30 or 2:00 PM. Left office info to call back [phone]

## 2025-04-16 ENCOUNTER — HOSPITAL ENCOUNTER (OUTPATIENT)
Dept: PULMONOLOGY | Facility: HOSPITAL | Age: 65
Discharge: HOME | End: 2025-04-16

## 2025-04-16 RX ORDER — ALBUTEROL SULFATE 0.83 MG/ML
2.5 SOLUTION RESPIRATORY (INHALATION) ONCE
Status: DISCONTINUED | OUTPATIENT
Start: 2025-04-16 | End: 2025-04-20 | Stop reason: HOSPADM

## 2025-04-22 ENCOUNTER — CONSULT (OUTPATIENT)
Dept: VASCULAR SURGERY | Facility: CLINIC | Age: 65
End: 2025-04-22
Payer: COMMERCIAL

## 2025-04-22 VITALS
SYSTOLIC BLOOD PRESSURE: 120 MMHG | BODY MASS INDEX: 48.82 KG/M2 | OXYGEN SATURATION: 95 % | DIASTOLIC BLOOD PRESSURE: 70 MMHG | HEART RATE: 76 BPM | WEIGHT: 293 LBS | HEIGHT: 65 IN

## 2025-04-22 DIAGNOSIS — I89.0 LYMPHEDEMA OF BOTH LOWER EXTREMITIES: Chronic | ICD-10-CM

## 2025-04-22 PROCEDURE — 99203 OFFICE O/P NEW LOW 30 MIN: CPT | Performed by: NURSE PRACTITIONER

## 2025-04-22 NOTE — ASSESSMENT & PLAN NOTE
65-year-old female with morbid obesity, BMI 64, prediabetes, HTN, HLD, BRADY, COPD, chronic heart failure, BLE lymphedema    Patient presents the office for evaluation of bilateral lower extremity lymphedema    -Chronic longstanding bilateral lower extremity lymphedema with progression of swelling over the past several months to year  -Stage III lymphedema, L>R with buffalo hump of dorsal foot, skin fibrosis and thickening with a remote history of weeping  -No active wounds, cellulitis or lymphangitis  -No history of DVT  -Recommended pneumatic compression pumps to defer lymphedema from advancing to the next stage  -Ordered for Compreflex compression wraps. Not able to don traditional compression due to mis shape of the leg and size  -Referral to lymphedema clinic for manual lymph drainage and wrapping  -Weight loss is essential.  She has appointment with her PCP and [facility] weight loss upcoming to discuss weight loss  -Additionally,  moisturize skin daily to maintain skin integrity, calf pump exercises, periodic leg elevation  -Follow-up in the office in 1 month for second set of measurements      Orders:    Ambulatory Referral to Vascular Surgery    Ambulatory Referral to PT/OT Lymphedema Therapy; Future    Pneumatic compression pumps    CompreFlex

## 2025-04-22 NOTE — PROGRESS NOTES
Name: Valerie Mejia      : 1960      MRN: [id number]  Encounter Provider: RONALD Hamilton  Encounter Date: 2025   Encounter department: [facility]  :  Assessment & Plan  Lymphedema of both lower extremities  65-year-old female with morbid obesity, BMI 64, prediabetes, HTN, HLD, BRADY, COPD, chronic heart failure, BLE lymphedema    Patient presents the office for evaluation of bilateral lower extremity lymphedema    -Chronic longstanding bilateral lower extremity lymphedema with progression of swelling over the past several months to year  -Stage III lymphedema, L>R with buffalo hump of dorsal foot, skin fibrosis and thickening with a remote history of weeping  -No active wounds, cellulitis or lymphangitis  -No history of DVT  -Recommended pneumatic compression pumps to defer lymphedema from advancing to the next stage  -Ordered for Compreflex compression wraps. Not able to don traditional compression due to mis shape of the leg and size  -Referral to lymphedema clinic for manual lymph drainage and wrapping  -Weight loss is essential.  She has appointment with her PCP and [facility] weight loss upcoming to discuss weight loss  -Additionally,  moisturize skin daily to maintain skin integrity, calf pump exercises, periodic leg elevation  -Follow-up in the office in 1 month for second set of measurements      Orders:    Ambulatory Referral to Vascular Surgery    Ambulatory Referral to PT/OT Lymphedema Therapy; Future    Pneumatic compression pumps    CompreFlex      [employer]   Pedro Toni   Phone: [phone]  Fax: [phone]   E-mail: [email or web address]    Ordering Provider:  RONALD - Maude Nobles (NPI: [id number])  [facility]  [address] [place] [address]   [address] [place] [address]  Phone: [phone]  Fax: [phone]      Patient Information  MRN: [id number]   Valeire Mejia  1960  [address]  "[place] [id number]  [phone]     Insurance Information  Payor: [employer] / Plan: [employer] / Product Type: Medicare HMO /      [id number] - (Medicare Advantage)     Patient Height and Weight 5' 5\" (1.651 m)    Wt Readings from Last 1 Encounters:   04/22/25 (!) 175 kg (385 lb 12.8 oz)       Compression Lymphedema Pump Prescription Form      [x] Patient utilized Compression Garment >= 30 mmHg distally OR Gradient Wrap System    Appliance:     Legs:    [x] Right    [x] Left   Arms:    [] Right    [] Left     []Chest garment    []Abdominal garment     Length of need: 99 months    Protocol:  [x] Std: 40 mmHg, TID/ BID,  60 minutes  [] Other:   Pressures: __ mmHg    Frequency: __ / Day   Minutes/ Session: __ minutes    Patient History and Prognosis:  [x] Patient was diagnosed for the chronic disorder with reported on-set __at age 52   [x] Attempts at elevation and compression have not improved patients' condition and is now at risk of lymphatic disorder progressing to the next stage/ grade  [x] Patient's physical condition/ range of motion limited for exercise  [x] Patient/ Caregiver experienced difficulty applying 30 mmHg distal compression garments  [x] Patient compression stocking/ wrap tolerance limited due to pain/ reduced circulation  [x] Patient advised to reduce salt intake and adhere to a daily regiment of elevation, compression, and lymphatic exercises  [x] Patient's severe condition will not improve without further treatment interventions  [x] Patient has noted skin changes such as marked hyperkeratosis with hyperplasia and hyperpigmentation, papillomatosis cutis lymphostatica, elephantiasis, and/ or skin breakdown with persisting lymphorrhea    Symptoms/ Observation/ Evaluation/ Plan of Care for Lymphedema / Venous Compression Pumps     Conservative Care >= 4 weeks for severe lymphedema (check all that apply):  Patient instructions for DAILY use of conservative therapies;  [x] Elevate extremities " "daily and nightly to reduce swelling  [x] Exercise and ambulate / range of motion (ROM) daily to increase fluid flow and reduce swelling  [x] Wear 30-mmHg distal compression garments / wraps daily to reduce / control swelling  [x] Manual lymph drainage (MLD)  [x] On-set date of lymphedema : at age 52     Initial Measurements      Body Part Right Left   Ankle / Forearm 40 cm 44 1/2 cm   Calf / Elbow  62 2/4 cm 69 cm   Knee / Bicep  67 cm 69 1/2 cm   Mid-Thigh / Axilla  82 cm 82 1/2 cm           Chief complaint: Pt is here for a Consult and was ref by PCP. Pt presents with Lymphedema of BLE. No recent testing. Patient has discoloration, and tiredness in BLE.    History of Present Illness   Valerie Mejia is a 65 y.o. female who presents to the office for evaluation of bilateral lower extremity lymphedema, L>R.  Ongoing, started in her early 50s.  No history of DVT.  She has chronic bilateral lower extremity lymphedema that is worsened over the past several months to year with weight gain.  She is not able to don traditional compression due to size of the leg.  She has dyspnea on exertion limiting her mobility.  Legs feel tired and heavy.  She does have a history of weeping though not presently.  No wounds or ulcerations.  Skin fibrosis and thickening of the lower leg.  Skin is dry.    History obtained from: patient    Review of Systems   Cardiovascular:  Positive for leg swelling.   Skin:  Positive for color change. Negative for wound.     Medical History Reviewed by provider this encounter:  Tobacco  Allergies  Meds  Problems  Med Hx  Surg Hx  Fam Hx     .     Objective   I have reviewed and made appropriate changes to the review of systems input by the medical assistant.    Vitals:    04/22/25 1402   BP: 120/70   BP Location: Left arm   Patient Position: Sitting   Cuff Size: Large   Pulse: 76   SpO2: 95%   Weight: (!) 175 kg (385 lb 12.8 oz)   Height: 5' 5\" (1.651 m)       Patient Active Problem List "   Diagnosis    Essential hypertension    Mixed stress and urge urinary incontinence    COPD (chronic obstructive pulmonary disease) (HCC)    Mixed hyperlipidemia    Morbid obesity with body mass index (BMI) of 60.0 to 69.9 in adult ([facility])    Cigarette nicotine dependence without complication    Obstructive sleep apnea treated with bilevel positive airway pressure (BiPAP)    Insomnia due to medical condition    Chronic diastolic congestive heart failure (HCC)    Lymphedema of both lower extremities    Pre-diabetes    Hypercalcemia    Moderate episode of recurrent major depressive disorder ([facility])    Ambulatory dysfunction    Adrenal nodule (HCC)    Urinary incontinence    Hyperparathyroidism ([facility])    Lung cancer screening declined by patient    Acute pain of left shoulder       Past Surgical History:   Procedure Laterality Date    HYSTERECTOMY      INCONTINENCE SURGERY      JOINT REPLACEMENT  2015    Right knee    OOPHORECTOMY Bilateral     REPLACEMENT TOTAL KNEE Right 2016    TOOTH EXTRACTION  02/15/2023    2/15/23,3/16/23,4/27/23    TOTAL ABDOMINAL HYSTERECTOMY W/ BILATERAL SALPINGOOPHORECTOMY  2006       Family History   Problem Relation Age of Onset    Breast cancer Mother 70    Aneurysm Mother         aortic    Dementia Mother     Heart failure Father     Diabetes Father         Dad    Heart disease Father     Lung cancer Sister     No Known Problems Daughter     Polycystic ovary syndrome Daughter     Dementia Maternal Grandmother     Heart disease Maternal Grandfather     Heart disease Paternal Grandmother     Heart disease Paternal Grandfather     No Known Problems Son     Dementia Maternal Aunt     No Known Problems Maternal Aunt     No Known Problems Paternal Aunt     No Known Problems Paternal Aunt     Sleep apnea Sister     Cancer Sister        Social History     Socioeconomic History    Marital status:      Spouse name: Not on file    Number of children: Not on file    Years of education: Not on file     Highest education level: Not on file   Occupational History    Not on file   Tobacco Use    Smoking status: Some Days     Current packs/day: 0.00     Average packs/day: 1 pack/day for 70.6 years (70.0 ttl pk-yrs)     Types: Cigarettes     Start date: 1976     Last attempt to quit: 2023     Years since quittin.8    Smokeless tobacco: Never    Tobacco comments:     Patient is at about 3 per day - occasional    Vaping Use    Vaping status: Former    Start date: 2018    Substances: Nicotine   Substance and Sexual Activity    Alcohol use: Not Currently     Comment: occassional- once a year    Drug use: Not Currently     Types: Marijuana    Sexual activity: Not Currently     Birth control/protection: Surgical   Other Topics Concern    Not on file   Social History Narrative    Not on file     Social Drivers of Health     Financial Resource Strain: Medium Risk (2022)    Overall Financial Resource Strain (CARDIA)     Difficulty of Paying Living Expenses: Somewhat hard   Food Insecurity: No Food Insecurity (3/14/2025)    Hunger Vital Sign     Worried About Running Out of Food in the Last Year: Never true     Ran Out of Food in the Last Year: Never true   Transportation Needs: No Transportation Needs (3/14/2025)    PRAPARE - Transportation     Lack of Transportation (Medical): No     Lack of Transportation (Non-Medical): No   Physical Activity: Not on file   Stress: Not on file   Social Connections: Not on file   Intimate Partner Violence: Not on file   Housing Stability: Low Risk  (3/14/2025)    Housing Stability Vital Sign     Unable to Pay for Housing in the Last Year: No     Number of Times Moved in the Last Year: 1     Homeless in the Last Year: No       Allergies   Allergen Reactions    Lisinopril Cough         Current Outpatient Medications:     albuterol (PROVENTIL HFA,VENTOLIN HFA) 90 mcg/act inhaler, Inhale 2 puffs every 6 (six) hours as needed for wheezing or shortness of breath, Disp: 18 g,  "Rfl: 1    escitalopram (LEXAPRO) 10 mg tablet, Take 1 tablet (10 mg total) by mouth daily, Disp: 90 tablet, Rfl: 1    furosemide (LASIX) 80 mg tablet, Take 1 tablet (80 mg total) by mouth 2 (two) times a day (Patient taking differently: Take 80 mg by mouth daily), Disp: 180 tablet, Rfl: 1    losartan (COZAAR) 25 mg tablet, Take 1 tablet (25 mg total) by mouth daily, Disp: 90 tablet, Rfl: 1    metoprolol succinate (TOPROL-XL) 25 mg 24 hr tablet, Take 1 tablet (25 mg total) by mouth daily at bedtime, Disp: 30 tablet, Rfl: 5    oxybutynin (DITROPAN) 5 mg tablet, Take 1 tablet (5 mg total) by mouth 3 (three) times a day (Patient taking differently: Take 5 mg by mouth 2 (two) times a day), Disp: 180 tablet, Rfl: 2    spironolactone (ALDACTONE) 25 mg tablet, Take 0.5 tablets (12.5 mg total) by mouth daily, Disp: 90 tablet, Rfl: 0    /70 (BP Location: Left arm, Patient Position: Sitting, Cuff Size: Large)   Pulse 76   Ht 5' 5\" (1.651 m)   Wt (!) 175 kg (385 lb 12.8 oz)   SpO2 95%   BMI 64.20 kg/m²      Physical Exam  Vitals and nursing note reviewed. Exam conducted with a chaperone present.   Constitutional:       Appearance: Normal appearance. She is obese.   Eyes:      Extraocular Movements: Extraocular movements intact.   Cardiovascular:      Pulses:           Dorsalis pedis pulses are 2+ on the right side and 2+ on the left side.      Heart sounds: Normal heart sounds.   Pulmonary:      Effort: Pulmonary effort is normal.      Breath sounds: Normal breath sounds.   Musculoskeletal:      Right lower leg: Edema present.      Left lower leg: Edema present.      Comments: Bilateral lower extremity stage III lymphedema with skin thickening, fibrosis at the distal lower leg, + buffalo hump dorsal foot.  Few scattered reticular veins of the anterior lower leg.  No significantly large truncal varicosities.   Skin:     General: Skin is warm and dry.   Neurological:      General: No focal deficit present.      Mental " Status: She is alert and oriented to person, place, and time.         Administrative Statements   I have spent a total time of 30 minutes in caring for this patient on the day of the visit/encounter including Prognosis, Risks and benefits of tx options, Instructions for management, Patient and family education, Importance of tx compliance, Risk factor reductions, Impressions, Counseling / Coordination of care, Documenting in the medical record, Reviewing/placing orders in the medical record (including tests, medications, and/or procedures), and Obtaining or reviewing history  .

## 2025-04-22 NOTE — LETTER
2025     [facility]  [id number]  [place] [address]    Patient: Valerie Mejia   YOB: 1960   Date of Visit: 2025       Dear  [facility]:    Thank you for referring Valerie Mejia to me for evaluation. Below are my notes for this consultation.    If you have questions, please do not hesitate to call me. I look forward to following your patient along with you.         Sincerely,        RONALD Hamilton        CC: No Recipients    RONALD Hamilton  2025  5:10 PM  Sign when Signing Visit  Name: Valerie Mejia      : 1960      MRN: [id number]  Encounter Provider: RONALD Hamilton  Encounter Date: 2025   Enc[facility] department: [facility] [place]  :  Assessment & Plan  Lymphedema of both lower extremities  65-year-old female with morbid obesity, BMI 64, prediabetes, HTN, HLD, BRADY, COPD, chronic heart failure, BLE lymphedema    Patient presents the office for evaluation of bilateral lower extremity lymphedema    -Chronic longstanding bilateral lower extremity lymphedema with progression of swelling over the past several months to year  -Stage III lymphedema, L>R with buffalo hump of dorsal foot, skin fibrosis and thickening with a remote history of weeping  -No active wounds, cellulitis or lymphangitis  -No history of DVT  -Recommended pneumatic compression pumps to defer lymphedema from advancing to the next stage  -Ordered for Compreflex compression wraps. Not able to don traditional compression due to mis shape of the leg and size  -Referral to lymphedema clinic for manual lymph drainage and wrapping  -Weight loss is essential.  She has appointment with her PCP and [facility] upcoming to discuss weight loss  -Additionally,  moisturize skin daily to maintain skin integrity, calf pump exercises, periodic leg elevation  -Follow-up in the office in 1 month  "for second set of measurements      Orders:  •  Ambulatory Referral to Vascular Surgery  •  Ambulatory Referral to PT/OT Lymphedema Therapy; Future  •  Pneumatic compression pumps  •  [facility]      [facility]   Pedro Muñoz   Phone: [phone]  Fax: [phone]   E-mail: [email or web address]    Ordering Provider:  Maude Cruz (NPI: [id number])  [facility]  [address] [place] [address]   [address] [place] [address]  Phone: [phone]  Fax: [phone]      Patient Information  MRN: [id number]   Valerie Mejia  1960  [address] [id number]  [phone]     Insurance Information  Payor: [employer] / Plan: [employer] / Product Type: Medicare HMO /      [id number] - (Medicare Advantage)     Patient Height and Weight 5' 5\" (1.651 m)    Wt Readings from Last 1 Encounters:   04/22/25 (!) 175 kg (385 lb 12.8 oz)       Compression Lymphedema Pump Prescription Form      [x] Patient utilized Compression Garment >= 30 mmHg distally OR Gradient Wrap System    Appliance:     Legs:    [x] Right    [x] Left   Arms:    [] Right    [] Left     []Chest garment    []Abdominal garment     Length of need: 99 months    Protocol:  [x] Std: 40 mmHg, TID/ BID,  60 minutes  [] Other:   Pressures: __ mmHg    Frequency: __ / Day   Minutes/ Session: __ minutes    Patient History and Prognosis:  [x] Patient was diagnosed for the chronic disorder with reported on-set __at age 52   [x] Attempts at elevation and compression have not improved patients' condition and is now at risk of lymphatic disorder progressing to the next stage/ grade  [x] Patient's physical condition/ range of motion limited for exercise  [x] Patient/ Caregiver experienced difficulty applying 30 mmHg distal compression garments  [x] Patient compression stocking/ wrap tolerance limited due to pain/ reduced circulation  [x] Patient advised to reduce salt intake and adhere to a " daily regiment of elevation, compression, and lymphatic exercises  [x] Patient's severe condition will not improve without further treatment interventions  [x] Patient has noted skin changes such as marked hyperkeratosis with hyperplasia and hyperpigmentation, papillomatosis cutis lymphostatica, elephantiasis, and/ or skin breakdown with persisting lymphorrhea    Symptoms/ Observation/ Evaluation/ Plan of Care for Lymphedema / Venous Compression Pumps     Conservative Care >= 4 weeks for severe lymphedema (check all that apply):  Patient instructions for DAILY use of conservative therapies;  [x] Elevate extremities daily and nightly to reduce swelling  [x] Exercise and ambulate / range of motion (ROM) daily to increase fluid flow and reduce swelling  [x] Wear 30-mmHg distal compression garments / wraps daily to reduce / control swelling  [x] Manual lymph drainage (MLD)  [x] On-set date of lymphedema : at age 52     Initial Measurements      Body Part Right Left   Ankle / Forearm 40 cm 44 1/2 cm   Calf / Elbow  62 2/4 cm 69 cm   Knee / Bicep  67 cm 69 1/2 cm   Mid-Thigh / Axilla  82 cm 82 1/2 cm           Chief complaint: Pt is here for a Consult and was ref by PCP. Pt presents with Lymphedema of BLE. No recent testing. Patient has discoloration, and tiredness in BLE.    History of Present Illness  Valerie Mejia is a 65 y.o. female who presents to the office for evaluation of bilateral lower extremity lymphedema, L>R.  Ongoing, started in her early 50s.  No history of DVT.  She has chronic bilateral lower extremity lymphedema that is worsened over the past several months to year with weight gain.  She is not able to don traditional compression due to size of the leg.  She has dyspnea on exertion limiting her mobility.  Legs feel tired and heavy.  She does have a history of weeping though not presently.  No wounds or ulcerations.  Skin fibrosis and thickening of the lower leg.  Skin is dry.    History obtained  "from: patient    Review of Systems   Cardiovascular:  Positive for leg swelling.   Skin:  Positive for color change. Negative for wound.     Medical History Reviewed by provider this encounter:  Tobacco  Allergies  Meds  Problems  Med Hx  Surg Hx  Fam Hx     .     Objective  I have reviewed and made appropriate changes to the review of systems input by the medical assistant.    Vitals:    04/22/25 1402   BP: 120/70   BP Location: Left arm   Patient Position: Sitting   Cuff Size: Large   Pulse: 76   SpO2: 95%   Weight: (!) 175 kg (385 lb 12.8 oz)   Height: 5' 5\" (1.651 m)       Patient Active Problem List   Diagnosis   • Essential hypertension   • Mixed stress and urge urinary incontinence   • COPD (chronic obstructive pulmonary disease) ([facility])   • Mixed hyperlipidemia   • Morbid obesity with body mass index (BMI) of 60.0 to 69.9 in adult ([facility])   • Cigarette nicotine dependence without complication   • Obstructive sleep apnea treated with bilevel positive airway pressure (BiPAP)   • Insomnia due to medical condition   • Chronic diastolic congestive heart failure (HCC)   • Lymphedema of both lower extremities   • Pre-diabetes   • Hypercalcemia   • Moderate episode of recurrent major depressive disorder ([facility])   • Ambulatory dysfunction   • Adrenal nodule ([facility])   • Urinary incontinence   • Hyperparathyroidism ([facility])   • Lung cancer screening declined by patient   • Acute pain of left shoulder       Past Surgical History:   Procedure Laterality Date   • HYSTERECTOMY     • INCONTINENCE SURGERY     • JOINT REPLACEMENT  2015    Right knee   • OOPHORECTOMY Bilateral    • REPLACEMENT TOTAL KNEE Right 2016   • TOOTH EXTRACTION  02/15/2023    2/15/23,3/16/23,4/27/23   • TOTAL ABDOMINAL HYSTERECTOMY W/ BILATERAL SALPINGOOPHORECTOMY  2006       Family History   Problem Relation Age of Onset   • Breast cancer Mother 70   • Aneurysm Mother         aortic   • Dementia Mother    • Heart failure Father    • Diabetes Father        "  Dad   • Heart disease Father    • Lung cancer Sister    • No Known Problems Daughter    • Polycystic ovary syndrome Daughter    • Dementia Maternal Grandmother    • Heart disease Maternal Grandfather    • Heart disease Paternal Grandmother    • Heart disease Paternal Grandfather    • No Known Problems Son    • Dementia Maternal Aunt    • No Known Problems Maternal Aunt    • No Known Problems Paternal Aunt    • No Known Problems Paternal Aunt    • Sleep apnea Sister    • Cancer Sister        Social History     Socioeconomic History   • Marital status:      Spouse name: Not on file   • Number of children: Not on file   • Years of education: Not on file   • Highest education level: Not on file   Occupational History   • Not on file   Tobacco Use   • Smoking status: Some Days     Current packs/day: 0.00     Average packs/day: 1 pack/day for 70.6 years (70.0 ttl pk-yrs)     Types: Cigarettes     Start date: 1976     Last attempt to quit: 2023     Years since quittin.8   • Smokeless tobacco: Never   • Tobacco comments:     Patient is at about 3 per day - occasional    Vaping Use   • Vaping status: Former   • Start date: 2018   • Substances: Nicotine   Substance and Sexual Activity   • Alcohol use: Not Currently     Comment: occassional- once a year   • Drug use: Not Currently     Types: Marijuana   • Sexual activity: Not Currently     Birth control/protection: Surgical   Other Topics Concern   • Not on file   Social History Narrative   • Not on file     Social Drivers of Health     Financial Resource Strain: Medium Risk (2022)    Overall Financial Resource Strain (CARDIA)    • Difficulty of Paying Living Expenses: Somewhat hard   Food Insecurity: No Food Insecurity (3/14/2025)    Hunger Vital Sign    • Worried About Running Out of Food in the Last Year: Never true    • Ran Out of Food in the Last Year: Never true   Transportation Needs: No Transportation Needs (3/14/2025)    PRAPARE -  "Transportation    • Lack of Transportation (Medical): No    • Lack of Transportation (Non-Medical): No   Physical Activity: Not on file   Stress: Not on file   Social Connections: Not on file   Intimate Partner Violence: Not on file   Housing Stability: Low Risk  (3/14/2025)    Housing Stability Vital Sign    • Unable to Pay for Housing in the Last Year: No    • Number of Times Moved in the Last Year: 1    • Homeless in the Last Year: No       Allergies   Allergen Reactions   • Lisinopril Cough         Current Outpatient Medications:   •  albuterol (PROVENTIL HFA,VENTOLIN HFA) 90 mcg/act inhaler, Inhale 2 puffs every 6 (six) hours as needed for wheezing or shortness of breath, Disp: 18 g, Rfl: 1  •  escitalopram (LEXAPRO) 10 mg tablet, Take 1 tablet (10 mg total) by mouth daily, Disp: 90 tablet, Rfl: 1  •  furosemide (LASIX) 80 mg tablet, Take 1 tablet (80 mg total) by mouth 2 (two) times a day (Patient taking differently: Take 80 mg by mouth daily), Disp: 180 tablet, Rfl: 1  •  losartan (COZAAR) 25 mg tablet, Take 1 tablet (25 mg total) by mouth daily, Disp: 90 tablet, Rfl: 1  •  metoprolol succinate (TOPROL-XL) 25 mg 24 hr tablet, Take 1 tablet (25 mg total) by mouth daily at bedtime, Disp: 30 tablet, Rfl: 5  •  oxybutynin (DITROPAN) 5 mg tablet, Take 1 tablet (5 mg total) by mouth 3 (three) times a day (Patient taking differently: Take 5 mg by mouth 2 (two) times a day), Disp: 180 tablet, Rfl: 2  •  spironolactone (ALDACTONE) 25 mg tablet, Take 0.5 tablets (12.5 mg total) by mouth daily, Disp: 90 tablet, Rfl: 0    /70 (BP Location: Left arm, Patient Position: Sitting, Cuff Size: Large)   Pulse 76   Ht 5' 5\" (1.651 m)   Wt (!) 175 kg (385 lb 12.8 oz)   SpO2 95%   BMI 64.20 kg/m²      Physical Exam  Vitals and nursing note reviewed. Exam conducted with a chaperone present.   Constitutional:       Appearance: Normal appearance. She is obese.   Eyes:      Extraocular Movements: Extraocular movements " intact.   Cardiovascular:      Pulses:           Dorsalis pedis pulses are 2+ on the right side and 2+ on the left side.      Heart sounds: Normal heart sounds.   Pulmonary:      Effort: Pulmonary effort is normal.      Breath sounds: Normal breath sounds.   Musculoskeletal:      Right lower leg: Edema present.      Left lower leg: Edema present.      Comments: Bilateral lower extremity stage III lymphedema with skin thickening, fibrosis at the distal lower leg, + buffalo hump dorsal foot.  Few scattered reticular veins of the anterior lower leg.  No significantly large truncal varicosities.   Skin:     General: Skin is warm and dry.   Neurological:      General: No focal deficit present.      Mental Status: She is alert and oriented to person, place, and time.         Administrative Statements  I have spent a total time of 30 minutes in caring for this patient on the day of the visit/encounter including Prognosis, Risks and benefits of tx options, Instructions for management, Patient and family education, Importance of tx compliance, Risk factor reductions, Impressions, Counseling / Coordination of care, Documenting in the medical record, Reviewing/placing orders in the medical record (including tests, medications, and/or procedures), and Obtaining or reviewing history  .

## 2025-04-23 ENCOUNTER — OFFICE VISIT (OUTPATIENT)
Dept: INTERNAL MEDICINE CLINIC | Age: 65
End: 2025-04-23
Payer: COMMERCIAL

## 2025-04-23 ENCOUNTER — APPOINTMENT (OUTPATIENT)
Dept: LAB | Age: 65
End: 2025-04-23
Payer: COMMERCIAL

## 2025-04-23 VITALS
HEART RATE: 63 BPM | HEIGHT: 65 IN | BODY MASS INDEX: 48.82 KG/M2 | OXYGEN SATURATION: 96 % | SYSTOLIC BLOOD PRESSURE: 136 MMHG | WEIGHT: 293 LBS | TEMPERATURE: 97.6 F | DIASTOLIC BLOOD PRESSURE: 84 MMHG

## 2025-04-23 DIAGNOSIS — E66.01 MORBID OBESITY WITH BODY MASS INDEX (BMI) OF 60.0 TO 69.9 IN ADULT (HCC): ICD-10-CM

## 2025-04-23 DIAGNOSIS — R73.9 HYPERGLYCEMIA: ICD-10-CM

## 2025-04-23 DIAGNOSIS — R42 INTERMITTENT LIGHTHEADEDNESS: ICD-10-CM

## 2025-04-23 DIAGNOSIS — J43.8 OTHER EMPHYSEMA (HCC): Chronic | ICD-10-CM

## 2025-04-23 DIAGNOSIS — J96.21 ACUTE ON CHRONIC HYPOXIC RESPIRATORY FAILURE (HCC): ICD-10-CM

## 2025-04-23 DIAGNOSIS — R73.01 IMPAIRED FASTING GLUCOSE: ICD-10-CM

## 2025-04-23 DIAGNOSIS — G47.33 OBSTRUCTIVE SLEEP APNEA TREATED WITH BILEVEL POSITIVE AIRWAY PRESSURE (BIPAP): ICD-10-CM

## 2025-04-23 DIAGNOSIS — Z13.6 SCREENING FOR CARDIOVASCULAR CONDITION: ICD-10-CM

## 2025-04-23 DIAGNOSIS — E78.2 MIXED HYPERLIPIDEMIA: Chronic | ICD-10-CM

## 2025-04-23 DIAGNOSIS — I10 ESSENTIAL HYPERTENSION: Chronic | ICD-10-CM

## 2025-04-23 DIAGNOSIS — R73.03 PREDIABETES: ICD-10-CM

## 2025-04-23 DIAGNOSIS — I10 ESSENTIAL HYPERTENSION: Primary | Chronic | ICD-10-CM

## 2025-04-23 DIAGNOSIS — I89.0 LYMPHEDEMA OF BOTH LOWER EXTREMITIES: Chronic | ICD-10-CM

## 2025-04-23 DIAGNOSIS — Z13.1 SCREENING FOR DIABETES MELLITUS: ICD-10-CM

## 2025-04-23 DIAGNOSIS — E66.813 CLASS 3 SEVERE OBESITY DUE TO EXCESS CALORIES WITH SERIOUS COMORBIDITY AND BODY MASS INDEX (BMI) OF 60.0 TO 69.9 IN ADULT: ICD-10-CM

## 2025-04-23 LAB
BASOPHILS # BLD AUTO: 0.04 THOUSANDS/ÂΜL (ref 0–0.1)
BASOPHILS NFR BLD AUTO: 1 % (ref 0–1)
CHOLEST SERPL-MCNC: 208 MG/DL (ref ?–200)
EOSINOPHIL # BLD AUTO: 0.14 THOUSAND/ÂΜL (ref 0–0.61)
EOSINOPHIL NFR BLD AUTO: 2 % (ref 0–6)
ERYTHROCYTE [DISTWIDTH] IN BLOOD BY AUTOMATED COUNT: 13.5 % (ref 11.6–15.1)
EST. AVERAGE GLUCOSE BLD GHB EST-MCNC: 126 MG/DL
GLUCOSE P FAST SERPL-MCNC: 115 MG/DL (ref 65–99)
HBA1C MFR BLD: 6 %
HCT VFR BLD AUTO: 41.3 % (ref 34.8–46.1)
HDLC SERPL-MCNC: 42 MG/DL
HGB BLD-MCNC: 13.3 G/DL (ref 11.5–15.4)
IMM GRANULOCYTES # BLD AUTO: 0.03 THOUSAND/UL (ref 0–0.2)
IMM GRANULOCYTES NFR BLD AUTO: 1 % (ref 0–2)
LDLC SERPL CALC-MCNC: 138 MG/DL (ref 0–100)
LYMPHOCYTES # BLD AUTO: 1.54 THOUSANDS/ÂΜL (ref 0.6–4.47)
LYMPHOCYTES NFR BLD AUTO: 26 % (ref 14–44)
MCH RBC QN AUTO: 30.6 PG (ref 26.8–34.3)
MCHC RBC AUTO-ENTMCNC: 32.2 G/DL (ref 31.4–37.4)
MCV RBC AUTO: 95 FL (ref 82–98)
MONOCYTES # BLD AUTO: 0.62 THOUSAND/ÂΜL (ref 0.17–1.22)
MONOCYTES NFR BLD AUTO: 10 % (ref 4–12)
NEUTROPHILS # BLD AUTO: 3.58 THOUSANDS/ÂΜL (ref 1.85–7.62)
NEUTS SEG NFR BLD AUTO: 60 % (ref 43–75)
NONHDLC SERPL-MCNC: 166 MG/DL
NRBC BLD AUTO-RTO: 0 /100 WBCS
PLATELET # BLD AUTO: 181 THOUSANDS/UL (ref 149–390)
PMV BLD AUTO: 11.4 FL (ref 8.9–12.7)
PTH-INTACT SERPL-MCNC: 117.4 PG/ML (ref 12–88)
RBC # BLD AUTO: 4.34 MILLION/UL (ref 3.81–5.12)
TRIGL SERPL-MCNC: 139 MG/DL (ref ?–150)
TSH SERPL DL<=0.05 MIU/L-ACNC: 1.29 UIU/ML (ref 0.45–4.5)
WBC # BLD AUTO: 5.95 THOUSAND/UL (ref 4.31–10.16)

## 2025-04-23 PROCEDURE — 83036 HEMOGLOBIN GLYCOSYLATED A1C: CPT

## 2025-04-23 PROCEDURE — 82947 ASSAY GLUCOSE BLOOD QUANT: CPT

## 2025-04-23 PROCEDURE — 84443 ASSAY THYROID STIM HORMONE: CPT

## 2025-04-23 PROCEDURE — 36415 COLL VENOUS BLD VENIPUNCTURE: CPT

## 2025-04-23 PROCEDURE — 80061 LIPID PANEL: CPT

## 2025-04-23 PROCEDURE — 99214 OFFICE O/P EST MOD 30 MIN: CPT | Performed by: PHYSICIAN ASSISTANT

## 2025-04-23 PROCEDURE — 83970 ASSAY OF PARATHORMONE: CPT

## 2025-04-23 PROCEDURE — 85025 COMPLETE CBC W/AUTO DIFF WBC: CPT

## 2025-04-23 NOTE — ASSESSMENT & PLAN NOTE
Prior Authorization Clinical Questions for Weight Management Pharmacotherapy    2. Does the patient have a diagnosis of obesity, confirmed by a BMI greater than or equal to 30 kg/m^2?: Yes  3. Does the patient have a BMI of greater than or equal to 27 kg/m^2 with at least one weight-related comorbidity/risk factor/complication (e.g. diabetes, dyslipidemia, coronary artery disease)?: Yes  4. Weight-related co-morbidities/risk factors: metabolic syndrome, dyslipidemia, hypertension, obstructive sleep apnea (BRADY), GERD  5. WEGOVY CVA Indication: Does patient have established documented cardiovascular disease (history of a prior heart attack (myocardial infarction), stroke, or symptomatic peripheral arterial disease (PAD)?: No  6. ZEPBOUND BRADY Indication: Does patient have documented BRADY diagnosed via sleep study (insurance will require copy of sleep study results for approval)?: Yes  9. Does the patient have a history of type 2 diabetes?: No  10. Has the member tried and failed other weight loss medication within the past 12 months?: No  11. Will the member use requested medication in combination with another GLP agonist or weight loss drug?: No  12. Is the medication a controlled substance?: No     Baseline weight (in pounds): 385 lbs         Orders:    tirzepatide (Zepbound) 2.5 mg/0.5 mL auto-injector; Inject 0.5 mL (2.5 mg total) under the skin once a week for 28 days    Patient would like to see if his Zepbound would be an option for her as she also has sleep apnea  And this may not be covered by her plan  Reviewed her labs, fasting blood sugar 115 and hemoglobin A1c is at 6 therefore she is not diabetic but rather impaired fasting glucose

## 2025-04-23 NOTE — ASSESSMENT & PLAN NOTE
Controlled  Continue losartan  Orders:    tirzepatide (Zepbound) 2.5 mg/0.5 mL auto-injector; Inject 0.5 mL (2.5 mg total) under the skin once a week for 28 days

## 2025-04-23 NOTE — ASSESSMENT & PLAN NOTE
Discussed use of zepbound - would benefit greatly from weight loss   Orders:    tirzepatide (Zepbound) 2.5 mg/0.5 mL auto-injector; Inject 0.5 mL (2.5 mg total) under the skin once a week for 28 days

## 2025-04-23 NOTE — PROGRESS NOTES
Name: Valerie Mejia      : 1960      MRN: [id number]  Encounter Provider: Aziza Cisse PA-C  Encounter Date: 2025   Encounter department: [facility]  :  Assessment & Plan  Essential hypertension  Controlled  Continue losartan  Orders:    tirzepatide (Zepbound) 2.5 mg/0.5 mL auto-injector; Inject 0.5 mL (2.5 mg total) under the skin once a week for 28 days    Obstructive sleep apnea treated with bilevel positive airway pressure (BiPAP)  Discussed use of zepbound - would benefit greatly from weight loss   Orders:    tirzepatide (Zepbound) 2.5 mg/0.5 mL auto-injector; Inject 0.5 mL (2.5 mg total) under the skin once a week for 28 days    Other emphysema (HCC)         Lymphedema of both lower extremities         Morbid obesity with body mass index (BMI) of 60.0 to 69.9 in adult (HCC)  Prior Authorization Clinical Questions for Weight Management Pharmacotherapy    2. Does the patient have a diagnosis of obesity, confirmed by a BMI greater than or equal to 30 kg/m^2?: Yes  3. Does the patient have a BMI of greater than or equal to 27 kg/m^2 with at least one weight-related comorbidity/risk factor/complication (e.g. diabetes, dyslipidemia, coronary artery disease)?: Yes  4. Weight-related co-morbidities/risk factors: metabolic syndrome, dyslipidemia, hypertension, obstructive sleep apnea (BRADY), GERD  5. WEGOVY CVA Indication: Does patient have established documented cardiovascular disease (history of a prior heart attack (myocardial infarction), stroke, or symptomatic peripheral arterial disease (PAD)?: No  6. ZEPBOUND BRADY Indication: Does patient have documented BRADY diagnosed via sleep study (insurance will require copy of sleep study results for approval)?: Yes  9. Does the patient have a history of type 2 diabetes?: No  10. Has the member tried and failed other weight loss medication within the past 12 months?: No  11. Will the member use requested medication in  combination with another GLP agonist or weight loss drug?: No  12. Is the medication a controlled substance?: No     Baseline weight (in pounds): 385 lbs         Orders:    tirzepatide (Zepbound) 2.5 mg/0.5 mL auto-injector; Inject 0.5 mL (2.5 mg total) under the skin once a week for 28 days    Patient would like to see if his Zepbound would be an option for her as she also has sleep apnea  And this may not be covered by her plan  Reviewed her labs, fasting blood sugar 115 and hemoglobin A1c is at 6 therefore she is not diabetic but rather impaired fasting glucose    Mixed hyperlipidemia  Discussed weight loss and low calorie diet        Class 3 severe obesity due to excess calories with serious comorbidity and body mass index (BMI) of 60.0 to 69.9 in adult      Orders:    tirzepatide (Zepbound) 2.5 mg/0.5 mL auto-injector; Inject 0.5 mL (2.5 mg total) under the skin once a week for 28 days    Impaired fasting glucose    Orders:    tirzepatide (Zepbound) 2.5 mg/0.5 mL auto-injector; Inject 0.5 mL (2.5 mg total) under the skin once a week for 28 days           History of Present Illness   66 y/o female with BRADY, HTN, impaired FBS, hx CHF presents for f/u and desire to lose weight   Pt has heard about zepbound and would like to try this   She has an apt with weight management in June     Family hx of DM  Pt denies family hx of pancreatic or thyroid cancers       Review of Systems   Constitutional:  Negative for activity change, appetite change, chills, diaphoresis and fever.   HENT:  Negative for congestion and sore throat.    Eyes:  Negative for pain and redness.   Respiratory:  Negative for cough and shortness of breath.    Cardiovascular:  Positive for leg swelling. Negative for chest pain and palpitations.   Gastrointestinal:  Negative for abdominal pain, constipation, diarrhea and nausea.   Genitourinary:  Negative for dysuria.   Musculoskeletal:  Positive for arthralgias, back pain and gait problem.   Skin:   "Negative for rash.   Neurological:  Negative for dizziness, light-headedness and headaches.   Psychiatric/Behavioral:  Positive for sleep disturbance. The patient is not nervous/anxious.        Objective   /84 (BP Location: Left arm, Patient Position: Sitting, Cuff Size: Large)   Pulse 63   Temp 97.6 °F (36.4 °C) (Temporal)   Ht 5' 5\" (1.651 m)   Wt (!) 175 kg (385 lb)   SpO2 96%   BMI 64.07 kg/m²      Physical Exam  Vitals reviewed.   Constitutional:       General: She is not in acute distress.     Appearance: She is obese.   HENT:      Head: Normocephalic and atraumatic.      Nose: Nose normal.   Eyes:      Conjunctiva/sclera: Conjunctivae normal.      Pupils: Pupils are equal, round, and reactive to light.   Cardiovascular:      Rate and Rhythm: Normal rate and regular rhythm.   Pulmonary:      Effort: Pulmonary effort is normal. No respiratory distress.      Breath sounds: Normal breath sounds. No wheezing or rales.   Musculoskeletal:      Cervical back: Normal range of motion.      Right lower leg: Edema present.      Left lower leg: Edema present.   Neurological:      Mental Status: She is alert.   Psychiatric:         Mood and Affect: Mood normal.         "

## 2025-04-24 RX ORDER — TIRZEPATIDE 2.5 MG/.5ML
2.5 INJECTION, SOLUTION SUBCUTANEOUS WEEKLY
Qty: 2 ML | Refills: 0 | Status: SHIPPED | OUTPATIENT
Start: 2025-04-24 | End: 2025-04-24

## 2025-04-24 RX ORDER — TIRZEPATIDE 2.5 MG/.5ML
2.5 INJECTION, SOLUTION SUBCUTANEOUS WEEKLY
Qty: 2 ML | Refills: 0 | Status: SHIPPED | OUTPATIENT
Start: 2025-04-24 | End: 2025-05-22

## 2025-04-28 ENCOUNTER — TELEPHONE (OUTPATIENT)
Age: 65
End: 2025-04-28

## 2025-04-28 ENCOUNTER — HOSPITAL ENCOUNTER (OUTPATIENT)
Dept: PULMONOLOGY | Facility: HOSPITAL | Age: 65
Discharge: HOME | End: 2025-04-28
Attending: STUDENT IN AN ORGANIZED HEALTH CARE EDUCATION/TRAINING PROGRAM
Payer: COMMERCIAL

## 2025-04-28 DIAGNOSIS — E78.2 MIXED HYPERLIPIDEMIA: Chronic | ICD-10-CM

## 2025-04-28 PROCEDURE — 94726 PLETHYSMOGRAPHY LUNG VOLUMES: CPT | Performed by: INTERNAL MEDICINE

## 2025-04-28 PROCEDURE — 94060 EVALUATION OF WHEEZING: CPT | Performed by: INTERNAL MEDICINE

## 2025-04-28 PROCEDURE — 94726 PLETHYSMOGRAPHY LUNG VOLUMES: CPT

## 2025-04-28 PROCEDURE — 94729 DIFFUSING CAPACITY: CPT

## 2025-04-28 PROCEDURE — 94729 DIFFUSING CAPACITY: CPT | Performed by: INTERNAL MEDICINE

## 2025-04-28 PROCEDURE — 94760 N-INVAS EAR/PLS OXIMETRY 1: CPT

## 2025-04-28 PROCEDURE — 94060 EVALUATION OF WHEEZING: CPT

## 2025-04-28 RX ORDER — ALBUTEROL SULFATE 0.83 MG/ML
2.5 SOLUTION RESPIRATORY (INHALATION) ONCE
Status: COMPLETED | OUTPATIENT
Start: 2025-04-28 | End: 2025-04-28

## 2025-04-28 RX ADMIN — ALBUTEROL SULFATE 2.5 MG: 2.5 SOLUTION RESPIRATORY (INHALATION) at 08:58

## 2025-04-28 NOTE — TELEPHONE ENCOUNTER
Caller: Iris Mejia    Doctor: Maude Nobles    Reason for call: Pt called to check the approval status for her lymphedema pumps. Please contact the pt with any updates, thank you. If unavailable, pt consent to leaving a voicemail.     Call back#: [phone]

## 2025-04-29 NOTE — TELEPHONE ENCOUNTER
Spoke with pt and informed them that we need the second office visit because care [facility] will need 2 notes for the pumps to be approved, sent out

## 2025-04-30 ENCOUNTER — EVALUATION (OUTPATIENT)
Dept: OCCUPATIONAL THERAPY | Age: 65
End: 2025-04-30
Attending: NURSE PRACTITIONER
Payer: COMMERCIAL

## 2025-04-30 ENCOUNTER — RESULTS FOLLOW-UP (OUTPATIENT)
Dept: PULMONOLOGY | Facility: CLINIC | Age: 65
End: 2025-04-30

## 2025-04-30 DIAGNOSIS — I89.0 LYMPHEDEMA OF BOTH LOWER EXTREMITIES: Chronic | ICD-10-CM

## 2025-04-30 PROCEDURE — 97167 OT EVAL HIGH COMPLEX 60 MIN: CPT | Performed by: OCCUPATIONAL THERAPIST

## 2025-04-30 NOTE — LETTER
2025    RONALD Nroton  [address] [place] [address]  [address] [place] [address]    Patient: Valerie Mejia   YOB: 1960   Date of Visit: 2025     Encounter Diagnosis     ICD-10-CM    1. Lymphedema of both lower extremities  I89.0 Ambulatory Referral to PT/OT Lymphedema Therapy          Dear RONALD Yadav MD Roseline Okigbo, DO:    Thank you for your recent referral of Valerie Mejia. Please review the attached evaluation summary from Valerie's recent visit.     Please verify that you agree with the plan of care by signing the attached order.     If you have any questions or concerns, please do not hesitate to call.     I sincerely appreciate the opportunity to share in the care of one of your patients and hope to have another opportunity to work with you in the near future.     Sincerely,    Pham Lane, OT      Referring Provider:     I certify that I have read the below Plan of Care and certify the need for these services furnished under this plan of treatment while under my care.                    RONALD Norton  [address] [facility]  [address] [place] [address]  Via In Basket        Lymphedema OT Evaluation     Today's date: 2025  Patient name: Valerie Mejia  : 1960  MRN: [id number]  Referring provider: Maude Nobles CRNP  Dx:   Encounter Diagnosis     ICD-10-CM    1. Lymphedema of both lower extremities  I89.0 Ambulatory Referral to PT/OT Lymphedema Therapy                     Assessment  Impairments: abnormal coordination, abnormal or restricted ROM, activity intolerance, impaired physical strength, lacks appropriate home exercise program and endurance  Symptom irritability: high    Assessment details: Valerie Mejia is a 65 y.o. female with complaints of worsening edema to BLE (L>R), worsening heaviness of BLEs, increased difficulty with functional mobility, stair negotiating, and movement due to swollen  legs, and concern for infections.  Patient's presentation is consistent with Stage 3 lymphedema, which is also multifactorial due to dx of CHF, obesity, and DMII with the following impairments found on evaluation: severe edema to BLE (L>R), moderate fibrotic tissue, tight/shiny skin, mild hyperpigmentation at distal leg, +2.5 pitting edema B/L feet.     Valerie Mejia is a good candidate for occupational lymphedema therapy and would benefit from skilled OT to address the above impairments in order to allow the patient to achieve the goals listed and return to prior level of function. OT plan of care to include short stretch bandage decongestion to LLE first, edu and performance of elevation, MLD, use of pump and most approp compression garment to limb after decongestion, then continue to R side. Compression prescription includes: TBD.     During initial evaluation, education was provided on lymphatic anatomy and physiology, edema differential diagnosis, edema risk reduction behaviors, and healthy habits; decongestion vs maintenance treatment options. Recommendations were made for skin care, elevation of the edematous limb, and ROM and muscle pump exercises to address edema and patient consented to these recommendations. Patient also educated on diagnosis, plan of care and prognosis.  Pt is in agreement with recommended plan of care and goals for therapy, and demonstrates motivation for active participation in proposed plan of care.    Understanding of Dx/Px/POC: good     Prognosis: good    Goals  STG  - Patient and/or caregiver will understand lymphedema precautions to decrease risk of infection and exacerbation of lymphedema  - Patient will develop a tolerance for wearing multi-layer, short stretch bandages betweens sessions to facilitate limb decongestion for at least 8 hours  - Pt/caregiver will require Min A for multi-layer short stretch bandage wrapping to lower extremity  - Patient will demo decreased  edema of 6% of each limb  - Patient will perform HEP of ROM and MLD independently or with minimal assistance to help improve lymphatic flow and venous return     LTG  - Patient will experience increased ROM and functional mobility to aid with ADL's at time of discharge  - Patient will be independent with donning and doffing of compression garments which will enable regular daily garment wear at time of discharge, skin maintenance, and self- MLD   - Patient and/or caregiver will be independent with HEP and lymphedema management to prevent relapse and reduce risk of infection and hospitalizations at time of discharge  - Patient will demo decreased edema to BLE of 15%    Plan  Patient would benefit from: skilled occupational therapy    Planned therapy interventions: IASTM, joint mobilization, kinesiology taping, manual therapy, massage, compression, strengthening, stretching, therapeutic activities, therapeutic exercise, functional ROM exercises and home exercise program    Frequency: 1-2x per week.  Duration in weeks: 12  Plan of Care beginning date: 4/30/2025  Plan of Care expiration date: 7/23/2025  Treatment plan discussed with: patient        Subjective/History:  Chief Complaint: worsening heaviness, dec activity mark trying to pick legs up for functional mobility, inc difficulty losing weight  Prior Edema Treatments: +5 years ago  Imaging: April 2021- No DVT  Relevant PMHx:   Past Medical History:   Diagnosis Date   • Acute on chronic heart failure with preserved ejection fraction (HFpEF) ([facility]) 12/02/2022   • Arthritis    • CHF (congestive heart failure) ([facility])    • COPD (chronic obstructive pulmonary disease) ([facility])    • COVID-19 virus infection 02/27/2023   • Depression    • Eating disorder    • Hypertension    • Hypoxia 12/02/2022   • Obesity    • Sleep apnea    • Urinary incontinence    • Visual impairment 2018        Lymphedema special questions  Feeling of heaviness, fullness, pressure? yes  Resolves with  "elevation: No  Sleeping location? Supine, bed  Change in fit of shoes/clothes/jewelry?yes  History of Cellulitis?  no  History of S/DVT? no  History of Leg wounds? no  Sees a podiatrist regularly for foot care? no  Presence of trunk/abdominal/genital edema? yes  Latex Allergy? no    Systems Review:  Personal history of Cancer? No  Cardiovascular hx: Yes  On diuretics? yes  Thyroid dysfunction: No  Diabetes: No  Kidney disease:No   Liver disease: No  Abdominal surgeries: No  Orthopedic injuries/surgeries: Yes R TKR; L knee arthritis   Tobacco/alcohol use?    Pain: moderate to severe B/L knee pain  Function: independence with functional mobility with cane; occ assistance with ADLs as needed form family  Working Status: retired  Exercise status: limited 2* inc pain at knees, arthritis, heaviness to BLE, COPD, SOB with functional mobility  Patient goals: \"to be able to walk without fatigue to exercise and lose weight\"      Objective       04/30/25 1300   LOWER EXTREMITY LEFT   8cm proximal to 1st MTP 28.5 cm   4cm proximal to 1st MTP 27 cm   MTP 27 cm   Malleoli (cm) 39.5 cm   M+4 cm 46 cm   M+8 cm 53 cm   M+12 cm 58.5 cm   M+16 cm 65 cm   M+20 cm 68.8 cm   M+24 cm 72 cm   M+28 cm 72 cm   M+32 cm 68 cm   M+36 cm 70 cm   M+40 cm 77 cm   M+44 cm 83 cm   M+48 cm 83.5 cm   M+52 cm 83.5 cm   Volume LE (mL) 21578   Difference from last visit (mL)  -21578   Difference from first visit (mL)  -21578   LOWER EXTREMITY RIGHT   8cm proximal to 1st MTP 28.5 cm   4cm proximal to 1st MTP 27 cm   MTP 27 cm   Malleoli (cm) 36 cm   M+4 cm 40 cm   M+8 cm 45.5 cm   M+12 cm 51 cm   M+16 cm 56.2 cm   M+20 cm 61 cm   M+24 cm 64.5 cm   M+28 cm 66 cm   M+32 cm 63.5 cm   M+36 cm 67.5 cm   M+40 cm 73 cm   M+44 cm 78 cm   M+48 cm 83.5 cm   M+52 cm 81 cm   Volume LE (mL) 18706   Difference from last visit (mL)  -18706   Difference from first visit (mL)  -18706          Precautions: Universal, controlled CHF, COPD    POC expires Auth Status? (BOMN, " approved, pending) Unit limit (Daily) Auth Start date Expiration date PT/OT + Visit Limit?   7/23/25          Date of Service 4/30        Visits used         Visits remaining         Compression Rx                                    Manual Ther         volumetrics perf        MLD Perf/edu        Compression Bandaging         Wound Care         Fibrotic tissue         Ther Ex         Remedial Exercise; HEP provided         Step ups         UBE bike                  Ther Activity & Self Care         Skin care         Garment Fit/Train                           Pt Education         Edema differential dx perf        Lymphatic A&P perf        Compression options perf

## 2025-05-01 NOTE — PROGRESS NOTES
Lymphedema OT Evaluation     Today's date: 2025  Patient name: Valerie Mejia  : 1960  MRN: [id number]  Referring provider: Maude Nobles CRNP  Dx:   Encounter Diagnosis     ICD-10-CM    1. Lymphedema of both lower extremities  I89.0 Ambulatory Referral to PT/OT Lymphedema Therapy                     Assessment  Impairments: abnormal coordination, abnormal or restricted ROM, activity intolerance, impaired physical strength, lacks appropriate home exercise program and endurance  Symptom irritability: high    Assessment details: Valerie Mejia is a 65 y.o. female with complaints of worsening edema to BLE (L>R), worsening heaviness of BLEs, increased difficulty with functional mobility, stair negotiating, and movement due to swollen legs, and concern for infections.  Patient's presentation is consistent with Stage 3 lymphedema, which is also multifactorial due to dx of CHF, obesity, and DMII with the following impairments found on evaluation: severe edema to BLE (L>R), moderate fibrotic tissue, tight/shiny skin, mild hyperpigmentation at distal leg, +2.5 pitting edema B/L feet.     Valerie Mejia is a good candidate for occupational lymphedema therapy and would benefit from skilled OT to address the above impairments in order to allow the patient to achieve the goals listed and return to prior level of function. OT plan of care to include short stretch bandage decongestion to LLE first, edu and performance of elevation, MLD, use of pump and most approp compression garment to limb after decongestion, then continue to R side. Compression prescription includes: TBD.     During initial evaluation, education was provided on lymphatic anatomy and physiology, edema differential diagnosis, edema risk reduction behaviors, and healthy habits; decongestion vs maintenance treatment options. Recommendations were made for skin care, elevation of the edematous limb, and ROM and muscle pump exercises to  address edema and patient consented to these recommendations. Patient also educated on diagnosis, plan of care and prognosis.  Pt is in agreement with recommended plan of care and goals for therapy, and demonstrates motivation for active participation in proposed plan of care.    Understanding of Dx/Px/POC: good     Prognosis: good    Goals  STG  - Patient and/or caregiver will understand lymphedema precautions to decrease risk of infection and exacerbation of lymphedema  - Patient will develop a tolerance for wearing multi-layer, short stretch bandages betweens sessions to facilitate limb decongestion for at least 8 hours  - Pt/caregiver will require Min A for multi-layer short stretch bandage wrapping to lower extremity  - Patient will demo decreased edema of 6% of each limb  - Patient will perform HEP of ROM and MLD independently or with minimal assistance to help improve lymphatic flow and venous return     LTG  - Patient will experience increased ROM and functional mobility to aid with ADL's at time of discharge  - Patient will be independent with donning and doffing of compression garments which will enable regular daily garment wear at time of discharge, skin maintenance, and self- MLD   - Patient and/or caregiver will be independent with HEP and lymphedema management to prevent relapse and reduce risk of infection and hospitalizations at time of discharge  - Patient will demo decreased edema to BLE of 15%    Plan  Patient would benefit from: skilled occupational therapy    Planned therapy interventions: IASTM, joint mobilization, kinesiology taping, manual therapy, massage, compression, strengthening, stretching, therapeutic activities, therapeutic exercise, functional ROM exercises and home exercise program    Frequency: 1-2x per week.  Duration in weeks: 12  Plan of Care beginning date: 4/30/2025  Plan of Care expiration date: 7/23/2025  Treatment plan discussed with:  "patient        Subjective/History:  Chief Complaint: worsening heaviness, dec activity mark trying to pick legs up for functional mobility, inc difficulty losing weight  Prior Edema Treatments: +5 years ago  Imaging: April 2021- No DVT  Relevant PMHx:   Past Medical History:   Diagnosis Date    Acute on chronic heart failure with preserved ejection fraction (HFpEF) ([facility]) 12/02/2022    Arthritis     CHF (congestive heart failure) ([facility])     COPD (chronic obstructive pulmonary disease) ([facility])     COVID-19 virus infection 02/27/2023    Depression     Eating disorder     Hypertension     Hypoxia 12/02/2022    Obesity     Sleep apnea     Urinary incontinence     Visual impairment 2018        Lymphedema special questions  Feeling of heaviness, fullness, pressure? yes  Resolves with elevation: No  Sleeping location? Supine, bed  Change in fit of shoes/clothes/jewelry?yes  History of Cellulitis?  no  History of S/DVT? no  History of Leg wounds? no  Sees a podiatrist regularly for foot care? no  Presence of trunk/abdominal/genital edema? yes  Latex Allergy? no    Systems Review:  Personal history of Cancer? No  Cardiovascular hx: Yes  On diuretics? yes  Thyroid dysfunction: No  Diabetes: No  Kidney disease:No   Liver disease: No  Abdominal surgeries: No  Orthopedic injuries/surgeries: Yes R TKR; L knee arthritis   Tobacco/alcohol use?    Pain: moderate to severe B/L knee pain  Function: independence with functional mobility with cane; occ assistance with ADLs as needed form family  Working Status: retired  Exercise status: limited 2* inc pain at knees, arthritis, heaviness to BLE, COPD, SOB with functional mobility  Patient goals: \"to be able to walk without fatigue to exercise and lose weight\"      Objective       04/30/25 1300   LOWER EXTREMITY LEFT   8cm proximal to 1st MTP 28.5 cm   4cm proximal to 1st MTP 27 cm   MTP 27 cm   Malleoli (cm) 39.5 cm   M+4 cm 46 cm   M+8 cm 53 cm   M+12 cm 58.5 cm   M+16 cm 65 cm   M+20 cm " 68.8 cm   M+24 cm 72 cm   M+28 cm 72 cm   M+32 cm 68 cm   M+36 cm 70 cm   M+40 cm 77 cm   M+44 cm 83 cm   M+48 cm 83.5 cm   M+52 cm 83.5 cm   Volume LE (mL) 21578   Difference from last visit (mL)  -21578   Difference from first visit (mL)  -21578   LOWER EXTREMITY RIGHT   8cm proximal to 1st MTP 28.5 cm   4cm proximal to 1st MTP 27 cm   MTP 27 cm   Malleoli (cm) 36 cm   M+4 cm 40 cm   M+8 cm 45.5 cm   M+12 cm 51 cm   M+16 cm 56.2 cm   M+20 cm 61 cm   M+24 cm 64.5 cm   M+28 cm 66 cm   M+32 cm 63.5 cm   M+36 cm 67.5 cm   M+40 cm 73 cm   M+44 cm 78 cm   M+48 cm 83.5 cm   M+52 cm 81 cm   Volume LE (mL) 18706   Difference from last visit (mL)  -18706   Difference from first visit (mL)  -18706          Precautions: Universal, controlled CHF, COPD    POC expires Auth Status? (BOMN, approved, pending) Unit limit (Daily) Auth Start date Expiration date PT/OT + Visit Limit?   7/23/25          Date of Service 4/30        Visits used         Visits remaining         Compression Rx                                    Manual Ther         volumetrics perf        MLD Perf/edu        Compression Bandaging         Wound Care         Fibrotic tissue         Ther Ex         Remedial Exercise; HEP provided         Step ups         UBE bike                  Ther Activity & Self Care         Skin care         Garment Fit/Train                           Pt Education         Edema differential dx perf        Lymphatic A&P perf        Compression options perf

## 2025-05-02 ENCOUNTER — TELEPHONE (OUTPATIENT)
Age: 65
End: 2025-05-02

## 2025-05-02 NOTE — TELEPHONE ENCOUNTER
Pt called , was advised by pharmacy that PA is needed for tirzepatide (Zepbound) 2.5 mg/0.5 mL auto-injector . Please advise

## 2025-05-05 NOTE — TELEPHONE ENCOUNTER
PA for (Zepbound) 2.5 mg/0.5 mL auto-injector APPROVED     Date(s) approved 01/01/25-12/31/25    Case # n/a    Patient advised by          []Sporhart Message  [x]Phone call   [x]LMOM  []L/M to call office as no active Communication consent on file  []Unable to leave detailed message as VM not approved on Communication consent       Pharmacy advised by    [x]Fax  []Phone call  []Secure Chat     Approval letter scanned into Media Yes      
PA for (Zepbound) 2.5 mg/0.5 mL auto-injector SUBMITTED to [employer]    via    []CMM-KEY:   []Surescripts-Case ID #   []Availity-Auth ID # NDC #   [x]Faxed to plan - completed form scanned in media  []Other website   []Phone call Case ID #     [x]PA sent as URGENT    All office notes, labs and other pertaining documents and studies sent. Clinical questions answered. Awaiting determination from insurance company.     Turnaround time for your insurance to make a decision on your Prior Authorization can take 7-21 business days.                 
Pt called , was advised by pharmacy that PA is needed for tirzepatide (Zepbound) 2.5 mg/0.5 mL auto-injector . Please advise        
patient

## 2025-05-07 ENCOUNTER — OFFICE VISIT (OUTPATIENT)
Dept: OCCUPATIONAL THERAPY | Age: 65
End: 2025-05-07
Attending: NURSE PRACTITIONER
Payer: COMMERCIAL

## 2025-05-07 DIAGNOSIS — I89.0 LYMPHEDEMA OF BOTH LOWER EXTREMITIES: Primary | ICD-10-CM

## 2025-05-07 PROCEDURE — 97140 MANUAL THERAPY 1/> REGIONS: CPT | Performed by: OCCUPATIONAL THERAPIST

## 2025-05-07 NOTE — PROGRESS NOTES
"Daily Note     Today's date: 2025  Patient name: Valerie Mejia  : 1960  MRN: [id number]  Referring provider: Maude Nobles CRNP  Dx:   Encounter Diagnosis     ICD-10-CM    1. Lymphedema of both lower extremities  I89.0                      Subjective: \"I got my pump and I have been using it 2x per day.\"      Objective: See treatment diary below      Assessment: Tolerated treatment well. Pt reported perf exercises, elevation and now pumps at home. Therapist perf MLD to LLE to inc lymphatic flow venous return and dec edema. Therapist perf skin care of lotion before short stretch bandage wrapping. Therapist edu pt and pt daughter on compression bandage instructions. Pt instructed to try to keep up until tomorrow for shower. Pt instructed to take off if experience pain, n/t, toe discolored. Patient would benefit from continued OT      Plan: Continue per plan of care.      Precautions: Universal, controlled CHF, COPD    POC expires Auth Status? (BOMN, approved, pending) Unit limit (Daily) Auth Start date Expiration date PT/OT + Visit Limit?   25          Date of Service        Visits used         Visits remaining         Compression Rx                                    Manual Ther         volumetrics perf        MLD Perf/edu 35'       Compression Bandaging  LLE- stockinette, 2 white foam, 1 10cm, 2 12cm.; edu daughter to perf       Wound Care         Fibrotic tissue         Ther Ex         Remedial Exercise; HEP provided         Step ups         UBE bike                  Ther Activity & Self Care         Skin care         Garment Fit/Train                           Pt Education         Edema differential dx perf        Lymphatic A&P perf        Compression options perf                               "

## 2025-05-09 ENCOUNTER — RESULTS FOLLOW-UP (OUTPATIENT)
Dept: SLEEP CENTER | Facility: CLINIC | Age: 65
End: 2025-05-09

## 2025-05-14 ENCOUNTER — APPOINTMENT (OUTPATIENT)
Dept: OCCUPATIONAL THERAPY | Age: 65
End: 2025-05-14
Attending: NURSE PRACTITIONER
Payer: COMMERCIAL

## 2025-05-21 ENCOUNTER — OFFICE VISIT (OUTPATIENT)
Dept: OCCUPATIONAL THERAPY | Age: 65
End: 2025-05-21
Attending: NURSE PRACTITIONER
Payer: COMMERCIAL

## 2025-05-21 DIAGNOSIS — I89.0 LYMPHEDEMA OF BOTH LOWER EXTREMITIES: Primary | ICD-10-CM

## 2025-05-21 PROCEDURE — 97140 MANUAL THERAPY 1/> REGIONS: CPT | Performed by: OCCUPATIONAL THERAPIST

## 2025-05-21 NOTE — PROGRESS NOTES
"Daily Note     Today's date: 2025  Patient name: Valerie Mejia  : 1960  MRN: [id number]  Referring provider: Maude Nobles CRNP  Dx:   Encounter Diagnosis     ICD-10-CM    1. Lymphedema of both lower extremities  I89.0                      Subjective: \"I feel like my knee is smaller.\"      Objective: See treatment diary below      Assessment: Tolerated treatment well. Pt reported short stretch compression bandage wrapping did not work well, reported falling off at the knee. Therapist not wanting to wrap from knee down 2* possible inc edema to knee. Therapist measured for Farrow calf and knee wrap- XL regular. Pt noted with dec edema, inc pliable skin 2* pt use of pump 2x per day. Patient would benefit from continued OT      Plan: Continue per plan of care.      Precautions: Universal, controlled CHF, COPD    POC expires Auth Status? (BOMN, approved, pending) Unit limit (Daily) Auth Start date Expiration date PT/OT + Visit Limit?   25          Date of Service       Visits used         Visits remaining         Compression Rx                                    Manual Ther         volumetrics perf        MLD Perf/edu 35' 35'      Compression Bandaging  LLE- stockinette, 2 white foam, 1 10cm, 2 12cm.; edu daughter to perf       Wound Care         Fibrotic tissue   15- at ankle      Ther Ex         Remedial Exercise; HEP provided         Step ups         UBE bike                  Ther Activity & Self Care         Skin care         Garment Fit/Train                           Pt Education         Edema differential dx perf        Lymphatic A&P perf        Compression options perf                               "

## 2025-05-25 DIAGNOSIS — E66.01 MORBID OBESITY WITH BODY MASS INDEX (BMI) OF 60.0 TO 69.9 IN ADULT (HCC): ICD-10-CM

## 2025-05-25 DIAGNOSIS — E78.2 MIXED HYPERLIPIDEMIA: ICD-10-CM

## 2025-05-25 DIAGNOSIS — G47.33 OBSTRUCTIVE SLEEP APNEA TREATED WITH BILEVEL POSITIVE AIRWAY PRESSURE (BIPAP): Primary | ICD-10-CM

## 2025-05-25 DIAGNOSIS — R73.01 IMPAIRED FASTING GLUCOSE: ICD-10-CM

## 2025-05-25 RX ORDER — TIRZEPATIDE 5 MG/.5ML
5 INJECTION, SOLUTION SUBCUTANEOUS WEEKLY
Qty: 2 ML | Refills: 0 | Status: SHIPPED | OUTPATIENT
Start: 2025-05-25

## 2025-05-28 ENCOUNTER — OFFICE VISIT (OUTPATIENT)
Dept: OCCUPATIONAL THERAPY | Age: 65
End: 2025-05-28
Attending: NURSE PRACTITIONER
Payer: COMMERCIAL

## 2025-05-28 DIAGNOSIS — I89.0 LYMPHEDEMA OF BOTH LOWER EXTREMITIES: Primary | ICD-10-CM

## 2025-05-28 PROCEDURE — 97140 MANUAL THERAPY 1/> REGIONS: CPT | Performed by: OCCUPATIONAL THERAPIST

## 2025-05-28 NOTE — PROGRESS NOTES
"Daily Note     Today's date: 2025  Patient name: Valerie Mejia  : 1960  MRN: [id number]  Referring provider: Maude Nobles CRNP  Dx:   Encounter Diagnosis     ICD-10-CM    1. Lymphedema of both lower extremities  I89.0                      Subjective: \"I feel like the fluid is moving.\"      Objective: See treatment diary below      Assessment: Tolerated treatment well. Pt noted with dec edema at b/l knees. Medial knee lobule is not extending over leg like before. Therapist now believes, with use of the pump and exercise, pt would be okay to use calf velcro wrap without knee part. Therapist even perf short stretch bandage wrapping to BLE foot to below knee as therapist not too concerned right now of inc edema at knee with inc compression below knee. Pt reported bandage wrapping felt good on her legs; denied pain. Pt noted with inc fibrotic tissue at distal leg B/L. Patient would benefit from continued OT      Plan: Continue per plan of care.      Precautions: Universal, controlled CHF, COPD    POC expires Auth Status? (BOMN, approved, pending) Unit limit (Daily) Auth Start date Expiration date PT/OT + Visit Limit?   25          Date of Service      Visits used         Visits remaining         Compression Rx                                    Manual Ther         volumetrics perf        MLD Perf/edu 35' 35' 30'     Compression Bandaging  LLE- stockinette, 2 white foam, 1 10cm, 2 12cm.; edu daughter to perf  stockinette, 1 white foam, 1 10cm, 1 12cm.; B/L     Wound Care         Fibrotic tissue   15- at ankle      Ther Ex         Remedial Exercise; HEP provided         Step ups         UBE bike         Nu step    10'     Ther Activity & Self Care         Skin care         Garment Fit/Train                           Pt Education         Edema differential dx perf        Lymphatic A&P perf        Compression options perf                               "

## 2025-06-05 ENCOUNTER — OFFICE VISIT (OUTPATIENT)
Dept: VASCULAR SURGERY | Facility: CLINIC | Age: 65
End: 2025-06-05
Payer: COMMERCIAL

## 2025-06-05 VITALS
HEART RATE: 79 BPM | DIASTOLIC BLOOD PRESSURE: 76 MMHG | HEIGHT: 65 IN | OXYGEN SATURATION: 97 % | SYSTOLIC BLOOD PRESSURE: 132 MMHG | RESPIRATION RATE: 16 BRPM | WEIGHT: 293 LBS | BODY MASS INDEX: 48.82 KG/M2

## 2025-06-05 DIAGNOSIS — I89.0 LYMPHEDEMA OF BOTH LOWER EXTREMITIES: Primary | Chronic | ICD-10-CM

## 2025-06-05 PROCEDURE — 99213 OFFICE O/P EST LOW 20 MIN: CPT | Performed by: NURSE PRACTITIONER

## 2025-06-05 NOTE — ASSESSMENT & PLAN NOTE
"65-year-old female with morbid obesity, BMI 64, prediabetes, HTN, HLD, BRADY, COPD, chronic heart failure, BLE lymphedema L>R    Patient returns to the office to recheck bilateral lower extremity lymphedema.  Second set of measurements      -Stage III lymphedema, L>R with buffalo hump of dorsal foot, skin fibrosis and thickening with a remote history of weeping  -Since last visit she obtained compression pumps and is going to PT for lymphedema therapy  -Significant improvement in lower extremity lymphedema with pump use  -Awaiting Compreflex wraps for daily use.  -No active wounds, cellulitis or lymphangitis  -Continue to use compression pumps, goal BID for 1 hour each  -Lost 20+ Lbs with zepbound     -Additionally,  moisturize skin daily to maintain skin integrity, calf pump exercises, periodic leg elevation  -Follow-up in the office in 3-6 months to recheck lymphedema           [facility]   Pedro Muñoz   Phone: [phone]  Fax: [phone]   E-mail: [email or web address]    Ordering Provider:  Maude Cruz (NPI: [id number])  [facility]  [address] [place] [address]   [address] [place] [address]  Phone: [phone]  Fax: [phone]      Patient Information  MRN: [id number]   Valerie Mejia  1960  [address] [facility] [address]  [phone]     Insurance Information  Payor: [employer] / Plan: [employer] ADVANTRA / Product Type: Medicare HMO /     [id number] - (Medicare Advantage)     Patient Height and Weight 5' 5\" (1.651 m)    Wt Readings from Last 1 Encounters:   06/05/25 (!) 169 kg (372 lb)         Post 4-week Measurements      Body Part Right Left   Ankle  36 cm 39.5 cm   Calf  58 cm 60 cm   Knee 65 cm 70 cm   Mid-Thigh  80 cm 85 cm     Patient outcome after 4-weeks of conservative therapy:   [x] Patient's condition has NOT improved      "

## 2025-06-05 NOTE — LETTER
2025     [facility]  [address]  [id number]  [place] [address]    Patient: Valerie Mejia   YOB: 1960   Date of Visit: 2025       Dear  [facility]:    Thank you for referring Valerie Mejia to me for evaluation. Below are my notes for this consultation.    If you have questions, please do not hesitate to call me. I look forward to following your patient along with you.         Sincerely,        RONALD Hamilton        CC: No Recipients    RONALD Hamilton  2025  3:10 PM  Incomplete  Name: Valerie Mejia      : 1960      MRN: [id number]  Encounter Provider: RONALD Hamilton  Encounter Date: 2025   [facility]: [facility] [place]  :  Assessment & Plan  Lymphedema of both lower extremities  65-year-old female with morbid obesity, BMI 64, prediabetes, HTN, HLD, BRADY, COPD, chronic heart failure, BLE lymphedema L>R    Patient returns to the office to recheck bilateral lower extremity lymphedema.  Second set of measurements      -Stage III lymphedema, L>R with buffalo hump of dorsal foot, skin fibrosis and thickening with a remote history of weeping  -Since last visit she obtained compression pumps and is going to PT for lymphedema therapy  -Significant improvement in lower extremity lymphedema with pump use  -Awaiting Compreflex wraps for daily use.  -No active wounds, cellulitis or lymphangitis  -Continue to use compression pumps, goal BID for 1 hour each  -Lost 20+ Lbs with zepbound     -Additionally,  moisturize skin daily to maintain skin integrity, calf pump exercises, periodic leg elevation  -Follow-up in the office in 3-6 months         [facility]   Pedromagdalena Muñoz   Phone: [phone]  Fax: [phone]   E-mail: [email or web address]    Ordering Provider:  RONALD - Maude Nobles (NPI: [id number])  [facility]  "[address]   [address]  [place] [address]  Phone: [phone]  Fax: [phone]      Patient Information  MRN: [id number]   aVlerie Mejia  1960  [address] [facility] [address]  [phone]     Insurance Information  Payor: [employer] / Plan: [employer] MEDICARE ADVANTRA / Product Type: Medicare HMO /     [id number] - (Medicare Advantage)     Patient Height and Weight 5' 5\" (1.651 m)    Wt Readings from Last 1 Encounters:   06/05/25 (!) 169 kg (372 lb)         Post 4-week Measurements      Body Part Right Left   Ankle  36 cm 39.5 cm   Calf  58 cm 60 cm   Knee 65 cm 70 cm   Mid-Thigh  80 cm 85 cm     Patient outcome after 4-weeks of conservative therapy:   [x] Patient's condition has NOT improved      History of Present Illness  Valerie Mejia is a 65 y.o. female who presents to the office in follow up for lymphedema check.  Since last visit she obtained compression pumps and is using them.  Significant reduction in leg size with use of compression pumps and is also following at PT.  Awaiting compression wraps.  She started Zepbound since last visit and lost 20 pounds.    Today for a follow up visit regarding her compression pumps. She was last seen on 4/22/25. She states that she has already received her pumps and she is loving it. She did state that she has not gotten her wraps yet and it has become difficulty having to go to physical therapy constantly for it to get done.     She states the swelling has lessened last PT visit she measured she was down 3cm. Her legs feel less heavy and tiresome. She denies redness and any wounds.     History obtained from: patient    Review of Systems   Constitutional:  Negative for fatigue.   Cardiovascular:  Positive for leg swelling.   Musculoskeletal:  Negative for gait problem.   Skin:  Negative for color change and wound.     Medical History Reviewed by provider this encounter:  Tobacco  Allergies  Meds  Problems  Med Hx  Surg Hx  Fam " "Hx     .     Objective  I have reviewed and made appropriate changes to the review of systems input by the medical assistant.    Vitals:    25 1428   BP: 132/76   BP Location: Left arm   Patient Position: Sitting   Cuff Size: Large   Pulse: 79   Resp: 16   SpO2: 97%   Weight: (!) 169 kg (372 lb)   Height: 5' 5\" (1.651 m)       Problem List[1]    Past Surgical History[2]    Family History[3]    Social History     Socioeconomic History   • Marital status:      Spouse name: Not on file   • Number of children: Not on file   • Years of education: Not on file   • Highest education level: Not on file   Occupational History   • Not on file   Tobacco Use   • Smoking status: Some Days     Current packs/day: 0.00     Average packs/day: 1 pack/day for 70.6 years (70.0 ttl pk-yrs)     Types: Cigarettes     Start date: 1976     Last attempt to quit: 2023     Years since quittin.9   • Smokeless tobacco: Never   • Tobacco comments:     Patient is at about 3 per day - occasional    Vaping Use   • Vaping status: Former   • Start date: 2018   • Substances: Nicotine   Substance and Sexual Activity   • Alcohol use: Not Currently     Comment: occassional- once a year   • Drug use: Not Currently     Types: Marijuana   • Sexual activity: Not Currently     Birth control/protection: Surgical   Other Topics Concern   • Not on file   Social History Narrative   • Not on file     Social Drivers of Health     Financial Resource Strain: Medium Risk (2022)    Overall Financial Resource Strain (CARDIA)    • Difficulty of Paying Living Expenses: Somewhat hard   Food Insecurity: No Food Insecurity (3/14/2025)    Nursing - Inadequate Food Risk Classification    • Worried About Running Out of Food in the Last Year: Never true    • Ran Out of Food in the Last Year: Never true    • Ran Out of Food in the Last Year: Not on file   Transportation Needs: No Transportation Needs (3/14/2025)    PRAPARE - Transportation    • " "Lack of Transportation (Medical): No    • Lack of Transportation (Non-Medical): No   Physical Activity: Not on file   Stress: Not on file   Social Connections: Not on file   Intimate Partner Violence: Not on file   Housing Stability: Low Risk  (3/14/2025)    Housing Stability Vital Sign    • Unable to Pay for Housing in the Last Year: No    • Number of Times Moved in the Last Year: 1    • Homeless in the Last Year: No       Allergies[4]    Current Medications[5]   /76 (BP Location: Left arm, Patient Position: Sitting, Cuff Size: Large)   Pulse 79   Resp 16   Ht 5' 5\" (1.651 m)   Wt (!) 169 kg (372 lb)   SpO2 97%   BMI 61.90 kg/m²      Physical Exam  Vitals and nursing note reviewed.   Constitutional:       Appearance: She is obese.   HENT:      Head: Normocephalic and atraumatic.     Eyes:      Extraocular Movements: Extraocular movements intact.     Pulmonary:      Effort: Pulmonary effort is normal.     Musculoskeletal:         General: Swelling present.      Comments: Ambulating with a cane  Stage III lymphedema bilaterally, left greater than right with skin fibrosis, thickening     Skin:     General: Skin is warm.     Neurological:      General: No focal deficit present.      Mental Status: She is oriented to person, place, and time.     Psychiatric:         Behavior: Behavior normal.         Administrative Statements  I have spent a total time of 25 minutes in caring for this patient on the day of the visit/encounter including Prognosis, Instructions for management, Patient and family education, Importance of tx compliance, Risk factor reductions, Impressions, Counseling / Coordination of care, and Documenting in the medical record.         [1]  Patient Active Problem List  Diagnosis   • Essential hypertension   • Mixed stress and urge urinary incontinence   • COPD (chronic obstructive pulmonary disease) (HCC)   • Mixed hyperlipidemia   • Morbid obesity with body mass index (BMI) of 60.0 to 69.9 in " adult (HCC)   • Cigarette nicotine dependence without complication   • Obstructive sleep apnea treated with bilevel positive airway pressure (BiPAP)   • Insomnia due to medical condition   • Chronic diastolic congestive heart failure (HCC)   • Lymphedema of both lower extremities   • Pre-diabetes   • Hypercalcemia   • Moderate episode of recurrent major depressive disorder (HCC)   • Ambulatory dysfunction   • Adrenal nodule (HCC)   • Urinary incontinence   • Hyperparathyroidism (HCC)   • Lung cancer screening declined by patient   • Acute pain of left shoulder   [2]  Past Surgical History:  Procedure Laterality Date   • HYSTERECTOMY     • INCONTINENCE SURGERY     • JOINT REPLACEMENT  2015    Right knee   • OOPHORECTOMY Bilateral    • REPLACEMENT TOTAL KNEE Right 2016   • TOOTH EXTRACTION  02/15/2023    2/15/23,3/16/23,4/27/23   • TOTAL ABDOMINAL HYSTERECTOMY W/ BILATERAL SALPINGOOPHORECTOMY  2006   [3]  Family History  Problem Relation Name Age of Onset   • Breast cancer Mother Mom 70   • Aneurysm Mother Mom         aortic   • Dementia Mother Mom    • Heart failure Father Dad    • Diabetes Father Dad         Dad   • Heart disease Father Dad    • Lung cancer Sister Luzma    • No Known Problems Daughter Scotty    • Polycystic ovary syndrome Daughter Beckie    • Dementia Maternal Grandmother Rocío    • Heart disease Maternal Grandfather     • Heart disease Paternal Grandmother     • Heart disease Paternal Grandfather     • No Known Problems Son     • Dementia Maternal Aunt Miroslava    • No Known Problems Maternal Aunt Pat    • No Known Problems Paternal Aunt     • No Known Problems Paternal Aunt     • Sleep apnea Sister Keila    • Cancer Sister Keila    [4]  Allergies  Allergen Reactions   • Lisinopril Cough   [5]    Current Outpatient Medications:   •  albuterol (PROVENTIL HFA,VENTOLIN HFA) 90 mcg/act inhaler, Inhale 2 puffs every 6 (six) hours as needed for wheezing or shortness of breath, Disp: 18 g, Rfl: 1  •   escitalopram (LEXAPRO) 10 mg tablet, Take 1 tablet (10 mg total) by mouth daily, Disp: 90 tablet, Rfl: 1  •  furosemide (LASIX) 80 mg tablet, Take 1 tablet (80 mg total) by mouth 2 (two) times a day, Disp: 180 tablet, Rfl: 1  •  losartan (COZAAR) 25 mg tablet, Take 1 tablet (25 mg total) by mouth daily, Disp: 90 tablet, Rfl: 1  •  metoprolol succinate (TOPROL-XL) 25 mg 24 hr tablet, Take 1 tablet (25 mg total) by mouth daily at bedtime, Disp: 30 tablet, Rfl: 5  •  oxybutynin (DITROPAN) 5 mg tablet, Take 1 tablet (5 mg total) by mouth 3 (three) times a day, Disp: 180 tablet, Rfl: 2  •  spironolactone (ALDACTONE) 25 mg tablet, Take 0.5 tablets (12.5 mg total) by mouth daily, Disp: 90 tablet, Rfl: 0  •  tirzepatide (Zepbound) 5 mg/0.5 mL auto-injector, Inject 0.5 mL (5 mg total) under the skin once a week, Disp: 2 mL, Rfl: 0      Topical Sulfur Applications Pregnancy And Lactation Text: This medication is Pregnancy Category C and has an unknown safety profile during pregnancy. It is unknown if this topical medication is excreted in breast milk.

## 2025-06-05 NOTE — PROGRESS NOTES
"Name: Valerie Mejia      : 1960      MRN: [id number]  Encounter Provider: RONALD Hamilton  Encounter Date: 2025   Encounter department: [facility] [place]  :  Assessment & Plan  Lymphedema of both lower extremities  65-year-old female with morbid obesity, BMI 64, prediabetes, HTN, HLD, BRADY, COPD, chronic heart failure, BLE lymphedema L>R    Patient returns to the office to recheck bilateral lower extremity lymphedema.  Second set of measurements      -Stage III lymphedema, L>R with buffalo hump of dorsal foot, skin fibrosis and thickening with a remote history of weeping  -Since last visit she obtained compression pumps and is going to PT for lymphedema therapy  -Significant improvement in lower extremity lymphedema with pump use  -Awaiting Compreflex wraps for daily use.  -No active wounds, cellulitis or lymphangitis  -Continue to use compression pumps, goal BID for 1 hour each  -Lost 20+ Lbs with zepbound     -Additionally,  moisturize skin daily to maintain skin integrity, calf pump exercises, periodic leg elevation  -Follow-up in the office in 3-6 months to recheck lymphedema           [facility]   Pedromagdalena Muñoz   Phone: [phone]  Fax: [phone]   E-mail: [email or web address]    Ordering Provider:  RONALD - Maude Nobles (NPI: [id number])  [facility]  [address] [place] [address]  Phone: [phone]  Fax: [phone]      Patient Information  MRN: [id number]   Valerie Mejia  1960  [address] [facility] [address]  [phone]     Insurance Information  Payor: [employer] / Plan: AETNA MEDICARE ADVANTRA / Product Type: Medicare HMO /     [id number] - (Medicare Advantage)     Patient Height and Weight 5' 5\" (1.651 m)    Wt Readings from Last 1 Encounters:   25 (!) 169 kg (372 lb)         Post 4-week Measurements      Body Part Right Left   Ankle  36 cm 39.5 cm   Calf  58 cm 60 " "cm   Knee 65 cm 70 cm   Mid-Thigh  80 cm 85 cm     Patient outcome after 4-weeks of conservative therapy:   [x] Patient's condition has NOT improved      History of Present Illness   Valerie Mejia is a 65 y.o. female who presents to the office in follow up for lymphedema check.  Since last visit she obtained compression pumps and is using them.  Significant reduction in leg size with use of compression pumps and is also following at PT.  Awaiting compression wraps.  She started Zepbound since last visit and lost 20 pounds.    Today for a follow up visit regarding her compression pumps. She was last seen on 4/22/25. She states that she has already received her pumps and she is loving it. She did state that she has not gotten her wraps yet and it has become difficulty having to go to physical therapy constantly for it to get done.     She states the swelling has lessened last PT visit she measured she was down 3cm. Her legs feel less heavy and tiresome. She denies redness and any wounds.     History obtained from: patient    Review of Systems   Constitutional:  Negative for fatigue.   Cardiovascular:  Positive for leg swelling.   Musculoskeletal:  Negative for gait problem.   Skin:  Negative for color change and wound.     Medical History Reviewed by provider this encounter:  Tobacco  Allergies  Meds  Problems  Med Hx  Surg Hx  Fam Hx     .     Objective   I have reviewed and made appropriate changes to the review of systems input by the medical assistant.    Vitals:    06/05/25 1428   BP: 132/76   BP Location: Left arm   Patient Position: Sitting   Cuff Size: Large   Pulse: 79   Resp: 16   SpO2: 97%   Weight: (!) 169 kg (372 lb)   Height: 5' 5\" (1.651 m)       Problem List[1]    Past Surgical History[2]    Family History[3]    Social History     Socioeconomic History    Marital status:      Spouse name: Not on file    Number of children: Not on file    Years of education: Not on file    Highest " "education level: Not on file   Occupational History    Not on file   Tobacco Use    Smoking status: Some Days     Current packs/day: 0.00     Average packs/day: 1 pack/day for 70.6 years (70.0 ttl pk-yrs)     Types: Cigarettes     Start date: 1976     Last attempt to quit: 2023     Years since quittin.9    Smokeless tobacco: Never    Tobacco comments:     Patient is at about 3 per day - occasional    Vaping Use    Vaping status: Former    Start date: 2018    Substances: Nicotine   Substance and Sexual Activity    Alcohol use: Not Currently     Comment: occassional- once a year    Drug use: Not Currently     Types: Marijuana    Sexual activity: Not Currently     Birth control/protection: Surgical   Other Topics Concern    Not on file   Social History Narrative    Not on file     Social Drivers of Health     Financial Resource Strain: Medium Risk (2022)    Overall Financial Resource Strain (CARDIA)     Difficulty of Paying Living Expenses: Somewhat hard   Food Insecurity: No Food Insecurity (3/14/2025)    Nursing - Inadequate Food Risk Classification     Worried About Running Out of Food in the Last Year: Never true     Ran Out of Food in the Last Year: Never true     Ran Out of Food in the Last Year: Not on file   Transportation Needs: No Transportation Needs (3/14/2025)    PRAPARE - Transportation     Lack of Transportation (Medical): No     Lack of Transportation (Non-Medical): No   Physical Activity: Not on file   Stress: Not on file   Social Connections: Not on file   Intimate Partner Violence: Not on file   Housing Stability: Low Risk  (3/14/2025)    Housing Stability Vital Sign     Unable to Pay for Housing in the Last Year: No     Number of Times Moved in the Last Year: 1     Homeless in the Last Year: No       Allergies[4]    Current Medications[5]   /76 (BP Location: Left arm, Patient Position: Sitting, Cuff Size: Large)   Pulse 79   Resp 16   Ht 5' 5\" (1.651 m)   Wt (!) 169 " kg (372 lb)   SpO2 97%   BMI 61.90 kg/m²      Physical Exam  Vitals and nursing note reviewed.   Constitutional:       Appearance: She is obese.   HENT:      Head: Normocephalic and atraumatic.     Eyes:      Extraocular Movements: Extraocular movements intact.     Pulmonary:      Effort: Pulmonary effort is normal.     Musculoskeletal:         General: Swelling present.      Comments: Ambulating with a cane  Stage III lymphedema bilaterally, left greater than right with skin fibrosis, thickening     Skin:     General: Skin is warm.     Neurological:      General: No focal deficit present.      Mental Status: She is oriented to person, place, and time.     Psychiatric:         Behavior: Behavior normal.         Administrative Statements   I have spent a total time of 25 minutes in caring for this patient on the day of the visit/encounter including Prognosis, Instructions for management, Patient and family education, Importance of tx compliance, Risk factor reductions, Impressions, Counseling / Coordination of care, and Documenting in the medical record.       [1]   Patient Active Problem List  Diagnosis    Essential hypertension    Mixed stress and urge urinary incontinence    COPD (chronic obstructive pulmonary disease) ([facility])    Mixed hyperlipidemia    Morbid obesity with body mass index (BMI) of 60.0 to 69.9 in adult ([facility])    Cigarette nicotine dependence without complication    Obstructive sleep apnea treated with bilevel positive airway pressure (BiPAP)    Insomnia due to medical condition    Chronic diastolic congestive heart failure (HCC)    Lymphedema of both lower extremities    Pre-diabetes    Hypercalcemia    Moderate episode of recurrent major depressive disorder ([facility])    Ambulatory dysfunction    Adrenal nodule (HCC)    Urinary incontinence    Hyperparathyroidism ([facility])    Lung cancer screening declined by patient    Acute pain of left shoulder   [2]   Past Surgical History:  Procedure Laterality Date     HYSTERECTOMY      INCONTINENCE SURGERY      JOINT REPLACEMENT  2015    Right knee    OOPHORECTOMY Bilateral     REPLACEMENT TOTAL KNEE Right 2016    TOOTH EXTRACTION  02/15/2023    2/15/23,3/16/23,4/27/23    TOTAL ABDOMINAL HYSTERECTOMY W/ BILATERAL SALPINGOOPHORECTOMY  2006   [3]   Family History  Problem Relation Name Age of Onset    Breast cancer Mother Mom 70    Aneurysm Mother Mom         aortic    Dementia Mother Mom     Heart failure Father Dad     Diabetes Father Dad         Dad    Heart disease Father Dad     Lung cancer Sister Luzma     No Known Problems Daughter Scotty     Polycystic ovary syndrome Daughter Beckie     Dementia Maternal Grandmother Rocío     Heart disease Maternal Grandfather      Heart disease Paternal Grandmother      Heart disease Paternal Grandfather      No Known Problems Son      Dementia Maternal Aunt Miroslava     No Known Problems Maternal Aunt Pat     No Known Problems Paternal Aunt      No Known Problems Paternal Aunt      Sleep apnea Sister Keila     Cancer Sister Keila    [4]   Allergies  Allergen Reactions    Lisinopril Cough   [5]   Current Outpatient Medications:     albuterol (PROVENTIL HFA,VENTOLIN HFA) 90 mcg/act inhaler, Inhale 2 puffs every 6 (six) hours as needed for wheezing or shortness of breath, Disp: 18 g, Rfl: 1    escitalopram (LEXAPRO) 10 mg tablet, Take 1 tablet (10 mg total) by mouth daily, Disp: 90 tablet, Rfl: 1    furosemide (LASIX) 80 mg tablet, Take 1 tablet (80 mg total) by mouth 2 (two) times a day, Disp: 180 tablet, Rfl: 1    losartan (COZAAR) 25 mg tablet, Take 1 tablet (25 mg total) by mouth daily, Disp: 90 tablet, Rfl: 1    metoprolol succinate (TOPROL-XL) 25 mg 24 hr tablet, Take 1 tablet (25 mg total) by mouth daily at bedtime, Disp: 30 tablet, Rfl: 5    oxybutynin (DITROPAN) 5 mg tablet, Take 1 tablet (5 mg total) by mouth 3 (three) times a day, Disp: 180 tablet, Rfl: 2    spironolactone (ALDACTONE) 25 mg tablet, Take 0.5 tablets (12.5 mg total)  by mouth daily, Disp: 90 tablet, Rfl: 0    tirzepatide (Zepbound) 5 mg/0.5 mL auto-injector, Inject 0.5 mL (5 mg total) under the skin once a week, Disp: 2 mL, Rfl: 0

## 2025-06-09 ENCOUNTER — TELEPHONE (OUTPATIENT)
Dept: VASCULAR SURGERY | Facility: CLINIC | Age: 65
End: 2025-06-09

## 2025-06-09 NOTE — TELEPHONE ENCOUNTER
[facility] and [facility] said they do not have the compreflex order and asked that I fax it over and he can put the order in today. Order was faxed to [phone]

## 2025-06-09 NOTE — TELEPHONE ENCOUNTER
----- Message from RONALD Norton sent at 6/6/2025  8:34 AM EDT -----  Hi - can you call [facility] and see if they are able to get this patient CompreFlex wraps. They were ordered the first visit and she has not received them. She did receive pumps. ThanksMaude  ----- Message -----  From: Pham Lane OT  Sent: 6/5/2025   4:05 PM EDT  To: RONALD Norton    Hi, I was waiting to hear back where the wraps were ordered. And then I was waiting to hear back from Dylan. I plan to give francescomacy barahonaw's information of the process to get her the wraps since she has not heard from him. Pham  ----- Message -----  From: RONALD Norton  Sent: 6/5/2025   3:14 PM EDT  To: Pham Lane OT    Hi - We were messaging back and forth about Francesco. I saw her in the office today and she seemed to think you were waiting on something from me for her wraps. Let me know if there is anything outstanding that you need me to do. Thanks, Maude

## 2025-06-11 ENCOUNTER — APPOINTMENT (OUTPATIENT)
Dept: OCCUPATIONAL THERAPY | Age: 65
End: 2025-06-11
Attending: NURSE PRACTITIONER
Payer: COMMERCIAL

## 2025-06-18 DIAGNOSIS — I50.9 ACUTE ON CHRONIC CONGESTIVE HEART FAILURE, UNSPECIFIED HEART FAILURE TYPE (HCC): ICD-10-CM

## 2025-06-18 RX ORDER — LOSARTAN POTASSIUM 25 MG/1
25 TABLET ORAL DAILY
Qty: 90 TABLET | Refills: 0 | Status: SHIPPED | OUTPATIENT
Start: 2025-06-18

## 2025-06-24 DIAGNOSIS — R73.01 IMPAIRED FASTING GLUCOSE: ICD-10-CM

## 2025-06-24 DIAGNOSIS — E66.01 MORBID OBESITY WITH BODY MASS INDEX (BMI) OF 60.0 TO 69.9 IN ADULT (HCC): ICD-10-CM

## 2025-06-24 DIAGNOSIS — E78.2 MIXED HYPERLIPIDEMIA: ICD-10-CM

## 2025-06-24 DIAGNOSIS — G47.33 OBSTRUCTIVE SLEEP APNEA TREATED WITH BILEVEL POSITIVE AIRWAY PRESSURE (BIPAP): ICD-10-CM

## 2025-06-24 NOTE — TELEPHONE ENCOUNTER
Medication: tirzepatide (Zepbound) 5 mg/0.5 mL auto-injector     Dose/Frequency: Inject 0.5 mL (5 mg total) under the skin once a week     Quantity: 2ml    Pharmacy: [facility]    Office:   [x] PCP/Provider - Dr Lyons/Aziza Cisse  [] Speciality/Provider -     Does the patient have enough for 3 days?   [x] Yes   [] No - Send as HP to POD

## 2025-06-25 ENCOUNTER — OFFICE VISIT (OUTPATIENT)
Dept: OCCUPATIONAL THERAPY | Age: 65
End: 2025-06-25
Attending: NURSE PRACTITIONER
Payer: COMMERCIAL

## 2025-06-25 DIAGNOSIS — R73.01 IMPAIRED FASTING GLUCOSE: ICD-10-CM

## 2025-06-25 DIAGNOSIS — E66.01 MORBID OBESITY WITH BODY MASS INDEX (BMI) OF 60.0 TO 69.9 IN ADULT (HCC): ICD-10-CM

## 2025-06-25 DIAGNOSIS — G47.33 OBSTRUCTIVE SLEEP APNEA TREATED WITH BILEVEL POSITIVE AIRWAY PRESSURE (BIPAP): Primary | ICD-10-CM

## 2025-06-25 DIAGNOSIS — I89.0 LYMPHEDEMA OF BOTH LOWER EXTREMITIES: Primary | ICD-10-CM

## 2025-06-25 DIAGNOSIS — E78.2 MIXED HYPERLIPIDEMIA: ICD-10-CM

## 2025-06-25 PROCEDURE — 97140 MANUAL THERAPY 1/> REGIONS: CPT | Performed by: OCCUPATIONAL THERAPIST

## 2025-06-25 RX ORDER — TIRZEPATIDE 5 MG/.5ML
5 INJECTION, SOLUTION SUBCUTANEOUS WEEKLY
Qty: 2 ML | Refills: 0 | OUTPATIENT
Start: 2025-06-25

## 2025-06-25 RX ORDER — TIRZEPATIDE 7.5 MG/.5ML
7.5 INJECTION, SOLUTION SUBCUTANEOUS WEEKLY
Qty: 2 ML | Refills: 0 | Status: SHIPPED | OUTPATIENT
Start: 2025-06-25

## 2025-06-26 NOTE — PROGRESS NOTES
"Daily Note     Today's date: 2025  Patient name: Valerie Mejia  : 1960  MRN: [id number]  Referring provider: Maude Nobles CRNP  Dx:   Encounter Diagnosis     ICD-10-CM    1. Lymphedema of both lower extremities  I89.0                      Subjective: \"I feel better and can move better.\"      Objective: See treatment diary below      Assessment: Tolerated treatment well. Pt reporting further weight loss, inc activity, and inc ease with mobility. MLD and fibrotic tissue to ankle and distal LLE addressed. Dec edema noted to LLE. Therapist fabricated thick white foam LLE sleeve to wear when using pump to further dec edema, dec fibrotic tissue. Pt to contact [facility], where vascular sent referral for sigvarus velcro wrap, in order to obtain bets day compression. Patient would benefit from continued OT      Plan: Continue per plan of care.  Progress note during next visit.      Precautions: Universal, controlled CHF, COPD    POC expires Auth Status? (BOMN, approved, pending) Unit limit (Daily) Auth Start date Expiration date PT/OT + Visit Limit?   25          Date of Service     Visits used         Visits remaining         Compression Rx                                    Manual Ther         volumetrics perf        MLD Perf/edu 35' 35' 30' 25' LLE    Compression Bandaging  LLE- stockinette, 2 white foam, 1 10cm, 2 12cm.; edu daughter to perf  stockinette, 1 white foam, 1 10cm, 1 12cm.; B/L Thick white foam cover to LLE to be worn/used with compression pump 15'    Wound Care         Fibrotic tissue   15- at ankle  10- IASTM ankle and distal leg    Ther Ex         Remedial Exercise; HEP provided         Step ups         UBE bike         Nu step    10'     Ther Activity & Self Care         Skin care         Garment Fit/Train     Measured for sigvarus- calf XXL  Foot-M/L                      Pt Education         Edema differential dx perf        Lymphatic A&P perf     " 2030 observed patient sleeping in her room. I called patient's name 2 times and she did not wake. Checked in every hour to see if patient was up and awake.    2240 observed patient awake through monitor. I went to patient's room to offer HS medication. Patient was flat and avoided eye contact.   2245 Patient was compliant with medication and requested a snack. Patient was given snacks. Will continue to monitor with close observation for safety concerns and changes in condition.      Compression options perf

## 2025-07-02 ENCOUNTER — EVALUATION (OUTPATIENT)
Dept: OCCUPATIONAL THERAPY | Age: 65
End: 2025-07-02
Attending: NURSE PRACTITIONER
Payer: COMMERCIAL

## 2025-07-02 DIAGNOSIS — I89.0 LYMPHEDEMA OF BOTH LOWER EXTREMITIES: Primary | ICD-10-CM

## 2025-07-02 PROCEDURE — 97140 MANUAL THERAPY 1/> REGIONS: CPT | Performed by: OCCUPATIONAL THERAPIST

## 2025-07-02 NOTE — PROGRESS NOTES
Lymphedema OT Re-Evaluation/Progress Note    Today's date: 2025  Patient name: Valerie Mejia  : 1960  MRN: [id number]  Referring provider: Maude Nobles CRNP  Dx:   Encounter Diagnosis     ICD-10-CM    1. Lymphedema of both lower extremities  I89.0                      Assessment  Impairments: abnormal coordination, abnormal or restricted ROM, activity intolerance, impaired physical strength, lacks appropriate home exercise program and endurance  Symptom irritability: moderate    Assessment details: REEVAL/Progress Note  25:  Valerie has been progressing and participating in skilled OT lymphedema treatment. Tx has focused on decongesting her LLE from fluid, exercise, dec fibrotic tissue, and weight loss. Pt has loss almost 30# in 3 months; and pt has demo a 17% dec in edema to LLE compared to volumetrics taken on evaluation. Pt now has compression pump and strives for use 2x per day. Therapist also edu pt and pt daughter on compression bandage wrapping to decongest limb- pt has been inconsistent as she cannot bend over to feet and busy family. Vascular sent order for sigvarus compreflex calf wrap to [facility]- awaiting for rep to contact patient regarding order; pt even called 2x herself. Therapist to continue to address pt fibrotic tissue at distal leg, further dec edema by use of daily compression, and edu pt on how to marjorie/doff garments.     Valerie Mejia is a 65 y.o. female with complaints of worsening edema to BLE (L>R), worsening heaviness of BLEs, increased difficulty with functional mobility, stair negotiating, and movement due to swollen legs, and concern for infections.  Patient's presentation is consistent with Stage 3 lymphedema, which is also multifactorial due to dx of CHF, obesity, and DMII with the following impairments found on evaluation: severe edema to BLE (L>R), moderate fibrotic tissue, tight/shiny skin, mild hyperpigmentation at distal leg, +2.5 pitting edema B/L  feet.     Valerie Mejia is a good candidate for occupational lymphedema therapy and would benefit from skilled OT to address the above impairments in order to allow the patient to achieve the goals listed and return to prior level of function. OT plan of care to include short stretch bandage decongestion to LLE first, edu and performance of elevation, MLD, use of pump and most approp compression garment to limb after decongestion, then continue to R side. Compression prescription includes: TBD.     During initial evaluation, education was provided on lymphatic anatomy and physiology, edema differential diagnosis, edema risk reduction behaviors, and healthy habits; decongestion vs maintenance treatment options. Recommendations were made for skin care, elevation of the edematous limb, and ROM and muscle pump exercises to address edema and patient consented to these recommendations. Patient also educated on diagnosis, plan of care and prognosis.  Pt is in agreement with recommended plan of care and goals for therapy, and demonstrates motivation for active participation in proposed plan of care.    Understanding of Dx/Px/POC: good     Prognosis: good    Goals  STG  - Patient and/or caregiver will understand lymphedema precautions to decrease risk of infection and exacerbation of lymphedema- MET  - Patient will develop a tolerance for wearing multi-layer, short stretch bandages betweens sessions to facilitate limb decongestion for at least 8 hours- Not met  - Pt/caregiver will require Min A for multi-layer short stretch bandage wrapping to lower extremity- not met  - Patient will demo decreased edema of 6% of each limb- MET  - Patient will perform HEP of ROM and MLD independently or with minimal assistance to help improve lymphatic flow and venous return- MET     LTG  - Patient will experience increased ROM and functional mobility to aid with ADL's at time of discharge- progressing  - Patient will be independent  with donning and doffing of compression garments which will enable regular daily garment wear at time of discharge, skin maintenance, and self- MLD- TBA   - Patient and/or caregiver will be independent with HEP and lymphedema management to prevent relapse and reduce risk of infection and hospitalizations at time of discharge- progressing  - Patient will demo decreased edema to BLE of 15%- MET    New:  1. Pt will demo a 25% dec in edema to LLE    Plan  Patient would benefit from: skilled occupational therapy    Planned therapy interventions: IASTM, joint mobilization, kinesiology taping, manual therapy, massage, compression, strengthening, stretching, therapeutic activities, therapeutic exercise, functional ROM exercises and home exercise program    Frequency: 1-2x per week.  Duration in weeks: 12  Plan of Care beginning date: 4/30/2025  Plan of Care expiration date: 7/23/2025  Treatment plan discussed with: patient        Subjective/History:  Chief Complaint: worsening heaviness, dec activity mark trying to pick legs up for functional mobility, inc difficulty losing weight  Prior Edema Treatments: +5 years ago  Imaging: April 2021- No DVT  Relevant PMHx:   Past Medical History:   Diagnosis Date   • Acute on chronic heart failure with preserved ejection fraction (HFpEF) ([facility]) 12/02/2022   • Arthritis    • CHF (congestive heart failure) ([facility])    • COPD (chronic obstructive pulmonary disease) ([facility])    • COVID-19 virus infection 02/27/2023   • Depression    • Eating disorder    • Hypertension    • Hypoxia 12/02/2022   • Obesity    • Sleep apnea    • Urinary incontinence    • Visual impairment 2018        Lymphedema special questions  Feeling of heaviness, fullness, pressure? yes  Resolves with elevation: No  Sleeping location? Supine, bed  Change in fit of shoes/clothes/jewelry?yes  History of Cellulitis?  no  History of S/DVT? no  History of Leg wounds? no  Sees a podiatrist regularly for foot care? no  Presence of  "trunk/abdominal/genital edema? yes  Latex Allergy? no    Systems Review:  Personal history of Cancer? No  Cardiovascular hx: Yes  On diuretics? yes  Thyroid dysfunction: No  Diabetes: No  Kidney disease:No   Liver disease: No  Abdominal surgeries: No  Orthopedic injuries/surgeries: Yes R TKR; L knee arthritis   Tobacco/alcohol use?    Pain: moderate to severe B/L knee pain  Function: independence with functional mobility with cane; occ assistance with ADLs as needed form family  Working Status: retired  Exercise status: limited 2* inc pain at knees, arthritis, heaviness to BLE, COPD, SOB with functional mobility  Patient goals: \"to be able to walk without fatigue to exercise and lose weight\"      Objective       07/02/25 1200   LOWER EXTREMITY LEFT   8cm proximal to 1st MTP 26.5 cm   4cm proximal to 1st MTP 25.2 cm   MTP 24.3 cm   Malleoli (cm) 35 cm   M+4 cm 40.7 cm   M+8 cm 46.5 cm   M+12 cm 49 cm   M+16 cm 55.5 cm   M+20 cm 60 cm   M+24 cm 62.3 cm   M+28 cm 63.5 cm   M+32 cm 68.5 cm   M+36 cm 70 cm   M+40 cm 74.5 cm   M+44 cm 75.5 cm   M+48 cm 75.5 cm   M+52 cm 77.5 cm   Volume LE (mL) 25404   Difference from last visit (mL)  3589   Difference from first visit (mL)  3589   Difference from last visit (%)  17   Difference from first visit (%)  17            Precautions: Universal, controlled CHF, COPD    POC expires Auth Status? (BOMN, approved, pending) Unit limit (Daily) Auth Start date Expiration date PT/OT + Visit Limit?   7/23/25            Date of Service 4/30 5/7 5/21 5/28 6/25 7/2   Visits used               Visits remaining               Compression Rx                                                               Manual Ther               volumetrics perf          perf   MLD Perf/edu 35' 35' 30' 25' LLE  LLE 15'   Compression Bandaging   LLE- stockinette, 2 white foam, 1 10cm, 2 12cm.; edu daughter to perf   stockinette, 1 white foam, 1 10cm, 1 12cm.; B/L Thick white foam cover to LLE to be worn/used with " compression pump 15'     Wound Care               Fibrotic tissue     15- at ankle   10- IASTM ankle and distal leg  15- IASTM ankle and distal leg   Ther Ex               Remedial Exercise; HEP provided               Step ups               UBE bike               Nu step       10'       Ther Activity & Self Care               Skin care               Garment Fit/Train         Measured for sigvarus- calf XXL  Foot-M/L                                     Pt Education               Edema differential dx perf             Lymphatic A&P perf             Compression options perf

## 2025-07-02 NOTE — LETTER
2025    RONALD Norton  [address] [place] [id number]    Patient: Valerie Mejia   YOB: 1960   Date of Visit: 2025     Encounter Diagnosis     ICD-10-CM    1. Lymphedema of both lower extremities  I89.0           Dear RONALD Yadav:    Thank you for your recent referral of Valerie Mejia. Please review the attached evaluation summary from Valerie's recent visit.     Please verify that you agree with the plan of care by signing the attached order.     If you have any questions or concerns, please do not hesitate to call.     I sincerely appreciate the opportunity to share in the care of one of your patients and hope to have another opportunity to work with you in the near future.     Sincerely,    Pham Lane, OT      Referring Provider:     I certify that I have read the below Plan of Care and certify the need for these services furnished under this plan of treatment while under my care.                    RONALD Norton  [facility] [address] [address]  Via In Basket        Lymphedema OT Re-Evaluation/Progress Note    Today's date: 2025  Patient name: Valerie Mejia  : 1960  MRN: [id number]  Referring provider: Maude Nobles CRNP  Dx:   Encounter Diagnosis     ICD-10-CM    1. Lymphedema of both lower extremities  I89.0                      Assessment  Impairments: abnormal coordination, abnormal or restricted ROM, activity intolerance, impaired physical strength, lacks appropriate home exercise program and endurance  Symptom irritability: moderate    Assessment details: REEVAL/Progress Note  25:  Valerie has been progressing and participating in skilled OT lymphedema treatment. Tx has focused on decongesting her LLE from fluid, exercise, dec fibrotic tissue, and weight loss. Pt has loss almost 30# in 3 months; and pt has demo a 17% dec in edema to LLE compared to volumetrics taken on evaluation. Pt  now has compression pump and strives for use 2x per day. Therapist also edu pt and pt daughter on compression bandage wrapping to decongest limb- pt has been inconsistent as she cannot bend over to feet and busy family. Vascular sent order for sigvarus compreflex calf wrap to [facility]- awaiting for rep to contact patient regarding order; pt even called 2x herself. Therapist to continue to address pt fibrotic tissue at distal leg, further dec edema by use of daily compression, and edu pt on how to marjorie/doff garments.     Valerie Mejia is a 65 y.o. female with complaints of worsening edema to BLE (L>R), worsening heaviness of BLEs, increased difficulty with functional mobility, stair negotiating, and movement due to swollen legs, and concern for infections.  Patient's presentation is consistent with Stage 3 lymphedema, which is also multifactorial due to dx of CHF, obesity, and DMII with the following impairments found on evaluation: severe edema to BLE (L>R), moderate fibrotic tissue, tight/shiny skin, mild hyperpigmentation at distal leg, +2.5 pitting edema B/L feet.     Valerie Mejia is a good candidate for occupational lymphedema therapy and would benefit from skilled OT to address the above impairments in order to allow the patient to achieve the goals listed and return to prior level of function. OT plan of care to include short stretch bandage decongestion to LLE first, edu and performance of elevation, MLD, use of pump and most approp compression garment to limb after decongestion, then continue to R side. Compression prescription includes: TBD.     During initial evaluation, education was provided on lymphatic anatomy and physiology, edema differential diagnosis, edema risk reduction behaviors, and healthy habits; decongestion vs maintenance treatment options. Recommendations were made for skin care, elevation of the edematous limb, and ROM and muscle pump exercises to address edema and patient  consented to these recommendations. Patient also educated on diagnosis, plan of care and prognosis.  Pt is in agreement with recommended plan of care and goals for therapy, and demonstrates motivation for active participation in proposed plan of care.    Understanding of Dx/Px/POC: good     Prognosis: good    Goals  STG  - Patient and/or caregiver will understand lymphedema precautions to decrease risk of infection and exacerbation of lymphedema- MET  - Patient will develop a tolerance for wearing multi-layer, short stretch bandages betweens sessions to facilitate limb decongestion for at least 8 hours- Not met  - Pt/caregiver will require Min A for multi-layer short stretch bandage wrapping to lower extremity- not met  - Patient will demo decreased edema of 6% of each limb- MET  - Patient will perform HEP of ROM and MLD independently or with minimal assistance to help improve lymphatic flow and venous return- MET     LTG  - Patient will experience increased ROM and functional mobility to aid with ADL's at time of discharge- progressing  - Patient will be independent with donning and doffing of compression garments which will enable regular daily garment wear at time of discharge, skin maintenance, and self- MLD- TBA   - Patient and/or caregiver will be independent with HEP and lymphedema management to prevent relapse and reduce risk of infection and hospitalizations at time of discharge- progressing  - Patient will demo decreased edema to BLE of 15%- MET    New:  1. Pt will demo a 25% dec in edema to LLE    Plan  Patient would benefit from: skilled occupational therapy    Planned therapy interventions: IASTM, joint mobilization, kinesiology taping, manual therapy, massage, compression, strengthening, stretching, therapeutic activities, therapeutic exercise, functional ROM exercises and home exercise program    Frequency: 1-2x per week.  Duration in weeks: 12  Plan of Care beginning date: 4/30/2025  Plan of Care  "expiration date: 7/23/2025  Treatment plan discussed with: patient        Subjective/History:  Chief Complaint: worsening heaviness, dec activity mark trying to pick legs up for functional mobility, inc difficulty losing weight  Prior Edema Treatments: +5 years ago  Imaging: April 2021- No DVT  Relevant PMHx:   Past Medical History:   Diagnosis Date   • Acute on chronic heart failure with preserved ejection fraction (HFpEF) ([facility]) 12/02/2022   • Arthritis    • CHF (congestive heart failure) ([facility])    • COPD (chronic obstructive pulmonary disease) ([facility])    • COVID-19 virus infection 02/27/2023   • Depression    • Eating disorder    • Hypertension    • Hypoxia 12/02/2022   • Obesity    • Sleep apnea    • Urinary incontinence    • Visual impairment 2018        Lymphedema special questions  Feeling of heaviness, fullness, pressure? yes  Resolves with elevation: No  Sleeping location? Supine, bed  Change in fit of shoes/clothes/jewelry?yes  History of Cellulitis?  no  History of S/DVT? no  History of Leg wounds? no  Sees a podiatrist regularly for foot care? no  Presence of trunk/abdominal/genital edema? yes  Latex Allergy? no    Systems Review:  Personal history of Cancer? No  Cardiovascular hx: Yes  On diuretics? yes  Thyroid dysfunction: No  Diabetes: No  Kidney disease:No   Liver disease: No  Abdominal surgeries: No  Orthopedic injuries/surgeries: Yes R TKR; L knee arthritis   Tobacco/alcohol use?    Pain: moderate to severe B/L knee pain  Function: independence with functional mobility with cane; occ assistance with ADLs as needed form family  Working Status: retired  Exercise status: limited 2* inc pain at knees, arthritis, heaviness to BLE, COPD, SOB with functional mobility  Patient goals: \"to be able to walk without fatigue to exercise and lose weight\"      Objective       07/02/25 1200   LOWER EXTREMITY LEFT   8cm proximal to 1st MTP 26.5 cm   4cm proximal to 1st MTP 25.2 cm   MTP 24.3 cm   Malleoli (cm) 35 cm "   M+4 cm 40.7 cm   M+8 cm 46.5 cm   M+12 cm 49 cm   M+16 cm 55.5 cm   M+20 cm 60 cm   M+24 cm 62.3 cm   M+28 cm 63.5 cm   M+32 cm 68.5 cm   M+36 cm 70 cm   M+40 cm 74.5 cm   M+44 cm 75.5 cm   M+48 cm 75.5 cm   M+52 cm 77.5 cm   Volume LE (mL) 09775   Difference from last visit (mL)  3589   Difference from first visit (mL)  3589   Difference from last visit (%)  17   Difference from first visit (%)  17            Precautions: Universal, controlled CHF, COPD    POC expires Auth Status? (BOMN, approved, pending) Unit limit (Daily) Auth Start date Expiration date PT/OT + Visit Limit?   7/23/25            Date of Service 4/30 5/7 5/21 5/28 6/25 7/2   Visits used               Visits remaining               Compression Rx                                                               Manual Ther               volumetrics perf          perf   MLD Perf/edu 35' 35' 30' 25' LLE  LLE 15'   Compression Bandaging   LLE- stockinette, 2 white foam, 1 10cm, 2 12cm.; edu daughter to perf   stockinette, 1 white foam, 1 10cm, 1 12cm.; B/L Thick white foam cover to LLE to be worn/used with compression pump 15'     Wound Care               Fibrotic tissue     15- at ankle   10- IASTM ankle and distal leg  15- IASTM ankle and distal leg   Ther Ex               Remedial Exercise; HEP provided               Step ups               UBE bike               Nu step       10'       Ther Activity & Self Care               Skin care               Garment Fit/Train         Measured for sigvarus- calf XXL  Foot-M/L                                     Pt Education               Edema differential dx perf             Lymphatic A&P perf             Compression options perf

## 2025-07-09 ENCOUNTER — OFFICE VISIT (OUTPATIENT)
Dept: OCCUPATIONAL THERAPY | Age: 65
End: 2025-07-09
Attending: NURSE PRACTITIONER
Payer: COMMERCIAL

## 2025-07-09 DIAGNOSIS — I89.0 LYMPHEDEMA OF BOTH LOWER EXTREMITIES: Primary | ICD-10-CM

## 2025-07-09 PROCEDURE — 97110 THERAPEUTIC EXERCISES: CPT | Performed by: OCCUPATIONAL THERAPIST

## 2025-07-09 PROCEDURE — 97140 MANUAL THERAPY 1/> REGIONS: CPT | Performed by: OCCUPATIONAL THERAPIST

## 2025-07-09 NOTE — PROGRESS NOTES
"Daily Note     Today's date: 2025  Patient name: Valerie Mejia  : 1960  MRN: [id number]  Referring provider: Maude Nobles CRNP  Dx:   Encounter Diagnosis     ICD-10-CM    1. Lymphedema of both lower extremities  I89.0                      Subjective: \"My granddaughters have helped me with the MLD.\"      Objective: See treatment diary below      Assessment: Tolerated treatment well. Therapist perf short stretch bandage wrapping to LLE- + cellona and 10, 10, 12 cm bandages. With bandages on, pt completed 10 min level 5 nustep exerise, and BLE ROM exercise for muscle pump to inc lymphatic flow and venous return. Pt instructed to keep wraps on, until tomorrow if able, and to make sure to use pump with bandages on to further inc lymphatic flow and dec edema. Therapist has been reaching out to Vascular regarding order for sigvaris compreflex wraps to [facility]; will continue to follow up. Patient would benefit from continued OT      Plan: Continue per plan of care.  Edu granddaughter on compression wrapping.      Precautions: Universal, controlled CHF, COPD    POC expires Auth Status? (BOMN, approved, pending) Unit limit (Daily) Auth Start date Expiration date PT/OT + Visit Limit?   25            Date of Service    Visits used               Visits remaining               Compression Rx                                                               Manual Ther               volumetrics           perf   MLD  35' 35' 30' 25' LLE  LLE 15'   Compression Bandaging  15' LLE- stockinette, 2 white foam, 1 10cm, 2 12cm.; edu daughter to perf   stockinette, 1 white foam, 1 10cm, 1 12cm.; B/L Thick white foam cover to LLE to be worn/used with compression pump 15'     Wound Care               Fibrotic tissue     15- at ankle   10- IASTM ankle and distal leg  15- IASTM ankle and distal leg   Ther Ex                 Remedial Exercise; HEP provided  BLE ROM- hip flex, hiip abd/add, knee " flex/ext, ankle pump/rolls, toe scrunches- and then reverse.             Step ups               UBE bike               Nu step  level 5 10'     10'       Ther Activity & Self Care               Skin care               Garment Fit/Train         Measured for sigvarus- calf XXL  Foot-M/L                                     Pt Education               Edema differential dx perf             Lymphatic A&P perf             Compression options perf

## 2025-07-14 ENCOUNTER — TELEPHONE (OUTPATIENT)
Dept: VASCULAR SURGERY | Facility: CLINIC | Age: 65
End: 2025-07-14

## 2025-07-14 NOTE — TELEPHONE ENCOUNTER
Pt's compreflex order has been faxed to [facility] and confirmed that it has been faxed over.

## 2025-07-14 NOTE — TELEPHONE ENCOUNTER
----- Message from RONALD Norton sent at 7/11/2025  4:26 PM EDT -----  Regarding: FW: New order  Can you please resend the compreflex order again - ATTN Vinayak w/ measurements  - XXL for the calf wrap and M/L for the foot wrap. Once faxed please call to follow up that it was received since we have already faxed it again.     Thank you,   Maude  ----- Message -----  From: Pham Lane OT  Sent: 7/3/2025  10:55 AM EDT  To: RONALD Norton  Subject: New order                                        Good Morning Iris Santoro and I were finally able to connect with someone at [facility]. I had hesitated to go through another entity, as I wasn't certain whether her insurance had already been charged. Additionally, [facility] recently updated their policy regarding third-party vendors, limiting what I could do on my end, and it took Iris some time to work through her side as well.  Iris spoke with Vinayak-the person who delivered her pump. He mentioned that Pedro has been on vacation for the past few weeks, which explains the lack of response, and that no order for a Velcro wrap had been received. Vinayak requested that the order be resent and said he would personally keep an eye out for it.  I'm still unclear about [facility]'s process-whether they need to measure her before filling the order-but I went ahead and measured her for the [employer] Compreflex calf wrap. Her size is XXL for the calf wrap and M/L for the foot wrap. Hopefully, having those details will help expedite the process so she can receive what she needs more quickly.  Please let me know if there's anything further you need and I appreciate your patience.    Thank you,  Pham Lane  ----- Message -----  From: RONALD Norton  Sent: 6/5/2025   3:14 PM EDT  To: Pham Lane OT    Hi - We were messaging back and forth about Iris. I saw her in the office today and she seemed to think you were waiting on something from me for her  wraps. Let me know if there is anything outstanding that you need me to do.     Thanks,   Maude

## 2025-07-22 DIAGNOSIS — R73.01 IMPAIRED FASTING GLUCOSE: ICD-10-CM

## 2025-07-22 DIAGNOSIS — E78.2 MIXED HYPERLIPIDEMIA: ICD-10-CM

## 2025-07-22 DIAGNOSIS — G47.33 OBSTRUCTIVE SLEEP APNEA TREATED WITH BILEVEL POSITIVE AIRWAY PRESSURE (BIPAP): ICD-10-CM

## 2025-07-22 DIAGNOSIS — E66.01 MORBID OBESITY WITH BODY MASS INDEX (BMI) OF 60.0 TO 69.9 IN ADULT (HCC): ICD-10-CM

## 2025-07-23 ENCOUNTER — APPOINTMENT (OUTPATIENT)
Dept: OCCUPATIONAL THERAPY | Age: 65
End: 2025-07-23
Attending: NURSE PRACTITIONER
Payer: COMMERCIAL

## 2025-07-23 DIAGNOSIS — E66.01 MORBID OBESITY WITH BODY MASS INDEX (BMI) OF 60.0 TO 69.9 IN ADULT (HCC): ICD-10-CM

## 2025-07-23 DIAGNOSIS — Z91.89 AT RISK FOR SIDE EFFECT OF MEDICATION: Primary | ICD-10-CM

## 2025-07-24 NOTE — TELEPHONE ENCOUNTER
Relayed Dr. Lyons's message regarding CMP.  Patient will go for CMP as per Dr. Lyons. But she is supposed to get her next dosage of Zepbound on Sunday. Would it be ok. Patient is very happy with this medicine and lost almost 40 lb as of May 1st.  If the  Can see the results on Friday can the next dosage be send to the pharmacy on Friday itself. Please let patient know.    Thank you!!

## 2025-07-24 NOTE — TELEPHONE ENCOUNTER
LMOM for patient informing her to complete blood work and that the orders are placed in her chart and to call back the office with any questions.

## 2025-07-25 ENCOUNTER — APPOINTMENT (OUTPATIENT)
Dept: LAB | Age: 65
End: 2025-07-25
Payer: COMMERCIAL

## 2025-07-25 LAB
ALBUMIN SERPL BCG-MCNC: 4.1 G/DL (ref 3.5–5)
ALP SERPL-CCNC: 74 U/L (ref 34–104)
ALT SERPL W P-5'-P-CCNC: 22 U/L (ref 7–52)
ANION GAP SERPL CALCULATED.3IONS-SCNC: 7 MMOL/L (ref 4–13)
AST SERPL W P-5'-P-CCNC: 18 U/L (ref 13–39)
BILIRUB SERPL-MCNC: 0.48 MG/DL (ref 0.2–1)
BUN SERPL-MCNC: 13 MG/DL (ref 5–25)
CALCIUM SERPL-MCNC: 10.2 MG/DL (ref 8.4–10.2)
CHLORIDE SERPL-SCNC: 105 MMOL/L (ref 96–108)
CO2 SERPL-SCNC: 28 MMOL/L (ref 21–32)
CREAT SERPL-MCNC: 0.69 MG/DL (ref 0.6–1.3)
GFR SERPL CREATININE-BSD FRML MDRD: 92 ML/MIN/1.73SQ M
GLUCOSE P FAST SERPL-MCNC: 104 MG/DL (ref 65–99)
POTASSIUM SERPL-SCNC: 4.1 MMOL/L (ref 3.5–5.3)
PROT SERPL-MCNC: 7 G/DL (ref 6.4–8.4)
SODIUM SERPL-SCNC: 140 MMOL/L (ref 135–147)

## 2025-07-25 PROCEDURE — 36415 COLL VENOUS BLD VENIPUNCTURE: CPT | Performed by: INTERNAL MEDICINE

## 2025-07-25 PROCEDURE — 80053 COMPREHEN METABOLIC PANEL: CPT | Performed by: INTERNAL MEDICINE

## 2025-07-25 RX ORDER — TIRZEPATIDE 7.5 MG/.5ML
7.5 INJECTION, SOLUTION SUBCUTANEOUS WEEKLY
Qty: 2 ML | Refills: 0 | Status: SHIPPED | OUTPATIENT
Start: 2025-07-25

## 2025-07-25 NOTE — TELEPHONE ENCOUNTER
Patient called in stating she had her blood work done today and the results are in. She would like to know if Dr. Lyons could review them and call in her script today because she is due for her injection on Sunday. Please advise. Thank you.

## 2025-07-25 NOTE — TELEPHONE ENCOUNTER
Patient calling to check status of refill. She is very worried she may miss her next dose and would like to speak to someone regarding this refill today.     Her CMP was completed earlier. Please review and call to discuss. Thank you!

## 2025-07-30 ENCOUNTER — OFFICE VISIT (OUTPATIENT)
Dept: OCCUPATIONAL THERAPY | Age: 65
End: 2025-07-30
Attending: NURSE PRACTITIONER
Payer: COMMERCIAL

## 2025-07-30 DIAGNOSIS — I50.33 ACUTE ON CHRONIC HEART FAILURE WITH PRESERVED EJECTION FRACTION (HCC): ICD-10-CM

## 2025-07-30 DIAGNOSIS — I10 HYPERTENSION, UNSPECIFIED TYPE: ICD-10-CM

## 2025-07-30 DIAGNOSIS — I89.0 LYMPHEDEMA OF BOTH LOWER EXTREMITIES: Primary | ICD-10-CM

## 2025-07-30 PROCEDURE — 97535 SELF CARE MNGMENT TRAINING: CPT | Performed by: OCCUPATIONAL THERAPIST

## 2025-07-30 PROCEDURE — 97140 MANUAL THERAPY 1/> REGIONS: CPT | Performed by: OCCUPATIONAL THERAPIST

## 2025-07-30 RX ORDER — SPIRONOLACTONE 25 MG/1
12.5 TABLET ORAL DAILY
Qty: 90 TABLET | Refills: 1 | Status: SHIPPED | OUTPATIENT
Start: 2025-07-30